# Patient Record
Sex: FEMALE | Race: WHITE | Employment: OTHER | URBAN - METROPOLITAN AREA
[De-identification: names, ages, dates, MRNs, and addresses within clinical notes are randomized per-mention and may not be internally consistent; named-entity substitution may affect disease eponyms.]

---

## 2017-01-06 ENCOUNTER — APPOINTMENT (OUTPATIENT)
Dept: LAB | Facility: HOSPITAL | Age: 62
End: 2017-01-06
Attending: INTERNAL MEDICINE
Payer: MEDICARE

## 2017-01-06 ENCOUNTER — HOSPITAL ENCOUNTER (OUTPATIENT)
Dept: GENERAL RADIOLOGY | Facility: HOSPITAL | Age: 62
Discharge: HOME | End: 2017-01-06
Attending: INTERNAL MEDICINE
Payer: MEDICARE

## 2017-01-06 ENCOUNTER — TELEPHONE (OUTPATIENT)
Dept: RHEUMATOLOGY | Facility: CLINIC | Age: 62
End: 2017-01-06

## 2017-01-06 ENCOUNTER — OFFICE VISIT (OUTPATIENT)
Dept: RHEUMATOLOGY | Facility: CLINIC | Age: 62
End: 2017-01-06

## 2017-01-06 VITALS
HEART RATE: 83 BPM | WEIGHT: 130 LBS | BODY MASS INDEX: 24.55 KG/M2 | SYSTOLIC BLOOD PRESSURE: 125 MMHG | DIASTOLIC BLOOD PRESSURE: 81 MMHG | HEIGHT: 61 IN

## 2017-01-06 DIAGNOSIS — I73.00 RAYNAUD'S DISEASE WITHOUT GANGRENE: ICD-10-CM

## 2017-01-06 DIAGNOSIS — M25.511 CHRONIC RIGHT SHOULDER PAIN: ICD-10-CM

## 2017-01-06 DIAGNOSIS — G89.29 CHRONIC RIGHT SHOULDER PAIN: ICD-10-CM

## 2017-01-06 DIAGNOSIS — M15.9 GENERALIZED OA: ICD-10-CM

## 2017-01-06 DIAGNOSIS — M25.50 POLYARTHRALGIA: ICD-10-CM

## 2017-01-06 DIAGNOSIS — M15.9 GENERALIZED OA: Primary | ICD-10-CM

## 2017-01-06 LAB
ALBUMIN SERPL BCP-MCNC: 4.3 G/DL (ref 3.5–4.8)
ALBUMIN/GLOB SERPL: 1.6 {RATIO} (ref 1–2)
ALP SERPL-CCNC: 67 U/L (ref 32–100)
ALT SERPL-CCNC: 13 U/L (ref 14–54)
ANION GAP SERPL CALC-SCNC: 10 MMOL/L (ref 0–18)
AST SERPL-CCNC: 15 U/L (ref 15–41)
BILIRUB SERPL-MCNC: 0.6 MG/DL (ref 0.3–1.2)
BUN SERPL-MCNC: 5 MG/DL (ref 8–20)
BUN/CREAT SERPL: 11.4 (ref 10–20)
CALCIUM SERPL-MCNC: 9.3 MG/DL (ref 8.5–10.5)
CHLORIDE SERPL-SCNC: 96 MMOL/L (ref 95–110)
CO2 SERPL-SCNC: 29 MMOL/L (ref 22–32)
CREAT SERPL-MCNC: 0.44 MG/DL (ref 0.5–1.5)
CRP SERPL-MCNC: 0.6 MG/DL (ref 0–0.9)
ERYTHROCYTE [DISTWIDTH] IN BLOOD BY AUTOMATED COUNT: 13.1 % (ref 11–15)
ERYTHROCYTE [SEDIMENTATION RATE] IN BLOOD: 12 MM/HR (ref 0–30)
GLOBULIN PLAS-MCNC: 2.7 G/DL (ref 2.5–3.7)
GLUCOSE SERPL-MCNC: 89 MG/DL (ref 70–99)
HCT VFR BLD AUTO: 39.3 % (ref 35–48)
HGB BLD-MCNC: 13.2 G/DL (ref 12–16)
MCH RBC QN AUTO: 30.7 PG (ref 27–32)
MCHC RBC AUTO-ENTMCNC: 33.6 G/DL (ref 32–37)
MCV RBC AUTO: 91.3 FL (ref 80–100)
OSMOLALITY UR CALC.SUM OF ELEC: 277 MOSM/KG (ref 275–295)
PLATELET # BLD AUTO: 233 K/UL (ref 140–400)
PMV BLD AUTO: 7.5 FL (ref 7.4–10.3)
POTASSIUM SERPL-SCNC: 4.2 MMOL/L (ref 3.3–5.1)
PROT SERPL-MCNC: 7 G/DL (ref 5.9–8.4)
RBC # BLD AUTO: 4.31 M/UL (ref 3.7–5.4)
RHEUMATOID FACT SER QL: <5 IU/ML
SODIUM SERPL-SCNC: 135 MMOL/L (ref 136–144)
URATE SERPL-MCNC: 3 MG/DL (ref 2.1–7.4)
WBC # BLD AUTO: 5 K/UL (ref 4–11)

## 2017-01-06 PROCEDURE — 84550 ASSAY OF BLOOD/URIC ACID: CPT

## 2017-01-06 PROCEDURE — G0463 HOSPITAL OUTPT CLINIC VISIT: HCPCS | Performed by: INTERNAL MEDICINE

## 2017-01-06 PROCEDURE — 85652 RBC SED RATE AUTOMATED: CPT

## 2017-01-06 PROCEDURE — 99214 OFFICE O/P EST MOD 30 MIN: CPT | Performed by: INTERNAL MEDICINE

## 2017-01-06 PROCEDURE — 85027 COMPLETE CBC AUTOMATED: CPT

## 2017-01-06 PROCEDURE — 86140 C-REACTIVE PROTEIN: CPT

## 2017-01-06 PROCEDURE — 36415 COLL VENOUS BLD VENIPUNCTURE: CPT

## 2017-01-06 PROCEDURE — 86200 CCP ANTIBODY: CPT

## 2017-01-06 PROCEDURE — 73030 X-RAY EXAM OF SHOULDER: CPT

## 2017-01-06 PROCEDURE — 80053 COMPREHEN METABOLIC PANEL: CPT

## 2017-01-06 PROCEDURE — 86431 RHEUMATOID FACTOR QUANT: CPT

## 2017-01-06 PROCEDURE — 20610 DRAIN/INJ JOINT/BURSA W/O US: CPT | Performed by: INTERNAL MEDICINE

## 2017-01-06 RX ORDER — TRIAMCINOLONE ACETONIDE 40 MG/ML
40 INJECTION, SUSPENSION INTRA-ARTICULAR; INTRAMUSCULAR ONCE
Status: COMPLETED | OUTPATIENT
Start: 2017-01-06 | End: 2017-01-06

## 2017-01-06 RX ORDER — ACETAMINOPHEN AND CODEINE PHOSPHATE 300; 30 MG/1; MG/1
1 TABLET ORAL EVERY 12 HOURS PRN
Qty: 60 TABLET | Refills: 3 | Status: SHIPPED | OUTPATIENT
Start: 2017-01-06 | End: 2017-03-10

## 2017-01-06 NOTE — PROCEDURES
With paitent's consent, I injected pt's right shoulder with 1ml lidocaine 1 % and 1 ml kenalog 40. It was done under sterile technique using iodine and alcohol swabs and ethyl chloride was used as an anaesthetic spray. Pt.  tolerated it well.

## 2017-01-06 NOTE — PROGRESS NOTES
Daniele Gruber is a 59year old female. HPI:   Patient presents with:  Osteoarthritis: right shoulder pain  Joint Pain: wrist and left ankle pain,finger pain      I saw Farhan Rothman on 4/6/2015. I had last seen her on 12/17/2015.  I had last seen her on   1/ feels ibuporfen makes her jumpy and irritable. No gastirits. She doesn't feel it helps her pain. Her hips are starting to hurt and her right collar bone was hurting and wrists were hurting in the past 2 weeks. No swleling in any other joints.  Not much sw albuterol sulfate (2.5 MG/3ML) 0.083% Inhalation Nebu Soln Take 3 mL (2.5 mg total) by nebulization every 4 (four) hours as needed for Wheezing. Disp: 60 ampule Rfl: 1   QUEtiapine Fumarate 50 MG Oral Tab Take 50 mg by mouth nightly.    Disp:  Rfl: 2   PA VOLUME      80.0 - 100.0 FL      MEAN CORPUSCULAR HEMOGLOBIN      27.0 - 32.0 PG      MEAN CORPUSCULAR HEMOGLOBIN CO      32.0 - 37.0 G/DL      RED CELL DISTRIBUTION WIDTH      11.0 - 15.0 %      PLATELET COUNT (L)      140 - 400 K/UL      MEAN PLATELET VO Bilateral merchant views were obtained today and reveals adequate placement of total knee arthroplasty on the left and narrowing of the patellofemoral compartment on the right. 10/22/2015  Ct chest   1. Coronary artery calcifications.   2. Pulmonary ate [place] to see if pseudogout   - check b/l knee xrays and then mri of knees -   - norco 5/325mg given #45 prn for pain  - prednisone 10mg taper given in the past and that's helped her. - but not this time   - She will return to clinic in 6 months.    - Work

## 2017-01-06 NOTE — PATIENT INSTRUCTIONS
1. Cont. Tylenol #3 every 12 hours - 3 refills given this week   2. Rest right shoudler x 3 days   3. Return to clinic in  6 months. 4. Check righ thsoudler xray   5. Check labs  6.  Physical therpay - right hsoudler x 2

## 2017-01-10 ENCOUNTER — TELEPHONE (OUTPATIENT)
Dept: RHEUMATOLOGY | Facility: CLINIC | Age: 62
End: 2017-01-10

## 2017-01-11 LAB — CCP IGG SERPL-ACNC: 0.7 U/ML (ref 0–6.9)

## 2017-01-11 NOTE — TELEPHONE ENCOUNTER
Not able to reach pt - mobile not able to leave message   - ok to mail pt.  Xray - of shoulder and mail normal lab letter -

## 2017-01-16 RX ORDER — AMLODIPINE BESYLATE 5 MG/1
TABLET ORAL
Qty: 30 TABLET | Refills: 6 | Status: SHIPPED | OUTPATIENT
Start: 2017-01-16 | End: 2017-08-17

## 2017-02-06 ENCOUNTER — TELEPHONE (OUTPATIENT)
Dept: FAMILY MEDICINE CLINIC | Facility: CLINIC | Age: 62
End: 2017-02-06

## 2017-02-06 DIAGNOSIS — Z13.89 ENCOUNTER FOR SURVEILLANCE OF ABNORMAL NEVI: Primary | ICD-10-CM

## 2017-02-06 NOTE — TELEPHONE ENCOUNTER
Pt states that she saw Dr. Anabell Anglin several years ago. She showed him a brown \"spot\" on her back and he was not concerned about it at the time. Pt's daughter looked at it and it has changed in shape. Pt does not want to come in to [facility] for it.  She prefers

## 2017-02-06 NOTE — TELEPHONE ENCOUNTER
Pt is asking to have a referral to see the Derm Doc   Pt stts she has a spot on her back and it has gotten larger now   Pt stts that spot is starting to itch   Good call back [phone]

## 2017-02-07 ENCOUNTER — OFFICE VISIT (OUTPATIENT)
Dept: PHYSICAL THERAPY | Age: 62
End: 2017-02-07
Attending: INTERNAL MEDICINE
Payer: MEDICARE

## 2017-02-07 DIAGNOSIS — M25.511 CHRONIC RIGHT SHOULDER PAIN: Primary | ICD-10-CM

## 2017-02-07 DIAGNOSIS — G89.29 CHRONIC RIGHT SHOULDER PAIN: Primary | ICD-10-CM

## 2017-02-07 DIAGNOSIS — M15.9 GENERALIZED OA: ICD-10-CM

## 2017-02-07 PROCEDURE — 97110 THERAPEUTIC EXERCISES: CPT

## 2017-02-07 PROCEDURE — 97162 PT EVAL MOD COMPLEX 30 MIN: CPT

## 2017-02-07 NOTE — PROGRESS NOTES
PHYSICAL THERAPY EVALUATION:   Referring Physician: Dr. Erinn Carpenter  Date of Onset 1/6/2017    Date of Service: 2/7/2017   Diagnosis: Chronic right shoulder pain (M25.511,G89.29)  Generalized OA (M15.9)    PATIENT SUMMARY     Olimpia Jacome is a 59year old disease    Patient/Family Goal: for the pain to go away           ASSESSMENT:   Eric presented to therapy with diagnosis of Chronic right shoulder pain (M25.511,G89.29) Generalized OA (M15.9) .  Eric presented with following co-morbidities OA, B TKR, dyspnea, --- ---   MIdtraps -- -- NT this day NT   Rhombois -- -- NT NT   Lower taps -- -- NT NT     Special tests:highly irritable no special test done this day  Today’s Treatment :  1. PROM x10 all planes: better  2.  Supine shoulder flexion as tolerated assisted any questions, please contact me at Dept: [phone]    Sincerely,  Electronically signed by therapist: Krissy Mauricio, PT    [de-identified] certification required: Yes  I certify the need for these services furnished under this plan of treatment and while un

## 2017-02-07 NOTE — TELEPHONE ENCOUNTER
Pt called on the status of this referral. Dr. Johana Perera did authorize a referral to derm.  Pt was transferred to Derm to schedule

## 2017-02-13 ENCOUNTER — APPOINTMENT (OUTPATIENT)
Dept: PHYSICAL THERAPY | Age: 62
End: 2017-02-13
Attending: INTERNAL MEDICINE
Payer: MEDICARE

## 2017-02-14 ENCOUNTER — APPOINTMENT (OUTPATIENT)
Dept: PHYSICAL THERAPY | Age: 62
End: 2017-02-14
Attending: INTERNAL MEDICINE
Payer: MEDICARE

## 2017-02-18 RX ORDER — PANTOPRAZOLE SODIUM 40 MG/1
TABLET, DELAYED RELEASE ORAL
Qty: 30 TABLET | Refills: 5 | Status: SHIPPED | OUTPATIENT
Start: 2017-02-18 | End: 2017-08-17

## 2017-02-21 ENCOUNTER — TELEPHONE (OUTPATIENT)
Dept: PHYSICAL THERAPY | Age: 62
End: 2017-02-21

## 2017-02-21 ENCOUNTER — APPOINTMENT (OUTPATIENT)
Dept: PHYSICAL THERAPY | Age: 62
End: 2017-02-21
Attending: INTERNAL MEDICINE
Payer: MEDICARE

## 2017-03-01 ENCOUNTER — HOSPITAL ENCOUNTER (OUTPATIENT)
Dept: MAMMOGRAPHY | Facility: HOSPITAL | Age: 62
Discharge: HOME | End: 2017-03-01
Attending: FAMILY MEDICINE
Payer: MEDICARE

## 2017-03-01 DIAGNOSIS — Z13.9 ENCOUNTER FOR SCREENING: ICD-10-CM

## 2017-03-01 PROCEDURE — 77067 SCR MAMMO BI INCL CAD: CPT

## 2017-03-07 ENCOUNTER — OFFICE VISIT (OUTPATIENT)
Dept: DERMATOLOGY CLINIC | Facility: CLINIC | Age: 62
End: 2017-03-07

## 2017-03-07 DIAGNOSIS — L82.1 SEBORRHEIC KERATOSES: Primary | ICD-10-CM

## 2017-03-07 PROCEDURE — 99212 OFFICE O/P EST SF 10 MIN: CPT | Performed by: DERMATOLOGY

## 2017-03-07 PROCEDURE — G0463 HOSPITAL OUTPT CLINIC VISIT: HCPCS | Performed by: DERMATOLOGY

## 2017-03-07 RX ORDER — DULOXETIN HYDROCHLORIDE 30 MG/1
CAPSULE, DELAYED RELEASE ORAL ONCE AS NEEDED
Refills: 2 | COMMUNITY
Start: 2017-02-27 | End: 2017-07-03

## 2017-03-07 NOTE — PROGRESS NOTES
HPI:     Chief Complaint     Lesion        HPI     Lesion    Additional comments: LOV 9/2015 with SD. Patient with lesion to back x few years. Area is growing. Per patient, \"Dr. Sophie Fountain looked at the area and said it was fine. \" Denies bleeding.  Personal • Ventral hernia      repair   • Appendicitis      Appendectomy   • Cataract      OD, Phaco w/ PC IOL 1999   • Mature cataract 2009     OS   • Capsular opacification 2009     Opacified Capsule OD   • Left inguinal hernia      repair w/ mesh 2009   • Left No     Social History Narrative     Family History   Problem Relation Age of Onset   • Dementia Father      Alzheimer's   • Lipids Father      hyperlipidemia   • Hypertension Father    • Musculo-skelatal Disorder Father      B/L knees replaced   • Cancer B

## 2017-03-10 ENCOUNTER — TELEPHONE (OUTPATIENT)
Dept: RHEUMATOLOGY | Facility: CLINIC | Age: 62
End: 2017-03-10

## 2017-03-10 RX ORDER — ACETAMINOPHEN AND CODEINE PHOSPHATE 300; 30 MG/1; MG/1
1 TABLET ORAL EVERY 8 HOURS PRN
Qty: 90 TABLET | Refills: 3 | OUTPATIENT
Start: 2017-03-10 | End: 2017-07-03

## 2017-03-10 NOTE — TELEPHONE ENCOUNTER
Pharmacy states pt is currently at the pharmacy requesting a refill for Her tylenon 3, states pt is taking it more than frequently.   Pharmacist states pt is willing to pay out of pocket for medication, but is asking if MD can approve for more than one tabl

## 2017-04-23 ENCOUNTER — HOSPITAL ENCOUNTER (OUTPATIENT)
Facility: HOSPITAL | Age: 62
Setting detail: OBSERVATION
Discharge: HOME | End: 2017-04-24
Attending: EMERGENCY MEDICINE | Admitting: INTERNAL MEDICINE
Payer: MEDICARE

## 2017-04-23 ENCOUNTER — APPOINTMENT (OUTPATIENT)
Dept: GENERAL RADIOLOGY | Facility: HOSPITAL | Age: 62
End: 2017-04-23
Attending: EMERGENCY MEDICINE
Payer: MEDICARE

## 2017-04-23 DIAGNOSIS — R07.9 ACUTE CHEST PAIN: Primary | ICD-10-CM

## 2017-04-23 DIAGNOSIS — R00.2 PALPITATIONS: ICD-10-CM

## 2017-04-23 PROBLEM — R73.9 HYPERGLYCEMIA: Status: ACTIVE | Noted: 2017-04-23

## 2017-04-23 PROBLEM — E87.1 HYPONATREMIA: Status: ACTIVE | Noted: 2017-04-23

## 2017-04-23 PROCEDURE — 36415 COLL VENOUS BLD VENIPUNCTURE: CPT

## 2017-04-23 PROCEDURE — 71010 XR CHEST AP PORTABLE  (CPT=71010): CPT

## 2017-04-23 PROCEDURE — 84484 ASSAY OF TROPONIN QUANT: CPT | Performed by: INTERNAL MEDICINE

## 2017-04-23 PROCEDURE — 84484 ASSAY OF TROPONIN QUANT: CPT | Performed by: EMERGENCY MEDICINE

## 2017-04-23 PROCEDURE — 85025 COMPLETE CBC W/AUTO DIFF WBC: CPT | Performed by: EMERGENCY MEDICINE

## 2017-04-23 PROCEDURE — 99285 EMERGENCY DEPT VISIT HI MDM: CPT

## 2017-04-23 PROCEDURE — 96372 THER/PROPH/DIAG INJ SC/IM: CPT

## 2017-04-23 PROCEDURE — 93005 ELECTROCARDIOGRAM TRACING: CPT

## 2017-04-23 PROCEDURE — 80048 BASIC METABOLIC PNL TOTAL CA: CPT | Performed by: EMERGENCY MEDICINE

## 2017-04-23 PROCEDURE — 93010 ELECTROCARDIOGRAM REPORT: CPT | Performed by: EMERGENCY MEDICINE

## 2017-04-23 RX ORDER — DULOXETIN HYDROCHLORIDE 30 MG/1
60 CAPSULE, DELAYED RELEASE ORAL DAILY
Status: DISCONTINUED | OUTPATIENT
Start: 2017-04-23 | End: 2017-04-24

## 2017-04-23 RX ORDER — PANTOPRAZOLE SODIUM 40 MG/1
40 TABLET, DELAYED RELEASE ORAL
Status: DISCONTINUED | OUTPATIENT
Start: 2017-04-23 | End: 2017-04-24

## 2017-04-23 RX ORDER — ASPIRIN 81 MG/1
324 TABLET, CHEWABLE ORAL ONCE
Status: COMPLETED | OUTPATIENT
Start: 2017-04-23 | End: 2017-04-23

## 2017-04-23 RX ORDER — CLONAZEPAM 1 MG/1
2 TABLET ORAL NIGHTLY
Status: DISCONTINUED | OUTPATIENT
Start: 2017-04-23 | End: 2017-04-24

## 2017-04-23 RX ORDER — ONDANSETRON 2 MG/ML
4 INJECTION INTRAMUSCULAR; INTRAVENOUS ONCE
Status: DISCONTINUED | OUTPATIENT
Start: 2017-04-23 | End: 2017-04-23

## 2017-04-23 RX ORDER — QUETIAPINE 25 MG/1
50 TABLET, FILM COATED ORAL NIGHTLY
Status: DISCONTINUED | OUTPATIENT
Start: 2017-04-23 | End: 2017-04-24

## 2017-04-23 RX ORDER — AMLODIPINE BESYLATE 5 MG/1
5 TABLET ORAL
Status: DISCONTINUED | OUTPATIENT
Start: 2017-04-23 | End: 2017-04-24

## 2017-04-23 RX ORDER — ARIPIPRAZOLE 5 MG/1
7.5 TABLET ORAL NIGHTLY
Status: DISCONTINUED | OUTPATIENT
Start: 2017-04-23 | End: 2017-04-24

## 2017-04-23 RX ORDER — ALBUTEROL SULFATE 2.5 MG/3ML
2.5 SOLUTION RESPIRATORY (INHALATION) EVERY 4 HOURS PRN
Status: DISCONTINUED | OUTPATIENT
Start: 2017-04-23 | End: 2017-04-24

## 2017-04-23 RX ORDER — ENOXAPARIN SODIUM 100 MG/ML
40 INJECTION SUBCUTANEOUS DAILY
Status: DISCONTINUED | OUTPATIENT
Start: 2017-04-23 | End: 2017-04-24

## 2017-04-23 RX ORDER — ACETAMINOPHEN AND CODEINE PHOSPHATE 300; 30 MG/1; MG/1
1 TABLET ORAL EVERY 8 HOURS PRN
Status: DISCONTINUED | OUTPATIENT
Start: 2017-04-23 | End: 2017-04-24

## 2017-04-23 NOTE — ED PROVIDER NOTES
Patient Seen in: [facility] AND [facility] Emergency Department    History   Patient presents with:  Palpitations    Stated Complaint: palpitations    HPI    Pt presents to ED after taking extra dose of anxiety medicine at home.  She induced vomiting after that a Comment excisional (fibrocystic changes)    COLONOSCOPY  7/2009    Comment incomplete (chronic constipation)    CATARACT      JESSICA NEEDLE LOCALIZATION W/ SPECIMEN 1 SITE LEFT         Medications :   Acetaminophen-Codeine 300-30 MG Oral Tab,  Take 1 tablet b Packs/Day: 0.00  Years: 10        Types: Cigarettes      Quit date: 01/01/2008    Comment: 1 unit per day    Alcohol Use: No                Review of Systems    Positive for stated complaint: palpitations  Other systems are as noted in HPI.   Con following orders were created for panel order CBC WITH DIFFERENTIAL WITH PLATELET.   Procedure                               Abnormality         Status                     ---------                               -----------         ------

## 2017-04-23 NOTE — CONSULTS
[facility]    PATIENT'S NAME: Fawn Whiting   ATTENDING PHYSICIAN: Rissa Alonzo MD   CONSULTING PHYSICIAN: Babak Griffin Mt, DO   PATIENT ACCOUNT#:   [de-identified]    LOCATION:  [address] [place] [address] #:   [id number]       DATE OF BIRTH:  [birth year] but she was in her 74s when this happened. PHYSICAL EXAMINATION:    GENERAL:  No acute distress. VITAL SIGNS:  Blood pressure 134/59, maximum 146/70. The patient is afebrile, pulse 85, respirations 18. HEENT:  No scleral icterus appreciated.     NEC her telemetry is normal, and we will continue to monitor for any arrhythmias. She should have an outpatient lipid profile to assess her long-term risk for coronary disease.   She has stated she does not have any coronary calcifications to suggest any early

## 2017-04-23 NOTE — ED NOTES
Received pt via wheelchair. Pt is A&OX4. Pt reports taking, \"Clonazepam 2mg; Quetiapine Fumarate 50mg;aripiprazole 5mg\" tonight. Pt reports taking an extra pill of each b/c \"couldn't remember if she took it or not\".  Pt states, \"Once I realized I alrea

## 2017-04-23 NOTE — PLAN OF CARE
Maintains optimal cardiac output and hemodynamic stability Progressing      Absence of cardiac arrhythmias or at baseline Progressing      Minimal or absence of nausea and vomiting Progressing      Maintains adequate nutritional intake (undernourished) Pro

## 2017-04-23 NOTE — H&P
[facility]    PATIENT'S NAME: Marcia Lynn   ATTENDING PHYSICIAN: Kiarra Wayne MD   PATIENT ACCOUNT#:   [id number]    LOCATION:  [address] [place] [address] #:   [id number]       YOB: 1955  ADMISSION DATE:       04/23/2017 rhythm. ASSESSMENT AND PLAN:    1. Chest pain with palpitations. Need to rule out CAD. We will get cardiology evaluation. EKG showed normal sinus rhythm. Could be related to anxiety. 2.   Hypertension which is controlled. 3.   Bipolar disorder.

## 2017-04-24 ENCOUNTER — APPOINTMENT (OUTPATIENT)
Dept: NUCLEAR MEDICINE | Facility: HOSPITAL | Age: 62
End: 2017-04-24
Attending: INTERNAL MEDICINE
Payer: MEDICARE

## 2017-04-24 ENCOUNTER — APPOINTMENT (OUTPATIENT)
Dept: CV DIAGNOSTICS | Facility: HOSPITAL | Age: 62
End: 2017-04-24
Attending: INTERNAL MEDICINE
Payer: MEDICARE

## 2017-04-24 VITALS
BODY MASS INDEX: 23.17 KG/M2 | RESPIRATION RATE: 20 BRPM | WEIGHT: 125.88 LBS | HEIGHT: 62 IN | TEMPERATURE: 97 F | OXYGEN SATURATION: 97 % | DIASTOLIC BLOOD PRESSURE: 72 MMHG | SYSTOLIC BLOOD PRESSURE: 129 MMHG | HEART RATE: 96 BPM

## 2017-04-24 PROCEDURE — 78452 HT MUSCLE IMAGE SPECT MULT: CPT | Performed by: NUCLEAR MEDICINE

## 2017-04-24 PROCEDURE — 93306 TTE W/DOPPLER COMPLETE: CPT | Performed by: INTERNAL MEDICINE

## 2017-04-24 PROCEDURE — 93017 CV STRESS TEST TRACING ONLY: CPT | Performed by: NUCLEAR MEDICINE

## 2017-04-24 PROCEDURE — 96372 THER/PROPH/DIAG INJ SC/IM: CPT

## 2017-04-24 PROCEDURE — 93016 CV STRESS TEST SUPVJ ONLY: CPT | Performed by: INTERNAL MEDICINE

## 2017-04-24 PROCEDURE — 93018 CV STRESS TEST I&R ONLY: CPT | Performed by: INTERNAL MEDICINE

## 2017-04-24 PROCEDURE — 93306 TTE W/DOPPLER COMPLETE: CPT

## 2017-04-24 RX ORDER — 0.9 % SODIUM CHLORIDE 0.9 %
VIAL (ML) INJECTION
Status: COMPLETED
Start: 2017-04-24 | End: 2017-04-24

## 2017-04-24 NOTE — PROGRESS NOTES
[facility]    Progress Note    Ashleigh Swan Patient Status:  Observation    1955 MRN [id number]   [address] [place] [address] [address] 3W/SW Attending [address], [address] [place] [address] Day # 1 PCP Ashly Solomon MD       Subjective:   Candido Bergeron 04/23/2017   CREATSERUM 0.57 04/23/2017   BUN 9 04/23/2017   * 04/23/2017   K 3.9 04/23/2017   CL 97 04/23/2017   CO2 28 04/23/2017   * 04/23/2017   CA 9.7 04/23/2017   ALB 4.3 01/06/2017   ALKPHO 67 01/06/2017   BILT 0.6 01/06/2017   TP 7.0 0

## 2017-04-24 NOTE — PLAN OF CARE
CARDIOVASCULAR - ADULT    • Maintains optimal cardiac output and hemodynamic stability Adequate for Discharge    • Absence of cardiac arrhythmias or at baseline Adequate for Discharge        GASTROINTESTINAL - ADULT    • Minimal or absence of nausea and vo

## 2017-05-03 RX ORDER — IBUPROFEN 600 MG/1
TABLET ORAL
Qty: 60 TABLET | Refills: 2 | Status: SHIPPED | OUTPATIENT
Start: 2017-05-03 | End: 2017-07-19

## 2017-05-10 ENCOUNTER — TELEPHONE (OUTPATIENT)
Dept: PULMONOLOGY | Facility: CLINIC | Age: 62
End: 2017-05-10

## 2017-05-10 RX ORDER — AMOXICILLIN AND CLAVULANATE POTASSIUM 500; 125 MG/1; MG/1
1 TABLET, FILM COATED ORAL 2 TIMES DAILY
Qty: 14 TABLET | Refills: 0 | Status: SHIPPED | OUTPATIENT
Start: 2017-05-10 | End: 2017-06-01

## 2017-05-10 NOTE — TELEPHONE ENCOUNTER
Eric c/o dry cough, feeling like she is choking, chest & throat burning, joint pain, slight headache, intermittent dyspnea when she is aggravated, & feeling warm onset Tuesday night. She stts she was sent home from work today.  Pt denies wheezing or any othe

## 2017-05-10 NOTE — TELEPHONE ENCOUNTER
Pt states she has a cough, and feels throat burning when coughs, no phlegm or discharge, joint pain, headache, requesting if possible to send Rx since per pt due to her insurance problem she cannot come in, Please Advise.

## 2017-05-10 NOTE — TELEPHONE ENCOUNTER
V/o received from Dr. Prakash Delatorre for Augmentin 500-125 mg oral tab take 1 tab po two times daily, Q #14, R #0. Rx sent to pharmacy. Notified pt of Dr. Jodi Bui orders. She verbalized understanding.

## 2017-05-12 ENCOUNTER — OFFICE VISIT (OUTPATIENT)
Dept: FAMILY MEDICINE CLINIC | Facility: CLINIC | Age: 62
End: 2017-05-12

## 2017-05-12 ENCOUNTER — TELEPHONE (OUTPATIENT)
Dept: PULMONOLOGY | Facility: CLINIC | Age: 62
End: 2017-05-12

## 2017-05-12 VITALS
WEIGHT: 129 LBS | TEMPERATURE: 98 F | HEART RATE: 74 BPM | SYSTOLIC BLOOD PRESSURE: 107 MMHG | HEIGHT: 62 IN | RESPIRATION RATE: 22 BRPM | DIASTOLIC BLOOD PRESSURE: 67 MMHG | BODY MASS INDEX: 23.74 KG/M2

## 2017-05-12 DIAGNOSIS — J06.9 ACUTE UPPER RESPIRATORY INFECTION: Primary | ICD-10-CM

## 2017-05-12 PROCEDURE — G0463 HOSPITAL OUTPT CLINIC VISIT: HCPCS | Performed by: PHYSICIAN ASSISTANT

## 2017-05-12 PROCEDURE — 99213 OFFICE O/P EST LOW 20 MIN: CPT | Performed by: PHYSICIAN ASSISTANT

## 2017-05-12 RX ORDER — AZITHROMYCIN 250 MG/1
TABLET, FILM COATED ORAL
Qty: 6 TABLET | Refills: 0 | Status: SHIPPED | OUTPATIENT
Start: 2017-05-12 | End: 2017-06-01

## 2017-05-12 RX ORDER — METHYLPREDNISOLONE 4 MG/1
TABLET ORAL
Qty: 1 KIT | Refills: 0 | Status: SHIPPED | OUTPATIENT
Start: 2017-05-12 | End: 2017-06-01

## 2017-05-12 NOTE — PROGRESS NOTES
HPI:    Patient ID: Kimmie Thomas is a 57year old female. HPI Comments: Patient presents for cough, sore throat, headache, and fever for past two days. She has history of COPD and has been feeling shortness of breath since symptom onset.   She has been tablet Rfl: 6   albuterol sulfate (2.5 MG/3ML) 0.083% Inhalation Nebu Soln Take 3 mL (2.5 mg total) by nebulization every 4 (four) hours as needed for Wheezing. Disp: 60 ampule Rfl: 1   QUEtiapine Fumarate 50 MG Oral Tab Take 50 mg by mouth nightly.    Disp symptomatic treatment as needed. Patient instructed to follow up if symptoms persist or worsen. Advised patient to go to ER with any severe symptoms. No orders of the defined types were placed in this encounter.        Meds This Visit:  Terri Larsen

## 2017-05-12 NOTE — TELEPHONE ENCOUNTER
Pt has Dry Cough & sore throat - requesting for cough syrup. Pls call - aware PJC is not in office. Thank you.

## 2017-05-12 NOTE — TELEPHONE ENCOUNTER
Spoke with pt she stts she is currently on Amoxicillin for cough and sore throat but is requesting rx for cough syrup. Informed pt unfortunately no MDs in clinic today to physically sign rx for cough syrup she will have to call PCP.  Pt reports she does not

## 2017-06-01 ENCOUNTER — HOSPITAL ENCOUNTER (OUTPATIENT)
Age: 62
Discharge: HOME | End: 2017-06-01
Payer: MEDICARE

## 2017-06-01 VITALS
TEMPERATURE: 99 F | DIASTOLIC BLOOD PRESSURE: 73 MMHG | WEIGHT: 190 LBS | OXYGEN SATURATION: 97 % | HEART RATE: 72 BPM | SYSTOLIC BLOOD PRESSURE: 154 MMHG | RESPIRATION RATE: 18 BRPM | HEIGHT: 67 IN | BODY MASS INDEX: 29.82 KG/M2

## 2017-06-01 DIAGNOSIS — J02.0 STREP PHARYNGITIS: Primary | ICD-10-CM

## 2017-06-01 PROCEDURE — 87430 STREP A AG IA: CPT

## 2017-06-01 PROCEDURE — 99214 OFFICE O/P EST MOD 30 MIN: CPT

## 2017-06-01 PROCEDURE — 94640 AIRWAY INHALATION TREATMENT: CPT

## 2017-06-01 RX ORDER — AMOXICILLIN 875 MG/1
875 TABLET, COATED ORAL 2 TIMES DAILY
Qty: 20 TABLET | Refills: 0 | Status: SHIPPED | OUTPATIENT
Start: 2017-06-01 | End: 2017-06-11

## 2017-06-01 RX ORDER — PREDNISONE 20 MG/1
40 TABLET ORAL DAILY
Qty: 8 TABLET | Refills: 0 | Status: SHIPPED | OUTPATIENT
Start: 2017-06-02 | End: 2017-06-06

## 2017-06-01 RX ORDER — PREDNISONE 20 MG/1
60 TABLET ORAL ONCE
Status: COMPLETED | OUTPATIENT
Start: 2017-06-01 | End: 2017-06-01

## 2017-06-01 RX ORDER — ALBUTEROL SULFATE 2.5 MG/3ML
2.5 SOLUTION RESPIRATORY (INHALATION) ONCE
Status: COMPLETED | OUTPATIENT
Start: 2017-06-01 | End: 2017-06-01

## 2017-06-01 NOTE — ED INITIAL ASSESSMENT (HPI)
Reports sore throat and cough starting last night. History of COPD. Also c/o \"chest burning. \"  Chinyere Reeks medication for cough without adequate relief in symptoms.

## 2017-06-01 NOTE — ED PROVIDER NOTES
Patient Seen in: [facility]    History   Patient presents with:  Sore Throat  Cough/URI    Stated Complaint: SORE THROAT/COUGH    HPI    Patient is a 25-year-old with a history of COPD who presents for evaluation of sore hernia re-repair (L inguinal pain)    OTHER SURGICAL HISTORY      Comment Arthrocentesis of the right knee joint    OTHER SURGICAL HISTORY      Comment Arthrocentesis of the left knee joint    BREAST BIOPSY  9/2011    Comment excisional (fibrocystic change Arthritis   • Glaucoma Neg      multiple   • Macular degeneration Neg      multiple   • Colon Cancer Maternal Grandmother    • Asthma Daughter    • Breast Cancer Sister 36   • Ovarian Cancer Sister    • Other[other] [OTHER] Sister      flu H1N1   • Breast normal. No respiratory distress. She has wheezes in the right middle field and the right lower field. She has no rhonchi. She has no rales. She exhibits no tenderness.    Mild expiratory wheezing, no retractions, no rhonchi or rales, no respiratory distress

## 2017-06-07 NOTE — DISCHARGE SUMMARY
[facility]    PATIENT'S NAME: Blade Alberts   ATTENDING PHYSICIAN: Wyatt Rincon MD   PATIENT ACCOUNT#:   [id number]    LOCATION:  [address] [place] [address] #:   [id number]       YOB: 1955  ADMISSION DATE:       04/23/2017

## 2017-07-03 ENCOUNTER — OFFICE VISIT (OUTPATIENT)
Dept: RHEUMATOLOGY | Facility: CLINIC | Age: 62
End: 2017-07-03

## 2017-07-03 VITALS
BODY MASS INDEX: 24.11 KG/M2 | HEIGHT: 62 IN | HEART RATE: 81 BPM | TEMPERATURE: 97 F | SYSTOLIC BLOOD PRESSURE: 122 MMHG | WEIGHT: 131 LBS | DIASTOLIC BLOOD PRESSURE: 77 MMHG

## 2017-07-03 DIAGNOSIS — I73.00 RAYNAUD'S DISEASE WITHOUT GANGRENE: ICD-10-CM

## 2017-07-03 DIAGNOSIS — M15.9 GENERALIZED OA: Primary | ICD-10-CM

## 2017-07-03 DIAGNOSIS — M79.10 MYALGIA: ICD-10-CM

## 2017-07-03 PROCEDURE — 99214 OFFICE O/P EST MOD 30 MIN: CPT | Performed by: INTERNAL MEDICINE

## 2017-07-03 PROCEDURE — G0463 HOSPITAL OUTPT CLINIC VISIT: HCPCS | Performed by: INTERNAL MEDICINE

## 2017-07-03 RX ORDER — PREDNISONE 10 MG/1
TABLET ORAL
Qty: 21 TABLET | Refills: 0 | Status: SHIPPED | OUTPATIENT
Start: 2017-07-03 | End: 2017-07-19

## 2017-07-03 RX ORDER — GABAPENTIN 300 MG/1
300 CAPSULE ORAL NIGHTLY
Qty: 30 CAPSULE | Refills: 1 | Status: SHIPPED | OUTPATIENT
Start: 2017-07-03 | End: 2017-07-19

## 2017-07-03 RX ORDER — ACETAMINOPHEN AND CODEINE PHOSPHATE 300; 30 MG/1; MG/1
1 TABLET ORAL EVERY 8 HOURS PRN
Qty: 90 TABLET | Refills: 1 | Status: SHIPPED | OUTPATIENT
Start: 2017-07-03 | End: 2017-07-19

## 2017-07-03 NOTE — PATIENT INSTRUCTIONS
1. Cont. Tylenol #3 every 8 hours - 1 refills given this week   2. Try prednisone 10mg - Take 6 tabsx 1 day, take 5 tabsx 1 day, take 4 tabsx 1 day,take 3 tabsx 1 day, take 2 tabsx 1 day, take 1 tabsx 1 day, then off  3.  After that Try gabapentin 300mg mat What may interact with this medicine?   Do not take this medicine with any of the following medications:  · other gabapentin products  This medicine may also interact with the following medications:  · alcohol  · antacids  · antihistamines for allergy, coug Visit your doctor or health care professional for regular checks on your progress. You may want to keep a record at home of how you feel your condition is responding to treatment.  You may want to share this information with your doctor or health care profe NOTE:This sheet is a summary. It may not cover all possible information. If you have questions about this medicine, talk to your doctor, pharmacist, or health care provider.  Copyright© 2016 [employer]

## 2017-07-03 NOTE — PROGRESS NOTES
Kayla Walls is a 57year old female. HPI:   Patient presents with:  Osteoarthritis: bilateral knee pain      I saw Amos Brown on 7/3/2017 . Rupert argueta here also on  4/6/2015. I had last seen her on 12/17/2015.  I had last seen her on   1/22/2015 and before got her left knee repalced in 6.8/2015. Wanda. #3 did help in the past.   She feels ibuprofen makes ehr tired. 7/3/2017  She has pain in her knees with sitting or standing. She feels her knees hurt more than before the surgery.    Her knees really hu ONE TABLET BY MOUTH EVERY DAY Disp: 30 tablet Rfl: 6   albuterol sulfate (2.5 MG/3ML) 0.083% Inhalation Nebu Soln Take 3 mL (2.5 mg total) by nebulization every 4 (four) hours as needed for Wheezing.  Disp: 60 ampule Rfl: 1   QUEtiapine Fumarate 50 MG Oral 15 - 41 U/L  15   ALKALINE PHOSPHATASE      32 - 100 U/L  67   Total Bilirubin      0.3 - 1.2 mg/dL  0.6   TOTAL PROTEIN      5.9 - 8.4 g/dL  7.0   Albumin      3.5 - 4.8 g/dL  4.3   GLOBULIN, TOTAL      2.5 - 3.7 g/dL  2.7   A/G RATIO      1.0 - 2.0  1.6 adequate placement of a total knee arthroplasty on the left. . A lateral view of the left knee reveals no fractures or dislocations.  Bilateral merchant views were obtained today and reveals adequate placement of total knee arthroplasty on the left and narro arthroplast 6/1/2015 and right knee 6/2016 -   - prednisone 10mg taper given in the past and that's helped her. - but not this time   - Workup in the past was negative for autoimmune disease and other causes for her Raynaud's and joint pain.  She can take

## 2017-07-19 ENCOUNTER — OFFICE VISIT (OUTPATIENT)
Dept: OBGYN CLINIC | Facility: CLINIC | Age: 62
End: 2017-07-19

## 2017-07-19 VITALS
DIASTOLIC BLOOD PRESSURE: 78 MMHG | SYSTOLIC BLOOD PRESSURE: 138 MMHG | BODY MASS INDEX: 24.73 KG/M2 | WEIGHT: 131 LBS | HEART RATE: 77 BPM | HEIGHT: 61 IN

## 2017-07-19 DIAGNOSIS — Z01.419 ENCOUNTER FOR GYNECOLOGICAL EXAMINATION: Primary | ICD-10-CM

## 2017-07-19 DIAGNOSIS — Z12.31 ENCOUNTER FOR SCREENING MAMMOGRAM FOR BREAST CANCER: ICD-10-CM

## 2017-07-19 PROCEDURE — 99396 PREV VISIT EST AGE 40-64: CPT | Performed by: OBSTETRICS & GYNECOLOGY

## 2017-07-19 PROCEDURE — G0101 CA SCREEN;PELVIC/BREAST EXAM: HCPCS | Performed by: OBSTETRICS & GYNECOLOGY

## 2017-07-20 NOTE — PROGRESS NOTES
Jony Baker is a 57year old female  No LMP recorded. Patient is postmenopausal. here for annual exam.       Last seen 11/19/15. Last pap in 2015 normal with neg HPV. No further bleeding since e-bx in .     In last month, having left pelv Alzheimer's   • Lipids Father      hyperlipidemia   • Hypertension Father    • Musculo-skelatal Disorder Father      B/L knees replaced   • Cancer Brother      liver   • Diabetes Mother    • Heart Disease Mother      CAD   • Stroke Mother      CVA   • Hear discharge. Neurological:  denies headaches, extremity weakness or numbness. Psychiatric: denies depression or anxiety. Endocrine:   denies excessive thirst or urination. Heme/Lymph:  easy bruising or bleeding.     PHYSICAL EXAM:   /78   Pulse 77

## 2017-07-27 ENCOUNTER — OFFICE VISIT (OUTPATIENT)
Dept: INTERNAL MEDICINE CLINIC | Facility: CLINIC | Age: 62
End: 2017-07-27

## 2017-07-27 VITALS
DIASTOLIC BLOOD PRESSURE: 80 MMHG | HEIGHT: 62 IN | BODY MASS INDEX: 23.55 KG/M2 | HEART RATE: 90 BPM | TEMPERATURE: 98 F | SYSTOLIC BLOOD PRESSURE: 119 MMHG | WEIGHT: 128 LBS

## 2017-07-27 DIAGNOSIS — I73.00 RAYNAUD'S DISEASE WITHOUT GANGRENE: ICD-10-CM

## 2017-07-27 DIAGNOSIS — R73.9 HYPERGLYCEMIA: ICD-10-CM

## 2017-07-27 DIAGNOSIS — M17.12 PRIMARY OSTEOARTHRITIS OF LEFT KNEE: ICD-10-CM

## 2017-07-27 DIAGNOSIS — T81.82XA EMPHYSEMA (SUBCUTANEOUS) (SURGICAL) RESULTING FROM A PROCEDURE, INITIAL ENCOUNTER: Primary | ICD-10-CM

## 2017-07-27 DIAGNOSIS — J40 BRONCHITIS: ICD-10-CM

## 2017-07-27 PROCEDURE — G0463 HOSPITAL OUTPT CLINIC VISIT: HCPCS | Performed by: INTERNAL MEDICINE

## 2017-07-27 PROCEDURE — 99214 OFFICE O/P EST MOD 30 MIN: CPT | Performed by: INTERNAL MEDICINE

## 2017-07-27 RX ORDER — AZITHROMYCIN 250 MG/1
TABLET, FILM COATED ORAL
Qty: 6 TABLET | Refills: 0 | Status: SHIPPED | OUTPATIENT
Start: 2017-07-27 | End: 2017-08-21

## 2017-08-03 PROBLEM — R07.9 ACUTE CHEST PAIN: Status: RESOLVED | Noted: 2017-04-23 | Resolved: 2017-08-03

## 2017-08-03 PROBLEM — E87.1 HYPONATREMIA: Status: RESOLVED | Noted: 2017-04-23 | Resolved: 2017-08-03

## 2017-08-03 PROBLEM — R00.2 PALPITATIONS: Status: RESOLVED | Noted: 2017-04-23 | Resolved: 2017-08-03

## 2017-08-03 NOTE — PROGRESS NOTES
HPI:    Patient ID: Rajiv Jasso is a 57year old female.     HPI    Establish care    cough, scratchy throat  , bilateral ear itching    /80 (BP Location: Right arm, Patient Position: Sitting, Cuff Size: adult)   Pulse 90   Temp 98.3 °F (36.8 °C) (Or Disp: 30 tablet Rfl: 6   albuterol sulfate (2.5 MG/3ML) 0.083% Inhalation Nebu Soln Take 3 mL (2.5 mg total) by nebulization every 4 (four) hours as needed for Wheezing.  Disp: 60 ampule Rfl: 1   QUEtiapine Fumarate 50 MG Oral Tab Take 50 mg by mouth nightl • Heart Attack Mother      quadruple bypass surgery/MI   • Arthritis Mother      possible Rheumatoid Arthritis   • Colon Cancer Maternal Grandmother    • Asthma Daughter    • Breast Cancer Sister 36   • Ovarian Cancer Sister    • Other Tawanda Leon Sister (J40) Bronchitis  Plan: PFT MISCELLANEOUS      Acute RX zithrmax  otc deocngestant and proper use f inhler    (R73.9) Hyperglycemia  Plan: low carb  Diet avoid concnerted sweees    (M17.12) Primary osteoarthritis of left knee  Plan: chronic Monitor

## 2017-08-18 NOTE — TELEPHONE ENCOUNTER
Last seen 12/12/16 with RTC in 11/2017  Last refilled 1/16/17 and 2/18/17  Sent to [address] [place] [address] for sign off

## 2017-08-19 RX ORDER — AMLODIPINE BESYLATE 5 MG/1
TABLET ORAL
Qty: 30 TABLET | Refills: 3 | Status: SHIPPED | OUTPATIENT
Start: 2017-08-19 | End: 2017-12-07

## 2017-08-19 RX ORDER — PANTOPRAZOLE SODIUM 40 MG/1
TABLET, DELAYED RELEASE ORAL
Qty: 30 TABLET | Refills: 3 | Status: SHIPPED | OUTPATIENT
Start: 2017-08-19 | End: 2017-12-07

## 2017-08-21 ENCOUNTER — TELEPHONE (OUTPATIENT)
Dept: RHEUMATOLOGY | Facility: CLINIC | Age: 62
End: 2017-08-21

## 2017-08-21 ENCOUNTER — HOSPITAL ENCOUNTER (OUTPATIENT)
Dept: GENERAL RADIOLOGY | Facility: HOSPITAL | Age: 62
Discharge: HOME | End: 2017-08-21
Attending: INTERNAL MEDICINE
Payer: MEDICARE

## 2017-08-21 ENCOUNTER — OFFICE VISIT (OUTPATIENT)
Dept: RHEUMATOLOGY | Facility: CLINIC | Age: 62
End: 2017-08-21

## 2017-08-21 VITALS
SYSTOLIC BLOOD PRESSURE: 112 MMHG | WEIGHT: 133.81 LBS | DIASTOLIC BLOOD PRESSURE: 62 MMHG | HEART RATE: 84 BPM | TEMPERATURE: 98 F | BODY MASS INDEX: 25.27 KG/M2 | HEIGHT: 61 IN | OXYGEN SATURATION: 98 %

## 2017-08-21 DIAGNOSIS — M79.10 MYALGIA: ICD-10-CM

## 2017-08-21 DIAGNOSIS — M54.50 LOW BACK PAIN WITHOUT SCIATICA, UNSPECIFIED BACK PAIN LATERALITY, UNSPECIFIED CHRONICITY: ICD-10-CM

## 2017-08-21 DIAGNOSIS — I73.00 RAYNAUD'S DISEASE WITHOUT GANGRENE: ICD-10-CM

## 2017-08-21 DIAGNOSIS — M15.9 GENERALIZED OA: Primary | ICD-10-CM

## 2017-08-21 PROCEDURE — 99212 OFFICE O/P EST SF 10 MIN: CPT | Performed by: INTERNAL MEDICINE

## 2017-08-21 PROCEDURE — 99214 OFFICE O/P EST MOD 30 MIN: CPT | Performed by: INTERNAL MEDICINE

## 2017-08-21 PROCEDURE — 72202 X-RAY EXAM SI JOINTS 3/> VWS: CPT | Performed by: INTERNAL MEDICINE

## 2017-08-21 RX ORDER — AMITRIPTYLINE HYDROCHLORIDE 10 MG/1
10 TABLET, FILM COATED ORAL NIGHTLY
Qty: 30 TABLET | Refills: 1 | Status: SHIPPED | OUTPATIENT
Start: 2017-08-21 | End: 2017-09-27

## 2017-08-21 RX ORDER — ACETAMINOPHEN AND CODEINE PHOSPHATE 300; 30 MG/1; MG/1
1 TABLET ORAL EVERY 8 HOURS PRN
Qty: 90 TABLET | Refills: 3 | Status: SHIPPED | OUTPATIENT
Start: 2017-08-21 | End: 2017-11-30

## 2017-08-21 RX ORDER — PREDNISONE 10 MG/1
TABLET ORAL
Qty: 21 TABLET | Refills: 0 | Status: SHIPPED | OUTPATIENT
Start: 2017-08-21 | End: 2017-08-21

## 2017-08-21 RX ORDER — IBUPROFEN 600 MG/1
TABLET ORAL
Qty: 60 TABLET | Refills: 2 | Status: SHIPPED | OUTPATIENT
Start: 2017-08-21 | End: 2017-09-30

## 2017-08-21 NOTE — PATIENT INSTRUCTIONS
1. Cont. Tylenol #3 every 8 hours - 3 refills given this week   2. Get si joint xray   3. Try amitryptline 10mg at night   4. Return to clinic in  1  months. Amitriptyline tablets  What is this medicine?   AMITRIPTYLINE (a kathryn TRIP ti chaim) is used to tr diarrhea  · nausea, vomiting  · weight gain or loss  What may interact with this medicine?   Do not take this medicine with any of the following medications:  · arsenic trioxide  · certain medicines used to regulate abnormal heartbeat or to treat other hear for while using this medicine? Tell your doctor if your symptoms do not get better or if they get worse. Visit your doctor or health care professional for regular checks on your progress.  Because it may take several weeks to see the full effects of this m you cannot avoid being in the sun, wear protective clothing and use sunscreen. Do not use sun lamps or tanning beds/booths. Date Last Reviewed:   NOTE:This sheet is a summary. It may not cover all possible information.  If you have questions about this med

## 2017-08-21 NOTE — TELEPHONE ENCOUNTER
LOV: 7-3-17  Last Refilled:#60, 2rfs 7/19/17    Future Appointments  Date Time Provider Cirilo Springer   8/21/2017 2:30 PM Coral Kenny MD [facility]   8/23/2017 10:15  [facility] PFT ROOM [address] [place] [address] [address]       Please advise.

## 2017-08-21 NOTE — PROGRESS NOTES
Kimmie Thomas is a 57year old female. HPI:   No chief complaint on file. I saw Susan Newmanas on 8/21/2017. I had last seen her on 7/3/2017 . Rupert argueta here also on  4/6/2015. I had last seen her on 12/17/2015.  I had last seen her on   1/22/2015 and ramya got her left knee repalced in 6.8/2015. Wanda. #3 did help in the past.   She feels ibuprofen makes ehr tired. 7/3/2017  She has pain in her knees with sitting or standing. She feels her knees hurt more than before the surgery.    Her knees really hu Disp: 30 tablet Rfl: 3   azithromycin (ZITHROMAX Z-GIRISH) 250 MG Oral Tab Take two tablets by mouth today, then one tablet daily.  Disp: 6 tablet Rfl: 0   albuterol sulfate (2.5 MG/3ML) 0.083% Inhalation Nebu Soln Take 3 mL (2.5 mg total) by nebulization ever CALCIUM      8.5 - 10.5 mg/dL 9.7 9.3   ALT (SGPT)      14 - 54 U/L  13 (L)   AST (SGOT)      15 - 41 U/L  15   ALKALINE PHOSPHATASE      32 - 100 U/L  67   Total Bilirubin      0.3 - 1.2 mg/dL  0.6   TOTAL PROTEIN      5.9 - 8.4 g/dL  7.0   Albumin PROTEIN      0.0 - 0.9 mg/dL 0.6 0.7     12/18/2015 - b/l hand xray   Osteoarthritis of both hands are roughly symmetric involvement of the first  carpometacarpal joints bilaterally and moderate to severe degenerative  changes of the PIP/PIP joints of the with this   She is not feeling better -   - renew vipul.l #3 #90 with 3 refills   - right shoudler pain - s/o rigt shoudler injection on 1/2017   - take pred 10mg - Take 6 tabsx 1 day, take 5 tabsx 1 day, take 4 tabsx 1 day,take 3 tabsx 1 day, take 2 tabs

## 2017-08-28 NOTE — TELEPHONE ENCOUNTER
LOV: 8-21-17  Last Refilled:#30, 1rf 7/3/17    Future Appointments  Date Time Provider Cirilo Springer   8/30/2017 1:00 PM [address] [place] [address] PFT ROOM [address] [place] [address] [address]       Please advise.

## 2017-08-30 ENCOUNTER — HOSPITAL ENCOUNTER (OUTPATIENT)
Dept: RESPIRATORY THERAPY | Facility: HOSPITAL | Age: 62
End: 2017-08-30
Attending: INTERNAL MEDICINE
Payer: MEDICARE

## 2017-08-30 RX ORDER — GABAPENTIN 300 MG/1
CAPSULE ORAL
Qty: 30 CAPSULE | Refills: 1 | Status: SHIPPED | OUTPATIENT
Start: 2017-08-30 | End: 2017-09-27

## 2017-09-01 ENCOUNTER — HOSPITAL ENCOUNTER (OUTPATIENT)
Dept: RESPIRATORY THERAPY | Facility: HOSPITAL | Age: 62
Discharge: HOME | End: 2017-09-01
Attending: INTERNAL MEDICINE
Payer: MEDICARE

## 2017-09-01 DIAGNOSIS — T81.82XA EMPHYSEMA (SUBCUTANEOUS) (SURGICAL) RESULTING FROM A PROCEDURE, INITIAL ENCOUNTER: ICD-10-CM

## 2017-09-01 DIAGNOSIS — J40 BRONCHITIS: ICD-10-CM

## 2017-09-01 PROCEDURE — 94726 PLETHYSMOGRAPHY LUNG VOLUMES: CPT | Performed by: INTERNAL MEDICINE

## 2017-09-01 PROCEDURE — 94060 EVALUATION OF WHEEZING: CPT | Performed by: INTERNAL MEDICINE

## 2017-09-01 PROCEDURE — 94729 DIFFUSING CAPACITY: CPT | Performed by: INTERNAL MEDICINE

## 2017-09-05 ENCOUNTER — TELEPHONE (OUTPATIENT)
Dept: RHEUMATOLOGY | Facility: CLINIC | Age: 62
End: 2017-09-05

## 2017-09-05 NOTE — TELEPHONE ENCOUNTER
PHARMACY SAID THEY HAVE BEEN CALLING FOR DRUG INTERACTION BETWEEN THE  AMITRIPTYLINE AND THE DULOXETINE SINCE 8/21

## 2017-09-12 NOTE — ADDENDUM NOTE
Encounter addended by: Ata Layton MD on: 9/12/2017 12:03 AM<BR>    Actions taken: Sign clinical note, Charge Capture section accepted

## 2017-09-12 NOTE — ADDENDUM NOTE
Encounter addended by: Jose Canela on: 9/12/2017  8:06 AM<BR>    Actions taken: Results reviewed in IB, Letter status changed

## 2017-09-12 NOTE — PROCEDURES
[facility]    Patient's Name Kamlesh Pickens MRN [id number]    1955 Pulmonologist Jordana Jimenez MD   [facility] RESPIRATORY THERAPY PCP Parth Sotomayor MD     IMPRESSION:    The PFTs are Abnormal.    There is a very mild

## 2017-09-21 ENCOUNTER — HOSPITAL ENCOUNTER (OUTPATIENT)
Age: 62
Discharge: HOME | End: 2017-09-21
Payer: MEDICARE

## 2017-09-21 VITALS
TEMPERATURE: 98 F | WEIGHT: 124 LBS | BODY MASS INDEX: 23.41 KG/M2 | RESPIRATION RATE: 20 BRPM | DIASTOLIC BLOOD PRESSURE: 67 MMHG | OXYGEN SATURATION: 97 % | HEIGHT: 61 IN | SYSTOLIC BLOOD PRESSURE: 124 MMHG | HEART RATE: 88 BPM

## 2017-09-21 DIAGNOSIS — J02.0 STREP THROAT: Primary | ICD-10-CM

## 2017-09-21 LAB — S PYO AG THROAT QL: POSITIVE

## 2017-09-21 PROCEDURE — 99213 OFFICE O/P EST LOW 20 MIN: CPT

## 2017-09-21 PROCEDURE — 87430 STREP A AG IA: CPT

## 2017-09-21 RX ORDER — AMOXICILLIN 500 MG/1
500 TABLET, FILM COATED ORAL 2 TIMES DAILY
Qty: 20 TABLET | Refills: 0 | Status: SHIPPED | OUTPATIENT
Start: 2017-09-21 | End: 2017-10-01

## 2017-09-21 NOTE — ED PROVIDER NOTES
Patient presents with:  Sore Throat      HPI:     Maye Francisco is a 57year old female who presents for evaluation of a chief complaint of sore throat and cough. Patient reports symptoms started 3 days ago.   Last night patient began to have worsening symp with cough and sore throat. On examination patient is nontoxic in appearance, easy respirations and swallowing secretions without any difficulty. Rapid strep reviewed and is positive.   Will treat patient with amoxicillin twice daily ×10 days and have her

## 2017-09-21 NOTE — ED INITIAL ASSESSMENT (HPI)
PATIENT ARRIVED AMBULATORY TO ROOM C/O A SORE THROAT THAT STARTED 2 DAYS AGO. +CONGESTED COUGH. NO FEVERS. NO N/V/D. NO NASAL CONGESTION.

## 2017-09-27 ENCOUNTER — OFFICE VISIT (OUTPATIENT)
Dept: RHEUMATOLOGY | Facility: CLINIC | Age: 62
End: 2017-09-27

## 2017-09-27 VITALS
TEMPERATURE: 99 F | BODY MASS INDEX: 23.22 KG/M2 | HEIGHT: 61 IN | HEART RATE: 79 BPM | WEIGHT: 123 LBS | SYSTOLIC BLOOD PRESSURE: 138 MMHG | DIASTOLIC BLOOD PRESSURE: 89 MMHG

## 2017-09-27 DIAGNOSIS — M15.9 GENERALIZED OA: Primary | ICD-10-CM

## 2017-09-27 DIAGNOSIS — I73.00 RAYNAUD'S DISEASE WITHOUT GANGRENE: ICD-10-CM

## 2017-09-27 DIAGNOSIS — M54.50 LOW BACK PAIN WITHOUT SCIATICA, UNSPECIFIED BACK PAIN LATERALITY, UNSPECIFIED CHRONICITY: ICD-10-CM

## 2017-09-27 DIAGNOSIS — M25.50 HYPERMOBILITY ARTHRALGIA: ICD-10-CM

## 2017-09-27 PROCEDURE — 99214 OFFICE O/P EST MOD 30 MIN: CPT | Performed by: INTERNAL MEDICINE

## 2017-09-27 PROCEDURE — G0463 HOSPITAL OUTPT CLINIC VISIT: HCPCS | Performed by: INTERNAL MEDICINE

## 2017-09-27 RX ORDER — AMITRIPTYLINE HYDROCHLORIDE 10 MG/1
20 TABLET, FILM COATED ORAL NIGHTLY
Qty: 60 TABLET | Refills: 3 | Status: SHIPPED | OUTPATIENT
Start: 2017-09-27 | End: 2020-07-08

## 2017-09-27 NOTE — PATIENT INSTRUCTIONS
1. Cont. Tylenol #3 every 8 hours - prn -   2. Can try  amitryptline -  Increase 20mg at night - if too many side effects - go back to 10mg at night   3. Return to clinic in  3-4 months.

## 2017-09-27 NOTE — PROGRESS NOTES
Rajiv Jasso is a 57year old female. HPI:   Patient presents with:  Osteoarthritis: knee and back pain  Raynaud's Syndrome      I saw Tu Baumann on 9/27/2017. I had last seen her on 8/21/2017. I had last seen her on 7/3/2017 .  Rupert ewas here also on  4/6 started working   She has hand pain and wrist pain and elft ankel pain as well. She also got her left knee repalced in 6.8/2015. Wanda. #3 did help in the past.   She feels ibuprofen makes ehr tired.      7/3/2017  She has pain in her knees with sittin 1/14/2015   • Rib fracture 2015    left side   • Seborrheic keratoses 3/7/2017   • Sx.6/1/15, CPT. 48480, L Total Knee, w/, Global Exp.8/30/15 6/8/2015   • Sx.6/8/16, CPT. 59662, R Total Knee Replacement, w/, Global Exp.9/6/16 1/14/2016 no murmurs  RS: CTAB, no crackles, no rhonchi  ABD: Soft Non tender,   Joint exam:  Right shoulder not tender   tender right knee warm - mild swelling - - small lateral loculation  Hyperpigmentation over right knee - but skin is lax - not tight   Tender  , VOLUME      7.4 - 10.3 fL 7.1 (L) 7.5   Neutrophils %      % 48    Lymphocytes %      % 42    Monocytes %      % 8    Eosinophils %      % 1    Basophils %      % 1    Neutrophils Absolute      1.8 - 7.7 K/UL 2.2    Lymphocytes Absolute      1.0 - 4.0 K/UL mm  diameter intra-articular loose body in the left glenohumeral joint. 4/24/2015 - mri left knee  1. No acute fractures or dislocations. 2. Tricompartmental degenerative changes of the left knee. 3. Degenerative changes of the left hip.     4/17/2015 this last time with her knees. - Workup in the past was negative for autoimmune disease and other causes for her Raynaud's and joint pain. She can take tylenol #3 prn for the pain as well. 2. Bipolar  - on cymbalta and kaitlin, dr. Trista Bright  f/u  3.  Ra

## 2017-10-02 RX ORDER — IBUPROFEN 600 MG/1
TABLET ORAL
Qty: 60 TABLET | Refills: 3 | Status: SHIPPED | OUTPATIENT
Start: 2017-10-02 | End: 2018-01-29

## 2017-10-02 NOTE — TELEPHONE ENCOUNTER
LOV: 9/27/17  No future appointments.   Labs:   Component      Latest Ref Rng & Units 1/6/2017   Glucose      70 - 99 mg/dL 89   Sodium      136 - 144 mmol/L 135 (L)   Potassium      3.3 - 5.1 mmol/L 4.2   Chloride      95 - 110 mmol/L 96   Carbon Dioxide, Total      22 - 32 mmol/L 29   BUN      8 - 20 mg/dL 5 (L)   CREATININE      0.50 - 1.50 mg/dL 0.44 (L)   CALCIUM      8.5 - 10.5 mg/dL 9.3   ALT (SGPT)      14 - 54 U/L 13 (L)   AST (SGOT)      15 - 41 U/L 15   ALKALINE PHOSPHATASE      32 - 100 U/L 67   Total Bilirubin      0.3 - 1.2 mg/dL 0.6   TOTAL PROTEIN      5.9 - 8.4 g/dL 7.0   Albumin      3.5 - 4.8 g/dL 4.3   GLOBULIN, TOTAL      2.5 - 3.7 g/dL 2.7   A/G RATIO      1.0 - 2.0 1.6   ANION GAP      0 - 18 10   BUN/CREA RATIO      10.0 - 20.0 11.4   CALCULATED OSMOLALITY      275 - 295 mOsm/kg 277   GFR, Non-      >=60 >60   GFR, -American      >=60 >60

## 2017-10-04 ENCOUNTER — HOSPITAL ENCOUNTER (OUTPATIENT)
Dept: CT IMAGING | Facility: HOSPITAL | Age: 62
Discharge: HOME | End: 2017-10-04
Attending: INTERNAL MEDICINE | Admitting: INTERNAL MEDICINE
Payer: MEDICARE

## 2017-10-04 ENCOUNTER — OFFICE VISIT (OUTPATIENT)
Dept: INTERNAL MEDICINE CLINIC | Facility: CLINIC | Age: 62
End: 2017-10-04

## 2017-10-04 VITALS
SYSTOLIC BLOOD PRESSURE: 128 MMHG | HEIGHT: 61 IN | TEMPERATURE: 98 F | DIASTOLIC BLOOD PRESSURE: 75 MMHG | WEIGHT: 128 LBS | HEART RATE: 72 BPM | BODY MASS INDEX: 24.17 KG/M2

## 2017-10-04 DIAGNOSIS — R10.32 LLQ PAIN: ICD-10-CM

## 2017-10-04 DIAGNOSIS — K40.91 UNILATERAL RECURRENT INGUINAL HERNIA WITHOUT OBSTRUCTION OR GANGRENE: ICD-10-CM

## 2017-10-04 DIAGNOSIS — K40.91 UNILATERAL RECURRENT INGUINAL HERNIA WITHOUT OBSTRUCTION OR GANGRENE: Primary | ICD-10-CM

## 2017-10-04 PROCEDURE — 82565 ASSAY OF CREATININE: CPT

## 2017-10-04 PROCEDURE — G0463 HOSPITAL OUTPT CLINIC VISIT: HCPCS | Performed by: INTERNAL MEDICINE

## 2017-10-04 PROCEDURE — 74177 CT ABD & PELVIS W/CONTRAST: CPT | Performed by: INTERNAL MEDICINE

## 2017-10-04 PROCEDURE — G0008 ADMIN INFLUENZA VIRUS VAC: HCPCS | Performed by: INTERNAL MEDICINE

## 2017-10-04 PROCEDURE — 90732 PPSV23 VACC 2 YRS+ SUBQ/IM: CPT | Performed by: INTERNAL MEDICINE

## 2017-10-04 PROCEDURE — G0009 ADMIN PNEUMOCOCCAL VACCINE: HCPCS | Performed by: INTERNAL MEDICINE

## 2017-10-04 PROCEDURE — 99214 OFFICE O/P EST MOD 30 MIN: CPT | Performed by: INTERNAL MEDICINE

## 2017-10-04 PROCEDURE — 90686 IIV4 VACC NO PRSV 0.5 ML IM: CPT | Performed by: INTERNAL MEDICINE

## 2017-10-04 NOTE — PROGRESS NOTES
HPI:    Patient ID: Elias Alvarez is a 57year old female.     HPI    LLQ pain  Hernia repair 2009 and 2011  recurrentl inguinal hernia  Having the same symptom  Last colonoscopy 2015  Negative for diverticuli    Tender  Left ingunal area  S/p repair area  R needed for Wheezing. Disp: 60 ampule Rfl: 1   QUEtiapine Fumarate 50 MG Oral Tab Take 50 mg by mouth nightly. Disp:  Rfl: 2   ClonazePAM (KLONOPIN) 2 MG Oral Tab Take 2 mg by mouth nightly.    Disp:  Rfl: 2   ARIpiprazole (ABILIFY) 5 MG Oral Tab Take 7.5 liver   • Diabetes Mother    • Heart Disease Mother      CAD   • Stroke Mother      CVA   • Heart Attack Mother      quadruple bypass surgery/MI   • Arthritis Mother      possible Rheumatoid Arthritis   • Colon Cancer Maternal Grandmother    • Asthma Davalente Nursing note and vitals reviewed.            ASSESSMENT/PLAN:   (K40.91) Unilateral recurrent inguinal hernia without obstruction or gangrene  (primary encounter diagnosis)  Plan: SURGERY - INTERNAL, CANCELED: CT         ABDOMEN(CONTRAST ONLY) (CPT=74160)

## 2017-10-05 ENCOUNTER — OFFICE VISIT (OUTPATIENT)
Dept: SURGERY | Facility: CLINIC | Age: 62
End: 2017-10-05

## 2017-10-05 ENCOUNTER — APPOINTMENT (OUTPATIENT)
Dept: LAB | Facility: HOSPITAL | Age: 62
End: 2017-10-05
Attending: INTERNAL MEDICINE
Payer: MEDICARE

## 2017-10-05 VITALS — SYSTOLIC BLOOD PRESSURE: 102 MMHG | DIASTOLIC BLOOD PRESSURE: 80 MMHG | HEART RATE: 72 BPM

## 2017-10-05 DIAGNOSIS — R10.32 LEFT GROIN PAIN: Primary | ICD-10-CM

## 2017-10-05 DIAGNOSIS — R10.32 LLQ PAIN: ICD-10-CM

## 2017-10-05 PROCEDURE — 36415 COLL VENOUS BLD VENIPUNCTURE: CPT

## 2017-10-05 PROCEDURE — 99203 OFFICE O/P NEW LOW 30 MIN: CPT | Performed by: SURGERY

## 2017-10-05 PROCEDURE — 85027 COMPLETE CBC AUTOMATED: CPT

## 2017-10-05 PROCEDURE — 81015 MICROSCOPIC EXAM OF URINE: CPT

## 2017-10-05 PROCEDURE — G0463 HOSPITAL OUTPT CLINIC VISIT: HCPCS | Performed by: SURGERY

## 2017-10-05 PROCEDURE — 80053 COMPREHEN METABOLIC PANEL: CPT

## 2017-10-06 ENCOUNTER — TELEPHONE (OUTPATIENT)
Dept: OTHER | Age: 62
End: 2017-10-06

## 2017-10-06 NOTE — TELEPHONE ENCOUNTER
Dr Tammy Shankar, please advise. Pt says she still has abdominal pain, not better. She says Dr Rickie Mckee did not think it was a hernia, he only recommended that she try to rest. Pt says she did complete all the tests requested by [facility]. Advised of all results.   She

## 2017-10-06 NOTE — PROGRESS NOTES
History and Physical      Eric Polanco is a 57year old female. HPI   Patient presents with:  Hernia: Hernia repair in 2009 and 2011, patient states she is having same symptoms.   LLQ pain, radiating from groin down left leg, c/o fatigue, appetite poor with food Disp: 60 tablet Rfl: 3   Amitriptyline HCl 10 MG Oral Tab Take 2 tablets (20 mg total) by mouth nightly. Disp: 60 tablet Rfl: 3   Acetaminophen-Codeine 300-30 MG Oral Tab Take 1 tablet by mouth every 8 (eight) hours as needed for Pain.  Disp: 90 t Father      B/L knees replaced   • Cancer Brother      liver   • Diabetes Mother    • Heart Disease Mother      CAD   • Stroke Mother      CVA   • Heart Attack Mother      quadruple bypass surgery/MI   • Arthritis Mother      possible Rheumatoid Arthritis and hip arthritis and spine DJD. Consider tylenol or nsaid for pain, activity as carlos.          10/6/2017  Ravi Hemphill MD

## 2017-10-06 NOTE — TELEPHONE ENCOUNTER
I spoke wit patient   Advised laxative  Discussed  CT and labs in detail  She was agreeable with plan

## 2017-10-19 ENCOUNTER — HOSPITAL ENCOUNTER (OUTPATIENT)
Age: 62
Discharge: HOME | End: 2017-10-19
Payer: MEDICARE

## 2017-10-19 ENCOUNTER — APPOINTMENT (OUTPATIENT)
Dept: GENERAL RADIOLOGY | Age: 62
End: 2017-10-19
Attending: NURSE PRACTITIONER
Payer: MEDICARE

## 2017-10-19 VITALS
TEMPERATURE: 98 F | OXYGEN SATURATION: 98 % | HEIGHT: 62 IN | BODY MASS INDEX: 23 KG/M2 | SYSTOLIC BLOOD PRESSURE: 137 MMHG | HEART RATE: 81 BPM | WEIGHT: 125 LBS | DIASTOLIC BLOOD PRESSURE: 79 MMHG | RESPIRATION RATE: 18 BRPM

## 2017-10-19 DIAGNOSIS — M75.121 COMPLETE TEAR OF RIGHT ROTATOR CUFF: ICD-10-CM

## 2017-10-19 DIAGNOSIS — M25.511 ACUTE PAIN OF RIGHT SHOULDER: Primary | ICD-10-CM

## 2017-10-19 PROCEDURE — 73030 X-RAY EXAM OF SHOULDER: CPT | Performed by: NURSE PRACTITIONER

## 2017-10-19 PROCEDURE — 99213 OFFICE O/P EST LOW 20 MIN: CPT

## 2017-10-19 RX ORDER — ACETAMINOPHEN 500 MG
1000 TABLET ORAL ONCE
Status: COMPLETED | OUTPATIENT
Start: 2017-10-19 | End: 2017-10-19

## 2017-10-19 NOTE — ED PROVIDER NOTES
Patient presents with:  Musculoskeletal Problem      HPI:     Curry Montesinos is a 57year old female who presents today with a chief complaint of pain in the right shoulder after the patient leaned down on the floor to clean under a bathroom cabinet.   She st Comment: 1 unit per day    Alcohol use No    Drug use: No    Sexual activity: No     Other Topics Concern    Caffeine Concern Yes    Comment: tea, soda, 44 oz daily    Reaction to local anesthetic No     Social History Narrative   None on file          ROS rotator cuff tear. 2. Moderate osteoarthritis right glenohumeral joint. 3. No acute finding or significant change.          The patient is aware of the XR results, will apply a sling and recommend ice, pain meds she has at home, and close follow-up with ort

## 2017-10-19 NOTE — ED INITIAL ASSESSMENT (HPI)
Patient complains of right arm pain s/p getting down on her knees to clean under the sink. Pt denies fall or trauma. No obvious deformity. Patient reports pain is worse with movement.   Pt reports pain radiates to right upper back with right arm abductio

## 2017-10-20 ENCOUNTER — TELEPHONE (OUTPATIENT)
Dept: FAMILY MEDICINE CLINIC | Facility: CLINIC | Age: 62
End: 2017-10-20

## 2017-10-20 DIAGNOSIS — S43.429D SPRAIN OF ROTATOR CUFF CAPSULE, UNSPECIFIED LATERALITY, SUBSEQUENT ENCOUNTER: Primary | ICD-10-CM

## 2017-10-20 NOTE — TELEPHONE ENCOUNTER
Patient Name:   Sandeep Ross ([id number])   Sex: female  : 1955        Order Date:  10/20/2017  Authorizing Provider:   Lisa Pittman  Procedure:  ORTHOPEDIC - INTERNAL [[id number]]             EPIC Order #:   [id number]  Comments:    Associated

## 2017-10-20 NOTE — TELEPHONE ENCOUNTER
Pt is calling state that she was in the immediate care yesterday for a rotator cuff injury  Pt want to know if Dr HOGAN can recommend a ORTH that can repair rotator cuff injury  Pt is requesting a call back

## 2017-10-23 ENCOUNTER — TELEPHONE (OUTPATIENT)
Dept: RHEUMATOLOGY | Facility: CLINIC | Age: 62
End: 2017-10-23

## 2017-10-23 NOTE — TELEPHONE ENCOUNTER
Pt calling and stts she tore her rotator cuff and needs an ortho dr. Pt would like some recommendations on who she should see. Please advise.

## 2017-10-23 NOTE — TELEPHONE ENCOUNTER
Pt is calling for status of her message and  stated that she does \"not want to see Dr. Jonnathan Presley". Please, call pt at [phone].

## 2017-10-23 NOTE — TELEPHONE ENCOUNTER
FYI.  Do you have another recommendation for this pt as Dr. Jada Marin is not covered by her insurance.

## 2017-10-24 NOTE — TELEPHONE ENCOUNTER
Talke to pt. - referred her to   - ,.  [facility] [address]  Phone: [phone]  Fax: [phone]

## 2017-10-24 NOTE — TELEPHONE ENCOUNTER
Dr Erin Anderson is in network but not at the location you gave her. He is in network at the [facility] location [phone].

## 2017-10-31 ENCOUNTER — TELEPHONE (OUTPATIENT)
Dept: PULMONOLOGY | Facility: CLINIC | Age: 62
End: 2017-10-31

## 2017-11-27 NOTE — TELEPHONE ENCOUNTER
Pt notified to call [facility] [phone] to make sure PA has octavio completed and then to call central scheduling [phone] to schedule chest CT. Pt states her insurance has changed and would like to get established with Dr. Kimber Garcia as PCP again.   Pt s

## 2017-11-30 ENCOUNTER — OFFICE VISIT (OUTPATIENT)
Dept: PULMONOLOGY | Facility: CLINIC | Age: 62
End: 2017-11-30

## 2017-11-30 VITALS
WEIGHT: 125.5 LBS | HEART RATE: 73 BPM | HEIGHT: 60.5 IN | SYSTOLIC BLOOD PRESSURE: 129 MMHG | TEMPERATURE: 98 F | DIASTOLIC BLOOD PRESSURE: 79 MMHG | BODY MASS INDEX: 24 KG/M2

## 2017-11-30 DIAGNOSIS — T81.82XA EMPHYSEMA (SUBCUTANEOUS) (SURGICAL) RESULTING FROM A PROCEDURE, INITIAL ENCOUNTER: Primary | ICD-10-CM

## 2017-11-30 PROCEDURE — 99213 OFFICE O/P EST LOW 20 MIN: CPT | Performed by: INTERNAL MEDICINE

## 2017-11-30 PROCEDURE — G0463 HOSPITAL OUTPT CLINIC VISIT: HCPCS | Performed by: INTERNAL MEDICINE

## 2017-11-30 NOTE — PROGRESS NOTES
The patient is 13-year-old female who I know well from prior evaluation. Her insurance has changed a couple times recently and I lost her is a patient and she is now back. She quit smoking 15 years ago.   She was previously advised to have a low-dose CT s complete examination. Patient to return to see me at the 6-12 month interval or sooner if needed.

## 2017-12-07 RX ORDER — AMLODIPINE BESYLATE 5 MG/1
TABLET ORAL
Qty: 30 TABLET | Refills: 11 | Status: SHIPPED | OUTPATIENT
Start: 2017-12-07 | End: 2018-12-19

## 2017-12-07 RX ORDER — PANTOPRAZOLE SODIUM 40 MG/1
TABLET, DELAYED RELEASE ORAL
Qty: 30 TABLET | Refills: 11 | Status: SHIPPED | OUTPATIENT
Start: 2017-12-07 | End: 2019-03-16

## 2017-12-21 ENCOUNTER — TELEPHONE (OUTPATIENT)
Dept: PULMONOLOGY | Facility: CLINIC | Age: 62
End: 2017-12-21

## 2017-12-21 DIAGNOSIS — R19.7 DIARRHEA, UNSPECIFIED TYPE: Primary | ICD-10-CM

## 2017-12-21 NOTE — TELEPHONE ENCOUNTER
Informed pt of Dr. Kathy Blandon orders. [place] pt [place] 1[place]16/[place] 4[place]45 [place] [facility] [place] pain [place]. She was given appt time, location, & parking. Phone # for Ramiro Later given. Instructed pt to go to ER if symptoms worsen. Pt verbalized understanding.

## 2017-12-21 NOTE — TELEPHONE ENCOUNTER
Pt requesting to speak to RN, pt indicates Dr Luz Elena Wade is now back to being her PCP. Pt complaining of dirr and lt lower pain, near hernia. Pls call pt ED:[phone] or [phone].   *pt last seen 11/30/17

## 2017-12-21 NOTE — TELEPHONE ENCOUNTER
Telephone rx w/ read back rcvd from Dr. Rk Shaikh to refer pt to [facility].

## 2018-01-09 ENCOUNTER — OFFICE VISIT (OUTPATIENT)
Dept: GASTROENTEROLOGY | Facility: CLINIC | Age: 63
End: 2018-01-09

## 2018-01-09 ENCOUNTER — TELEPHONE (OUTPATIENT)
Dept: GASTROENTEROLOGY | Facility: CLINIC | Age: 63
End: 2018-01-09

## 2018-01-09 VITALS
DIASTOLIC BLOOD PRESSURE: 79 MMHG | HEART RATE: 76 BPM | HEIGHT: 61 IN | WEIGHT: 124.63 LBS | SYSTOLIC BLOOD PRESSURE: 137 MMHG | BODY MASS INDEX: 23.53 KG/M2

## 2018-01-09 DIAGNOSIS — R19.4 CHANGE IN BOWEL HABITS: Primary | ICD-10-CM

## 2018-01-09 PROCEDURE — 99213 OFFICE O/P EST LOW 20 MIN: CPT | Performed by: INTERNAL MEDICINE

## 2018-01-09 PROCEDURE — G0463 HOSPITAL OUTPT CLINIC VISIT: HCPCS | Performed by: INTERNAL MEDICINE

## 2018-01-09 RX ORDER — POLYETHYLENE GLYCOL 3350, SODIUM CHLORIDE, SODIUM BICARBONATE, POTASSIUM CHLORIDE 420; 11.2; 5.72; 1.48 G/4L; G/4L; G/4L; G/4L
POWDER, FOR SOLUTION ORAL
Qty: 1 BOTTLE | Refills: 0 | Status: ON HOLD | OUTPATIENT
Start: 2018-01-09 | End: 2018-11-08

## 2018-01-09 NOTE — TELEPHONE ENCOUNTER
Scheduled for:  Colonoscopy 67759  Provider Name: Dr. Noel Shafer  Date:  3/27/18  Location:  [facility]  Sedation:  MAC  Time:  1:00 pm, arrival 12:00 pm  Prep: Suprep  Meds/Allergies Reconciled?:  Physician reviewed  Diagnosis with codes: Change in bowel habits R19.4

## 2018-01-09 NOTE — H&P
[address] [facility] [address] Gastroenterology                                                                                                  Clinic History and Physical     Re APPENDECTOMY      Comment: appendicitis  09/07/2011: BREAST BIOPSY Left      Comment: fibrocystic changes  No date: CATARACT  7/2009: COLONOSCOPY      Comment: incomplete (chronic constipation)  12/21/2011: HERNIA SURGERY Left      Comment: neuroma excisio TABLET BY MOUTH EVERY MORNING BEFORE BREAKFAST Disp: 30 tablet Rfl: 11   AMLODIPINE BESYLATE 5 MG Oral Tab TAKE ONE TABLET BY MOUTH ONE TIME DAILY  Disp: 30 tablet Rfl: 11   IBUPROFEN 600 MG Oral Tab TAKE ONE TABLET BY MOUTH EVERY SIX HOURS as needed with lysis of adhesions) here regarding changes in bowel habits    Discussed that with her history of constipation, her description of incomplete evacuation and small pellety stools as well as her CT from October showing a large significant stool burden, her sy

## 2018-01-09 NOTE — PATIENT INSTRUCTIONS
1. Schedule colonoscopy with Monitored anesthesia care (MAC) with Dr. Desire Varghese    2.  bowel prep from pharmacy (split suprep )    PLEASE NOTE I'M GIVING YOU 2 BOWEL PREPARATION.  SUPREP IF FOR THE COLONOSCOPY AND TRILYTE IS FOR THE WASHOUT (INSTRUCT this.    7. Schedule an appointment with the pelvic floor physical therapist

## 2018-01-16 ENCOUNTER — OFFICE VISIT (OUTPATIENT)
Dept: PULMONOLOGY | Facility: CLINIC | Age: 63
End: 2018-01-16

## 2018-01-16 VITALS
BODY MASS INDEX: 24.94 KG/M2 | WEIGHT: 127 LBS | SYSTOLIC BLOOD PRESSURE: 125 MMHG | DIASTOLIC BLOOD PRESSURE: 75 MMHG | HEART RATE: 75 BPM | RESPIRATION RATE: 18 BRPM | OXYGEN SATURATION: 99 % | HEIGHT: 60 IN

## 2018-01-16 DIAGNOSIS — M54.32 SCIATICA OF LEFT SIDE: Primary | ICD-10-CM

## 2018-01-16 PROCEDURE — 99213 OFFICE O/P EST LOW 20 MIN: CPT | Performed by: INTERNAL MEDICINE

## 2018-01-16 PROCEDURE — G0463 HOSPITAL OUTPT CLINIC VISIT: HCPCS | Performed by: INTERNAL MEDICINE

## 2018-01-16 NOTE — PROGRESS NOTES
The patient is 26-year-old female who I know well from prior evaluation and comes in now for follow-up.   She has discomfort that radiates from her left lower back around her hip to the left medial groin and occasionally radiates down the leg and will resul

## 2018-01-18 ENCOUNTER — TELEPHONE (OUTPATIENT)
Dept: GASTROENTEROLOGY | Facility: CLINIC | Age: 63
End: 2018-01-18

## 2018-01-18 NOTE — TELEPHONE ENCOUNTER
Pt contacted and she states that she did complete the bowel cleanse yesterday as instructed by Dr. Caroline Ibrahim and also started the [facility] today.      She states she was concerned b/c she has had bowel preps in the past and for this one, she had liquid stool from t

## 2018-01-18 NOTE — TELEPHONE ENCOUNTER
Pt states that she did washout as instructed yesterday and stool was liquid from start to finish. Pt is concerned that this is not normal.  She started taking Miralax today. Please call.

## 2018-01-19 NOTE — TELEPHONE ENCOUNTER
No new recommendations. We are planning on endoscopic evaluation and she should plan to see pelvic floor Pt.  Agree with triage/recommendations    Thomas Garcia MD  [facility] - Gastroenterology  1/18/2018  6:32 PM

## 2018-01-20 NOTE — TELEPHONE ENCOUNTER
Pt contacted and reviewed Dr. Eligio Mckeon message below, she verbalized understanding and did not have further questions at this time.     Future Appointments  Date Time Provider Cirilo Springer   2/13/2018 11:00 AM Amita Herrera, PT [address] NEURO PT EM [address]   2/19/201

## 2018-01-29 RX ORDER — ACETAMINOPHEN AND CODEINE PHOSPHATE 300; 30 MG/1; MG/1
TABLET ORAL
Qty: 90 TABLET | Refills: 3 | Status: SHIPPED
Start: 2018-01-29 | End: 2018-05-16

## 2018-01-29 RX ORDER — IBUPROFEN 600 MG/1
TABLET ORAL
Qty: 60 TABLET | Refills: 3 | Status: SHIPPED | OUTPATIENT
Start: 2018-01-29 | End: 2019-03-16

## 2018-01-29 NOTE — TELEPHONE ENCOUNTER
LOV: 9/27/17  Last Refilled:#90, 3rfs 8/21/17    Future Appointments  Date Time Provider Cirilo Springer   2/13/2018 11:00 AM Tash Owusu, PT [facility] NEURO PT EM [facility]   2/19/2018 12:30 PM Tash Owusu, PT [facility] NEURO PT EM [facility]   2/26/2018 12:30 PM Marie Barraza

## 2018-01-29 NOTE — TELEPHONE ENCOUNTER
LOV:9-27  Last Filled:10-2, #60 with 3 refills  Labs:10-5, ALT 14 and AST 19  Future Appointments  Date Time Provider Cirilo Springer   2/13/2018 11:00 AM Mckenna Bui, PT [facility] NEURO PT EM [facility]   2/19/2018 12:30 PM Mckenna Bui, PT [facility] NEURO PT EM [facility]

## 2018-01-30 ENCOUNTER — APPOINTMENT (OUTPATIENT)
Dept: GENERAL RADIOLOGY | Facility: HOSPITAL | Age: 63
End: 2018-01-30
Payer: MEDICARE

## 2018-01-30 ENCOUNTER — TELEPHONE (OUTPATIENT)
Dept: GASTROENTEROLOGY | Facility: CLINIC | Age: 63
End: 2018-01-30

## 2018-01-30 ENCOUNTER — HOSPITAL ENCOUNTER (EMERGENCY)
Facility: HOSPITAL | Age: 63
Discharge: HOME | End: 2018-01-30
Payer: MEDICARE

## 2018-01-30 VITALS
WEIGHT: 122 LBS | TEMPERATURE: 98 F | DIASTOLIC BLOOD PRESSURE: 85 MMHG | HEART RATE: 71 BPM | OXYGEN SATURATION: 98 % | RESPIRATION RATE: 18 BRPM | HEIGHT: 60 IN | BODY MASS INDEX: 23.95 KG/M2 | SYSTOLIC BLOOD PRESSURE: 147 MMHG

## 2018-01-30 DIAGNOSIS — K59.00 CONSTIPATION, UNSPECIFIED CONSTIPATION TYPE: Primary | ICD-10-CM

## 2018-01-30 LAB
ANION GAP SERPL CALC-SCNC: 12 MMOL/L (ref 0–18)
BASOPHILS # BLD: 0.1 K/UL (ref 0–0.2)
BASOPHILS NFR BLD: 1 %
BUN SERPL-MCNC: 7 MG/DL (ref 8–20)
BUN/CREAT SERPL: 14.9 (ref 10–20)
CALCIUM SERPL-MCNC: 9.4 MG/DL (ref 8.5–10.5)
CHLORIDE SERPL-SCNC: 95 MMOL/L (ref 95–110)
CO2 SERPL-SCNC: 24 MMOL/L (ref 22–32)
CREAT SERPL-MCNC: 0.47 MG/DL (ref 0.5–1.5)
EOSINOPHIL # BLD: 0.1 K/UL (ref 0–0.7)
EOSINOPHIL NFR BLD: 1 %
ERYTHROCYTE [DISTWIDTH] IN BLOOD BY AUTOMATED COUNT: 12.9 % (ref 11–15)
GLUCOSE SERPL-MCNC: 92 MG/DL (ref 70–99)
HCT VFR BLD AUTO: 35.6 % (ref 35–48)
HGB BLD-MCNC: 12.1 G/DL (ref 12–16)
LYMPHOCYTES # BLD: 1.6 K/UL (ref 1–4)
LYMPHOCYTES NFR BLD: 32 %
MCH RBC QN AUTO: 30.4 PG (ref 27–32)
MCHC RBC AUTO-ENTMCNC: 33.9 G/DL (ref 32–37)
MCV RBC AUTO: 89.7 FL (ref 80–100)
MONOCYTES # BLD: 0.4 K/UL (ref 0–1)
MONOCYTES NFR BLD: 7 %
NEUTROPHILS # BLD AUTO: 3 K/UL (ref 1.8–7.7)
NEUTROPHILS NFR BLD: 59 %
OSMOLALITY UR CALC.SUM OF ELEC: 270 MOSM/KG (ref 275–295)
PLATELET # BLD AUTO: 232 K/UL (ref 140–400)
PMV BLD AUTO: 7 FL (ref 7.4–10.3)
POTASSIUM SERPL-SCNC: 3.6 MMOL/L (ref 3.3–5.1)
RBC # BLD AUTO: 3.97 M/UL (ref 3.7–5.4)
SODIUM SERPL-SCNC: 131 MMOL/L (ref 136–144)
WBC # BLD AUTO: 5.2 K/UL (ref 4–11)

## 2018-01-30 PROCEDURE — 36415 COLL VENOUS BLD VENIPUNCTURE: CPT

## 2018-01-30 PROCEDURE — 74019 RADEX ABDOMEN 2 VIEWS: CPT

## 2018-01-30 PROCEDURE — 80048 BASIC METABOLIC PNL TOTAL CA: CPT

## 2018-01-30 PROCEDURE — 85025 COMPLETE CBC W/AUTO DIFF WBC: CPT

## 2018-01-30 PROCEDURE — 99284 EMERGENCY DEPT VISIT MOD MDM: CPT

## 2018-01-30 NOTE — ED INITIAL ASSESSMENT (HPI)
Pt had hx of constipation, was given colon prep to \"wash her out\".   Pt states she has not had bowel movement since 1/9, pt can pass gas with otc meds    Pt called her GI doc office and the nurse advised her come to ed bc she may need emergency surgery

## 2018-01-30 NOTE — TELEPHONE ENCOUNTER
Pt. States that she continues to be constipated since her last visit, stomach is swollen and having pain, and the Miralax did not help. Please call to discuss.

## 2018-01-30 NOTE — TELEPHONE ENCOUNTER
Routed to  Dr. Hien العلي office on-call as Dr. Kathy Souza is currently out of office. Pt contacted and she states that she had severe LLQ abdominal pain rated 10/10 yesterday. States today it is \"off and on with shooting pain and it goes from 10/10 to 0/10. \" State

## 2018-01-31 NOTE — ED NOTES
Patient accompanied by  for c/o constipation since 01/12 when she had a \"washout\" by GI. Patient admits to taking gas pill with some relief of gas no stool, and taking miralax with no relief.   Patient has hypoactive bowel sounds, denies any vomiti

## 2018-01-31 NOTE — TELEPHONE ENCOUNTER
Patient seen by Dr. Alisha Krishna on 1/9/18 office visit. Pt in ED now. I agree with triage advise given.  Will route to Dr. Mary Kelsey, on call

## 2018-01-31 NOTE — ED PROVIDER NOTES
Patient Seen in: [facility] Emergency Department     History   Patient presents with:  Constipation (gastrointestinal)    Stated Complaint: PCP sent for colon surgery     HPI    57year old female with a history of constipation, complains that she i Vitamins-Minerals (CENTRUM ADULTS OR),  Take by mouth. PEG 3350-KCl-Na Bicarb-NaCl (TRILYTE) 420 g Oral Recon Soln,  As directed.    Na Sulfate-K Sulfate-Mg Sulf (SUPREP BOWEL PREP KIT) 17.5-3.13-1.6 GM/180ML Oral Solution,  Take as directed   PANTOPRAZOL Comment: 1 unit per day  Alcohol use: No                Review of Systems    Positive for stated complaint: PCP sent for colon surgery   Other systems are as noted in HPI. Constitutional and vital signs reviewed.       All other systems reviewed and nega activity. Skin: Skin is intact. No petechiae noted. She is not diaphoretic. No cyanosis. No pallor. Psychiatric: She has a normal mood and affect. Her behavior is normal.   Nursing note and vitals reviewed.               Emergency Differential Diagnosis SOFT TISSUES: Normal. No masses or organomegaly. CALCIFICATIONS: None significant. BONES: S. shaped scoliosis dorsal lumbar spine and multilevel spondylosis. Bilateral hip arthritis.          Dictated by (CST): Cely Trujillo MD on 1/30/201 Saturation: 98% on room air, normal    Consults: No orders of the defined types were placed in this encounter. MD Discussions or Sign-Outs: None    Re-Evaluation   9p:   patient's symptoms remained unchanged -has no pain still.    overall clinical pres follow up information were provided prior to discharge from the ED if sent home.  We also recommended that the patient schedule follow up care with a primary care provider as soon as possible to obtain basic health screening including reassessment of blood

## 2018-02-02 ENCOUNTER — TELEPHONE (OUTPATIENT)
Dept: INTERNAL MEDICINE CLINIC | Facility: CLINIC | Age: 63
End: 2018-02-02

## 2018-02-02 ENCOUNTER — TELEPHONE (OUTPATIENT)
Dept: PULMONOLOGY | Facility: CLINIC | Age: 63
End: 2018-02-02

## 2018-02-02 DIAGNOSIS — I10 HYPERTENSION, UNSPECIFIED TYPE: Primary | ICD-10-CM

## 2018-02-02 NOTE — TELEPHONE ENCOUNTER
Pt has upcoming appt with cardiologist Dr Mark Anthony Benoit on  2/7- asking for referral for 6 visits to be faxed to [phone] or [phone]

## 2018-02-02 NOTE — TELEPHONE ENCOUNTER
Pt called and requested a referral to see Dr Criss Hastings. Pended referral.  Please review diagnois and sign off if you approve. Thank you!  Awais Section [phone] [facility]

## 2018-02-02 NOTE — TELEPHONE ENCOUNTER
Spoke to [place] in managed care, pts insurance is [employer] [place], Dr. Candace Dia is pts PCP,  Hettinger states she will process referral through Dr. Candace Dia. [facility] office notified of this.   Alpa pimentel [facility] phone # [phone] to ve

## 2018-02-08 ENCOUNTER — TELEPHONE (OUTPATIENT)
Dept: PULMONOLOGY | Facility: CLINIC | Age: 63
End: 2018-02-08

## 2018-02-08 NOTE — TELEPHONE ENCOUNTER
Dana Arrieta from [facility] is calling to have order for   MRI LEFT HIP to be faxed to [phone] any questions please call raudel at [phone].  Thank you

## 2018-02-12 ENCOUNTER — TELEPHONE (OUTPATIENT)
Dept: GASTROENTEROLOGY | Facility: CLINIC | Age: 63
End: 2018-02-12

## 2018-02-12 NOTE — TELEPHONE ENCOUNTER
Pt states miralax has not been helping her to have a bowel movement. Pt would like to know if GS has any other suggestions regarding her symptoms.  Please call thank you [phone]

## 2018-02-13 ENCOUNTER — OFFICE VISIT (OUTPATIENT)
Dept: PHYSICAL THERAPY | Facility: HOSPITAL | Age: 63
End: 2018-02-13
Attending: INTERNAL MEDICINE
Payer: MEDICARE

## 2018-02-13 ENCOUNTER — TELEPHONE (OUTPATIENT)
Dept: GASTROENTEROLOGY | Facility: CLINIC | Age: 63
End: 2018-02-13

## 2018-02-13 DIAGNOSIS — R19.4 CHANGE IN BOWEL HABITS: ICD-10-CM

## 2018-02-13 PROCEDURE — 97162 PT EVAL MOD COMPLEX 30 MIN: CPT

## 2018-02-13 PROCEDURE — 97535 SELF CARE MNGMENT TRAINING: CPT

## 2018-02-13 PROCEDURE — 97140 MANUAL THERAPY 1/> REGIONS: CPT

## 2018-02-13 NOTE — TELEPHONE ENCOUNTER
See other TE from 2/13/18 as I attempted to reach pt and unable to leave VM and she did not answer regarding both this message and the PT therapy message.    -Awaiting pt call back.

## 2018-02-13 NOTE — TELEPHONE ENCOUNTER
Referral order faxed to [phone], also LM for PT ([phone]) to see if they are requesting any additional information for this patient to have therapy.     -Awaiting call back. Attempted to reach pt as well- no answer, unable to leave a VM.

## 2018-02-13 NOTE — TELEPHONE ENCOUNTER
[2/13/2018 11:44 AM] Tavares Maurer @[facility]:   Courtney Grey- this is Erinn Baxter from  regarding Eric Aviles  I faxed over the referral as requested, where there something else that was needed for us to send?   [2/13/2018 11:46 AM] Garrett Romero:   Sandhya Espana, I did

## 2018-02-19 ENCOUNTER — OFFICE VISIT (OUTPATIENT)
Dept: PHYSICAL THERAPY | Facility: HOSPITAL | Age: 63
End: 2018-02-19
Attending: INTERNAL MEDICINE
Payer: MEDICARE

## 2018-02-19 DIAGNOSIS — R19.4 CHANGE IN BOWEL HABITS: ICD-10-CM

## 2018-02-19 PROCEDURE — 97112 NEUROMUSCULAR REEDUCATION: CPT

## 2018-02-19 PROCEDURE — 97140 MANUAL THERAPY 1/> REGIONS: CPT

## 2018-02-19 NOTE — PROGRESS NOTES
Dx:  R19.4 (ICD-10-CM) - 787.99 (ICD-9-CM) - Change in bowel habits         Authorized # of Visits/Insurance:  Gerber GOLDEN (8; 2/13-4/15)  Script Dates: 2/13/18-4/10/18          Next MD visit: none scheduled  Referring MD: Miya Chauhan  Fall Risk:  ashly during and upon discharge to aide with symptom management and return to previous level of function.    2. Pt to report improved BM frequency to 2-3x/week with a bristol stool scale of 3 or 4 to reduce straining by 50%.    3. Pt to demonstrate improved PF st

## 2018-02-23 ENCOUNTER — TELEPHONE (OUTPATIENT)
Dept: PULMONOLOGY | Facility: CLINIC | Age: 63
End: 2018-02-23

## 2018-02-23 NOTE — TELEPHONE ENCOUNTER
Pt is requesting MRI spine results. Pt informed results not available and Dr. Rupinder Byrne will be back in the office on Monday to review. Pt verbalized understanding.

## 2018-02-26 ENCOUNTER — APPOINTMENT (OUTPATIENT)
Dept: PHYSICAL THERAPY | Facility: HOSPITAL | Age: 63
End: 2018-02-26
Attending: INTERNAL MEDICINE
Payer: MEDICARE

## 2018-02-26 ENCOUNTER — TELEPHONE (OUTPATIENT)
Dept: PHYSICAL THERAPY | Facility: HOSPITAL | Age: 63
End: 2018-02-26

## 2018-02-27 NOTE — TELEPHONE ENCOUNTER
The MRI of the spine demonstrates foraminal stenosis at the lumbar spine bilaterally. I spoke with the patient. She was referred to physiatry.

## 2018-02-28 ENCOUNTER — LAB ENCOUNTER (OUTPATIENT)
Dept: LAB | Age: 63
End: 2018-02-28
Attending: INTERNAL MEDICINE
Payer: MEDICARE

## 2018-02-28 DIAGNOSIS — R07.9 CHEST PAIN: Primary | ICD-10-CM

## 2018-02-28 LAB
ANION GAP SERPL CALC-SCNC: 8 MMOL/L (ref 0–18)
APTT PPP: 29.9 SECONDS (ref 23.2–35.3)
BASOPHILS # BLD: 0 K/UL (ref 0–0.2)
BASOPHILS NFR BLD: 1 %
BUN SERPL-MCNC: 4 MG/DL (ref 8–20)
BUN/CREAT SERPL: 9.3 (ref 10–20)
CALCIUM SERPL-MCNC: 9.3 MG/DL (ref 8.5–10.5)
CHLORIDE SERPL-SCNC: 98 MMOL/L (ref 95–110)
CO2 SERPL-SCNC: 28 MMOL/L (ref 22–32)
CREAT SERPL-MCNC: 0.43 MG/DL (ref 0.5–1.5)
EOSINOPHIL # BLD: 0.1 K/UL (ref 0–0.7)
EOSINOPHIL NFR BLD: 1 %
ERYTHROCYTE [DISTWIDTH] IN BLOOD BY AUTOMATED COUNT: 13.2 % (ref 11–15)
GLUCOSE SERPL-MCNC: 68 MG/DL (ref 70–99)
HCT VFR BLD AUTO: 37 % (ref 35–48)
HGB BLD-MCNC: 12.3 G/DL (ref 12–16)
INR BLD: 1 (ref 0.9–1.2)
LYMPHOCYTES # BLD: 1.5 K/UL (ref 1–4)
LYMPHOCYTES NFR BLD: 33 %
MCH RBC QN AUTO: 30.2 PG (ref 27–32)
MCHC RBC AUTO-ENTMCNC: 33.3 G/DL (ref 32–37)
MCV RBC AUTO: 90.7 FL (ref 80–100)
MONOCYTES # BLD: 0.3 K/UL (ref 0–1)
MONOCYTES NFR BLD: 7 %
NEUTROPHILS # BLD AUTO: 2.6 K/UL (ref 1.8–7.7)
NEUTROPHILS NFR BLD: 58 %
OSMOLALITY UR CALC.SUM OF ELEC: 273 MOSM/KG (ref 275–295)
PLATELET # BLD AUTO: 238 K/UL (ref 140–400)
PMV BLD AUTO: 7.1 FL (ref 7.4–10.3)
POTASSIUM SERPL-SCNC: 4.1 MMOL/L (ref 3.3–5.1)
PROTHROMBIN TIME: 13 SECONDS (ref 11.8–14.5)
RBC # BLD AUTO: 4.08 M/UL (ref 3.7–5.4)
SODIUM SERPL-SCNC: 134 MMOL/L (ref 136–144)
WBC # BLD AUTO: 4.5 K/UL (ref 4–11)

## 2018-02-28 PROCEDURE — 85730 THROMBOPLASTIN TIME PARTIAL: CPT

## 2018-02-28 PROCEDURE — 80048 BASIC METABOLIC PNL TOTAL CA: CPT

## 2018-02-28 PROCEDURE — 36415 COLL VENOUS BLD VENIPUNCTURE: CPT

## 2018-02-28 PROCEDURE — 85025 COMPLETE CBC W/AUTO DIFF WBC: CPT

## 2018-02-28 PROCEDURE — 85610 PROTHROMBIN TIME: CPT

## 2018-03-01 ENCOUNTER — TELEPHONE (OUTPATIENT)
Dept: PULMONOLOGY | Facility: CLINIC | Age: 63
End: 2018-03-01

## 2018-03-01 ENCOUNTER — TELEPHONE (OUTPATIENT)
Dept: INTERNAL MEDICINE CLINIC | Facility: CLINIC | Age: 63
End: 2018-03-01

## 2018-03-01 DIAGNOSIS — M48.061 LUMBAR FORAMINAL STENOSIS: Primary | ICD-10-CM

## 2018-03-01 NOTE — TELEPHONE ENCOUNTER
Pt informed referral request will need to go through her PCP Dr. Georgiana Gomez. Pt verbalized understanding.

## 2018-03-01 NOTE — TELEPHONE ENCOUNTER
Patient would like a referral to Dr Trisha Avila for injections. Please sign referral order if you agree. Thank you.

## 2018-03-01 NOTE — TELEPHONE ENCOUNTER
Pt indicates she needs referral for Dr. Graeme Mendoza for injections in back. Pls contact pt when referral is in at:[phone],thanks.

## 2018-03-05 ENCOUNTER — HOSPITAL ENCOUNTER (OUTPATIENT)
Dept: INTERVENTIONAL RADIOLOGY/VASCULAR | Facility: HOSPITAL | Age: 63
Discharge: HOME | End: 2018-03-05
Attending: INTERNAL MEDICINE | Admitting: INTERNAL MEDICINE
Payer: MEDICARE

## 2018-03-05 ENCOUNTER — APPOINTMENT (OUTPATIENT)
Dept: PHYSICAL THERAPY | Facility: HOSPITAL | Age: 63
End: 2018-03-05
Attending: INTERNAL MEDICINE
Payer: MEDICARE

## 2018-03-05 VITALS
BODY MASS INDEX: 24.15 KG/M2 | SYSTOLIC BLOOD PRESSURE: 142 MMHG | OXYGEN SATURATION: 99 % | WEIGHT: 123 LBS | DIASTOLIC BLOOD PRESSURE: 82 MMHG | HEIGHT: 60 IN | RESPIRATION RATE: 20 BRPM | HEART RATE: 71 BPM

## 2018-03-05 DIAGNOSIS — R07.9 CHEST PAIN: ICD-10-CM

## 2018-03-05 PROCEDURE — 99152 MOD SED SAME PHYS/QHP 5/>YRS: CPT

## 2018-03-05 PROCEDURE — 93458 L HRT ARTERY/VENTRICLE ANGIO: CPT

## 2018-03-05 PROCEDURE — 99153 MOD SED SAME PHYS/QHP EA: CPT

## 2018-03-05 PROCEDURE — B2151ZZ FLUOROSCOPY OF LEFT HEART USING LOW OSMOLAR CONTRAST: ICD-10-PCS | Performed by: INTERNAL MEDICINE

## 2018-03-05 PROCEDURE — 4A023N7 MEASUREMENT OF CARDIAC SAMPLING AND PRESSURE, LEFT HEART, PERCUTANEOUS APPROACH: ICD-10-PCS | Performed by: INTERNAL MEDICINE

## 2018-03-05 PROCEDURE — B2111ZZ FLUOROSCOPY OF MULTIPLE CORONARY ARTERIES USING LOW OSMOLAR CONTRAST: ICD-10-PCS | Performed by: INTERNAL MEDICINE

## 2018-03-05 RX ORDER — NITROGLYCERIN 20 MG/100ML
INJECTION INTRAVENOUS
Status: COMPLETED
Start: 2018-03-05 | End: 2018-03-05

## 2018-03-05 RX ORDER — LIDOCAINE HYDROCHLORIDE 20 MG/ML
INJECTION, SOLUTION EPIDURAL; INFILTRATION; INTRACAUDAL; PERINEURAL
Status: COMPLETED
Start: 2018-03-05 | End: 2018-03-05

## 2018-03-05 RX ORDER — ASPIRIN 81 MG/1
TABLET, CHEWABLE ORAL
Status: DISCONTINUED
Start: 2018-03-05 | End: 2018-03-05

## 2018-03-05 RX ORDER — MIDAZOLAM HYDROCHLORIDE 1 MG/ML
INJECTION INTRAMUSCULAR; INTRAVENOUS
Status: COMPLETED
Start: 2018-03-05 | End: 2018-03-05

## 2018-03-05 RX ORDER — HEPARIN SODIUM 1000 [USP'U]/ML
INJECTION, SOLUTION INTRAVENOUS; SUBCUTANEOUS
Status: COMPLETED
Start: 2018-03-05 | End: 2018-03-05

## 2018-03-05 RX ORDER — ASPIRIN 81 MG/1
324 TABLET, CHEWABLE ORAL ONCE
Status: COMPLETED | OUTPATIENT
Start: 2018-03-05 | End: 2018-03-05

## 2018-03-05 RX ORDER — SODIUM CHLORIDE 9 MG/ML
INJECTION, SOLUTION INTRAVENOUS CONTINUOUS
Status: DISCONTINUED | OUTPATIENT
Start: 2018-03-05 | End: 2018-03-05

## 2018-03-05 RX ORDER — SODIUM CHLORIDE 9 MG/ML
INJECTION, SOLUTION INTRAVENOUS
Status: DISCONTINUED
Start: 2018-03-05 | End: 2018-03-05

## 2018-03-05 RX ORDER — VERAPAMIL HYDROCHLORIDE 2.5 MG/ML
INJECTION, SOLUTION INTRAVENOUS
Status: COMPLETED
Start: 2018-03-05 | End: 2018-03-05

## 2018-03-05 RX ADMIN — SODIUM CHLORIDE: 9 INJECTION, SOLUTION INTRAVENOUS at 07:59:00

## 2018-03-05 RX ADMIN — SODIUM CHLORIDE: 9 INJECTION, SOLUTION INTRAVENOUS at 09:45:00

## 2018-03-05 RX ADMIN — ASPIRIN 324 MG: 81 TABLET, CHEWABLE ORAL at 07:58:00

## 2018-03-05 NOTE — PROCEDURES
[facility]    PATIENT'S NAME: Alaina Minor   ATTENDING PHYSICIAN: Babak Christiansen DO   OPERATING PHYSICIAN: Babak Escamilla DO   PATIENT ACCOUNT#:   [de-identified]    LOCATION:  [place] [address]  MEDICAL RECORD #:   [id number]       DATE OF BI the right coronary artery giving rise to a large sized left ventricular branch that supplies the same area as the posterior descending artery.   The left anterior descending artery starts off as a moderate sized vessel, in the midportion becomes a very smal

## 2018-03-05 NOTE — PLAN OF CARE
Pt ambulated/voided/took liquids and food and tolerated well Site soft dry and intact after ambulation

## 2018-03-13 ENCOUNTER — APPOINTMENT (OUTPATIENT)
Dept: PHYSICAL THERAPY | Facility: HOSPITAL | Age: 63
End: 2018-03-13
Attending: INTERNAL MEDICINE
Payer: MEDICARE

## 2018-03-19 ENCOUNTER — APPOINTMENT (OUTPATIENT)
Dept: PHYSICAL THERAPY | Facility: HOSPITAL | Age: 63
End: 2018-03-19
Attending: INTERNAL MEDICINE
Payer: MEDICARE

## 2018-03-20 ENCOUNTER — TELEPHONE (OUTPATIENT)
Dept: GASTROENTEROLOGY | Facility: CLINIC | Age: 63
End: 2018-03-20

## 2018-03-20 NOTE — TELEPHONE ENCOUNTER
Pt transferred from phone room with questions re time/location of 3/27 procedure. Reviewed all with pt , who confirms she has instructions, just hadn't checked them yet. No further questions.

## 2018-03-22 ENCOUNTER — TELEPHONE (OUTPATIENT)
Dept: PULMONOLOGY | Facility: CLINIC | Age: 63
End: 2018-03-22

## 2018-03-22 NOTE — TELEPHONE ENCOUNTER
Desiree/Dr. Walker Harrell Office calling for mutual pt indicates pt was referred and is in the office now, requesting list of meds FAX:[phone], Pls fax asap, any questions pls call Desiree at:[phone],thanks.

## 2018-03-22 NOTE — DISCHARGE SUMMARY
Pt called and requested remaining apts to be canceled and would like to be discharged from PT at this time per cardiologist orders.   Therapist: Israel Hill PT, DPT

## 2018-03-26 ENCOUNTER — APPOINTMENT (OUTPATIENT)
Dept: PHYSICAL THERAPY | Facility: HOSPITAL | Age: 63
End: 2018-03-26
Attending: INTERNAL MEDICINE
Payer: MEDICARE

## 2018-03-26 ENCOUNTER — TELEPHONE (OUTPATIENT)
Dept: GASTROENTEROLOGY | Facility: CLINIC | Age: 63
End: 2018-03-26

## 2018-03-26 NOTE — TELEPHONE ENCOUNTER
Pt transferred from phone room. She is not going to get home from work until 6:30pm to start suprep. I told her to proceed as planned, do second half in the morning as instructed.  If she notices she is not cleaning out well, or is not running clear, she is

## 2018-03-27 ENCOUNTER — TELEPHONE (OUTPATIENT)
Dept: GASTROENTEROLOGY | Facility: CLINIC | Age: 63
End: 2018-03-27

## 2018-03-27 DIAGNOSIS — R19.4 CHANGE IN BOWEL HABITS: Primary | ICD-10-CM

## 2018-03-27 NOTE — TELEPHONE ENCOUNTER
Thank you for letting me know. Yes. Let's plan on a 2 day bowel prep with trilyte/colyte/golytely. Please re-schedule the procedure. Bowel prep should be 2L of which ever of the above is approved in the morning 24 hours prior to the procedure.  The

## 2018-03-27 NOTE — TELEPHONE ENCOUNTER
Pt returned call, baljeet trejo at:[phone],thanks. If calling tomorrow baljeet call after noon.

## 2018-03-27 NOTE — TELEPHONE ENCOUNTER
Dr Max Raymond, please not pt transferred from phone room. She is scheduled today for colonoscopy at 1pm. Was still passing many \"chunks\" of stool. She also had problems with getting well prepped last time.  Should we reschedule with Colyte instead of supre

## 2018-03-27 NOTE — TELEPHONE ENCOUNTER
Scheduled for:  Colonoscopy [id number]  Provider Name: Dr. Chris Kenny  Date:  5/29/18  Location:  [facility]  Sedation:  MAC  Time:  1330 (pt is aware to arrive at 1230)   Prep:  Colyte, mailed 3/27/18   Meds/Allergies Reconciled?:  Physician reviewed   Diagnosis with

## 2018-03-28 NOTE — TELEPHONE ENCOUNTER
Great. Thanks    Libia Jimenez MD  [facility] - Gastroenterology  3/28/2018  8:27 AM

## 2018-05-16 RX ORDER — ACETAMINOPHEN AND CODEINE PHOSPHATE 300; 30 MG/1; MG/1
TABLET ORAL
Qty: 90 TABLET | Refills: 3 | Status: SHIPPED
Start: 2018-05-16 | End: 2018-07-17

## 2018-05-16 NOTE — TELEPHONE ENCOUNTER
LOV: 9-27  Last Filled:1-29, #90 with 3 refills  Labs:   Future Appointments  Date Time Provider Cirilo Springer   5/29/2018 1:30 PM ZOIE, PROCEDURE ECWMOGIPROC None       Please Advise

## 2018-05-24 ENCOUNTER — HOSPITAL ENCOUNTER (OUTPATIENT)
Age: 63
Discharge: HOME | End: 2018-05-24
Attending: FAMILY MEDICINE
Payer: MEDICARE

## 2018-05-24 VITALS
SYSTOLIC BLOOD PRESSURE: 130 MMHG | OXYGEN SATURATION: 99 % | HEIGHT: 61 IN | BODY MASS INDEX: 23.22 KG/M2 | RESPIRATION RATE: 18 BRPM | HEART RATE: 79 BPM | WEIGHT: 123 LBS | TEMPERATURE: 98 F | DIASTOLIC BLOOD PRESSURE: 64 MMHG

## 2018-05-24 DIAGNOSIS — J04.0 ACUTE LARYNGITIS: Primary | ICD-10-CM

## 2018-05-24 PROCEDURE — 99214 OFFICE O/P EST MOD 30 MIN: CPT

## 2018-05-24 PROCEDURE — 87430 STREP A AG IA: CPT

## 2018-05-24 PROCEDURE — 99213 OFFICE O/P EST LOW 20 MIN: CPT

## 2018-05-24 RX ORDER — ALBUTEROL SULFATE 90 UG/1
2 AEROSOL, METERED RESPIRATORY (INHALATION) EVERY 4 HOURS PRN
Qty: 1 INHALER | Refills: 0 | Status: SHIPPED | OUTPATIENT
Start: 2018-05-24 | End: 2019-10-09

## 2018-05-24 RX ORDER — BENZONATATE 100 MG/1
100 CAPSULE ORAL 3 TIMES DAILY PRN
Qty: 15 CAPSULE | Refills: 0 | Status: SHIPPED | OUTPATIENT
Start: 2018-05-24 | End: 2018-06-23

## 2018-05-24 NOTE — ED PROVIDER NOTES
Patient Seen in: [facility] [place]    History   CC:  Patient presents with:  Cough/URI    Stated Complaint: Sore Throat/Body Ache    ------------------------------  Per Rn: (paraphrase)    Laryngitis and cough x I day  --------- tobacco: Never Used                      Comment: 1 unit per day  Alcohol use: No                Review of Systems  ROS:  Positive for stated complaint: Sore Throat/Body Ache  Other systems are as noted in HPI. Constitutional and vital signs reviewed. understanding  F/u w/PCP advised          Disposition and Plan     Clinical Impression:  Acute laryngitis  (primary encounter diagnosis)    Disposition:  Discharge  5/24/2018  4:37 pm    Follow-up:  Rena Rolon MD  [address]

## 2018-05-24 NOTE — ED INITIAL ASSESSMENT (HPI)
PATIENT ARRIVED AMBULATORY TO ROOM C/O A NON PRODUCTIVE COUGH THAT STARTED YESTERDAY. +LARYNGITIS. NO FEVERS. NO N/V/D. +NASAL CONGESTION. EASY NON LABORED RESPIRATIONS.

## 2018-06-13 ENCOUNTER — OFFICE VISIT (OUTPATIENT)
Dept: OBGYN CLINIC | Facility: CLINIC | Age: 63
End: 2018-06-13

## 2018-06-13 VITALS
DIASTOLIC BLOOD PRESSURE: 82 MMHG | BODY MASS INDEX: 24 KG/M2 | SYSTOLIC BLOOD PRESSURE: 148 MMHG | WEIGHT: 128 LBS | HEART RATE: 71 BPM

## 2018-06-13 DIAGNOSIS — R10.2 VAGINAL PAIN: Primary | ICD-10-CM

## 2018-06-13 PROCEDURE — 99213 OFFICE O/P EST LOW 20 MIN: CPT | Performed by: OBSTETRICS & GYNECOLOGY

## 2018-06-14 NOTE — PROGRESS NOTES
Kimmie Thomas is a 70year old female  No LMP recorded. Patient is postmenopausal. Patient presents with:  Vaginal Problem: Pt states she has been having vaginal pain for a few months. Last seen 17.   For last 2 months, having left vulvar/martínez Types: Cigarettes     Quit date: 1/1/2008  Smokeless tobacco: Never Used                      Comment: 1 unit per day  Alcohol use:  No                MEDICATIONS:    Current Outpatient Prescriptions:  Albuterol Sulfate  (90 Base) MCG/ACT Inhalation (three) times daily as needed for cough.  Disp: 15 capsule Rfl: 0   Na Sulfate-K Sulfate-Mg Sulf (SUPREP BOWEL PREP KIT) 17.5-3.13-1.6 GM/180ML Oral Solution Take as directed Disp: 1 Bottle Rfl: 0       ALLERGIES:    Sulfanilamide           RASH      PHYSIC

## 2018-06-27 ENCOUNTER — HOSPITAL ENCOUNTER (EMERGENCY)
Facility: HOSPITAL | Age: 63
Discharge: HOME | End: 2018-06-27
Attending: PHYSICIAN ASSISTANT
Payer: MEDICARE

## 2018-06-27 ENCOUNTER — APPOINTMENT (OUTPATIENT)
Dept: CT IMAGING | Facility: HOSPITAL | Age: 63
End: 2018-06-27
Attending: PHYSICIAN ASSISTANT
Payer: MEDICARE

## 2018-06-27 ENCOUNTER — APPOINTMENT (OUTPATIENT)
Dept: GENERAL RADIOLOGY | Facility: HOSPITAL | Age: 63
End: 2018-06-27
Attending: PHYSICIAN ASSISTANT
Payer: MEDICARE

## 2018-06-27 VITALS
HEART RATE: 73 BPM | BODY MASS INDEX: 20.96 KG/M2 | TEMPERATURE: 98 F | HEIGHT: 61 IN | RESPIRATION RATE: 16 BRPM | SYSTOLIC BLOOD PRESSURE: 155 MMHG | WEIGHT: 111 LBS | OXYGEN SATURATION: 97 % | DIASTOLIC BLOOD PRESSURE: 97 MMHG

## 2018-06-27 DIAGNOSIS — S46.911A STRAIN OF RIGHT SHOULDER, INITIAL ENCOUNTER: ICD-10-CM
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
## 2018-06-27 DIAGNOSIS — S80.02XA CONTUSION OF LEFT KNEE, INITIAL ENCOUNTER: ICD-10-CM

## 2018-06-27 DIAGNOSIS — S16.1XXA STRAIN OF NECK MUSCLE, INITIAL ENCOUNTER: ICD-10-CM

## 2018-06-27 DIAGNOSIS — S09.90XA CLOSED HEAD INJURY WITHOUT LOSS OF CONSCIOUSNESS, INITIAL ENCOUNTER: ICD-10-CM

## 2018-06-27 DIAGNOSIS — R03.0 ELEVATED BLOOD PRESSURE READING: ICD-10-CM

## 2018-06-27 DIAGNOSIS — S70.01XA CONTUSION OF RIGHT HIP, INITIAL ENCOUNTER: ICD-10-CM

## 2018-06-27 DIAGNOSIS — S80.11XA CONTUSION OF RIGHT LOWER LEG, INITIAL ENCOUNTER: ICD-10-CM

## 2018-06-27 DIAGNOSIS — S86.911A KNEE STRAIN, RIGHT, INITIAL ENCOUNTER: Primary | ICD-10-CM

## 2018-06-27 PROCEDURE — 73590 X-RAY EXAM OF LOWER LEG: CPT | Performed by: PHYSICIAN ASSISTANT

## 2018-06-27 PROCEDURE — 73502 X-RAY EXAM HIP UNI 2-3 VIEWS: CPT | Performed by: PHYSICIAN ASSISTANT

## 2018-06-27 PROCEDURE — 73030 X-RAY EXAM OF SHOULDER: CPT | Performed by: PHYSICIAN ASSISTANT

## 2018-06-27 PROCEDURE — 70450 CT HEAD/BRAIN W/O DYE: CPT | Performed by: PHYSICIAN ASSISTANT

## 2018-06-27 PROCEDURE — 72125 CT NECK SPINE W/O DYE: CPT | Performed by: PHYSICIAN ASSISTANT

## 2018-06-27 PROCEDURE — 99284 EMERGENCY DEPT VISIT MOD MDM: CPT

## 2018-06-27 PROCEDURE — 73560 X-RAY EXAM OF KNEE 1 OR 2: CPT | Performed by: PHYSICIAN ASSISTANT

## 2018-06-27 RX ORDER — HYDROCODONE BITARTRATE AND ACETAMINOPHEN 5; 325 MG/1; MG/1
1 TABLET ORAL EVERY 6 HOURS PRN
Qty: 12 TABLET | Refills: 0 | Status: SHIPPED | OUTPATIENT
Start: 2018-06-27 | End: 2018-07-04

## 2018-06-27 RX ORDER — HYDROCODONE BITARTRATE AND ACETAMINOPHEN 5; 325 MG/1; MG/1
1 TABLET ORAL ONCE
Status: COMPLETED | OUTPATIENT
Start: 2018-06-27 | End: 2018-06-27

## 2018-06-28 NOTE — ED INITIAL ASSESSMENT (HPI)
Pt was grocery shopping and slipped on some water. Pt states that the right leg slid forward and the left leg went backwards, so she was in a split. Pt c/o pain to bl knees, and states that she has hx of bl knee replacements.  Put is unsure if she hit her h

## 2018-06-28 NOTE — ED NOTES
Pt provided with discharge instructions. Verbalized understanding for plan of care at home and follow up. All questions/concerns addressed prior to discharge.    Pt ambulated out of er with steady gait

## 2018-06-28 NOTE — ED PROVIDER NOTES
Patient Seen in: [facility] AND [facility] Emergency Department    History   Patient presents with:  Fall (musculoskeletal, neurologic)    Stated Complaint:     HPI    42-year-old female presents with chief complaint of right knee pain.   Onset 20 minutes prior t HISTORY      Comment: Arthrocentesis of the left knee joint  2015 and 2016: TOTAL KNEE REPLACEMENT Bilateral  No date: TUBAL LIGATION    Medications :   HYDROcodone-acetaminophen 5-325 MG Oral Tab,  Take 1 tablet by mouth every 6 (six) hours as needed for CAD   • Stroke Mother      CVA   • Heart Attack Mother      quadruple bypass surgery/MI   • Arthritis Mother      possible Rheumatoid Arthritis   • Colon Cancer Maternal Grandmother    • Asthma Daughter    • Breast Cancer Sister 36   • Ovarian Cancer Sist abrasions. No penetrating injury. Chest: There is no tenderness to the chest wall. Respiratory: Respiratory effort was normal. There is no rales, wheezes, or rhonchi. There is no stridor.  Air entry is equal.  Cardiovascular: Regular rate and rhythm, no m shoulder. Full range of motion of right shoulder with reported pain. Distal pulses intact. Capillary refill normal.  Motor intact distally. Sensory intact distally. No ecchymosis. No erythema. No open wounds. No compartment syndrome. No edema. follow-up with their PCP to have their blood pressure re-checked within 24-48 hours was provided. Patient case discussed with and patient seen by Dr. Mat Gomez. Patient care transferred to Dr. Rony Arriola at [phone]. X-rays pending.     Disposition and Plan     C

## 2018-07-12 PROBLEM — Z96.653 HX OF TOTAL KNEE ARTHROPLASTY, BILATERAL: Status: ACTIVE | Noted: 2018-07-12

## 2018-07-12 PROBLEM — S80.01XA CONTUSION OF RIGHT KNEE: Status: ACTIVE | Noted: 2018-07-12

## 2018-07-12 PROBLEM — S80.02XA CONTUSION OF LEFT KNEE: Status: ACTIVE | Noted: 2018-07-12

## 2018-07-12 PROBLEM — M12.811 ROTATOR CUFF ARTHROPATHY, RIGHT: Status: ACTIVE | Noted: 2018-07-12

## 2018-07-30 PROBLEM — M25.561 ACUTE PAIN OF BOTH KNEES: Status: ACTIVE | Noted: 2018-07-30

## 2018-07-30 PROBLEM — M25.562 ACUTE PAIN OF BOTH KNEES: Status: ACTIVE | Noted: 2018-07-30

## 2018-08-21 ENCOUNTER — LAB ENCOUNTER (OUTPATIENT)
Dept: LAB | Facility: HOSPITAL | Age: 63
End: 2018-08-21
Attending: PHYSICAL MEDICINE & REHABILITATION
Payer: MEDICARE

## 2018-08-21 DIAGNOSIS — Z51.81 ENCOUNTER FOR THERAPEUTIC DRUG MONITORING: Primary | ICD-10-CM

## 2018-08-21 LAB
APTT PPP: 29.8 SECONDS (ref 23.2–35.3)
INR BLD: 1 (ref 0.9–1.2)
PROTHROMBIN TIME: 12.9 SECONDS (ref 11.8–14.5)

## 2018-08-21 PROCEDURE — 85610 PROTHROMBIN TIME: CPT

## 2018-08-21 PROCEDURE — 36415 COLL VENOUS BLD VENIPUNCTURE: CPT

## 2018-08-21 PROCEDURE — 85730 THROMBOPLASTIN TIME PARTIAL: CPT

## 2018-08-24 ENCOUNTER — HOSPITAL ENCOUNTER (OUTPATIENT)
Dept: GENERAL RADIOLOGY | Facility: HOSPITAL | Age: 63
Discharge: HOME | End: 2018-08-24
Attending: PHYSICAL MEDICINE & REHABILITATION
Payer: MEDICARE

## 2018-08-24 DIAGNOSIS — M47.817 SPONDYLOSIS OF LUMBOSACRAL REGION WITHOUT MYELOPATHY OR RADICULOPATHY: ICD-10-CM

## 2018-08-24 PROCEDURE — 77002 NEEDLE LOCALIZATION BY XRAY: CPT | Performed by: PHYSICAL MEDICINE & REHABILITATION

## 2018-08-24 PROCEDURE — 64493 INJ PARAVERT F JNT L/S 1 LEV: CPT

## 2018-08-24 PROCEDURE — 64495 INJ PARAVERT F JNT L/S 3 LEV: CPT

## 2018-08-24 PROCEDURE — 64494 INJ PARAVERT F JNT L/S 2 LEV: CPT

## 2018-08-24 RX ORDER — TRIAMCINOLONE ACETONIDE 40 MG/ML
INJECTION, SUSPENSION INTRA-ARTICULAR; INTRAMUSCULAR
Status: DISPENSED
Start: 2018-08-24 | End: 2018-08-25

## 2018-08-24 RX ORDER — LIDOCAINE HYDROCHLORIDE 10 MG/ML
INJECTION, SOLUTION EPIDURAL; INFILTRATION; INTRACAUDAL; PERINEURAL
Status: DISPENSED
Start: 2018-08-24 | End: 2018-08-25

## 2018-08-24 NOTE — H&P
Patient admitted for outpatient procedure for chronic left low back pain. Has a history of facet arthritis, RA and OA. On no blood thinners and not diabetic. Here for facet injections.    Examination   Heart: RRR  Lungs: CTA  Abd: soft NT  MSK: painful lumb

## 2018-08-25 NOTE — OPERATIVE REPORT
[facility]    PATIENT'S NAME: Fawn Whiting   ATTENDING PHYSICIAN: Pastor Georgia MD   OPERATING PHYSICIAN: Pastor Georgia MD   PATIENT ACCOUNT#:   [de-identified]    LOCATION:  [facility]  MEDICAL RECORD #:   [id number]       DATE OF BIRTH:  [birth year]

## 2018-09-04 RX ORDER — ACETAMINOPHEN AND CODEINE PHOSPHATE 300; 30 MG/1; MG/1
TABLET ORAL
Qty: 90 TABLET | Refills: 0 | OUTPATIENT
Start: 2018-09-04 | End: 2018-10-08

## 2018-09-04 NOTE — TELEPHONE ENCOUNTER
LOV:9-27  Last Filled:5-16, #90 with 3 refills  Labs:   Future Appointments  Date Time Provider Cirilo Racheal   9/6/2018 1:30 PM Corin Hook MD [facility]h [facility]       Please Advise

## 2018-09-07 NOTE — TELEPHONE ENCOUNTER
Tylenol #3 phoned into [facility] and spoke to [place]. Phoned patient and informed her to follow up with Dr. Alek [place].  Patient stated she would call back to schedule.

## 2018-09-13 PROBLEM — R10.32 CHRONIC PAIN OF LEFT INGUINAL REGION: Status: ACTIVE | Noted: 2018-09-13

## 2018-09-13 PROBLEM — G89.29 CHRONIC PAIN OF LEFT INGUINAL REGION: Status: ACTIVE | Noted: 2018-09-13

## 2018-09-27 ENCOUNTER — NURSE TRIAGE (OUTPATIENT)
Dept: OTHER | Age: 63
End: 2018-09-27

## 2018-09-27 ENCOUNTER — APPOINTMENT (OUTPATIENT)
Dept: CT IMAGING | Facility: HOSPITAL | Age: 63
End: 2018-09-27
Attending: EMERGENCY MEDICINE
Payer: MEDICARE

## 2018-09-27 ENCOUNTER — HOSPITAL ENCOUNTER (EMERGENCY)
Facility: HOSPITAL | Age: 63
Discharge: HOME | End: 2018-09-27
Attending: EMERGENCY MEDICINE
Payer: MEDICARE

## 2018-09-27 VITALS
TEMPERATURE: 98 F | DIASTOLIC BLOOD PRESSURE: 80 MMHG | RESPIRATION RATE: 18 BRPM | SYSTOLIC BLOOD PRESSURE: 144 MMHG | WEIGHT: 121 LBS | OXYGEN SATURATION: 98 % | HEIGHT: 61 IN | BODY MASS INDEX: 22.84 KG/M2 | HEART RATE: 80 BPM

## 2018-09-27 DIAGNOSIS — R51.9 HEADACHE IN BACK OF HEAD: Primary | ICD-10-CM

## 2018-09-27 PROCEDURE — 99284 EMERGENCY DEPT VISIT MOD MDM: CPT

## 2018-09-27 PROCEDURE — 70450 CT HEAD/BRAIN W/O DYE: CPT | Performed by: EMERGENCY MEDICINE

## 2018-09-27 PROCEDURE — 96374 THER/PROPH/DIAG INJ IV PUSH: CPT

## 2018-09-27 PROCEDURE — 96361 HYDRATE IV INFUSION ADD-ON: CPT

## 2018-09-27 PROCEDURE — 96375 TX/PRO/DX INJ NEW DRUG ADDON: CPT

## 2018-09-27 RX ORDER — DIPHENHYDRAMINE HYDROCHLORIDE 50 MG/ML
25 INJECTION INTRAMUSCULAR; INTRAVENOUS ONCE
Status: COMPLETED | OUTPATIENT
Start: 2018-09-27 | End: 2018-09-27

## 2018-09-27 RX ORDER — METOCLOPRAMIDE HYDROCHLORIDE 5 MG/ML
10 INJECTION INTRAMUSCULAR; INTRAVENOUS ONCE
Status: COMPLETED | OUTPATIENT
Start: 2018-09-27 | End: 2018-09-27

## 2018-09-27 RX ORDER — TRAMADOL HYDROCHLORIDE 50 MG/1
TABLET ORAL EVERY 4 HOURS PRN
Qty: 10 TABLET | Refills: 0 | Status: SHIPPED | OUTPATIENT
Start: 2018-09-27 | End: 2018-10-04

## 2018-09-27 RX ORDER — KETOROLAC TROMETHAMINE 15 MG/ML
15 INJECTION, SOLUTION INTRAMUSCULAR; INTRAVENOUS ONCE
Status: COMPLETED | OUTPATIENT
Start: 2018-09-27 | End: 2018-09-27

## 2018-09-27 RX ORDER — DEXAMETHASONE SODIUM PHOSPHATE 4 MG/ML
10 VIAL (ML) INJECTION ONCE
Status: COMPLETED | OUTPATIENT
Start: 2018-09-27 | End: 2018-09-27

## 2018-09-27 RX ORDER — HYDROCODONE BITARTRATE AND ACETAMINOPHEN 5; 325 MG/1; MG/1
1 TABLET ORAL ONCE
Status: DISCONTINUED | OUTPATIENT
Start: 2018-09-27 | End: 2018-09-27

## 2018-09-27 RX ORDER — HYDROCODONE BITARTRATE AND ACETAMINOPHEN 5; 325 MG/1; MG/1
2 TABLET ORAL ONCE
Status: COMPLETED | OUTPATIENT
Start: 2018-09-27 | End: 2018-09-27

## 2018-09-27 NOTE — TELEPHONE ENCOUNTER
Action Requested: Summary for Provider     []  Critical Lab, Recommendations Needed  [] Need Additional Advice  []   FYI    []   Need Orders  [] Need Medications Sent to Pharmacy  []  Other     SUMMARY: advised pt to go to ER as she is experiencing the wor

## 2018-09-28 NOTE — ED PROVIDER NOTES
Patient Seen in: [facility] AND [facility] Emergency Department    History   Patient presents with:  Headache (neurologic)    Stated Complaint:     HPI    70year old Female complains of headache   Location:  sharp pain, unilateral in the left occipital area. No date: KNEE SURGERY;  Right      Comment:  osteoarthritis  No date: OTHER SURGICAL HISTORY      Comment:  Laparotomy (adhesions)  No date: OTHER SURGICAL HISTORY      Comment:  Arthrocentesis of the right knee joint  No date: OTHER SURGICAL HISTORY      C • Dementia Father         Alzheimer's   • Lipids Father         hyperlipidemia   • Hypertension Father    • Musculo-skelatal Disorder Father         B/L knees replaced   • Cancer Brother         liver   • Diabetes Mother    • Heart Disease Mother         C Nose: Nose normal.   Mouth/Throat: Mucous membranes are normal. Mucous membranes are not pale and not cyanotic. Eyes: Conjunctivae and lids are normal. Right conjunctiva is not injected. Left conjunctiva is not injected. No scleral icterus.  Pupils are eq lesion. SINUSES: Limited views demonstrate no significant mucosal thickening or     fluid. ORBITS: Limited views are unremarkable.            OTHER: Negative.           =====    CONCLUSION:          No acute intracranial abnormality by Radhika Justice Complicating Factors: The patient already has has Osteoarthritis; Raynaud's disease; Sx.6/8/16, CPT. 38097, R Total Knee Replacement, w/, Global Exp.9/6/16; Seborrheic keratoses;  Hyperglycemia; Emphysema (subcutaneous) (surgical) resulting from a Plan: General supportive care recommendations given to patient. Will give to [place], if pain less than 5 out of 10, will discharge patient home to follow-up with primary care for neuro referral, possible occipital nerve injections.   Further outpatient eval

## 2018-09-28 NOTE — TELEPHONE ENCOUNTER
CSS--please call patient and schedule ER f/u visit with MMP     Disposition and Plan      Clinical Impression:  Headache in back of head  (primary encounter diagnosis)     Disposition:  Discharge     Follow-up:  Manfred Briones MD  [address] [facility]

## 2018-10-05 NOTE — TELEPHONE ENCOUNTER
Future Appointments       Provider Department Appt Notes    In 4 days Arlina Baumgarten, MD [address] [place], [address] [facility] Jaime Redd ER F/U

## 2018-10-08 ENCOUNTER — TELEPHONE (OUTPATIENT)
Dept: RHEUMATOLOGY | Facility: CLINIC | Age: 63
End: 2018-10-08

## 2018-10-08 ENCOUNTER — OFFICE VISIT (OUTPATIENT)
Dept: RHEUMATOLOGY | Facility: CLINIC | Age: 63
End: 2018-10-08
Payer: MEDICARE

## 2018-10-08 ENCOUNTER — HOSPITAL ENCOUNTER (OUTPATIENT)
Dept: GENERAL RADIOLOGY | Facility: HOSPITAL | Age: 63
Discharge: HOME | End: 2018-10-08
Attending: INTERNAL MEDICINE
Payer: MEDICARE

## 2018-10-08 VITALS
BODY MASS INDEX: 23.79 KG/M2 | HEIGHT: 61 IN | HEART RATE: 80 BPM | DIASTOLIC BLOOD PRESSURE: 82 MMHG | WEIGHT: 126 LBS | SYSTOLIC BLOOD PRESSURE: 136 MMHG

## 2018-10-08 DIAGNOSIS — M15.9 GENERALIZED OA: ICD-10-CM

## 2018-10-08 DIAGNOSIS — G89.29 CHRONIC LEFT SHOULDER PAIN: ICD-10-CM

## 2018-10-08 DIAGNOSIS — M25.512 CHRONIC LEFT SHOULDER PAIN: ICD-10-CM

## 2018-10-08 DIAGNOSIS — M25.50 HYPERMOBILITY ARTHRALGIA: ICD-10-CM

## 2018-10-08 DIAGNOSIS — M15.9 GENERALIZED OA: Primary | ICD-10-CM

## 2018-10-08 PROCEDURE — 73502 X-RAY EXAM HIP UNI 2-3 VIEWS: CPT | Performed by: INTERNAL MEDICINE

## 2018-10-08 PROCEDURE — 99214 OFFICE O/P EST MOD 30 MIN: CPT | Performed by: INTERNAL MEDICINE

## 2018-10-08 RX ORDER — ACETAMINOPHEN AND CODEINE PHOSPHATE 300; 30 MG/1; MG/1
TABLET ORAL
Qty: 90 TABLET | Refills: 5 | Status: SHIPPED | OUTPATIENT
Start: 2018-10-08 | End: 2019-03-08

## 2018-10-08 NOTE — PATIENT INSTRUCTIONS
1. Cont. Tylenol #3 every 8 hours -#90  5refills  2. Check mri left shoulder   3. Return to clinic in  3-4 months.    4. Check left hip xray

## 2018-10-08 NOTE — PROGRESS NOTES
Fannie Kamara is a 70year old female. HPI:   Patient presents with:  Osteoarthritis  Raynaud's Syndrome  Hip Pain: left  Shoulder Pain: left  Back Pain: lower      I saw Eric Aviles on 10/8/2018. I had last seen her on 9/27/2017.  I had last seen her on 8 she has right shoudler pain now -for the 5-6- months after she started working   She has hand pain and wrist pain and elft ankel pain as well. She also got her left knee repalced in 6.8/2015. Wanda.  #3 did help in the past.   She feels ibuprofen makes saw Dr. Conner Curry gave injections in both shoulders. It helped her right shoulder, but not her left shoulder. She fell in June 2018 -   She felt that her left shoulder pain is gradual. The pain is really bad in the morning.    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 Disp: 60 ampule Rfl: 1   QUEtiapine Fumarate 50 MG Oral Tab Take 50 mg by mouth nightly.    Disp:  Rfl: 2   ClonazePAM (KLONOPIN) 2 MG Oral Tab 3.3 - 5.1 mmol/L 3.9 4.2   Chloride      95 - 110 mmol/L 97 96   Carbon Dioxide, Total      22 - 32 mmol/L 28 29   BUN      8 - 20 mg/dL 9 5 (L)   CREATININE      0.50 - 1.50 mg/dL 0.57 0.44 (L)   CALCIUM      8.5 - 10.5 mg/dL 9.7 9.3   ALT (SGPT)      14 26   NEHAL SCREEN      Negative  Negative   C-Citrullinated Peptide IgG AB      0.0 - 6.9 U/mL 0.7 2.5   COMPLEMENT C3      88 - 201 mg/dL  111   RHEUMATOID FACTOR      <11 IU/mL <5 8   C-REACTIVE PROTEIN      0.0 - 0.9 mg/dL 0.6 0.7     12/18/2015 - b/l puentes left greater than right hip osteoarthritis. 4. No suspicious bone lesion or fracture. 5. Atherosclerotic vascular calcification. 9/6/2018 - left shoulder xray   3 view x-rays of left shoulder were obtained and reviewed today.  There is no evidence of f Increase amittriyline to 20mg at night to see if that helps. - not taking this   7. Left shoulder pain - sh sonia wary of surgery - check mri left shoulder - she didn't have transportation - yet - so she has to start it -   8.  Left hip pain - check xraay

## 2018-10-09 NOTE — TELEPHONE ENCOUNTER
PA started with [employer] [phone] Fax [phone] tracking #[id number]. Additional clinical information well need to be faxed in. [employer] to fax form to [phone].

## 2018-10-15 NOTE — TELEPHONE ENCOUNTER
Contacted pt to discuss xray report from 10/8/2018:    \"CONCLUSION: Mild left hip osteoarthritis without acute fracture or dislocation. Overall appearance is unchanged since 6/27/18. \"     Pt made aware that report has been mailed to address on file on 10

## 2018-10-23 ENCOUNTER — TELEPHONE (OUTPATIENT)
Dept: GASTROENTEROLOGY | Facility: CLINIC | Age: 63
End: 2018-10-23

## 2018-10-23 DIAGNOSIS — R19.4 CHANGE IN BOWEL HABITS: Primary | ICD-10-CM

## 2018-10-23 NOTE — TELEPHONE ENCOUNTER
Dr. Yulia Cash-    Please advise if pt OK to reschedule using orders from TE 3/27/18 (2 day bowel prep) for CLN w/MAC, DX change in bowel habits or would you prefer to see again for OV?

## 2018-10-23 NOTE — TELEPHONE ENCOUNTER
Yes ok to schedule using bowel prep instructions from telephone encounter from 3/27/18.      Thanks    Cee Wolff MD  [facility], [facility] - Gastroenterology  10/23/2018  4:50 PM

## 2018-10-23 NOTE — TELEPHONE ENCOUNTER
Pt states that she had colonoscopy scheduled in May 2018 but had to cancel because she lost her insurance. Pt has [address] now and would like to schedule. Please call.

## 2018-10-29 ENCOUNTER — TELEPHONE (OUTPATIENT)
Dept: RHEUMATOLOGY | Facility: CLINIC | Age: 63
End: 2018-10-29

## 2018-10-29 NOTE — TELEPHONE ENCOUNTER
Pt calling to check status on scheduling CLN. Pt states it has been over a week and she has not received a call.  Please call thank you

## 2018-10-29 NOTE — TELEPHONE ENCOUNTER
PA approved until 11/11/18 for MRI CPT 11003. Per insurance to completed at [place] #[phone] fax [phone]. Pt will call insurance for a closer location. She will call office back with information.

## 2018-10-29 NOTE — TELEPHONE ENCOUNTER
Pt states that she was scheduled to have an MRI today at 2:15 and was told by Central Scheduling that Dr. Keyur Ritchie office cancelled the appt for her. Pt would like to speak with the RN as to why the appt was cancelled.

## 2018-10-30 NOTE — TELEPHONE ENCOUNTER
Prep signed off per Dr. Khalida Anderson written order below. No further action required at this time.

## 2018-10-30 NOTE — TELEPHONE ENCOUNTER
Scheduled for:  Colonoscopy 82820  Provider Name: Dr. Savannah Jimenez  Date:  11/12/18  Location:  [facility] [place]  Sedation:  MAC  Time:  1589 (pt is aware to arrive at 0815)   Prep:  Colyte, mailed 10/31/18   Meds/Allergies Reconciled?:  Physician reviewed   Diagnosis wit

## 2018-10-30 NOTE — TELEPHONE ENCOUNTER
Spoke with pt. Insurance would not approve MRI at [address] [place] [address]. She would have to call insurance to change the facility of the MRI. Pt stated she will call and copy of order mailed to her. Pt verbalized understanding.

## 2018-10-31 ENCOUNTER — TELEPHONE (OUTPATIENT)
Dept: GASTROENTEROLOGY | Facility: CLINIC | Age: 63
End: 2018-10-31

## 2018-10-31 DIAGNOSIS — R19.4 CHANGE IN BOWEL HABITS: Primary | ICD-10-CM

## 2018-11-01 NOTE — TELEPHONE ENCOUNTER
Rescheduled this patient per Dr. Dina Fields since he was on call and over booked on 11/12/18    Rescheduled for:  Colonoscopy 76220  Provider Name: Dr. Dina Fields  Date:    From-11/12/18  To-11/8/18  Location:  [facility]  Sedation:  MAC  Time:    [id number]    To-0

## 2018-11-06 ENCOUNTER — TELEPHONE (OUTPATIENT)
Dept: GASTROENTEROLOGY | Facility: CLINIC | Age: 63
End: 2018-11-06

## 2018-11-06 NOTE — TELEPHONE ENCOUNTER
Sent call to RN - Pt has Sore throat & Coughing - will this effect 11/8/2018 CLN? Pls call. Thank you.

## 2018-11-06 NOTE — TELEPHONE ENCOUNTER
Dr. 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 Denies fever, chills, SOB, chest/nasal congestion, difficul

## 2018-11-07 RX ORDER — ISOSORBIDE MONONITRATE 30 MG/1
15 TABLET, EXTENDED RELEASE ORAL DAILY
COMMUNITY
End: 2019-03-16

## 2018-11-08 ENCOUNTER — HOSPITAL ENCOUNTER (OUTPATIENT)
Facility: HOSPITAL | Age: 63
Setting detail: HOSPITAL OUTPATIENT SURGERY
Discharge: HOME | End: 2018-11-08
Attending: INTERNAL MEDICINE | Admitting: INTERNAL MEDICINE
Payer: MEDICARE

## 2018-11-08 ENCOUNTER — ANESTHESIA EVENT (OUTPATIENT)
Dept: ENDOSCOPY | Facility: HOSPITAL | Age: 63
End: 2018-11-08
Payer: MEDICARE

## 2018-11-08 ENCOUNTER — ANESTHESIA (OUTPATIENT)
Dept: ENDOSCOPY | Facility: HOSPITAL | Age: 63
End: 2018-11-08
Payer: MEDICARE

## 2018-11-08 DIAGNOSIS — R19.4 CHANGE IN BOWEL HABITS: ICD-10-CM

## 2018-11-08 PROCEDURE — 0DBM8ZX EXCISION OF DESCENDING COLON, VIA NATURAL OR ARTIFICIAL OPENING ENDOSCOPIC, DIAGNOSTIC: ICD-10-PCS | Performed by: INTERNAL MEDICINE

## 2018-11-08 PROCEDURE — 0DBE8ZX EXCISION OF LARGE INTESTINE, VIA NATURAL OR ARTIFICIAL OPENING ENDOSCOPIC, DIAGNOSTIC: ICD-10-PCS | Performed by: INTERNAL MEDICINE

## 2018-11-08 PROCEDURE — 45380 COLONOSCOPY AND BIOPSY: CPT | Performed by: INTERNAL MEDICINE

## 2018-11-08 RX ORDER — SODIUM CHLORIDE, SODIUM LACTATE, POTASSIUM CHLORIDE, CALCIUM CHLORIDE 600; 310; 30; 20 MG/100ML; MG/100ML; MG/100ML; MG/100ML
INJECTION, SOLUTION INTRAVENOUS CONTINUOUS
Status: DISCONTINUED | OUTPATIENT
Start: 2018-11-08 | End: 2018-11-08

## 2018-11-08 RX ORDER — NALOXONE HYDROCHLORIDE 0.4 MG/ML
80 INJECTION, SOLUTION INTRAMUSCULAR; INTRAVENOUS; SUBCUTANEOUS AS NEEDED
Status: DISCONTINUED | OUTPATIENT
Start: 2018-11-08 | End: 2018-11-08

## 2018-11-08 NOTE — H&P
History & Physical Examination    Patient Name: Kimmie Thomas  MRN: [id number]  [facility]: [id number]  YOB: 1955    Diagnosis: colorectal cancer screening, rectal bleeding, LLQ pain      Medications Prior to Admission:  Isosorbide Mononitrate ER 27 M Oral Solution Take as directed Disp: 1 Bottle Rfl: 0 Not Taking   PANTOPRAZOLE SODIUM 40 MG Oral Tab EC TAKE ONE TABLET BY MOUTH EVERY MORNING BEFORE BREAKFAST Disp: 30 tablet Rfl: 11 Taking   Amitriptyline HCl 10 MG Oral Tab Take 2 tablets (20 mg total) b Mother    • Heart Disease Mother         CAD   • Stroke Mother         CVA   • Heart Attack Mother         quadruple bypass surgery/MI   • Arthritis Mother         possible Rheumatoid Arthritis   • Colon Cancer Maternal Grandmother    • Asthma Daughter

## 2018-11-08 NOTE — OPERATIVE REPORT
Colonoscopy Report    Eric Aviles     1955 Age 70year old   PCP Srikanth Willams MD Endoscopist Neal Arana MD     Date of procedure: 18    Procedure: Colonoscopy w/ biopsy    Pre-operative diagnosis: colorectal cancer screening, LLQ p tolerated the procedure well. There were no immediate postoperative complications. The patient’s vital signs were monitored throughout the procedure and remained stable.     Estimated blood loss: insignificant    Specimens collected:  Colon polyp, colon bio

## 2018-11-08 NOTE — ANESTHESIA PREPROCEDURE EVALUATION
Anesthesia PreOp Note    HPI:     Juan Mcdermott is a 70year old female who presents for preoperative consultation requested by: Justino Elliott MD    Date of Surgery: 11/8/2018    Procedure(s):  COLONOSCOPY  Indication: Change in bowel habits    Releva APPENDECTOMY     • BREAST BIOPSY Left 09/07/2011    fibrocystic changes   • CATARACT     • COLONOSCOPY  7/2009    incomplete (chronic constipation)   • HERNIA SURGERY Left 12/21/2011    neuroma excision, partial mesh removal, primary re-repair   • INGUINAL Taking   aspirin 81 MG Oral Tab Take 2 tablets (162 mg total) by mouth daily.  Disp: 30 tablet Rfl: 0 Taking   IBUPROFEN 600 MG Oral Tab TAKE ONE TABLET BY MOUTH EVERY SIX HOURS AS NEEDED WITH FOOD Disp: 60 tablet Rfl: 3 PRN   PEG 3350-KCl-Na Bicarb-NaCl (T level: Not on file    Social Needs      Financial resource strain: Not on file      Food insecurity - worry: Not on file      Food insecurity - inability: Not on file      Transportation needs - medical: Not on file      Transportation needs - non-medical: Anesthesia ROS/Med Hx and Physical Exam     Patient summary reviewed and Nursing notes reviewed    Airway   Mallampati: I  TM distance: >3 FB  Neck ROM: full  Dental    (+) upper dentures and lower dentures    Pulmonary - normal exam   (+) COPD mild

## 2018-11-09 ENCOUNTER — TELEPHONE (OUTPATIENT)
Dept: GASTROENTEROLOGY | Facility: CLINIC | Age: 63
End: 2018-11-09

## 2018-11-09 VITALS
OXYGEN SATURATION: 99 % | WEIGHT: 125 LBS | HEIGHT: 61 IN | BODY MASS INDEX: 23.6 KG/M2 | SYSTOLIC BLOOD PRESSURE: 137 MMHG | HEART RATE: 76 BPM | RESPIRATION RATE: 16 BRPM | DIASTOLIC BLOOD PRESSURE: 85 MMHG

## 2018-11-09 NOTE — TELEPHONE ENCOUNTER
Entered into Epic:Recall colon in 1 year by Dr. Yulia Cash d/t poor prep. Last Colon done 11/8/18, next due 11/8/19. Snapshot updated.     Pt contacted and reviewed Dr. Yulia Cash message below, she verbalized understanding and accepted the following appt, directions provi

## 2018-11-09 NOTE — TELEPHONE ENCOUNTER
GI Staff:    Please let the patient know her biopsies and polyp were unremarkable. 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 It is not harmful. Dr. Reza Haque also saw this findings 9 years ago.     As I discussed with he

## 2018-11-13 ENCOUNTER — HOSPITAL ENCOUNTER (OUTPATIENT)
Dept: MRI IMAGING | Age: 63
Discharge: HOME | End: 2018-11-13
Attending: INTERNAL MEDICINE
Payer: MEDICARE

## 2018-11-13 ENCOUNTER — TELEPHONE (OUTPATIENT)
Dept: RHEUMATOLOGY | Facility: CLINIC | Age: 63
End: 2018-11-13

## 2018-11-13 DIAGNOSIS — M25.512 CHRONIC LEFT SHOULDER PAIN: ICD-10-CM

## 2018-11-13 DIAGNOSIS — G89.29 CHRONIC LEFT SHOULDER PAIN: ICD-10-CM

## 2018-11-13 DIAGNOSIS — M15.9 GENERALIZED OA: ICD-10-CM

## 2018-11-13 PROCEDURE — 73221 MRI JOINT UPR EXTREM W/O DYE: CPT | Performed by: INTERNAL MEDICINE

## 2018-11-14 NOTE — TELEPHONE ENCOUNTER
Call pt.  With mri shoulder results - she will need f.u eval from  Oncology for bone lesions - will d/w

## 2018-11-15 ENCOUNTER — TELEPHONE (OUTPATIENT)
Dept: INTERNAL MEDICINE CLINIC | Facility: CLINIC | Age: 63
End: 2018-11-15

## 2018-11-15 NOTE — TELEPHONE ENCOUNTER
Talked to pt. About her test results - she is aware of the unusual bone lesions that need to be worked up to rule out cancer -   Told her to make a ppointment with dr. Shelley Lewis for this coming week. Dr. Judi James is aware. willsed her this message as FYI.    Can you mail the patient a copy of her MRI results

## 2018-11-15 NOTE — TELEPHONE ENCOUNTER
I called pt  MRI discussed  Next step See Dr Romayne Sartorius his opinion  Pt has an appt with me on 11/29  Will dsicuss plan of mgt at that time  Patient voiced understanding  and agrees with plan

## 2018-11-19 ENCOUNTER — TELEPHONE (OUTPATIENT)
Dept: SURGERY | Facility: CLINIC | Age: 63
End: 2018-11-19

## 2018-11-20 NOTE — TELEPHONE ENCOUNTER
MD Kiera Dupont, ONELIA Cc: Christine Guerra RN; Mine Bolton LPN   Caller: Unspecified (Yesterday,  4:08 PM)             I reviewed MRI report. Ward Sofia would follow the recs of Dr. Rohit Han and Dr. Dwain Mccormick. Peng Wilkinson will probably need more testing

## 2018-11-29 ENCOUNTER — OFFICE VISIT (OUTPATIENT)
Dept: INTERNAL MEDICINE CLINIC | Facility: CLINIC | Age: 63
End: 2018-11-29
Payer: MEDICARE

## 2018-11-29 VITALS
TEMPERATURE: 98 F | BODY MASS INDEX: 24.17 KG/M2 | HEIGHT: 61 IN | RESPIRATION RATE: 17 BRPM | HEART RATE: 90 BPM | WEIGHT: 128 LBS | SYSTOLIC BLOOD PRESSURE: 154 MMHG | DIASTOLIC BLOOD PRESSURE: 77 MMHG

## 2018-11-29 DIAGNOSIS — M89.9 BONE LESION: Primary | ICD-10-CM

## 2018-11-29 PROCEDURE — G0009 ADMIN PNEUMOCOCCAL VACCINE: HCPCS | Performed by: INTERNAL MEDICINE

## 2018-11-29 PROCEDURE — 99213 OFFICE O/P EST LOW 20 MIN: CPT | Performed by: INTERNAL MEDICINE

## 2018-11-29 PROCEDURE — 90653 IIV ADJUVANT VACCINE IM: CPT | Performed by: INTERNAL MEDICINE

## 2018-11-29 PROCEDURE — G0008 ADMIN INFLUENZA VIRUS VAC: HCPCS | Performed by: INTERNAL MEDICINE

## 2018-11-29 PROCEDURE — 90670 PCV13 VACCINE IM: CPT | Performed by: INTERNAL MEDICINE

## 2018-11-29 PROCEDURE — G0463 HOSPITAL OUTPT CLINIC VISIT: HCPCS | Performed by: INTERNAL MEDICINE

## 2018-11-29 NOTE — PROGRESS NOTES
HPI:    Patient ID: Luis Alfredo Brizuela is a 70year old female.     HPI      Here to discuss MRI report    /77 (BP Location: Right arm, Patient Position: Sitting, Cuff Size: adult)   Pulse 90   Temp 97.9 °F (36.6 °C) (Oral)   Resp 17   Ht 5' 1\" (1.549 m) Oral Tab Take 2 mg by mouth nightly. Disp:  Rfl: 2   ARIpiprazole (ABILIFY) 5 MG Oral Tab Take 7.5 mg by mouth nightly. Disp:  Rfl:    DULoxetine HCl (CYMBALTA) 60 MG Oral Cap DR Particles Take 60 mg by mouth daily. Take 60mg daily.   Disp:  Rfl:      A CVA   • Heart Attack Mother         quadruple bypass surgery/MI   • Arthritis Mother         possible Rheumatoid Arthritis   • Colon Cancer Maternal Grandmother    • Asthma Daughter    • Breast Cancer Sister 36   • Ovarian Cancer Sister    • Other ( a.c. joint degenerative change. GLENOHUMERAL JOINT, LABRUM, BICEPS TENDON:                                         Advanced degenerative changes of the left glenohumeral joint with moderate medial and inferior osteophytes.  Moderate shoul 11/19/2018. Outside left shoulder MRI dated  11/13/2018. FINDINGS:    The humerus is intact, without evidence of acute fracture. No periosteal edema seen along the  humeral shaft.   There is a well-circumscribed subcortical osseous lesion with sclerotic ri the anterior mid shaft of the left humerus. This lesion has a nonaggressive appearance. A similar but smaller subcortical lesion with a  nonaggressive appearance lies in the humeral shaft more proximally.  The exact etiology of these  lesions is uncertain,

## 2018-12-07 ENCOUNTER — OFFICE VISIT (OUTPATIENT)
Dept: HEMATOLOGY/ONCOLOGY | Facility: HOSPITAL | Age: 63
End: 2018-12-07
Attending: INTERNAL MEDICINE
Payer: MEDICARE

## 2018-12-07 VITALS
TEMPERATURE: 98 F | HEIGHT: 61 IN | HEART RATE: 90 BPM | RESPIRATION RATE: 16 BRPM | WEIGHT: 122 LBS | SYSTOLIC BLOOD PRESSURE: 150 MMHG | BODY MASS INDEX: 23.03 KG/M2 | DIASTOLIC BLOOD PRESSURE: 78 MMHG

## 2018-12-07 DIAGNOSIS — M89.9 BONE LESION: Primary | ICD-10-CM

## 2018-12-07 PROCEDURE — 99203 OFFICE O/P NEW LOW 30 MIN: CPT | Performed by: INTERNAL MEDICINE

## 2018-12-07 NOTE — PROGRESS NOTES
RONNY Walls is a 70year old here today for evaluation of Bone lesion  (primary encounter diagnosis)    The patient was initially seen by her rheumatologist as the patient has diffuse DJD with bilateral knee replacements.   The patient had pain in Subjective      CC: Vascular Dementia    History of Present Illness   Stephen Zarate returns to clinic today with a history of suspected Vascular Dementia . He has noted symptoms since at least early 2016 after a stroke. He has noted some memory impairment. His family has noted more prominent changes in personality and judgment, along with increased irritability and declining hygiene.     Prior evaluation in [place] reportedly showed evidence of multiple strokes (left occipital and right parietal). Carotid Doppler studies were reportedly normal. He was placed on anticoagulation for new onset atrial fibrillation. An EEG was interpreted as consistent with encephalopathy. Neuropsychological testing was consistent with a moderate dementia. Screening bloodwork in 7/16 was notable for mildly elevated AST and ALT, but otherwise unremarkable.     He was placed on Keppra in [place] for a suspicion of occipital seizures. He had described positive visual phenomena, which resolved quickly with the institution of Keppra. However, these symptoms varied, and were not clearly stereotyped. He stopped this medication in 11/16 and has had no recurrent symptoms suggestive of seizures.    Since his last visit on 7/17/17, he continues to live independently in [place] (he previously moved himself out of [facility]) and feels that he is doing well. He reports that his memory medications have been helpful, and that he is now having vivid memories from his childhood. He reports feeling very well and denies any complaints. However, his family notes that his blood sugars have ranged over 500. There are concerns about his ability to manage his medications and finances. He has not been paying bills and had failed to pay taxes by report for over 5 years.       The following portions of the patient's history were reviewed and updated as appropriate: allergies, current medications, past family history, past medical history, past social  "history, past surgical history and problem list.    Review of Systems   Constitutional: Negative.    Respiratory: Negative.    Cardiovascular: Negative.    Gastrointestinal: Negative.    Genitourinary: Negative.    Musculoskeletal: Negative.    Psychiatric/Behavioral: Negative.        Objective     /80  Ht 69\" (175.3 cm)  Wt 154 lb (69.9 kg)  BMI 22.74 kg/m2    General appearance today is normal.       Physical Exam   Constitutional: He is oriented to person, place, and time.   Neurological: He is oriented to person, place, and time. He has normal strength.   Psychiatric: His speech is normal.        Neurologic Exam     Mental Status   Oriented to person, place, and time.   Registration: recalls 3 of 3 objects. Recall at 5 minutes: recalls 2 of 3 objects. Follows 3 step commands.   Attention: normal.   Speech: speech is normal   Level of consciousness: alert  Able to name object. Able to read. Able to repeat. Able to write. Normal comprehension.     Cranial Nerves   Cranial nerves II through XII intact.     Motor Exam   Muscle bulk: normal  Overall muscle tone: normal    Strength   Strength 5/5 throughout.         Results  MMSE=29  On additional testing, 0/5 free recall; moderately impaired on similarities/differences; very concrete on interpreting proverbs; trouble with working through theoretical scenarios;       Assessment/Plan   Stephen was seen today for memory loss.    Diagnoses and all orders for this visit:    Vascular dementia with behavior disturbance        Discussion/Summary   Stephen Zarate returns to clinic today with a history of suspected vascular dementia (VaD). Though he continues to score well on the MMSE, additional testing would seem to correlate with his history which is very concerning for a significant dysexecutive function. Additionally, it is noted that his MRI shows prominent cortical atrophy and severe white matter changes extending frontally, especially on the right. I am also " did not show any features consistent with malignancy and more similar to the 5 mm lesion described above.   It was felt in the impression from the radiologist that these lesions were nonaggressive in appearance and they likely were consistent with benign fi Medications:  Isosorbide Mononitrate ER 30 MG Oral Tablet 24 Hr Take 15 mg by mouth daily.  Disp:  Rfl:    Acetaminophen-Codeine #3 300-30 MG Oral Tab TAKE ONE TABLET BY MOUTH EVERY EIGHT HOURS AS NEEDED FOR PAIN Disp: 90 tablet Rfl: 5   aspirin 81 MG Oral unconvinced that he is effectively managing medications or finances despite his claims otherwise. For now, I still feel we need to address his competence and will consider other options regarding neuropsychological testing.  He will then follow up in 3 months , or sooner if needed.       I spent 25 minutes of face-to-face time with the patient. Approximately 15 minutes were spent in counseling, including review of his current status, symptoms, management and treatment options.    1/14/2016     Past Surgical History:   Procedure Laterality Date   • APPENDECTOMY      appendicitis   • APPENDECTOMY     • BREAST BIOPSY Left 09/07/2011    fibrocystic changes   • CATARACT     • COLONOSCOPY  7/2009    incomplete (chronic constipation)   • Concern: Not Asked        Stress Concern: Not Asked        Weight Concern: Not Asked        Special Diet: Not Asked        Back Care: Not Asked        Exercise: Not Asked        Bike Helmet: Not Asked        Seat Belt: Not Asked        Self-Exams: Not Aske Abdominal: Soft. Bowel sounds are normal. She exhibits no distension. There is no tenderness. Musculoskeletal: She exhibits no edema. Lymphadenopathy:     She has no cervical adenopathy.    Neurological: She is alert and oriented to person, place, and will be discussing the case with Dr. Marlene Smith and with Dr. Megan Hightower. This note was created using a voice-recognition transcribing system. Incorrect words or phrases may have been missed during proofreading. Please interpret accordingly.           No o Sodium      136 - 144 mmol/L 132 (L)   Potassium      3.3 - 5.1 mmol/L 4.4   Chloride      95 - 110 mmol/L 97   Carbon Dioxide, Total      22 - 32 mmol/L 28   BUN      8 - 20 mg/dL 4 (L)   CREATININE      0.50 - 1.50 mg/dL 0.53   CALCIUM      8.5 - 10.5 signal characteristics arises from the  medial proximal humeral shaft.  There is no marrow edema, endosteal scalloping, cortical disruption,  enhancing extraosseous soft tissue mass, periosteal infiltration or other aggressive features  associated with eith of Exam: 11/19/18  MRN: [id number]   Exam Location: [address] [place] [address] Ordered by: Alana Sanders   Procedure: XR HUMERUS (MIN 2 VIEWS), LEFT (CPT=73060)          Interpretation   2 views of the left humerus were obtaine joint degenerative change. GLENOHUMERAL JOINT, LABRUM, BICEPS TENDON:                                         Advanced degenerative changes of the left glenohumeral joint with moderate medial and inferior osteophytes.  Moderate shoulder j FINDINGS:          CSF SPACES:   Ventricles, cisterns, and sulci are appropriate for age. No hydrocephalus, subarachnoid hemorrhage, or mass. No midline shift. CEREBRUM:     No edema, hemorrhage, mass, acute infarction, or significant atrophy. infraspinatus and teres minor muscle bellies. The proximal intra-articular segment of the long head biceps tendon is not visualized, raising  suspicion for a full-thickness rupture of the proximal biceps tendon.  The distal long head biceps  tendon is seen BONES: No evidence of recent fracture or dislocation. There is demineralization of the bony structures. There is spurring about the acetabulum and femoral head/neck as well as the greater trochanter. Early buttressing about the inferior femoral neck.    [place] Hint Automated exposure control for dose reduction was used. Adjustment of the mA and/or kV was done based on the patient's size. Use of iterative   reconstruction technique for dose reduction was used.    FINDINGS:   LIVER: Mildly prominent right hepatic lobe p Benign vertebral body hemangioma L4. Lucent lesion at the left acetabulum which appears slightly more defined when compared to prior study and may reflect   degenerative geode. There are degenerative changes of both hips, stable.  No significant bony lesion the lateral humeral head. The torn tendon fibers are markedly  retracted by up to 5.5 cm, to the level of the superior glenoid rim.  The AP extent of the tear  measures at least 5.5 cm, involving the entire width of the supraspinatus tendon as well as the with resulting uncovering and elevation of the humeral head and effacement of  the acromiohumeral interval.  Suspect a full-thickness rupture of the proximal intra-articular segment of the long head biceps  tendon.   Tear of the posterior-inferior glenoid l pulmonary embolus to the segmental pulmonary artery level. There is a left-sided three-vessel aortic arch without aneurysm or aortic injury. CHEST WALL: No axillary or clavicular lymphadenopathy.    LIMITED ABDOMEN: Limited images of the upper abdomen are

## 2018-12-20 RX ORDER — AMLODIPINE BESYLATE 5 MG/1
TABLET ORAL
Qty: 30 TABLET | Refills: 10 | Status: SHIPPED | OUTPATIENT
Start: 2018-12-20 | End: 2019-10-21

## 2019-02-21 PROBLEM — M75.102 LEFT ROTATOR CUFF TEAR ARTHROPATHY: Status: ACTIVE | Noted: 2019-02-21

## 2019-02-21 PROBLEM — M12.812 LEFT ROTATOR CUFF TEAR ARTHROPATHY: Status: ACTIVE | Noted: 2019-02-21

## 2019-03-08 NOTE — TELEPHONE ENCOUNTER
Requested medication: ACETAMINOPHEN-CODEINE #3 300-30 MG Oral Tab  Last refilled: 10/8/18 #90 tab with 5 refills  LOV: 10/8/18  Future Appointments   Date Time Provider Cirilo Springer   3/19/2019  2:50 PM Tatianna Lubin MD [address] [facility]   3/22

## 2019-03-11 RX ORDER — ACETAMINOPHEN AND CODEINE PHOSPHATE 300; 30 MG/1; MG/1
TABLET ORAL
Qty: 90 TABLET | Refills: 4 | Status: ON HOLD
Start: 2019-03-11 | End: 2019-03-25

## 2019-03-13 NOTE — TELEPHONE ENCOUNTER
Tyl #3 faxed to [address] [place] [address] per Dr. Kaylynn Collier on 3-11. Confirmation received.

## 2019-03-18 ENCOUNTER — APPOINTMENT (OUTPATIENT)
Dept: LAB | Age: 64
End: 2019-03-18
Attending: ORTHOPAEDIC SURGERY
Payer: MEDICARE

## 2019-03-18 DIAGNOSIS — M12.819 ROTATOR CUFF ARTHROPATHY: ICD-10-CM

## 2019-03-18 PROCEDURE — 87641 MR-STAPH DNA AMP PROBE: CPT

## 2019-03-19 LAB — MRSA DNA SPEC QL NAA+PROBE: NEGATIVE

## 2019-03-22 ENCOUNTER — ANESTHESIA (OUTPATIENT)
Dept: SURGERY | Facility: HOSPITAL | Age: 64
DRG: 483 | End: 2019-03-22
Payer: MEDICARE

## 2019-03-22 ENCOUNTER — HOSPITAL ENCOUNTER (INPATIENT)
Facility: HOSPITAL | Age: 64
LOS: 3 days | Discharge: HOME HEALTH | DRG: 483 | End: 2019-03-25
Attending: ORTHOPAEDIC SURGERY | Admitting: ORTHOPAEDIC SURGERY
Payer: MEDICARE

## 2019-03-22 ENCOUNTER — APPOINTMENT (OUTPATIENT)
Dept: GENERAL RADIOLOGY | Facility: HOSPITAL | Age: 64
DRG: 483 | End: 2019-03-22
Attending: ORTHOPAEDIC SURGERY
Payer: MEDICARE

## 2019-03-22 ENCOUNTER — ANESTHESIA EVENT (OUTPATIENT)
Dept: SURGERY | Facility: HOSPITAL | Age: 64
DRG: 483 | End: 2019-03-22
Payer: MEDICARE

## 2019-03-22 DIAGNOSIS — M75.102 ROTATOR CUFF SYNDROME OF LEFT SHOULDER: ICD-10-CM

## 2019-03-22 DIAGNOSIS — M12.819 ROTATOR CUFF ARTHROPATHY: Primary | ICD-10-CM

## 2019-03-22 PROBLEM — Z47.1 AFTERCARE FOLLOWING LEFT SHOULDER JOINT REPLACEMENT SURGERY: Status: ACTIVE | Noted: 2019-03-22

## 2019-03-22 PROBLEM — Z96.612 AFTERCARE FOLLOWING LEFT SHOULDER JOINT REPLACEMENT SURGERY: Status: ACTIVE | Noted: 2019-03-22

## 2019-03-22 PROBLEM — I10 ESSENTIAL HYPERTENSION: Chronic | Status: ACTIVE | Noted: 2019-03-22

## 2019-03-22 PROCEDURE — 73030 X-RAY EXAM OF SHOULDER: CPT | Performed by: ORTHOPAEDIC SURGERY

## 2019-03-22 PROCEDURE — 3E0T3BZ INTRODUCTION OF ANESTHETIC AGENT INTO PERIPHERAL NERVES AND PLEXI, PERCUTANEOUS APPROACH: ICD-10-PCS | Performed by: ANESTHESIOLOGY

## 2019-03-22 PROCEDURE — 0RRK00Z REPLACEMENT OF LEFT SHOULDER JOINT WITH REVERSE BALL AND SOCKET SYNTHETIC SUBSTITUTE, OPEN APPROACH: ICD-10-PCS | Performed by: ORTHOPAEDIC SURGERY

## 2019-03-22 PROCEDURE — 99232 SBSQ HOSP IP/OBS MODERATE 35: CPT | Performed by: HOSPITALIST

## 2019-03-22 DEVICE — IMPLANTABLE DEVICE
Type: IMPLANTABLE DEVICE | Site: SHOULDER | Status: FUNCTIONAL
Brand: FLEX SHOULDER SYSTEM

## 2019-03-22 RX ORDER — SODIUM CHLORIDE 0.9 % (FLUSH) 0.9 %
10 SYRINGE (ML) INJECTION AS NEEDED
Status: DISCONTINUED | OUTPATIENT
Start: 2019-03-22 | End: 2019-03-25

## 2019-03-22 RX ORDER — MORPHINE SULFATE 4 MG/ML
4 INJECTION, SOLUTION INTRAMUSCULAR; INTRAVENOUS EVERY 10 MIN PRN
Status: DISCONTINUED | OUTPATIENT
Start: 2019-03-22 | End: 2019-03-22 | Stop reason: HOSPADM

## 2019-03-22 RX ORDER — DEXAMETHASONE SODIUM PHOSPHATE 10 MG/ML
INJECTION, SOLUTION INTRAMUSCULAR; INTRAVENOUS AS NEEDED
Status: DISCONTINUED | OUTPATIENT
Start: 2019-03-22 | End: 2019-03-22 | Stop reason: SURG

## 2019-03-22 RX ORDER — ENOXAPARIN SODIUM 100 MG/ML
40 INJECTION SUBCUTANEOUS DAILY
Status: DISCONTINUED | OUTPATIENT
Start: 2019-03-22 | End: 2019-03-25

## 2019-03-22 RX ORDER — HYDROCODONE BITARTRATE AND ACETAMINOPHEN 5; 325 MG/1; MG/1
2 TABLET ORAL AS NEEDED
Status: DISCONTINUED | OUTPATIENT
Start: 2019-03-22 | End: 2019-03-22 | Stop reason: HOSPADM

## 2019-03-22 RX ORDER — HALOPERIDOL 5 MG/ML
0.25 INJECTION INTRAMUSCULAR ONCE AS NEEDED
Status: DISCONTINUED | OUTPATIENT
Start: 2019-03-22 | End: 2019-03-22 | Stop reason: HOSPADM

## 2019-03-22 RX ORDER — HYDROCODONE BITARTRATE AND ACETAMINOPHEN 5; 325 MG/1; MG/1
1 TABLET ORAL AS NEEDED
Status: DISCONTINUED | OUTPATIENT
Start: 2019-03-22 | End: 2019-03-22 | Stop reason: HOSPADM

## 2019-03-22 RX ORDER — OXYCODONE HCL 10 MG/1
10 TABLET, FILM COATED, EXTENDED RELEASE ORAL EVERY 12 HOURS
Status: DISCONTINUED | OUTPATIENT
Start: 2019-03-22 | End: 2019-03-24

## 2019-03-22 RX ORDER — MORPHINE SULFATE 4 MG/ML
4 INJECTION, SOLUTION INTRAMUSCULAR; INTRAVENOUS EVERY 2 HOUR PRN
Status: ACTIVE | OUTPATIENT
Start: 2019-03-22 | End: 2019-03-23

## 2019-03-22 RX ORDER — NEOSTIGMINE METHYLSULFATE 0.5 MG/ML
INJECTION INTRAVENOUS AS NEEDED
Status: DISCONTINUED | OUTPATIENT
Start: 2019-03-22 | End: 2019-03-22 | Stop reason: SURG

## 2019-03-22 RX ORDER — MORPHINE SULFATE 15 MG/1
15 TABLET ORAL EVERY 4 HOURS PRN
Status: ACTIVE | OUTPATIENT
Start: 2019-03-22 | End: 2019-03-23

## 2019-03-22 RX ORDER — FAMOTIDINE 20 MG/1
20 TABLET ORAL ONCE
Status: DISCONTINUED | OUTPATIENT
Start: 2019-03-22 | End: 2019-03-22 | Stop reason: HOSPADM

## 2019-03-22 RX ORDER — ONDANSETRON 2 MG/ML
4 INJECTION INTRAMUSCULAR; INTRAVENOUS EVERY 4 HOURS PRN
Status: DISPENSED | OUTPATIENT
Start: 2019-03-22 | End: 2019-03-24

## 2019-03-22 RX ORDER — MAGNESIUM HYDROXIDE 1200 MG/15ML
LIQUID ORAL CONTINUOUS PRN
Status: COMPLETED | OUTPATIENT
Start: 2019-03-22 | End: 2019-03-22

## 2019-03-22 RX ORDER — MORPHINE SULFATE 4 MG/ML
6 INJECTION, SOLUTION INTRAMUSCULAR; INTRAVENOUS EVERY 2 HOUR PRN
Status: ACTIVE | OUTPATIENT
Start: 2019-03-22 | End: 2019-03-23

## 2019-03-22 RX ORDER — SODIUM PHOSPHATE, DIBASIC AND SODIUM PHOSPHATE, MONOBASIC 7; 19 G/133ML; G/133ML
1 ENEMA RECTAL ONCE AS NEEDED
Status: DISCONTINUED | OUTPATIENT
Start: 2019-03-22 | End: 2019-03-25

## 2019-03-22 RX ORDER — MORPHINE SULFATE 4 MG/ML
2 INJECTION, SOLUTION INTRAMUSCULAR; INTRAVENOUS EVERY 10 MIN PRN
Status: DISCONTINUED | OUTPATIENT
Start: 2019-03-22 | End: 2019-03-22 | Stop reason: HOSPADM

## 2019-03-22 RX ORDER — ARIPIPRAZOLE 5 MG/1
7.5 TABLET ORAL NIGHTLY
Status: DISCONTINUED | OUTPATIENT
Start: 2019-03-22 | End: 2019-03-22

## 2019-03-22 RX ORDER — ALBUTEROL SULFATE 2.5 MG/3ML
2.5 SOLUTION RESPIRATORY (INHALATION) EVERY 4 HOURS PRN
Status: DISCONTINUED | OUTPATIENT
Start: 2019-03-22 | End: 2019-03-25

## 2019-03-22 RX ORDER — ONDANSETRON 2 MG/ML
4 INJECTION INTRAMUSCULAR; INTRAVENOUS ONCE AS NEEDED
Status: DISCONTINUED | OUTPATIENT
Start: 2019-03-22 | End: 2019-03-22 | Stop reason: HOSPADM

## 2019-03-22 RX ORDER — CEFAZOLIN SODIUM/WATER 2 G/20 ML
SYRINGE (ML) INTRAVENOUS AS NEEDED
Status: DISCONTINUED | OUTPATIENT
Start: 2019-03-22 | End: 2019-03-22 | Stop reason: SURG

## 2019-03-22 RX ORDER — MIDAZOLAM HYDROCHLORIDE 1 MG/ML
INJECTION INTRAMUSCULAR; INTRAVENOUS AS NEEDED
Status: DISCONTINUED | OUTPATIENT
Start: 2019-03-22 | End: 2019-03-22 | Stop reason: SURG

## 2019-03-22 RX ORDER — ROCURONIUM BROMIDE 10 MG/ML
INJECTION, SOLUTION INTRAVENOUS AS NEEDED
Status: DISCONTINUED | OUTPATIENT
Start: 2019-03-22 | End: 2019-03-22 | Stop reason: SURG

## 2019-03-22 RX ORDER — SODIUM CHLORIDE, SODIUM LACTATE, POTASSIUM CHLORIDE, CALCIUM CHLORIDE 600; 310; 30; 20 MG/100ML; MG/100ML; MG/100ML; MG/100ML
INJECTION, SOLUTION INTRAVENOUS CONTINUOUS
Status: DISCONTINUED | OUTPATIENT
Start: 2019-03-22 | End: 2019-03-25

## 2019-03-22 RX ORDER — ACETAMINOPHEN 500 MG
1000 TABLET ORAL EVERY 8 HOURS
Status: COMPLETED | OUTPATIENT
Start: 2019-03-22 | End: 2019-03-23

## 2019-03-22 RX ORDER — DEXAMETHASONE SODIUM PHOSPHATE 4 MG/ML
VIAL (ML) INJECTION AS NEEDED
Status: DISCONTINUED | OUTPATIENT
Start: 2019-03-22 | End: 2019-03-22 | Stop reason: SURG

## 2019-03-22 RX ORDER — METOCLOPRAMIDE HYDROCHLORIDE 5 MG/ML
10 INJECTION INTRAMUSCULAR; INTRAVENOUS EVERY 6 HOURS PRN
Status: ACTIVE | OUTPATIENT
Start: 2019-03-22 | End: 2019-03-24

## 2019-03-22 RX ORDER — NALOXONE HYDROCHLORIDE 0.4 MG/ML
80 INJECTION, SOLUTION INTRAMUSCULAR; INTRAVENOUS; SUBCUTANEOUS AS NEEDED
Status: DISCONTINUED | OUTPATIENT
Start: 2019-03-22 | End: 2019-03-22 | Stop reason: HOSPADM

## 2019-03-22 RX ORDER — DULOXETIN HYDROCHLORIDE 30 MG/1
60 CAPSULE, DELAYED RELEASE ORAL NIGHTLY
Status: DISCONTINUED | OUTPATIENT
Start: 2019-03-22 | End: 2019-03-25

## 2019-03-22 RX ORDER — METOCLOPRAMIDE 10 MG/1
10 TABLET ORAL ONCE
Status: DISCONTINUED | OUTPATIENT
Start: 2019-03-22 | End: 2019-03-22 | Stop reason: HOSPADM

## 2019-03-22 RX ORDER — DOCUSATE SODIUM 100 MG/1
100 CAPSULE, LIQUID FILLED ORAL 2 TIMES DAILY
Status: DISCONTINUED | OUTPATIENT
Start: 2019-03-22 | End: 2019-03-25

## 2019-03-22 RX ORDER — MORPHINE SULFATE 10 MG/ML
6 INJECTION, SOLUTION INTRAMUSCULAR; INTRAVENOUS EVERY 10 MIN PRN
Status: DISCONTINUED | OUTPATIENT
Start: 2019-03-22 | End: 2019-03-22 | Stop reason: HOSPADM

## 2019-03-22 RX ORDER — MORPHINE SULFATE 2 MG/ML
2 INJECTION, SOLUTION INTRAMUSCULAR; INTRAVENOUS EVERY 2 HOUR PRN
Status: DISPENSED | OUTPATIENT
Start: 2019-03-22 | End: 2019-03-23

## 2019-03-22 RX ORDER — AMITRIPTYLINE HYDROCHLORIDE 10 MG/1
20 TABLET, FILM COATED ORAL NIGHTLY
Status: DISCONTINUED | OUTPATIENT
Start: 2019-03-22 | End: 2019-03-22

## 2019-03-22 RX ORDER — AMLODIPINE BESYLATE 5 MG/1
5 TABLET ORAL
Status: DISCONTINUED | OUTPATIENT
Start: 2019-03-23 | End: 2019-03-22

## 2019-03-22 RX ORDER — POLYETHYLENE GLYCOL 3350 17 G/17G
17 POWDER, FOR SOLUTION ORAL DAILY PRN
Status: DISCONTINUED | OUTPATIENT
Start: 2019-03-22 | End: 2019-03-25

## 2019-03-22 RX ORDER — ACETAMINOPHEN 500 MG
1000 TABLET ORAL ONCE
Status: COMPLETED | OUTPATIENT
Start: 2019-03-22 | End: 2019-03-22

## 2019-03-22 RX ORDER — SENNOSIDES 8.6 MG
17.2 TABLET ORAL NIGHTLY
Status: DISCONTINUED | OUTPATIENT
Start: 2019-03-22 | End: 2019-03-25

## 2019-03-22 RX ORDER — ONDANSETRON 2 MG/ML
INJECTION INTRAMUSCULAR; INTRAVENOUS AS NEEDED
Status: DISCONTINUED | OUTPATIENT
Start: 2019-03-22 | End: 2019-03-22 | Stop reason: SURG

## 2019-03-22 RX ORDER — CEFAZOLIN SODIUM/WATER 2 G/20 ML
2 SYRINGE (ML) INTRAVENOUS EVERY 8 HOURS
Status: DISCONTINUED | OUTPATIENT
Start: 2019-03-22 | End: 2019-03-22

## 2019-03-22 RX ORDER — PHENYLEPHRINE HCL 10 MG/ML
VIAL (ML) INJECTION AS NEEDED
Status: DISCONTINUED | OUTPATIENT
Start: 2019-03-22 | End: 2019-03-22 | Stop reason: SURG

## 2019-03-22 RX ORDER — QUETIAPINE 25 MG/1
50 TABLET, FILM COATED ORAL NIGHTLY
Status: DISCONTINUED | OUTPATIENT
Start: 2019-03-22 | End: 2019-03-22

## 2019-03-22 RX ORDER — DIPHENHYDRAMINE HYDROCHLORIDE 50 MG/ML
25 INJECTION INTRAMUSCULAR; INTRAVENOUS ONCE AS NEEDED
Status: ACTIVE | OUTPATIENT
Start: 2019-03-22 | End: 2019-03-22

## 2019-03-22 RX ORDER — HYDROCODONE BITARTRATE AND ACETAMINOPHEN 10; 325 MG/1; MG/1
2 TABLET ORAL EVERY 6 HOURS PRN
Status: DISCONTINUED | OUTPATIENT
Start: 2019-03-22 | End: 2019-03-25

## 2019-03-22 RX ORDER — LIDOCAINE HYDROCHLORIDE 10 MG/ML
INJECTION, SOLUTION EPIDURAL; INFILTRATION; INTRACAUDAL; PERINEURAL AS NEEDED
Status: DISCONTINUED | OUTPATIENT
Start: 2019-03-22 | End: 2019-03-22 | Stop reason: SURG

## 2019-03-22 RX ORDER — GLYCOPYRROLATE 0.2 MG/ML
INJECTION INTRAMUSCULAR; INTRAVENOUS AS NEEDED
Status: DISCONTINUED | OUTPATIENT
Start: 2019-03-22 | End: 2019-03-22 | Stop reason: SURG

## 2019-03-22 RX ORDER — CLONAZEPAM 1 MG/1
2 TABLET ORAL NIGHTLY
Status: DISCONTINUED | OUTPATIENT
Start: 2019-03-22 | End: 2019-03-22

## 2019-03-22 RX ORDER — HYDROCODONE BITARTRATE AND ACETAMINOPHEN 10; 325 MG/1; MG/1
1 TABLET ORAL EVERY 6 HOURS PRN
Status: DISCONTINUED | OUTPATIENT
Start: 2019-03-22 | End: 2019-03-25

## 2019-03-22 RX ORDER — HYDROCODONE BITARTRATE AND ACETAMINOPHEN 10; 325 MG/1; MG/1
1-2 TABLET ORAL EVERY 6 HOURS PRN
Qty: 30 TABLET | Refills: 0 | Status: SHIPPED | OUTPATIENT
Start: 2019-03-22 | End: 2019-04-03

## 2019-03-22 RX ORDER — ROPIVACAINE HYDROCHLORIDE 5 MG/ML
INJECTION, SOLUTION EPIDURAL; INFILTRATION; PERINEURAL AS NEEDED
Status: DISCONTINUED | OUTPATIENT
Start: 2019-03-22 | End: 2019-03-22 | Stop reason: SURG

## 2019-03-22 RX ORDER — BISACODYL 10 MG
10 SUPPOSITORY, RECTAL RECTAL
Status: DISCONTINUED | OUTPATIENT
Start: 2019-03-22 | End: 2019-03-25

## 2019-03-22 RX ORDER — CEFAZOLIN SODIUM/WATER 2 G/20 ML
2 SYRINGE (ML) INTRAVENOUS EVERY 8 HOURS
Status: COMPLETED | OUTPATIENT
Start: 2019-03-22 | End: 2019-03-22

## 2019-03-22 RX ORDER — CEFAZOLIN SODIUM/WATER 2 G/20 ML
2 SYRINGE (ML) INTRAVENOUS ONCE
Status: DISCONTINUED | OUTPATIENT
Start: 2019-03-22 | End: 2019-03-22 | Stop reason: HOSPADM

## 2019-03-22 RX ORDER — SODIUM CHLORIDE, SODIUM LACTATE, POTASSIUM CHLORIDE, CALCIUM CHLORIDE 600; 310; 30; 20 MG/100ML; MG/100ML; MG/100ML; MG/100ML
INJECTION, SOLUTION INTRAVENOUS CONTINUOUS
Status: DISCONTINUED | OUTPATIENT
Start: 2019-03-22 | End: 2019-03-22 | Stop reason: HOSPADM

## 2019-03-22 RX ORDER — OXYCODONE HYDROCHLORIDE 5 MG/1
10 TABLET ORAL EVERY 4 HOURS PRN
Status: ACTIVE | OUTPATIENT
Start: 2019-03-22 | End: 2019-03-23

## 2019-03-22 RX ORDER — OXYCODONE HYDROCHLORIDE 5 MG/1
5 TABLET ORAL EVERY 4 HOURS PRN
Status: ACTIVE | OUTPATIENT
Start: 2019-03-22 | End: 2019-03-23

## 2019-03-22 RX ADMIN — ROCURONIUM BROMIDE 10 MG: 10 INJECTION, SOLUTION INTRAVENOUS at 08:56:00

## 2019-03-22 RX ADMIN — PHENYLEPHRINE HCL 100 MCG: 10 MG/ML VIAL (ML) INJECTION at 08:22:00

## 2019-03-22 RX ADMIN — DEXAMETHASONE SODIUM PHOSPHATE 4 MG: 10 INJECTION, SOLUTION INTRAMUSCULAR; INTRAVENOUS at 06:59:00

## 2019-03-22 RX ADMIN — LIDOCAINE HYDROCHLORIDE 2 ML: 10 INJECTION, SOLUTION EPIDURAL; INFILTRATION; INTRACAUDAL; PERINEURAL at 06:53:00

## 2019-03-22 RX ADMIN — PHENYLEPHRINE HCL 100 MCG: 10 MG/ML VIAL (ML) INJECTION at 09:05:00

## 2019-03-22 RX ADMIN — ONDANSETRON 4 MG: 2 INJECTION INTRAMUSCULAR; INTRAVENOUS at 09:22:00

## 2019-03-22 RX ADMIN — ROPIVACAINE HYDROCHLORIDE 30 ML: 5 INJECTION, SOLUTION EPIDURAL; INFILTRATION; PERINEURAL at 06:59:00

## 2019-03-22 RX ADMIN — SODIUM CHLORIDE, SODIUM LACTATE, POTASSIUM CHLORIDE, CALCIUM CHLORIDE: 600; 310; 30; 20 INJECTION, SOLUTION INTRAVENOUS at 07:44:00

## 2019-03-22 RX ADMIN — PHENYLEPHRINE HCL 100 MCG: 10 MG/ML VIAL (ML) INJECTION at 08:15:00

## 2019-03-22 RX ADMIN — ROCURONIUM BROMIDE 40 MG: 10 INJECTION, SOLUTION INTRAVENOUS at 07:49:00

## 2019-03-22 RX ADMIN — PHENYLEPHRINE HCL 100 MCG: 10 MG/ML VIAL (ML) INJECTION at 09:13:00

## 2019-03-22 RX ADMIN — PHENYLEPHRINE HCL 100 MCG: 10 MG/ML VIAL (ML) INJECTION at 08:50:00

## 2019-03-22 RX ADMIN — LIDOCAINE HYDROCHLORIDE 5 ML: 10 INJECTION, SOLUTION EPIDURAL; INFILTRATION; INTRACAUDAL; PERINEURAL at 07:49:00

## 2019-03-22 RX ADMIN — GLYCOPYRROLATE 0.4 MG: 0.2 INJECTION INTRAMUSCULAR; INTRAVENOUS at 09:26:00

## 2019-03-22 RX ADMIN — MIDAZOLAM HYDROCHLORIDE 2 MG: 1 INJECTION INTRAMUSCULAR; INTRAVENOUS at 06:53:00

## 2019-03-22 RX ADMIN — DEXAMETHASONE SODIUM PHOSPHATE 4 MG: 4 MG/ML VIAL (ML) INJECTION at 08:14:00

## 2019-03-22 RX ADMIN — SODIUM CHLORIDE, SODIUM LACTATE, POTASSIUM CHLORIDE, CALCIUM CHLORIDE: 600; 310; 30; 20 INJECTION, SOLUTION INTRAVENOUS at 09:27:00

## 2019-03-22 RX ADMIN — PHENYLEPHRINE HCL 100 MCG: 10 MG/ML VIAL (ML) INJECTION at 08:42:00

## 2019-03-22 RX ADMIN — CEFAZOLIN SODIUM/WATER 2 G: 2 G/20 ML SYRINGE (ML) INTRAVENOUS at 07:54:00

## 2019-03-22 RX ADMIN — NEOSTIGMINE METHYLSULFATE 3 MG: 0.5 INJECTION INTRAVENOUS at 09:26:00

## 2019-03-22 NOTE — OPERATIVE REPORT
[facility]    PATIENT'S NAME: Yesi Estevez   ATTENDING PHYSICIAN: Avinash Cartwright MD   OPERATING PHYSICIAN: Josue Choi MD   PATIENT ACCOUNT#:   [de-identified]    LOCATION:  [address] [place] [address] #:   [id number]       DATE OF BIRTH: seen and evaluated preoperatively. Her left shoulder was identified as the correct operative site. My initials were placed. She was transferred to the operating room and transferred to the operating table in a supine position.   General anesthesia was in aspect of the inferior aspect of the glenoid. It was reamed and drilled to accommodate 25 mm Perform Tornier base plate, which was compressed to the glenoid with a central compression screw.   Additional locking screws were placed in the periphery, 3 addit admitted for postoperative medical management, postoperative pain control, postoperative DVT prophylaxis, and postoperative antibiotic therapy. All of her questions were answered. She was in agreement with the treatment plan.         Dictated By Jacquelin Laura

## 2019-03-22 NOTE — PROGRESS NOTES
[facility] HOSP - [facility]    Progress Note    Mar Bachelor Patient Status:  Hospital Outpatient Surgery    1955 MRN [id number]   Location [address] [facility] Attending Waldo Estrada MD   Hosp Day # 0 PCP Jada Sotomayor, PT/OT. Essential hypertension  CONT HOME MEDS, MONITOR.              Results:     Lab Results   Component Value Date    WBC 4.5 02/28/2018    HGB 12.3 02/28/2018    HCT 37.0 02/28/2018     02/28/2018    CREATSERUM 0.43 (L) 02/28/2018    BUN 4 (

## 2019-03-22 NOTE — ANESTHESIA POSTPROCEDURE EVALUATION
Patient: Eric Aviles    Procedure Summary     Date:  03/22/19 Room / Location:  [address] [place] [address] MAIN OR 04 / [address] [place] [address] MAIN OR    Anesthesia Start:  5479 Anesthesia Stop:  9686    Procedure:  SHOULDER TOTAL (Left Shoulder) Diagnosis:       Rotator cuff syndrome of left bella

## 2019-03-22 NOTE — H&P
Eric JAVIER Aviles  32/1955  70year old   female  Yuli Casey MD     HPI:   Patient presents with:  Shoulder Pain: Left shoulder RP from Dr. Kadie De La Torre  The patient is right-handed.   Date of injury/ onset of symptoms: 2 years without injury   The patie PANTOPRAZOLE SODIUM 40 MG Oral Tab EC TAKE ONE TABLET BY MOUTH EVERY MORNING BEFORE BREAKFAST Disp: 30 tablet Rfl: 11       HISTORY:        Past Medical History:   Diagnosis Date   • Bipolar disorder ([facility] [address]     • Depression     • Essential hypertension   Arthritis   • Colon Cancer Maternal Grandmother     • Asthma Daughter     • Breast Cancer Sister 36   • Ovarian Cancer Sister     • Other (Other) Sister           flu H1N1   • Breast Cancer Paternal Aunt     • Glaucoma Neg           multiple   • Macular de positive Neer sign, Barber sign, and abduction sign. The patient has no pain with cross body adduction localized to the acromioclavicular joint.   The patient has a negative Speed's test and negative Crook test.  The patient has no evidence of generaliz

## 2019-03-22 NOTE — ANESTHESIA PREPROCEDURE EVALUATION
Anesthesia PreOp Note    HPI:     Tawnya Durand is a 70year old female who presents for preoperative consultation requested by: Sariah Pierson MD    Date of Surgery: 3/22/2019    Procedure(s):  SHOULDER TOTAL  Indication: Rotator cuff syndrome of left s left side   • Sciatica    • Seborrheic keratoses 3/7/2017   • Sx.6/1/15, CPT. 44563, L Total Knee, w/, Global Exp.8/30/15 6/8/2015   • Sx.6/8/16, CPT. 23569, R Total Knee Replacement, w/, Global Exp.9/6/16 1/14/2016   • Visual impairme DULoxetine HCl (CYMBALTA) 60 MG Oral Cap DR Particles Take 60 mg by mouth nightly. Take 60mg daily.   Disp:  Rfl:  3/21/2019 at 2300   albuterol sulfate (2.5 MG/3ML) 0.083% Inhalation Nebu Soln Take 3 mL (2.5 mg total) by nebulization every 4 (four) hours Paternal Aunt    • Glaucoma Neg         multiple   • Macular degeneration Neg         multiple     Social History    Socioeconomic History      Marital status:       Spouse name: Not on file      Number of children: Not on file      Years of educat Weight Concern: Not Asked        Special Diet: Not Asked        Back Care: Not Asked        Exercise: Not Asked        Bike Helmet: Not Asked        Seat Belt: Not Asked        Self-Exams: Not Asked        Grew up on a farm: Not Asked        History of tan including possible dental damage if relevant, major complications, and any alternative forms of anesthetic management. All of the patient's questions were answered to the best of my ability. The patient desires the anesthetic management as planned.   I ha

## 2019-03-22 NOTE — ANESTHESIA PROCEDURE NOTES
Airway  Date/Time: 3/22/2019 7:52 AM  Urgency: elective    Airway not difficult    General Information and Staff    Patient location during procedure: OR  Anesthesiologist: Endy Calvert MD  Resident/CRNA: Shawna Gupta CRNA  Performed: [facility]

## 2019-03-22 NOTE — BRIEF OP NOTE
Pre-Operative Diagnosis: Rotator cuff arthropathy left shoulder     Post-Operative Diagnosis: same3     Procedure Performed:   Procedure(s):  LEFT REVERSE TOTAL SHOULDER ARTHROPLASTY WITH BICEPS TENODESIS    Surgeon(s) and Role:     Alice De Dios MD

## 2019-03-22 NOTE — ANESTHESIA PROCEDURE NOTES
Peripheral Block    Anesthesiologist:  Darline Arthur MD  Performed by:   Anesthesiologist  Patient Location:  PACU  Start Time:  3/22/2019 6:53 AM  End Time:  3/22/2019 7:03 AM  Site Identification: ultrasound guided, real time ultrasound guided, ner

## 2019-03-23 ENCOUNTER — APPOINTMENT (OUTPATIENT)
Dept: CV DIAGNOSTICS | Facility: HOSPITAL | Age: 64
DRG: 483 | End: 2019-03-23
Attending: HOSPITALIST
Payer: MEDICARE

## 2019-03-23 LAB
ANION GAP SERPL CALC-SCNC: 4 MMOL/L (ref 0–18)
ANION GAP SERPL CALC-SCNC: 4 MMOL/L (ref 0–18)
ANTIBODY SCREEN: NEGATIVE
BASOPHILS # BLD AUTO: 0 X10(3) UL (ref 0–0.2)
BASOPHILS NFR BLD AUTO: 0 %
BUN BLD-MCNC: 8 MG/DL (ref 7–18)
BUN BLD-MCNC: 8 MG/DL (ref 7–18)
BUN/CREAT SERPL: 14.3 (ref 10–20)
BUN/CREAT SERPL: 16.7 (ref 10–20)
CALCIUM BLD-MCNC: 8 MG/DL (ref 8.5–10.1)
CALCIUM BLD-MCNC: 8.1 MG/DL (ref 8.5–10.1)
CHLORIDE SERPL-SCNC: 102 MMOL/L (ref 98–107)
CHLORIDE SERPL-SCNC: 105 MMOL/L (ref 98–107)
CO2 SERPL-SCNC: 27 MMOL/L (ref 21–32)
CO2 SERPL-SCNC: 27 MMOL/L (ref 21–32)
CREAT BLD-MCNC: 0.48 MG/DL (ref 0.55–1.02)
CREAT BLD-MCNC: 0.56 MG/DL (ref 0.55–1.02)
DEPRECATED HBV CORE AB SER IA-ACNC: 148.7 NG/ML (ref 18–340)
DEPRECATED RDW RBC AUTO: 42.4 FL (ref 35.1–46.3)
DEPRECATED RDW RBC AUTO: 43.5 FL (ref 35.1–46.3)
EOSINOPHIL # BLD AUTO: 0 X10(3) UL (ref 0–0.7)
EOSINOPHIL NFR BLD AUTO: 0 %
ERYTHROCYTE [DISTWIDTH] IN BLOOD BY AUTOMATED COUNT: 12.7 % (ref 11–15)
ERYTHROCYTE [DISTWIDTH] IN BLOOD BY AUTOMATED COUNT: 12.8 % (ref 11–15)
GLUCOSE BLD-MCNC: 112 MG/DL (ref 70–99)
GLUCOSE BLD-MCNC: 132 MG/DL (ref 70–99)
HCT VFR BLD AUTO: 24.2 % (ref 35–48)
HCT VFR BLD AUTO: 25.2 % (ref 35–48)
HGB BLD-MCNC: 6.9 G/DL (ref 12–16)
HGB BLD-MCNC: 7.2 G/DL (ref 12–16)
HGB BLD-MCNC: 7.9 G/DL (ref 12–16)
HGB BLD-MCNC: 8 G/DL (ref 12–16)
IMM GRANULOCYTES # BLD AUTO: 0.02 X10(3) UL (ref 0–1)
IMM GRANULOCYTES NFR BLD: 0.3 %
IRON SATURATION: 24 % (ref 15–50)
IRON SERPL-MCNC: 59 UG/DL (ref 50–170)
LACTATE SERPL-SCNC: 1.7 MMOL/L (ref 0.4–2)
LYMPHOCYTES # BLD AUTO: 1.04 X10(3) UL (ref 1–4)
LYMPHOCYTES NFR BLD AUTO: 15.5 %
MCH RBC QN AUTO: 29.7 PG (ref 26–34)
MCH RBC QN AUTO: 29.9 PG (ref 26–34)
MCHC RBC AUTO-ENTMCNC: 31.7 G/DL (ref 31–37)
MCHC RBC AUTO-ENTMCNC: 32.6 G/DL (ref 31–37)
MCV RBC AUTO: 91.7 FL (ref 80–100)
MCV RBC AUTO: 93.7 FL (ref 80–100)
MONOCYTES # BLD AUTO: 0.52 X10(3) UL (ref 0.1–1)
MONOCYTES NFR BLD AUTO: 7.7 %
NEUTROPHILS # BLD AUTO: 5.15 X10 (3) UL (ref 1.5–7.7)
NEUTROPHILS # BLD AUTO: 5.15 X10(3) UL (ref 1.5–7.7)
NEUTROPHILS NFR BLD AUTO: 76.5 %
OSMOLALITY SERPL CALC.SUM OF ELEC: 276 MOSM/KG (ref 275–295)
OSMOLALITY SERPL CALC.SUM OF ELEC: 281 MOSM/KG (ref 275–295)
PLATELET # BLD AUTO: 197 10(3)UL (ref 150–450)
PLATELET # BLD AUTO: 203 10(3)UL (ref 150–450)
POTASSIUM SERPL-SCNC: 4.3 MMOL/L (ref 3.5–5.1)
POTASSIUM SERPL-SCNC: 4.5 MMOL/L (ref 3.5–5.1)
RBC # BLD AUTO: 2.64 X10(6)UL (ref 3.8–5.3)
RBC # BLD AUTO: 2.69 X10(6)UL (ref 3.8–5.3)
RH BLOOD TYPE: POSITIVE
SODIUM SERPL-SCNC: 133 MMOL/L (ref 136–145)
SODIUM SERPL-SCNC: 136 MMOL/L (ref 136–145)
TOTAL IRON BINDING CAPACITY: 249 UG/DL (ref 240–450)
TRANSFERRIN SERPL-MCNC: 167 MG/DL (ref 200–360)
TROPONIN I SERPL-MCNC: <0.045 NG/ML (ref ?–0.04)
WBC # BLD AUTO: 6.6 X10(3) UL (ref 4–11)
WBC # BLD AUTO: 6.7 X10(3) UL (ref 4–11)

## 2019-03-23 PROCEDURE — 93306 TTE W/DOPPLER COMPLETE: CPT | Performed by: HOSPITALIST

## 2019-03-23 PROCEDURE — 30233N1 TRANSFUSION OF NONAUTOLOGOUS RED BLOOD CELLS INTO PERIPHERAL VEIN, PERCUTANEOUS APPROACH: ICD-10-PCS | Performed by: HOSPITALIST

## 2019-03-23 PROCEDURE — 99233 SBSQ HOSP IP/OBS HIGH 50: CPT | Performed by: HOSPITALIST

## 2019-03-23 RX ORDER — SODIUM CHLORIDE 9 MG/ML
INJECTION, SOLUTION INTRAVENOUS
Status: DISPENSED
Start: 2019-03-23 | End: 2019-03-24

## 2019-03-23 RX ORDER — FUROSEMIDE 10 MG/ML
20 INJECTION INTRAMUSCULAR; INTRAVENOUS ONCE
Status: COMPLETED | OUTPATIENT
Start: 2019-03-23 | End: 2019-03-24

## 2019-03-23 RX ORDER — SODIUM CHLORIDE 0.9 % (FLUSH) 0.9 %
10 SYRINGE (ML) INJECTION AS NEEDED
Status: DISCONTINUED | OUTPATIENT
Start: 2019-03-23 | End: 2019-03-25

## 2019-03-23 RX ORDER — SODIUM CHLORIDE 9 MG/ML
INJECTION, SOLUTION INTRAVENOUS ONCE
Status: COMPLETED | OUTPATIENT
Start: 2019-03-23 | End: 2019-03-24

## 2019-03-23 RX ORDER — METHOCARBAMOL 750 MG/1
750 TABLET, FILM COATED ORAL 3 TIMES DAILY
Status: DISCONTINUED | OUTPATIENT
Start: 2019-03-23 | End: 2019-03-25

## 2019-03-23 NOTE — PHYSICAL THERAPY NOTE
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   • Pulmonary emphysema (HCC)    • Rib fracture 2015    left side   • Sciatica    • Seborrheic AND STRENGTH ASSESSMENT  Upper extremity ROM and strength are within functional limits  Except L UE    Lower extremity ROM is within functional limits     Lower extremity strength is within functional limits     BALANCE  Static Sitting: Good  Dynamic Sitti devices at assistance level:  Mod I   Goal #3   Current Status    Goal #4 Patient will negotiate 3 stairs/one curb w/ assistive device and supervision   Goal #4   Current Status    Goal #5 Patient to demonstrate independence with home activity/exercise inst

## 2019-03-23 NOTE — ANESTHESIA POST-OP FOLLOW-UP NOTE
[facility] HOSP - [facility]   Acute Pain Rounds Note  3/23/2019    Patient name: Kamlesh Pickens 70year old female  : 1955  MRN: [id number]    Diagnosis: (M12.819) Rotator cuff arthropathy  (primary encounter diagnosis)  Plan: MRSA SCREEN BY PCR  (M75.

## 2019-03-23 NOTE — OCCUPATIONAL THERAPY NOTE
OCCUPATIONAL THERAPY EVALUATION - INPATIENT      Room Number: 403/403-A  Evaluation Date: 3/23/2019  Type of Evaluation: Initial  Presenting Problem: (L reverse TSA with biceps tenodesis )    Physician Order: IP Consult to Occupational Therapy  Reason for hour supervision. DISCHARGE RECOMMENDATIONS  OT Discharge Recommendations: 24 hour care/supervision;Home(HHOT )       PLAN  OT Treatment Plan: ADL training;UE strengthening/ROM; Patient/Family education;Patient/Family training;Equipment eval/education; Co SITUATION  Type of Home: House  Home Layout: One level  Lives With: Daughter    Toilet and Equipment: Standard height toilet  Shower/Tub and Equipment: Tub-shower combo;Walk-in shower          Hand Dominance: Right  Drives: Yes       Stairs in Home: 3STE; CK    FUNCTIONAL TRANSFER ASSESSMENT        Toilet Transfer: CGA no AD   Shower Transfer: n/t   Chair Transfer: n/t; declined, requested to return to bed   Car Transfer: n/t     Bedroom Mobility: CGA no AD       FUNCTIONAL ADL ASSESSMENT  Grooming: min.  A

## 2019-03-23 NOTE — CM/SW NOTE
SW received order for s/p total joint and fresh post op. Per chart review and discussion w/ RN/Desiree, MD does not typically send pt's home w/ HHC. Pt is from home w/ significant other, self care. PT/OT to evaluate, MAURICE will follow for recommendations.

## 2019-03-23 NOTE — PROGRESS NOTES
[place] [facility] - [facility]    Progress Note    Kayla Muñoz Patient Status:  Inpatient    1955 MRN [id number]   Location [facility] 4W/SW/SE Attending Cirilo Manning Day # 1 PCP Kedar Crawford MD        Subjective:     Co 03/23/2019    HCT 25.2 (L) 03/23/2019    .0 03/23/2019    CREATSERUM 0.56 03/23/2019    BUN 8 03/23/2019     03/23/2019    K 4.5 03/23/2019     03/23/2019    CO2 27.0 03/23/2019     (H) 03/23/2019    CA 8.1 (L) 03/23/2019    ALB 4

## 2019-03-23 NOTE — PROGRESS NOTES
[place] [facility] - [facility]    Progress Note    Elvi Aviles Patient Status:  Inpatient    1955 MRN [id number]   Location [facility] 4W/SW/SE Attending Marcial Lujan MD   Hosp Day # 1 PCP Srikanth Willams MD     SUBJECTIVE:  Interval History

## 2019-03-23 NOTE — PLAN OF CARE
DISCHARGE PLANNING    • Discharge to home or other facility with appropriate resources Progressing        Impaired Functional Mobility    • Achieve highest/safest level of mobility/gait Progressing        MUSCULOSKELETAL - ADULT    • Return mobility to saf Meryl Menjivar back at [phone] to let her know the actual results are in the computer. Orders to hold transfusion were obtained. This case was discussed thoroughly with Dr. Meryl Menjivar.

## 2019-03-23 NOTE — PLAN OF CARE
DISCHARGE PLANNING    • Discharge to home or other facility with appropriate resources Progressing    Patient plans to discharge home once cleared. PT/OT today.      Impaired Functional Mobility    • Achieve highest/safest level of mobility/gait Progressing

## 2019-03-24 LAB
ANION GAP SERPL CALC-SCNC: 4 MMOL/L (ref 0–18)
BASOPHILS # BLD AUTO: 0.03 X10(3) UL (ref 0–0.2)
BASOPHILS NFR BLD AUTO: 0.4 %
BUN BLD-MCNC: 4 MG/DL (ref 7–18)
BUN/CREAT SERPL: 12.1 (ref 10–20)
CALCIUM BLD-MCNC: 8.3 MG/DL (ref 8.5–10.1)
CHLORIDE SERPL-SCNC: 102 MMOL/L (ref 98–107)
CO2 SERPL-SCNC: 29 MMOL/L (ref 21–32)
CREAT BLD-MCNC: 0.33 MG/DL (ref 0.55–1.02)
DEPRECATED RDW RBC AUTO: 45.1 FL (ref 35.1–46.3)
EOSINOPHIL # BLD AUTO: 0.03 X10(3) UL (ref 0–0.7)
EOSINOPHIL NFR BLD AUTO: 0.4 %
ERYTHROCYTE [DISTWIDTH] IN BLOOD BY AUTOMATED COUNT: 13.5 % (ref 11–15)
GLUCOSE BLD-MCNC: 108 MG/DL (ref 70–99)
HAV IGM SER QL: 1.8 MG/DL (ref 1.6–2.6)
HCT VFR BLD AUTO: 25.7 % (ref 35–48)
HEMOCCULT STL QL: NEGATIVE
HGB BLD-MCNC: 8.5 G/DL (ref 12–16)
IMM GRANULOCYTES # BLD AUTO: 0.02 X10(3) UL (ref 0–1)
IMM GRANULOCYTES NFR BLD: 0.3 %
LYMPHOCYTES # BLD AUTO: 1.45 X10(3) UL (ref 1–4)
LYMPHOCYTES NFR BLD AUTO: 20.4 %
MCH RBC QN AUTO: 30 PG (ref 26–34)
MCHC RBC AUTO-ENTMCNC: 33.1 G/DL (ref 31–37)
MCV RBC AUTO: 90.8 FL (ref 80–100)
MONOCYTES # BLD AUTO: 0.63 X10(3) UL (ref 0.1–1)
MONOCYTES NFR BLD AUTO: 8.9 %
NEUTROPHILS # BLD AUTO: 4.94 X10 (3) UL (ref 1.5–7.7)
NEUTROPHILS # BLD AUTO: 4.94 X10(3) UL (ref 1.5–7.7)
NEUTROPHILS NFR BLD AUTO: 69.6 %
OSMOLALITY SERPL CALC.SUM OF ELEC: 277 MOSM/KG (ref 275–295)
PHOSPHATE SERPL-MCNC: 2.5 MG/DL (ref 2.5–4.9)
PLATELET # BLD AUTO: 168 10(3)UL (ref 150–450)
POTASSIUM SERPL-SCNC: 3.6 MMOL/L (ref 3.5–5.1)
RBC # BLD AUTO: 2.83 X10(6)UL (ref 3.8–5.3)
SODIUM SERPL-SCNC: 135 MMOL/L (ref 136–145)
WBC # BLD AUTO: 7.1 X10(3) UL (ref 4–11)

## 2019-03-24 PROCEDURE — 99233 SBSQ HOSP IP/OBS HIGH 50: CPT | Performed by: HOSPITALIST

## 2019-03-24 RX ORDER — POTASSIUM CHLORIDE 20 MEQ/1
40 TABLET, EXTENDED RELEASE ORAL EVERY 4 HOURS
Status: COMPLETED | OUTPATIENT
Start: 2019-03-24 | End: 2019-03-24

## 2019-03-24 RX ORDER — MAGNESIUM OXIDE 400 MG (241.3 MG MAGNESIUM) TABLET
400 TABLET ONCE
Status: COMPLETED | OUTPATIENT
Start: 2019-03-24 | End: 2019-03-24

## 2019-03-24 NOTE — PROGRESS NOTES
[facility]    Progress Note    Kylahjeanie Hubbardn Patient Status:  Inpatient    1955 MRN [id number]   Location [facility] 4W/SW/SE Attending Cirilo Manningissa Day # 2 PCP Miguelina Rios MD     SUBJECTIVE:  Interval H

## 2019-03-24 NOTE — PHYSICAL THERAPY NOTE
PHYSICAL THERAPY TREATMENT NOTE - INPATIENT     Room Number: 403/403-A       Presenting Problem: L total shoulder replacement    Problem List  Principal Problem:    Left rotator cuff tear arthropathy  Active Problems:    Essential hypertension    Rotator c Static Sitting: Good  Dynamic Sitting: Fair +           Static Standing: Fair  Dynamic Standing: Fair    ACTIVITY TOLERANCE                         O2 WALK  SPO2 on Room Air at Rest: 93  SPO2 Ambulation on Room Air: 93            AM-PAC '6-C device and supervision   Goal #4   Current Status Pt negotiated 5 stairs with 1 HR CGA   Goal #5 Patient to demonstrate independence with home activity/exercise instructions provided to patient in preparation for discharge.    Goal #5   Current Status IN [place]

## 2019-03-24 NOTE — OCCUPATIONAL THERAPY NOTE
OCCUPATIONAL THERAPY TREATMENT NOTE - INPATIENT    Room Number: 403/403-A         Presenting Problem: L reverse TSA with biceps tenodesis     Problem List  Principal Problem:    Left rotator cuff tear arthropathy  Active Problems:    Essential hypertension TOLERANCE         good                ACTIVITIES OF DAILY LIVING ASSESSMENT  AM-PAC ‘6-Clicks’ Inpatient Daily Activity Short Form  How much help from another person does the patient currently need…  -   Putting on and taking off regular lower body clothin

## 2019-03-24 NOTE — PROGRESS NOTES
[place] [facility] - [facility]    Progress Note    Elias Alvarez Patient Status:  Inpatient    1955 MRN [id number]   Location [facility] 4W/SW/SE Attending Cirilo Manning Day # 2 PCP Sam Vieira MD        Subjective:     Co WBC 7.1 03/24/2019    HGB 8.5 (L) 03/24/2019    HCT 25.7 (L) 03/24/2019    .0 03/24/2019    CREATSERUM 0.33 (L) 03/24/2019    BUN 4 (L) 03/24/2019     (L) 03/24/2019    K 3.6 03/24/2019     03/24/2019    CO2 29.0 03/24/2019     (H

## 2019-03-24 NOTE — PLAN OF CARE
DISCHARGE PLANNING    • Discharge to home or other facility with appropriate resources Progressing      Patient's D/C plan is for home when stable    HEMATOLOGIC - ADULT    • Maintains hematologic stability Progressing    • Free from bleeding injury Progre

## 2019-03-25 ENCOUNTER — APPOINTMENT (OUTPATIENT)
Dept: GENERAL RADIOLOGY | Facility: HOSPITAL | Age: 64
DRG: 483 | End: 2019-03-25
Attending: HOSPITALIST
Payer: MEDICARE

## 2019-03-25 VITALS
OXYGEN SATURATION: 94 % | TEMPERATURE: 98 F | BODY MASS INDEX: 25.43 KG/M2 | HEIGHT: 61 IN | RESPIRATION RATE: 16 BRPM | DIASTOLIC BLOOD PRESSURE: 79 MMHG | WEIGHT: 134.69 LBS | SYSTOLIC BLOOD PRESSURE: 111 MMHG | HEART RATE: 109 BPM

## 2019-03-25 LAB
ANION GAP SERPL CALC-SCNC: 7 MMOL/L (ref 0–18)
BLOOD TYPE BARCODE: 8400
BUN BLD-MCNC: 4 MG/DL (ref 7–18)
BUN/CREAT SERPL: 17.4 (ref 10–20)
CALCIUM BLD-MCNC: 8.6 MG/DL (ref 8.5–10.1)
CHLORIDE SERPL-SCNC: 102 MMOL/L (ref 98–107)
CO2 SERPL-SCNC: 27 MMOL/L (ref 21–32)
CREAT BLD-MCNC: 0.23 MG/DL (ref 0.55–1.02)
DEPRECATED RDW RBC AUTO: 45.4 FL (ref 35.1–46.3)
ERYTHROCYTE [DISTWIDTH] IN BLOOD BY AUTOMATED COUNT: 13.4 % (ref 11–15)
GLUCOSE BLD-MCNC: 99 MG/DL (ref 70–99)
HAV IGM SER QL: 2 MG/DL (ref 1.6–2.6)
HCT VFR BLD AUTO: 24.8 % (ref 35–48)
HGB BLD-MCNC: 8.1 G/DL (ref 12–16)
MCH RBC QN AUTO: 29.8 PG (ref 26–34)
MCHC RBC AUTO-ENTMCNC: 32.7 G/DL (ref 31–37)
MCV RBC AUTO: 91.2 FL (ref 80–100)
OSMOLALITY SERPL CALC.SUM OF ELEC: 279 MOSM/KG (ref 275–295)
PLATELET # BLD AUTO: 181 10(3)UL (ref 150–450)
POTASSIUM SERPL-SCNC: 4.1 MMOL/L (ref 3.5–5.1)
RBC # BLD AUTO: 2.72 X10(6)UL (ref 3.8–5.3)
SODIUM SERPL-SCNC: 136 MMOL/L (ref 136–145)
WBC # BLD AUTO: 7.5 X10(3) UL (ref 4–11)

## 2019-03-25 PROCEDURE — 99239 HOSP IP/OBS DSCHRG MGMT >30: CPT | Performed by: HOSPITALIST

## 2019-03-25 PROCEDURE — 71046 X-RAY EXAM CHEST 2 VIEWS: CPT | Performed by: HOSPITALIST

## 2019-03-25 RX ORDER — PSEUDOEPHEDRINE HCL 30 MG
100 TABLET ORAL 2 TIMES DAILY PRN
Qty: 20 CAPSULE | Refills: 0 | Status: SHIPPED | OUTPATIENT
Start: 2019-03-25 | End: 2019-07-18 | Stop reason: ALTCHOICE

## 2019-03-25 RX ORDER — METHOCARBAMOL 750 MG/1
750 TABLET, FILM COATED ORAL 3 TIMES DAILY
Qty: 20 TABLET | Refills: 0 | Status: SHIPPED | OUTPATIENT
Start: 2019-03-25 | End: 2019-03-27

## 2019-03-25 RX ORDER — ACETAMINOPHEN 500 MG
500 TABLET ORAL EVERY 6 HOURS PRN
Status: DISCONTINUED | OUTPATIENT
Start: 2019-03-25 | End: 2019-03-25

## 2019-03-25 NOTE — DISCHARGE SUMMARY
> 30 min spent on dc  Dc [facility] FOR [facility] Discharge Diagnoses: shoulder arthroplasty    Lace+ Score: 27  59-90 High Risk  29-58 Medium Risk  0-28   Low Risk.     TCM Follow-Up Recommendation:  LACE < 29: Low Risk of readmission after discharge f

## 2019-03-25 NOTE — PHYSICAL THERAPY NOTE
PHYSICAL THERAPY TREATMENT NOTE - INPATIENT     Room Number: 403/403-A       Presenting Problem: L total shoulder replacement    Problem List  Principal Problem:    Left rotator cuff tear arthropathy  Active Problems:    Essential hypertension    Rotator c Static Sitting: Good  Dynamic Sitting: Fair +           Static Standing: Fair  Dynamic Standing: Fair    ACTIVITY TOLERANCE                         O2 WALK                  AM-PAC '6-Clicks' INPATIENT SHORT FORM - BASIC MOBI Goal #4 Patient will negotiate 3 stairs/one curb w/ assistive device and supervision   Goal #4   Current Status NT pt declined to review   Goal #5 Patient to demonstrate independence with home activity/exercise instructions provided to patient in prepara

## 2019-03-25 NOTE — PLAN OF CARE
DISCHARGE PLANNING    • Discharge to home or other facility with appropriate resources Progressing      Plan for home when stable    HEMATOLOGIC - ADULT    • Maintains hematologic stability Progressing    • Free from bleeding injury Progressing        Impa

## 2019-03-26 NOTE — PAYOR COMM NOTE
--------------  ADMISSION REVIEW     Maxime Jacques MA O  Subscriber #:  [id number]  Authorization Number: [id number]    Admit date: 3/22/19  Admit time: 1154       Admitting Physician: Yanet Lam MD  Attending Physician:  No att. providers found  Pr albuterol sulfate (2.5 MG/3ML) 0.083% Inhalation Nebu Soln Take 3 mL (2.5 mg total) by nebulization every 4 (four) hours as needed for Wheezing.  Disp: 60 ampule Rfl: 1   AMLODIPINE BESYLATE 5 MG Oral Tab TAKE ONE TABLET BY MOUTH ONE TIME DAILY  Disp: 30 ta The patient is awake and oriented x 3 and overall well appearing. The patient has normal mood. The patient is non-tender and atraumatic with the exception of their left upper extremity. The patient's skin is intact and compartments are soft.   The patien The risks, indications, benefits, and procedures of both operative and non-operative treatment were discussed with the patient.     The patient desired surgery. Surgery recommended was left reverse total shoulder arthroplasty and biceps tenodesis.   Risks INDICATIONS:  The patient is a 20-year-old female. Based on history, physical examination, and imaging studies, was diagnosed as having a complete chronic rotator cuff tear with associated rotator cuff arthropathy.   The risks, indications, and benefits of OPERATIVE TECHNIQUE:  On 03/22/2019, the patient was seen and evaluated preoperatively. Her left shoulder was identified as the correct operative site. My initials were placed.   She was transferred to the operating room and transferred to the operating t placed with 10 degrees of inferior tilt at the center aspect of the inferior aspect of the glenoid. It was reamed and drilled to accommodate 25 mm Perform Tornier base plate, which was compressed to the glenoid with a central compression screw.   Additiona stable condition.       DISPOSITION:  She was admitted for postoperative medical management, postoperative pain control, postoperative DVT prophylaxis, and postoperative antibiotic therapy. All of her questions were answered.   She was in agreement with th What is the anticipated duration of therapy? <48 hours    1210-D/C'd         ceFAZolin sodium (ANCEF/KEFZOL) 2 GM/20ML premix IV syringe 2 g   Dose: 2 g  Freq:  Once Route: IV  Start: 03/22/19 0600 End: 03/22/19 0744    Order specific questions:   What infe Start: 03/22/19 0600 End: 03/22/19 0744    (0600)-Not Given     0744-MAR Hold     0744-MAR Unhold     0744-D/C'd         oxyCODONE HCl ER (OXYCONTIN) 12 hr tab 10 mg   Dose: 10 mg  Freq: Every 12 hours Route: OR  Start: 03/22/19 1215 End: 03/24/19 1546 Start: 03/25/19 0203 End: 03/25/19 1847       0236-Given     1847-D/C'd          1847-D/C'd      bacitracin 500 UNIT/GM ointment   Freq: As needed  Start: 03/22/19 0917 End: 03/22/19 8808    0917-Given     0952-D/C'd             1847-D/C'd      HYDROcodone

## 2019-03-26 NOTE — PAYOR COMM NOTE
--------------  DISCHARGE REVIEW    Francois Real [place] [facility]  Subscriber #:  [id number]  Authorization Number: [id number]    Admit date: 3/22/19  Admit time:  1154  Discharge Date: 3/25/2019  4:47 PM     Admitting Physician: Gui Ley MD  Attending Physi

## 2019-03-27 RX ORDER — METHOCARBAMOL 750 MG/1
750 TABLET, FILM COATED ORAL 3 TIMES DAILY
Qty: 20 TABLET | Refills: 0 | Status: SHIPPED | OUTPATIENT
Start: 2019-03-27 | End: 2019-07-18 | Stop reason: ALTCHOICE

## 2019-03-27 NOTE — DISCHARGE SUMMARY
[facility]    PATIENT'S NAME: Nikos Trimble   ATTENDING PHYSICIAN: Theresa Manning MD   PATIENT ACCOUNT#:   [id number]    LOCATION:  [address] [place] [address] RECORD #:   [id number]       YOB: 1955  ADMISSION DATE:       03/22/2 anemia. The patient's iron levels are acceptable. We gave her Venofer and transfused with 1 unit of blood. 3.   Hypertension. CONDITION ON DISCHARGE:  Stable. CODE STATUS:  Full code. DIET:  Low salt.     ACTIVITIES:  PT, OT; otherwise, as direc

## 2019-03-27 NOTE — TELEPHONE ENCOUNTER
Dr. Jacquelin Laureano, please advise on refill request. Pt scheduled f/u appointment with you. Rx pended. [facility] discharge notes 3/22/19  FOLLOWUP:  Dr. Veena Roque in 5 days. Dr. Ariela Castro, as she designates her for all wound care and activity orders. Hemoglobin followup when she sees Dr. Veena Roque. Future Appointments   Date Time Provider [facility]   3/29/2019  1:20 PM [facility]   4/3/2019  1:20 PM Khalida Samuels MD [facility]   4/8/2019  2:30 PM Madeleine Ulloa MD [facility]     Attempted to contact Pt, no answer.

## 2019-05-03 PROBLEM — Z96.612 S/P REVERSE TOTAL SHOULDER ARTHROPLASTY, LEFT: Status: ACTIVE | Noted: 2019-05-03

## 2019-06-17 ENCOUNTER — TELEPHONE (OUTPATIENT)
Dept: INTERNAL MEDICINE CLINIC | Facility: CLINIC | Age: 64
End: 2019-06-17

## 2019-06-17 NOTE — TELEPHONE ENCOUNTER
Pt called in requested for message to be sent to PCP    Pt said she had a colonoscopy and she need to know if she should go back to take another because doctor told her she was half empty  And he saw one polyp and it was negative  And he advised to come ba

## 2019-06-18 NOTE — TELEPHONE ENCOUNTER
Spoke with pt and MD message below given. Pt verb understanding. Dr Purvi Yip office number given to pt.

## 2019-06-26 PROBLEM — M12.811 RIGHT ROTATOR CUFF TEAR ARTHROPATHY: Status: ACTIVE | Noted: 2019-06-26

## 2019-06-26 PROBLEM — M75.101 RIGHT ROTATOR CUFF TEAR ARTHROPATHY: Status: ACTIVE | Noted: 2019-06-26

## 2019-06-26 RX ORDER — IBUPROFEN 600 MG/1
TABLET ORAL
Qty: 60 TABLET | Refills: 1 | Status: SHIPPED | OUTPATIENT
Start: 2019-06-26 | End: 2020-05-08

## 2019-06-26 NOTE — TELEPHONE ENCOUNTER
LOV: 10/8/2018 medication is not on active list: The source prescription was discontinued on 10/2/2017 by Heladio Valente MD. Per script request  Future Appointments   Date Time Provider Cirilo Racheal   6/26/2019  1:30 PM Talia Parmar MD [facility]

## 2019-07-18 ENCOUNTER — NURSE TRIAGE (OUTPATIENT)
Dept: OTHER | Age: 64
End: 2019-07-18

## 2019-07-18 ENCOUNTER — HOSPITAL ENCOUNTER (OUTPATIENT)
Dept: GENERAL RADIOLOGY | Age: 64
Discharge: HOME | End: 2019-07-18
Attending: INTERNAL MEDICINE
Payer: MEDICARE

## 2019-07-18 ENCOUNTER — OFFICE VISIT (OUTPATIENT)
Dept: INTERNAL MEDICINE CLINIC | Facility: CLINIC | Age: 64
End: 2019-07-18
Payer: MEDICARE

## 2019-07-18 VITALS
WEIGHT: 142 LBS | DIASTOLIC BLOOD PRESSURE: 82 MMHG | TEMPERATURE: 98 F | SYSTOLIC BLOOD PRESSURE: 147 MMHG | HEIGHT: 61 IN | BODY MASS INDEX: 26.81 KG/M2 | HEART RATE: 73 BPM

## 2019-07-18 DIAGNOSIS — R10.32 LEFT INGUINAL PAIN: Primary | ICD-10-CM

## 2019-07-18 DIAGNOSIS — R10.32 LEFT INGUINAL PAIN: ICD-10-CM

## 2019-07-18 LAB
APPEARANCE: CLEAR
MULTISTIX LOT#: ABNORMAL NUMERIC
PH, URINE: 5 (ref 4.5–8)
SPECIFIC GRAVITY: 1 (ref 1–1.03)
UROBILINOGEN,SEMI-QN: 0.2 MG/DL (ref 0–1.9)

## 2019-07-18 PROCEDURE — 74018 RADEX ABDOMEN 1 VIEW: CPT | Performed by: INTERNAL MEDICINE

## 2019-07-18 PROCEDURE — 99214 OFFICE O/P EST MOD 30 MIN: CPT | Performed by: INTERNAL MEDICINE

## 2019-07-18 PROCEDURE — 81002 URINALYSIS NONAUTO W/O SCOPE: CPT | Performed by: INTERNAL MEDICINE

## 2019-07-18 RX ORDER — ACETAMINOPHEN AND CODEINE PHOSPHATE 300; 30 MG/1; MG/1
TABLET ORAL
Refills: 4 | COMMUNITY
Start: 2019-07-08 | End: 2019-09-11

## 2019-07-18 NOTE — TELEPHONE ENCOUNTER
Action Requested: Summary for Provider     []  Critical Lab, Recommendations Needed  [] Need Additional Advice  []   FYI    []   Need Orders  [] Need Medications Sent to Pharmacy  []  Other     SUMMARY: Spoke with the patient who reports she had two hernia

## 2019-07-18 NOTE — PROGRESS NOTES
Patient ID: Amber Arango is a 75year old female. Patient presents with:  Groin Pain: Left sided groin pain, now pain is radiating to left side of vagina. Has seen Gyne already, saw PCP and was instructed to go to ER.  Per patient was told by Surgeon not constipation. Genitourinary: Negative. Musculoskeletal: Negative. Skin: Negative. Neurological: Negative. Psychiatric/Behavioral: Negative.       MEDICAL HISTORY:     Past Medical History:   Diagnosis Date   • Bipolar disorder ([facility]    • Miguelina Simon MOUTH ONE TIME DAILY  (Patient taking differently: TAKE ONE TABLET BY MOUTH ONE TIME NIGHT), Disp: 30 tablet, Rfl: 10  •  Amitriptyline HCl 10 MG Oral Tab, Take 2 tablets (20 mg total) by mouth nightly., Disp: 60 tablet, Rfl: 3  •  albuterol sulfate (2.5 M phone: Not on file        Gets together: Not on file        Attends Ten[employer] service: Not on file        Active member of club or organization: Not on file        Attends meetings of clubs or organizations: Not on file        Relationship status: Not on f alert.   Psychiatric: Her mood appears anxious. Results for Lazaro Rangel (MRN [id number]) as of 7/18/2019 15:58   Ref.  Range 7/18/2019 15:43   Color Urine Latest Ref Range: Yellow  Light Yellow   Spec Gravity Latest Ref Range: 1.005 - 1.030  1.000 (A)

## 2019-07-18 NOTE — PATIENT INSTRUCTIONS
1.  Get x-ray and ultrasound completed. 2.  Take over-the-counter enema if you do not believe MiraLAX is effective. 3.  Increase fiber in your diet. 4.  Drink plenty of fluids every day.     Constipation (Adult)  Constipation means that you have bowel All treatment should be done after talking with your healthcare provider. This is especially true if you have another medical problems, are taking prescription medicines, or are an older adult. Treatment most often involves lifestyle changes.  You may also Call your healthcare provider right away if any of these occur:  · Fever of 100.4°F (38°C) or higher, or as directed by your healthcare provider  · Failure to resume normal bowel movements  · Pain in your abdomen or back gets worse  · Nausea or vomiting  ·

## 2019-07-23 ENCOUNTER — HOSPITAL ENCOUNTER (OUTPATIENT)
Dept: ULTRASOUND IMAGING | Facility: HOSPITAL | Age: 64
Discharge: HOME | End: 2019-07-23
Attending: INTERNAL MEDICINE
Payer: MEDICARE

## 2019-07-23 ENCOUNTER — TELEPHONE (OUTPATIENT)
Dept: INTERNAL MEDICINE CLINIC | Facility: CLINIC | Age: 64
End: 2019-07-23

## 2019-07-23 DIAGNOSIS — R10.32 LEFT INGUINAL PAIN: ICD-10-CM

## 2019-07-23 PROCEDURE — 76882 US LMTD JT/FCL EVL NVASC XTR: CPT | Performed by: INTERNAL MEDICINE

## 2019-07-23 NOTE — TELEPHONE ENCOUNTER
Spoke with patient (identified name and ), results reviewed and agrees with plan. Written by Bertrand Bui MD on 2019  4:59 PM   Eric,     Your ultrasound is attached.  You have no hernia.      Marco Helms MD   [phone]     Written b

## 2019-07-25 ENCOUNTER — HOSPITAL ENCOUNTER (EMERGENCY)
Facility: HOSPITAL | Age: 64
Discharge: HOME | End: 2019-07-25
Attending: EMERGENCY MEDICINE
Payer: MEDICARE

## 2019-07-25 VITALS
BODY MASS INDEX: 26.62 KG/M2 | HEART RATE: 73 BPM | DIASTOLIC BLOOD PRESSURE: 68 MMHG | WEIGHT: 141 LBS | TEMPERATURE: 98 F | RESPIRATION RATE: 18 BRPM | HEIGHT: 61 IN | SYSTOLIC BLOOD PRESSURE: 136 MMHG | OXYGEN SATURATION: 98 %

## 2019-07-25 DIAGNOSIS — L30.9 DERMATITIS: Primary | ICD-10-CM

## 2019-07-25 DIAGNOSIS — R42 DIZZINESS: ICD-10-CM

## 2019-07-25 LAB
ALBUMIN SERPL-MCNC: 4.1 G/DL (ref 3.4–5)
ALP LIVER SERPL-CCNC: 99 U/L (ref 55–142)
ALT SERPL-CCNC: 18 U/L (ref 13–56)
ANION GAP SERPL CALC-SCNC: 7 MMOL/L (ref 0–18)
AST SERPL-CCNC: 11 U/L (ref 15–37)
BASOPHILS # BLD AUTO: 0.05 X10(3) UL (ref 0–0.2)
BASOPHILS NFR BLD AUTO: 0.8 %
BILIRUB DIRECT SERPL-MCNC: <0.1 MG/DL (ref 0–0.2)
BILIRUB SERPL-MCNC: 0.3 MG/DL (ref 0.1–2)
BILIRUB UR QL: NEGATIVE
BUN BLD-MCNC: 10 MG/DL (ref 7–18)
BUN/CREAT SERPL: 17.9 (ref 10–20)
CALCIUM BLD-MCNC: 9.1 MG/DL (ref 8.5–10.1)
CHLORIDE SERPL-SCNC: 93 MMOL/L (ref 98–112)
CLARITY UR: CLEAR
CO2 SERPL-SCNC: 30 MMOL/L (ref 21–32)
COLOR UR: COLORLESS
CREAT BLD-MCNC: 0.56 MG/DL (ref 0.55–1.02)
DEPRECATED RDW RBC AUTO: 44.1 FL (ref 35.1–46.3)
EOSINOPHIL # BLD AUTO: 0.14 X10(3) UL (ref 0–0.7)
EOSINOPHIL NFR BLD AUTO: 2.2 %
ERYTHROCYTE [DISTWIDTH] IN BLOOD BY AUTOMATED COUNT: 14 % (ref 11–15)
GLUCOSE BLD-MCNC: 89 MG/DL (ref 70–99)
GLUCOSE UR-MCNC: NEGATIVE MG/DL
HCT VFR BLD AUTO: 36.9 % (ref 35–48)
HGB BLD-MCNC: 12.4 G/DL (ref 12–16)
HGB UR QL STRIP.AUTO: NEGATIVE
IMM GRANULOCYTES # BLD AUTO: 0.01 X10(3) UL (ref 0–1)
IMM GRANULOCYTES NFR BLD: 0.2 %
KETONES UR-MCNC: NEGATIVE MG/DL
LEUKOCYTE ESTERASE UR QL STRIP.AUTO: NEGATIVE
LYMPHOCYTES # BLD AUTO: 2.71 X10(3) UL (ref 1–4)
LYMPHOCYTES NFR BLD AUTO: 42.1 %
M PROTEIN MFR SERPL ELPH: 7.3 G/DL (ref 6.4–8.2)
MCH RBC QN AUTO: 29 PG (ref 26–34)
MCHC RBC AUTO-ENTMCNC: 33.6 G/DL (ref 31–37)
MCV RBC AUTO: 86.4 FL (ref 80–100)
MONOCYTES # BLD AUTO: 0.48 X10(3) UL (ref 0.1–1)
MONOCYTES NFR BLD AUTO: 7.5 %
NEUTROPHILS # BLD AUTO: 3.05 X10 (3) UL (ref 1.5–7.7)
NEUTROPHILS # BLD AUTO: 3.05 X10(3) UL (ref 1.5–7.7)
NEUTROPHILS NFR BLD AUTO: 47.2 %
NITRITE UR QL STRIP.AUTO: NEGATIVE
OSMOLALITY SERPL CALC.SUM OF ELEC: 269 MOSM/KG (ref 275–295)
PH UR: 7 [PH] (ref 5–8)
PLATELET # BLD AUTO: 245 10(3)UL (ref 150–450)
POTASSIUM SERPL-SCNC: 4.1 MMOL/L (ref 3.5–5.1)
PROT UR-MCNC: NEGATIVE MG/DL
RBC # BLD AUTO: 4.27 X10(6)UL (ref 3.8–5.3)
SODIUM SERPL-SCNC: 130 MMOL/L (ref 136–145)
SP GR UR STRIP: 1 (ref 1–1.03)
UROBILINOGEN UR STRIP-ACNC: <2
VIT C UR-MCNC: NEGATIVE MG/DL
WBC # BLD AUTO: 6.4 X10(3) UL (ref 4–11)

## 2019-07-25 PROCEDURE — 80076 HEPATIC FUNCTION PANEL: CPT | Performed by: EMERGENCY MEDICINE

## 2019-07-25 PROCEDURE — 85025 COMPLETE CBC W/AUTO DIFF WBC: CPT | Performed by: EMERGENCY MEDICINE

## 2019-07-25 PROCEDURE — 36415 COLL VENOUS BLD VENIPUNCTURE: CPT

## 2019-07-25 PROCEDURE — 93010 ELECTROCARDIOGRAM REPORT: CPT | Performed by: EMERGENCY MEDICINE

## 2019-07-25 PROCEDURE — 93005 ELECTROCARDIOGRAM TRACING: CPT

## 2019-07-25 PROCEDURE — 80048 BASIC METABOLIC PNL TOTAL CA: CPT | Performed by: EMERGENCY MEDICINE

## 2019-07-25 PROCEDURE — 99283 EMERGENCY DEPT VISIT LOW MDM: CPT

## 2019-07-25 PROCEDURE — 81001 URINALYSIS AUTO W/SCOPE: CPT | Performed by: EMERGENCY MEDICINE

## 2019-07-25 RX ORDER — HYDROXYZINE HYDROCHLORIDE 25 MG/1
TABLET, FILM COATED ORAL EVERY 6 HOURS PRN
Qty: 30 TABLET | Refills: 0 | Status: SHIPPED | OUTPATIENT
Start: 2019-07-25 | End: 2019-07-28

## 2019-07-25 RX ORDER — PREDNISONE 20 MG/1
60 TABLET ORAL DAILY
Qty: 15 TABLET | Refills: 0 | Status: SHIPPED | OUTPATIENT
Start: 2019-07-25 | End: 2019-07-30

## 2019-07-25 RX ORDER — HYDROXYZINE HYDROCHLORIDE 25 MG/1
25 TABLET, FILM COATED ORAL ONCE
Status: COMPLETED | OUTPATIENT
Start: 2019-07-25 | End: 2019-07-25

## 2019-07-26 NOTE — ED PROVIDER NOTES
Patient Seen in: [facility] AND [facility] Emergency Department    History   Patient presents with:  Rash Skin Problem (integumentary)    Stated Complaint: rash    HPI    17-year-old female states recently started having some itching in her left lower abdomen an Arthrocentesis of the left knee joint   • SHOULDER TOTAL Left 3/22/2019    Performed by Job Ross MD at [address] [facility] [address]   • TOTAL KNEE REPLACEMENT Bilateral 2015 and 2016   • TUBAL LIGATION             Social History    Tobacco Use      Smoking sta There is no other appreciated rash per  Psychiatric: Normal mood and affect. Behavior is normal.   Nursing note and vitals reviewed. Differential diagnosis includes nonspecific rash and dermatitis, pruritus.       ED Course     Labs Reviewed   BASIC MET [address]  [phone]    Schedule an appointment as soon as possible for a visit in 1 day      We recommend that you schedule follow up care with a primary care provider within the next three months to obtain basic health screening including reassessment of

## 2019-07-26 NOTE — ED INITIAL ASSESSMENT (HPI)
Pt presents for eval of painful rash to L abdomen, L hip and groin since 7/18. Pt has been taking Benadryl and calamine lotion with no relief . Pt also reports dizziness x 3 days.

## 2019-08-08 ENCOUNTER — OFFICE VISIT (OUTPATIENT)
Dept: GASTROENTEROLOGY | Facility: CLINIC | Age: 64
End: 2019-08-08
Payer: MEDICARE

## 2019-08-08 ENCOUNTER — OFFICE VISIT (OUTPATIENT)
Dept: INTERNAL MEDICINE CLINIC | Facility: CLINIC | Age: 64
End: 2019-08-08
Payer: MEDICARE

## 2019-08-08 VITALS
DIASTOLIC BLOOD PRESSURE: 79 MMHG | HEIGHT: 61 IN | BODY MASS INDEX: 26.62 KG/M2 | SYSTOLIC BLOOD PRESSURE: 125 MMHG | WEIGHT: 141 LBS | HEART RATE: 84 BPM

## 2019-08-08 VITALS
WEIGHT: 144 LBS | SYSTOLIC BLOOD PRESSURE: 132 MMHG | DIASTOLIC BLOOD PRESSURE: 77 MMHG | HEART RATE: 77 BPM | TEMPERATURE: 98 F | HEIGHT: 61 IN | BODY MASS INDEX: 27.19 KG/M2

## 2019-08-08 DIAGNOSIS — K59.00 CONSTIPATION, UNSPECIFIED CONSTIPATION TYPE: Primary | ICD-10-CM

## 2019-08-08 DIAGNOSIS — R21 RASH: Primary | ICD-10-CM

## 2019-08-08 PROCEDURE — 99213 OFFICE O/P EST LOW 20 MIN: CPT | Performed by: INTERNAL MEDICINE

## 2019-08-08 RX ORDER — HYDROXYZINE HYDROCHLORIDE 25 MG/1
25 TABLET, FILM COATED ORAL 3 TIMES DAILY PRN
Qty: 30 TABLET | Refills: 0 | Status: SHIPPED | OUTPATIENT
Start: 2019-08-08 | End: 2019-12-20

## 2019-08-08 RX ORDER — HYDROXYZINE HYDROCHLORIDE 25 MG/1
25 TABLET, FILM COATED ORAL 3 TIMES DAILY PRN
COMMUNITY
End: 2019-08-08

## 2019-08-08 RX ORDER — PREDNISONE 20 MG/1
40 TABLET ORAL DAILY
Qty: 10 TABLET | Refills: 0 | Status: SHIPPED | OUTPATIENT
Start: 2019-08-08 | End: 2019-08-13

## 2019-08-08 NOTE — PROGRESS NOTES
Patient ID: Ariella Quintero is a 75year old female. Patient presents with:  Rash: rash and Itchiness on body was treated for this before, new episode developed yesterday. HISTORY OF PRESENT ILLNESS:   HPI.    There was initial improvement in the rash • COLONOSCOPY  7/2009    incomplete (chronic constipation)   • COLONOSCOPY N/A 11/8/2018    Performed by Jazmin Miranda MD at [facility] ENDOSCOPY   • HERNIA SURGERY Left 12/21/2011    neuroma excision, partial mesh removal, primary re-repair   • INGUINAL HE •  ClonazePAM (KLONOPIN) 2 MG Oral Tab, Take 2 mg by mouth nightly.  , Disp: , Rfl: 2  •  ARIpiprazole (ABILIFY) 5 MG Oral Tab, Take 7.5 mg by mouth nightly.  , Disp: , Rfl:   •  DULoxetine HCl (CYMBALTA) 60 MG Oral Cap DR Particles, Take 60 mg by mouth ni Concerns:         Service: Not Asked        Blood Transfusions: Not Asked        Caffeine Concern: Yes          tea, soda, 44 oz daily        Occupational Exposure: Not Asked        Hobby Hazards: Not Asked        Sleep Concern: Not Asked

## 2019-08-09 ENCOUNTER — OFFICE VISIT (OUTPATIENT)
Dept: DERMATOLOGY CLINIC | Facility: CLINIC | Age: 64
End: 2019-08-09
Payer: MEDICARE

## 2019-08-09 DIAGNOSIS — L30.9 DERMATITIS: Primary | ICD-10-CM

## 2019-08-09 PROCEDURE — 99213 OFFICE O/P EST LOW 20 MIN: CPT | Performed by: DERMATOLOGY

## 2019-08-09 RX ORDER — CLOBETASOL PROPIONATE 0.5 MG/G
1 CREAM TOPICAL 2 TIMES DAILY
Qty: 60 G | Refills: 3 | Status: SHIPPED | OUTPATIENT
Start: 2019-08-09 | End: 2019-12-20

## 2019-08-09 RX ORDER — TERBINAFINE HYDROCHLORIDE 250 MG/1
250 TABLET ORAL DAILY
Qty: 30 TABLET | Refills: 1 | Status: SHIPPED | OUTPATIENT
Start: 2019-08-09 | End: 2020-06-26

## 2019-08-09 RX ORDER — PREDNISONE 10 MG/1
TABLET ORAL
Qty: 18 TABLET | Refills: 0 | Status: SHIPPED | OUTPATIENT
Start: 2019-08-09 | End: 2019-09-11 | Stop reason: ALTCHOICE

## 2019-08-09 RX ORDER — CETIRIZINE HYDROCHLORIDE 10 MG/1
10 TABLET ORAL DAILY
Qty: 60 TABLET | Refills: 2 | Status: SHIPPED | OUTPATIENT
Start: 2019-08-09 | End: 2021-04-27 | Stop reason: ALTCHOICE

## 2019-08-09 NOTE — PROGRESS NOTES
HPI:    Patient ID: Patience Jacome is a 75year old female. HPI  The patient presents today for evaluation of chronic constipation. She has been seen by Dr. Tai Massey previously and most recently by Dr. Dawn Velasquez.     The patient h disorder on multiple medications. She also has arthritis under the care of rheumatology felt to represent seronegative rheumatoid arthritis or undifferentiated connective tissue disease.   This is being managed with acetaminophen with codeine No. 3 (1-2 ta Normocephalic and atraumatic. Mouth/Throat: No oropharyngeal exudate. Eyes: Conjunctivae are normal. No scleral icterus. Neck: Neck supple. No thyromegaly present. Cardiovascular: Normal rate, regular rhythm and normal heart sounds.    Pulmonary/Christy 2.2      Basophils %      % 0.8      Immature Granulocyte %      % 0.2      MEAN PLATELET VOLUME      6.8 - 10.3 fL       Glucose      70 - 99 mg/dL 89      Sodium      136 - 145 mmol/L 130 (L)      Potassium      3.5 - 5.1 mmol/L 4.1      Chloride      98 Eosinophils %      % 1    Basophils %      % 1    Immature Granulocyte %      %     MEAN PLATELET VOLUME      6.8 - 10.3 fL 7.1 (L)    Glucose      70 - 99 mg/dL     Sodium      136 - 145 mmol/L     Potassium      3.5 - 5.1 mmol/L     Chloride      98 - changes are seen in the spine and both hips. OTHER:             Vascular calcifications are noted.              =====  CONCLUSION:   1. Mild constipation. 2. 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 3. Demineralization. 4. Scoliosis.   5.

## 2019-08-12 ENCOUNTER — TELEPHONE (OUTPATIENT)
Dept: DERMATOLOGY CLINIC | Facility: CLINIC | Age: 64
End: 2019-08-12

## 2019-08-12 NOTE — TELEPHONE ENCOUNTER
LOV 8/9/19, pt wanted to ask which of her Rxs was an antifungal. Informed - terbinafine, pt voiced understanding. Pt wants to let you know that she is doing \"so much better\" and appreciates your help.

## 2019-08-12 NOTE — TELEPHONE ENCOUNTER
Pt calling with update on how she is doing. Was given 3 rx's. One was for the toe. Which medication is used for the toe.  Please call

## 2019-08-19 ENCOUNTER — TELEPHONE (OUTPATIENT)
Dept: DERMATOLOGY CLINIC | Facility: CLINIC | Age: 64
End: 2019-08-19

## 2019-08-19 NOTE — PROGRESS NOTES
Elias Daughters is a 75year old female. Patient presents with:  Derm Problem: LOV 3/7/17. pt presenting today with rash to bilateral sides of trunk and knees for 3 weeks.  pt states rash comes and go. pt was prescribed prednisone , just started taking ye Inhalation Nebu Soln Take 3 mL (2.5 mg total) by nebulization every 4 (four) hours as needed for Wheezing.  Disp: 60 ampule Rfl: 1      Past Medical History:   Diagnosis Date   • Bipolar disorder [facility])    • Cataract    • Depression    • Essential hypertensi Take 50 mg by mouth nightly. Disp:  Rfl: 2   ClonazePAM (KLONOPIN) 2 MG Oral Tab Take 2 mg by mouth nightly. Disp:  Rfl: 2   ARIpiprazole (ABILIFY) 5 MG Oral Tab Take 7.5 mg by mouth nightly.    Disp:  Rfl:    DULoxetine HCl (CYMBALTA) 60 MG Oral Cap  osteoarthritis   • OTHER Left 03/22/2019    LEFT REVERSE TOTAL SHOULDER ARTHROPLASTY WITH BICEPS TENODESIS   • OTHER SURGICAL HISTORY      Laparotomy (adhesions)   • OTHER SURGICAL HISTORY      Arthrocentesis of the right knee joint   • OTHER SURGICAL HIST Physically abused: Not on file        Forced sexual activity: Not on file    Other Topics      Concerns:         Service: Not Asked        Blood Transfusions: Not Asked        Caffeine Concern: Yes          tea, soda, 44 oz daily        Occupationa right great toenail distal medial abnormal changes. Not related to rash. Has been on prednisone. Not clear if the hydroxyzine was helping on 60 mg of prednisone now 40 had shoulder replacement in May concerns is related to that prednisone has helped.   Lacy Greer testing. Patient will let us know how they are doing over the next several weeks. Await clinical response to above therapy.        Lamisil as prescribed for the onychomycosislamisil daily x 1 month then 1 week each month x 4 mos  RTC as noted    The carmen

## 2019-08-19 NOTE — TELEPHONE ENCOUNTER
Pt states once she stopped using medication the rash came back.  Please call- also may need refill on medications

## 2019-08-19 NOTE — TELEPHONE ENCOUNTER
LOV 8/9/19.  Pt seen for rash (dermatitis?) pt continues on clobetasol cream BID, terbinafine 250mg QD, triamcinolone cream BID, cetirizine 10mg QD, prednisone (she has 1 tab left) pt states \"she is all clear\" however she did try to stop the oral medicati

## 2019-08-20 NOTE — TELEPHONE ENCOUNTER
lamisil -terbinafine-daily x 1 month then 1 week each month x 4 mos--for toe nail fungus--not really this itchy rash. Finish prednisone.   Possible rash may come back, but at this point she should be able to manage with the creams and I would continue th

## 2019-08-26 ENCOUNTER — NURSE TRIAGE (OUTPATIENT)
Dept: OTHER | Age: 64
End: 2019-08-26

## 2019-08-26 NOTE — TELEPHONE ENCOUNTER
Patient stated that she is still having left lower abdominal pain. Taking the miralax daily. Also introduced prunes into her diet. Had a small bowel movement yesterday. Patient wanted to know what else to do for the abdominal pain? Please advise.        Wr

## 2019-09-03 ENCOUNTER — TELEPHONE (OUTPATIENT)
Dept: GASTROENTEROLOGY | Facility: CLINIC | Age: 64
End: 2019-09-03

## 2019-09-03 NOTE — TELEPHONE ENCOUNTER
----- Message from Troy Oliveira RN sent at 2018  3:48 PM CST -----  Regardin yr CLN recall  Entered into Epic:Recall colon in 1 year by Dr. Flor Morelos. Last Colon done 18, next due 19. Snapshot updated.

## 2019-09-11 ENCOUNTER — OFFICE VISIT (OUTPATIENT)
Dept: INTERNAL MEDICINE CLINIC | Facility: CLINIC | Age: 64
End: 2019-09-11
Payer: MEDICARE

## 2019-09-11 ENCOUNTER — MED REC SCAN ONLY (OUTPATIENT)
Dept: INTERNAL MEDICINE CLINIC | Facility: CLINIC | Age: 64
End: 2019-09-11

## 2019-09-11 VITALS
HEIGHT: 61 IN | TEMPERATURE: 98 F | WEIGHT: 142 LBS | SYSTOLIC BLOOD PRESSURE: 146 MMHG | DIASTOLIC BLOOD PRESSURE: 82 MMHG | BODY MASS INDEX: 26.81 KG/M2 | HEART RATE: 72 BPM

## 2019-09-11 DIAGNOSIS — L98.9 BUMPS ON SKIN: Primary | ICD-10-CM

## 2019-09-11 DIAGNOSIS — Z23 NEED FOR VACCINATION: ICD-10-CM

## 2019-09-11 PROCEDURE — 99213 OFFICE O/P EST LOW 20 MIN: CPT | Performed by: INTERNAL MEDICINE

## 2019-09-11 PROCEDURE — 90662 IIV NO PRSV INCREASED AG IM: CPT | Performed by: INTERNAL MEDICINE

## 2019-09-11 PROCEDURE — G0008 ADMIN INFLUENZA VIRUS VAC: HCPCS | Performed by: INTERNAL MEDICINE

## 2019-09-11 RX ORDER — ACETAMINOPHEN AND CODEINE PHOSPHATE 300; 30 MG/1; MG/1
TABLET ORAL
Qty: 90 TABLET | Refills: 4 | OUTPATIENT
Start: 2019-09-11 | End: 2020-02-04

## 2019-09-11 NOTE — PROGRESS NOTES
Patient ID: Frannie Mahmood is a 75year old female. Patient presents with:  Lump: right hand, ring finger. Noticed 1.5 weeks ago       HISTORY OF PRESENT ILLNESS:   HPI  Patient presents for above.   Had noticed a bump on her fourth finger on the right hand Laparotomy (adhesions)   • OTHER SURGICAL HISTORY      Arthrocentesis of the right knee joint   • OTHER SURGICAL HISTORY      Arthrocentesis of the left knee joint   • SHOULDER TOTAL Left 3/22/2019    Performed by Sariah Pierson MD at [address] [place] [address] [place]   • T •  DULoxetine HCl (CYMBALTA) 60 MG Oral Cap DR Particles, Take 60 mg by mouth nightly. Take 60mg daily.  , Disp: , Rfl:     Allergies:  Sulfa Antibiotics       RASH  Sulfanilamide           RASH    Social History    Socioeconomic History      Marital status Occupational Exposure: Not Asked        Hobby Hazards: Not Asked        Sleep Concern: Not Asked        Stress Concern: Not Asked        Weight Concern: Not Asked        Special Diet: Not Asked        Back Care: Not Asked        Exercise: Not Asked

## 2019-09-11 NOTE — TELEPHONE ENCOUNTER
Last filled: 3/11/19 #90 tab with 4 refills  LOV: 10/8/18  Future Appointments   Date Time Provider Cirilo Springer   10/2/2019  1:30 PM Talia Parmar MD [facility]O [facility]   10/4/2019  4:00 PM Eda Walker MD [facility]     Labs: 1/6/17

## 2019-09-12 RX ORDER — ACETAMINOPHEN AND CODEINE PHOSPHATE 300; 30 MG/1; MG/1
TABLET ORAL
Qty: 90 TABLET | Refills: 3 | OUTPATIENT
Start: 2019-09-12

## 2019-09-12 NOTE — TELEPHONE ENCOUNTER
Called pharmacy and spoke to teresa regarding refill. Attempted to call pt. STEFANI. Will call again this afternoon to let her know refill was sent through.

## 2019-09-13 NOTE — TELEPHONE ENCOUNTER
Called pt. Verbalized understanding on refill. Advised we have 2 rheumatologists she can see in case she needs to come in.

## 2019-09-28 ENCOUNTER — TELEPHONE (OUTPATIENT)
Dept: INTERNAL MEDICINE CLINIC | Facility: CLINIC | Age: 64
End: 2019-09-28

## 2019-09-28 ENCOUNTER — NURSE TRIAGE (OUTPATIENT)
Dept: INTERNAL MEDICINE CLINIC | Facility: CLINIC | Age: 64
End: 2019-09-28

## 2019-09-28 RX ORDER — BENZONATATE 100 MG/1
100 CAPSULE ORAL 3 TIMES DAILY PRN
Qty: 30 CAPSULE | Refills: 1 | Status: SHIPPED | OUTPATIENT
Start: 2019-09-28 | End: 2019-10-11

## 2019-09-28 NOTE — TELEPHONE ENCOUNTER
Verified pt name and . Reviewed doctor's recommendations as noted below. Pt agreed with plan of care. Also reviewed recommendations to go to IC/ER if symptoms worsen.

## 2019-09-28 NOTE — TELEPHONE ENCOUNTER
Patient has a history of COPD , she has had antibiotics and steroids in the past, she is already on Tylenol with codeine. I will send Tessalon perles in for her , if she needs anything else or worsens she will need to be seen .   There is no history of he

## 2019-09-28 NOTE — TELEPHONE ENCOUNTER
Action Requested: Summary for Provider     []  Critical Lab, Recommendations Needed  [x] Need Additional Advice  []   FYI    []   Need Orders  [x] Need Medications Sent to Pharmacy  []  Other     SUMMARY: Patient requesting cough medication be sent to phar

## 2019-09-28 NOTE — TELEPHONE ENCOUNTER
Pt states she feels her body is aching. She is also experiencing coughing and chills. Pt declined appt and wants Dr. Felipe Vora to prescribe something for her. Transf to Triage.

## 2019-09-30 ENCOUNTER — APPOINTMENT (OUTPATIENT)
Dept: CT IMAGING | Facility: HOSPITAL | Age: 64
DRG: 194 | End: 2019-09-30
Attending: EMERGENCY MEDICINE
Payer: MEDICARE

## 2019-09-30 ENCOUNTER — APPOINTMENT (OUTPATIENT)
Dept: GENERAL RADIOLOGY | Facility: HOSPITAL | Age: 64
DRG: 194 | End: 2019-09-30
Attending: EMERGENCY MEDICINE
Payer: MEDICARE

## 2019-09-30 ENCOUNTER — HOSPITAL ENCOUNTER (INPATIENT)
Facility: HOSPITAL | Age: 64
LOS: 4 days | Discharge: HOME | DRG: 194 | End: 2019-10-04
Attending: EMERGENCY MEDICINE | Admitting: HOSPITALIST
Payer: MEDICARE

## 2019-09-30 ENCOUNTER — HOSPITAL ENCOUNTER (OUTPATIENT)
Age: 64
Discharge: ANOTHER HOSPITAL | DRG: 194 | End: 2019-09-30
Attending: EMERGENCY MEDICINE
Payer: MEDICARE

## 2019-09-30 VITALS
OXYGEN SATURATION: 93 % | WEIGHT: 141 LBS | BODY MASS INDEX: 25.95 KG/M2 | HEART RATE: 95 BPM | SYSTOLIC BLOOD PRESSURE: 120 MMHG | TEMPERATURE: 100 F | RESPIRATION RATE: 20 BRPM | DIASTOLIC BLOOD PRESSURE: 61 MMHG | HEIGHT: 62 IN

## 2019-09-30 DIAGNOSIS — R07.9 CHEST PAIN OF UNKNOWN ETIOLOGY: Primary | ICD-10-CM

## 2019-09-30 DIAGNOSIS — R06.00 DYSPNEA ON EXERTION: ICD-10-CM

## 2019-09-30 DIAGNOSIS — J18.9 COMMUNITY ACQUIRED PNEUMONIA OF RIGHT MIDDLE LOBE OF LUNG: Primary | ICD-10-CM

## 2019-09-30 DIAGNOSIS — R53.1 WEAKNESS GENERALIZED: ICD-10-CM

## 2019-09-30 PROCEDURE — 93010 ELECTROCARDIOGRAM REPORT: CPT | Performed by: EMERGENCY MEDICINE

## 2019-09-30 PROCEDURE — 93010 ELECTROCARDIOGRAM REPORT: CPT

## 2019-09-30 PROCEDURE — 93005 ELECTROCARDIOGRAM TRACING: CPT

## 2019-09-30 PROCEDURE — 99214 OFFICE O/P EST MOD 30 MIN: CPT

## 2019-09-30 PROCEDURE — 71045 X-RAY EXAM CHEST 1 VIEW: CPT | Performed by: EMERGENCY MEDICINE

## 2019-09-30 PROCEDURE — 99223 1ST HOSP IP/OBS HIGH 75: CPT | Performed by: HOSPITALIST

## 2019-09-30 PROCEDURE — 71260 CT THORAX DX C+: CPT | Performed by: EMERGENCY MEDICINE

## 2019-09-30 RX ORDER — METHYLPREDNISOLONE SODIUM SUCCINATE 40 MG/ML
40 INJECTION, POWDER, LYOPHILIZED, FOR SOLUTION INTRAMUSCULAR; INTRAVENOUS EVERY 8 HOURS
Status: DISCONTINUED | OUTPATIENT
Start: 2019-09-30 | End: 2019-10-01

## 2019-09-30 RX ORDER — GUAIFENESIN 100 MG/5ML
100 SOLUTION ORAL EVERY 4 HOURS PRN
Status: DISCONTINUED | OUTPATIENT
Start: 2019-09-30 | End: 2019-10-04

## 2019-09-30 RX ORDER — CETIRIZINE HYDROCHLORIDE 10 MG/1
10 TABLET ORAL DAILY
Status: DISCONTINUED | OUTPATIENT
Start: 2019-09-30 | End: 2019-10-04

## 2019-09-30 RX ORDER — SODIUM CHLORIDE 9 MG/ML
INJECTION, SOLUTION INTRAVENOUS CONTINUOUS
Status: DISCONTINUED | OUTPATIENT
Start: 2019-09-30 | End: 2019-10-04

## 2019-09-30 RX ORDER — ACETAMINOPHEN 325 MG/1
650 TABLET ORAL EVERY 6 HOURS PRN
Status: DISCONTINUED | OUTPATIENT
Start: 2019-09-30 | End: 2019-10-04

## 2019-09-30 RX ORDER — AMLODIPINE BESYLATE 5 MG/1
5 TABLET ORAL
Status: DISCONTINUED | OUTPATIENT
Start: 2019-09-30 | End: 2019-10-04

## 2019-09-30 RX ORDER — SODIUM CHLORIDE 0.9 % (FLUSH) 0.9 %
3 SYRINGE (ML) INJECTION AS NEEDED
Status: DISCONTINUED | OUTPATIENT
Start: 2019-09-30 | End: 2019-10-04

## 2019-09-30 RX ORDER — KETOROLAC TROMETHAMINE 15 MG/ML
15 INJECTION, SOLUTION INTRAMUSCULAR; INTRAVENOUS ONCE
Status: COMPLETED | OUTPATIENT
Start: 2019-09-30 | End: 2019-09-30

## 2019-09-30 RX ORDER — ACETAMINOPHEN AND CODEINE PHOSPHATE 300; 30 MG/1; MG/1
1 TABLET ORAL EVERY 4 HOURS PRN
Status: DISCONTINUED | OUTPATIENT
Start: 2019-09-30 | End: 2019-10-04

## 2019-09-30 RX ORDER — TEMAZEPAM 15 MG/1
15 CAPSULE ORAL NIGHTLY PRN
Status: DISCONTINUED | OUTPATIENT
Start: 2019-09-30 | End: 2019-10-04

## 2019-09-30 RX ORDER — IPRATROPIUM BROMIDE AND ALBUTEROL SULFATE 2.5; .5 MG/3ML; MG/3ML
3 SOLUTION RESPIRATORY (INHALATION) EVERY 6 HOURS PRN
Status: DISCONTINUED | OUTPATIENT
Start: 2019-09-30 | End: 2019-10-04

## 2019-09-30 RX ORDER — HEPARIN SODIUM 5000 [USP'U]/ML
5000 INJECTION, SOLUTION INTRAVENOUS; SUBCUTANEOUS EVERY 12 HOURS SCHEDULED
Status: DISCONTINUED | OUTPATIENT
Start: 2019-09-30 | End: 2019-10-04

## 2019-09-30 RX ORDER — AMITRIPTYLINE HYDROCHLORIDE 10 MG/1
20 TABLET, FILM COATED ORAL NIGHTLY
Status: DISCONTINUED | OUTPATIENT
Start: 2019-09-30 | End: 2019-10-04

## 2019-09-30 RX ORDER — HYDROXYZINE HYDROCHLORIDE 25 MG/1
25 TABLET, FILM COATED ORAL 3 TIMES DAILY PRN
Status: DISCONTINUED | OUTPATIENT
Start: 2019-09-30 | End: 2019-10-04

## 2019-09-30 RX ORDER — METHYLPREDNISOLONE SODIUM SUCCINATE 125 MG/2ML
125 INJECTION, POWDER, LYOPHILIZED, FOR SOLUTION INTRAMUSCULAR; INTRAVENOUS ONCE
Status: COMPLETED | OUTPATIENT
Start: 2019-09-30 | End: 2019-09-30

## 2019-09-30 RX ORDER — BENZONATATE 100 MG/1
100 CAPSULE ORAL 3 TIMES DAILY PRN
Status: DISCONTINUED | OUTPATIENT
Start: 2019-09-30 | End: 2019-10-01

## 2019-09-30 RX ORDER — DULOXETIN HYDROCHLORIDE 30 MG/1
60 CAPSULE, DELAYED RELEASE ORAL NIGHTLY
Status: DISCONTINUED | OUTPATIENT
Start: 2019-09-30 | End: 2019-10-04

## 2019-09-30 RX ORDER — QUETIAPINE 25 MG/1
50 TABLET, FILM COATED ORAL NIGHTLY
Status: DISCONTINUED | OUTPATIENT
Start: 2019-09-30 | End: 2019-10-04

## 2019-09-30 RX ORDER — ARIPIPRAZOLE 5 MG/1
7.5 TABLET ORAL NIGHTLY
Status: DISCONTINUED | OUTPATIENT
Start: 2019-09-30 | End: 2019-10-04

## 2019-09-30 RX ORDER — ONDANSETRON 2 MG/ML
4 INJECTION INTRAMUSCULAR; INTRAVENOUS EVERY 6 HOURS PRN
Status: DISCONTINUED | OUTPATIENT
Start: 2019-09-30 | End: 2019-10-04

## 2019-09-30 RX ORDER — CLONAZEPAM 1 MG/1
2 TABLET ORAL NIGHTLY
Status: DISCONTINUED | OUTPATIENT
Start: 2019-09-30 | End: 2019-10-04

## 2019-09-30 NOTE — ED NOTES
Orders for admission, patient is aware of plan and ready to go upstairs, any questions, please call ED ONELIA Eason Ear at ext: [phone]

## 2019-09-30 NOTE — ED NOTES
C/O cough difficulty breathing, chest and back pain for 4 days. Appears pale and states she's feeling weak and unable to walk a short distance in her house without dyspnea. EKG done. 911 called.

## 2019-09-30 NOTE — ED PROVIDER NOTES
Patient Seen in: [facility] AND [facility] Emergency Department      History   Patient presents with:  Dyspnea ARON SOB (respiratory)    Stated Complaint: [place], presents SOB/chest pain    HPI    76-year-old female with past medical history of hypertension COPD Left 12/21/2011    neuroma excision, partial mesh removal, primary re-repair   • INGUINAL HERNIA REPAIR Left 05/20/2009   • KNEE SURGERY Right     osteoarthritis   • OTHER Left 03/22/2019    LEFT REVERSE TOTAL SHOULDER ARTHROPLASTY WITH BICEPS TENODESIS person, place, and time. She appears well-developed and well-nourished. HENT:   Head: Normocephalic and atraumatic. Mouth/Throat: Oropharynx is clear and moist.   Eyes: Pupils are equal, round, and reactive to light.  EOM are normal.   Neck: Normal rang Notable for the following components:    WBC 12.7 (*)     HGB 11.7 (*)     HCT 33.8 (*)     Neutrophil Absolute Prelim 10.39 (*)     Neutrophil Absolute 10.39 (*)     All other components within normal limits   TROPONIN I - Normal   MAGNESIUM - Normal   LA cardiomegaly. Unremarkable pulmonary vasculature. MEDIAST/SAVANAH:   Atherosclerotic aorta with no visible aneurysm. LUNGS/PLEURA: No significant pulmonary parenchymal abnormalities. No effusion or pleural thickening.  BONES: Mild degenerative change withi advised. Scattered emphysematous changes of mild-to-moderate severity noted. Mild bibasilar scarring/atelectasis. VASCULATURE: No visible pulmonary arterial thrombus or attenuation. THORACIC AORTA: Normal size for age. No aneurysm or dissection.   PLEUR pressure.     Medications Prescribed:  Current Discharge Medication List                   Present on Admission  Date Reviewed: 9/30/2019          ICD-10-CM Noted POA    Community acquired pneumonia of right middle lobe of lung ([facility] J18.1 9/30/2019 Unknown

## 2019-09-30 NOTE — ED INITIAL ASSESSMENT (HPI)
Patient presents via EMS from [address] [facility] for SOB/ chestpain since yesterday. Cough as well this week. 4 baby aspirin prior.    Hx of copd/SOB

## 2019-09-30 NOTE — ED INITIAL ASSESSMENT (HPI)
Coughing is severe since Sunday, also c/o weakness and back pain. spoke to her provider via  Phone and was [employer] and inhaler.

## 2019-10-01 PROCEDURE — 99233 SBSQ HOSP IP/OBS HIGH 50: CPT | Performed by: HOSPITALIST

## 2019-10-01 PROCEDURE — 99222 1ST HOSP IP/OBS MODERATE 55: CPT | Performed by: INTERNAL MEDICINE

## 2019-10-01 RX ORDER — POTASSIUM CHLORIDE 20 MEQ/1
40 TABLET, EXTENDED RELEASE ORAL ONCE
Status: COMPLETED | OUTPATIENT
Start: 2019-10-01 | End: 2019-10-01

## 2019-10-01 RX ORDER — HYDROCODONE POLISTIREX AND CHLORPHENIRAMINE POLISTIREX 10; 8 MG/5ML; MG/5ML
5 SUSPENSION, EXTENDED RELEASE ORAL 2 TIMES DAILY PRN
Status: DISCONTINUED | OUTPATIENT
Start: 2019-10-01 | End: 2019-10-04

## 2019-10-01 RX ORDER — METHYLPREDNISOLONE SODIUM SUCCINATE 40 MG/ML
40 INJECTION, POWDER, LYOPHILIZED, FOR SOLUTION INTRAMUSCULAR; INTRAVENOUS EVERY 12 HOURS
Status: DISCONTINUED | OUTPATIENT
Start: 2019-10-01 | End: 2019-10-02

## 2019-10-01 RX ORDER — IPRATROPIUM BROMIDE AND ALBUTEROL SULFATE 2.5; .5 MG/3ML; MG/3ML
3 SOLUTION RESPIRATORY (INHALATION) 2 TIMES DAILY
Status: DISCONTINUED | OUTPATIENT
Start: 2019-10-01 | End: 2019-10-04

## 2019-10-01 NOTE — PAYOR COMM NOTE
--------------  ADMISSION REVIEW     Payor: Panda Meyers #:  [id number]  Authorization Number: [id number]    Admit date: 9/30/19  Admit time: 1844       Admitting Physician: Jose Antonio Chavez MD  Attending Physician:  Jose Antonio Chavez MD  Primary C Miesha Esposito MD at [facility]   • HERNIA SURGERY Left 12/21/2011    neuroma excision, partial mesh removal, primary re-repair   • INGUINAL HERNIA REPAIR Left 05/20/2009   • KNEE SURGERY Right     osteoarthritis   • OTHER Left 03/22/2019    LEFT REVERSE TOTA normal limits   PRO BETA NATRIURETIC PEPTIDE - Abnormal; Notable for the following components:    Pro-Beta Natriuretic Peptide 348 (*)     All other components within normal limits   CBC W/ DIFFERENTIAL - Abnormal; Notable for the following components: of breath, and cough. CBC showed white blood cell count of 12.7 with left shift. Chemistry was unremarkable, except for mild hypokalemia and dehydration. D-dimer was slightly elevated. Respiratory panel was negative. Lactic acid was negative.   Blood c and streptococcal antigens. Blood and sputum cultures. Use IV Solu-Medrol and nebulizer treatments, as well. DVT proximal, oxygen protocol, monitor her respiratory status closely. Further recommendations to follow.       10/01/19 0824 — — 20 113/52 94 % Action Dose Route User    9/30/2019 2057 Given 2 mg Oral Kevin Sharp RN      DULoxetine HCl (CYMBALTA) DR particles cap 60 mg     Date Action Dose Route User    9/30/2019 2057 Given 60 mg Oral Kevin Sharp RN      guaiFENesin (ROBITUSSIN) 100 MG/5ML solut new O2.     PLEASE FAX INPT DAYS AUTHORIZED ALONG WITH NRD [phone]

## 2019-10-01 NOTE — PLAN OF CARE
Problem: RESPIRATORY - ADULT  Goal: Achieves optimal ventilation and oxygenation  Description  INTERVENTIONS:  - Assess for changes in respiratory status  - Assess for changes in mentation and behavior  - Position to facilitate oxygenation and minimize r affect risk of falls.   - [place] fall precautions as indicated by assessment.  - Educate pt/family on patient safety including physical limitations  - Instruct pt to call for assistance with activity based on assessment  - Modify environment to reduce ri

## 2019-10-01 NOTE — H&P
[facility]    PATIENT'S NAME: Minda Rolon   ATTENDING PHYSICIAN: Cisco Silveira MD   PATIENT ACCOUNT#:   [id number]    LOCATION:  [place] [address]  MEDICAL RECORD #:   [id number]       YOB: 1955  ADMISSION DATE:       09/30/2019 living. REVIEW OF SYSTEMS:  The patient is a very poor historian, but she reports shortness of breath in the last few days. Cough, nonproductive of phlegm she cannot tell me if she has been having wheezing. She cannot remember any sick contacts.   Oth

## 2019-10-01 NOTE — CONSULTS
[facility]    Report of Consultation    Charis Britt Patient Status:  Inpatient    1955 MRN [id number]   Location [facility] 5SW/SE Attending Annie Garcia MD   Hosp Day # 1 PCP Sara Giron MD     Date of Admission:   changes   • CATARACT     • COLONOSCOPY  7/2009    incomplete (chronic constipation)   • COLONOSCOPY N/A 11/8/2018    Performed by Argenis Trivedi MD at [facility] ENDOSCOPY   • HERNIA SURGERY Left 12/21/2011    neuroma excision, partial mesh removal, primary Medications:  Hydrocod Polst-CPM Polst ER (TUSSIONEX) 10-8 MG/5ML liquid 5 mL 5 mL Oral BID PRN   CefTRIAXone Sodium (ROCEPHIN) 1 g in sodium chloride 0.9% 100 mL MBP/ADD-vantage 1 g Intravenous Q24H   azithromycin (ZITHROMAX) 500 mg in sodium chloride 0.9 ONE TABLET BY MOUTH ONE TIME DAILY  (Patient taking differently: TAKE ONE TABLET BY MOUTH ONE TIME NIGHT)   Amitriptyline HCl 10 MG Oral Tab Take 2 tablets (20 mg total) by mouth nightly.    albuterol sulfate (2.5 MG/3ML) 0.083% Inhalation Nebu Soln Take 3 11.8 (L) 10/01/2019    HCT 34.4 (L) 10/01/2019    .0 10/01/2019    CREATSERUM 0.44 (L) 10/01/2019    BUN 7 10/01/2019     10/01/2019    K 3.7 10/01/2019     10/01/2019    CO2 26.0 10/01/2019     (H) 10/01/2019    CA 9.1 10/01/2019

## 2019-10-01 NOTE — PLAN OF CARE
Problem: Patient Centered Care  Goal: Patient preferences are identified and integrated in the patient's plan of care  Description  Interventions:  - What would you like us to know as we care for you?  \" I live at home\"  - Provide timely, complete, and unsuccessful or patient reports new pain  - Anticipate increased pain with activity and pre-medicate as appropriate  Outcome: Progressing     Problem: RISK FOR INFECTION - ADULT  Goal: Absence of fever/infection during anticipated neutropenic period  Descr

## 2019-10-01 NOTE — PROGRESS NOTES
[facility]    Progress Note    Curry Montesinos Patient Status:  Inpatient    1955 MRN [id number]   [facility] 5SW/SE Attending Charmayne Cranker, MD   Hosp Day # 1 PCP Juan Alberto Teran MD       Subjective:   Curry Montesinos is f amLODIPine Besylate  5 mg Oral Daily   • ARIpiprazole  7.5 mg Oral Nightly   • cetirizine  10 mg Oral Daily   • clonazePAM  2 mg Oral Nightly   • DULoxetine HCl  60 mg Oral Nightly   • QUEtiapine Fumarate  50 mg Oral Nightly       Current PRN Inpatient Med subcarinal region may be reactive. Correlate clinically and follow-up CT in 3 months would be of help to further assess stability.     Dictated by (CST): Unruly Nickerson MD on 9/30/2019 at 16:01     Approved by (CST): Unruly Nickerosn MD on 9/30/2019 at 16:23

## 2019-10-01 NOTE — CM/SW NOTE
10/4 - 229pm:  Possible O2 need per RN. Pt has a qualifying dx of COPD. Ref to [place] from [address] [place] [address] to review.      HME order form has been signed by MD.     Documentation for O2 sats: (RN to add to progress note)  Patient's O2 sat on room air is ____% at rest.

## 2019-10-02 ENCOUNTER — APPOINTMENT (OUTPATIENT)
Dept: GENERAL RADIOLOGY | Facility: HOSPITAL | Age: 64
DRG: 194 | End: 2019-10-02
Attending: INTERNAL MEDICINE
Payer: MEDICARE

## 2019-10-02 PROCEDURE — 99233 SBSQ HOSP IP/OBS HIGH 50: CPT | Performed by: HOSPITALIST

## 2019-10-02 PROCEDURE — 99232 SBSQ HOSP IP/OBS MODERATE 35: CPT | Performed by: INTERNAL MEDICINE

## 2019-10-02 PROCEDURE — 71046 X-RAY EXAM CHEST 2 VIEWS: CPT | Performed by: INTERNAL MEDICINE

## 2019-10-02 NOTE — PROGRESS NOTES
[facility]    Progress Note    Kayla Walls Patient Status:  Inpatient    1955 MRN [id number]   Location [facility] 5SW/SE Attending Selin Marquis MD   [address] Day # 2 PCP Bernadette Reveles MD       Subjective:   Kayla Kavita was • Amitriptyline HCl  20 mg Oral Nightly   • amLODIPine Besylate  5 mg Oral Daily   • ARIpiprazole  7.5 mg Oral Nightly   • cetirizine  10 mg Oral Daily   • clonazePAM  2 mg Oral Nightly   • DULoxetine HCl  60 mg Oral Nightly   • QUEtiapine Fumarate  50 m Chest Ap Portable  (cpt=71045)    Result Date: 9/30/2019  CONCLUSION: No acute cardiopulmonary abnormality.    Dictated by (CST): Neelam Rees MD on 9/30/2019 at 14:28     Approved by (CST): Neelam Rees MD on 9/30/2019 at 14:29          Ct Chest Pain/pe (i

## 2019-10-02 NOTE — PLAN OF CARE
Problem: RESPIRATORY - ADULT  Goal: Achieves optimal ventilation and oxygenation  Description  INTERVENTIONS:  - Assess for changes in respiratory status  - Assess for changes in mentation and behavior  - Position to facilitate oxygenation and minimize res risk of falls.   - [place] fall precautions as indicated by assessment.  - Educate pt/family on patient safety including physical limitations  - Instruct pt to call for assistance with activity based on assessment  - Modify environment to reduce risk of i

## 2019-10-02 NOTE — PROGRESS NOTES
[facility]    Progress Note      Assessment and Plan:   1. Community-acquired pneumonia–patient is slowly improving.     Recommendations: Continue azithromycin, ceftriaxone, anticipate discharge in a day or 2.    2.  Abnormal CT scan the ch

## 2019-10-02 NOTE — PLAN OF CARE
Problem: Patient Centered Care  Goal: Patient preferences are identified and integrated in the patient's plan of care  Description  Interventions:  - What would you like us to know as we care for you?  I need my sleeping medication at night  - Provide roscoe patient reports new pain  - Anticipate increased pain with activity and pre-medicate as appropriate  10/2/2019 1500 by Shelton Hernandez RN  Outcome: Progressing   Denies pain.   Problem: RISK FOR INFECTION - ADULT  Goal: Absence of fever/infection during antici services based on physician/LIP order or complex needs related to functional status, cognitive ability or social support system  10/2/2019 1500 by Trish Alvarenga RN  Outcome: Progressing    Per pulmonology, plans to d/c in a day or two.    Problem: RESPIRATOR

## 2019-10-03 PROCEDURE — 99232 SBSQ HOSP IP/OBS MODERATE 35: CPT | Performed by: HOSPITALIST

## 2019-10-03 PROCEDURE — 99232 SBSQ HOSP IP/OBS MODERATE 35: CPT | Performed by: INTERNAL MEDICINE

## 2019-10-03 NOTE — PROGRESS NOTES
[facility] - [facility]    Progress Note      Assessment and Plan:   1. Community-acquired pneumonia–patient is slowly improving. Still with harsh cough and basilar crackle.   Would continue 1 more day of IV antibiotics and then discharged tomorrow on

## 2019-10-03 NOTE — PLAN OF CARE
Problem: Patient Centered Care  Goal: Patient preferences are identified and integrated in the patient's plan of care  Description  Interventions:  - What would you like us to know as we care for you?  I need my sleeping medication at night  - Provide roscoe ADULT  Goal: Verbalizes/displays adequate comfort level or patient's stated pain goal  Description  INTERVENTIONS:  - Encourage pt to monitor pain and request assistance  - Assess pain using appropriate pain scale  - Administer analgesics based on type and Identify discharge learning needs (meds, wound care, etc)  - Arrange for interpreters to assist at discharge as needed  - Consider post-discharge preferences of patient/family/discharge partner  - Complete POLST form as appropriate  - Assess patient's abil

## 2019-10-03 NOTE — PROGRESS NOTES
[place] [facility]    Progress Note    Frannie Mahmood Patient Status:  Inpatient    1955 MRN [id number]   Location [facility] 5SW/SE Attending Farrukh Carty MD   [facility] Day # 3 PCP Camelia Kelly MD       Subjective:   Frannie Mahmood was HCl  20 mg Oral Nightly   • amLODIPine Besylate  5 mg Oral Daily   • ARIpiprazole  7.5 mg Oral Nightly   • cetirizine  10 mg Oral Daily   • clonazePAM  2 mg Oral Nightly   • DULoxetine HCl  60 mg Oral Nightly   • QUEtiapine Fumarate  50 mg Oral Nightly (cpt=71045)    Result Date: 9/30/2019  CONCLUSION: No acute cardiopulmonary abnormality.    Dictated by (CST): Adria Fabry, MD on 9/30/2019 at 14:28     Approved by (CST): Adria Fabry, MD on 9/30/2019 at 14:29          Ct Chest Pain/pe (iv Only) (cpt=71260)

## 2019-10-04 VITALS
TEMPERATURE: 98 F | BODY MASS INDEX: 27.25 KG/M2 | OXYGEN SATURATION: 95 % | WEIGHT: 144.31 LBS | SYSTOLIC BLOOD PRESSURE: 109 MMHG | HEIGHT: 61 IN | DIASTOLIC BLOOD PRESSURE: 63 MMHG | HEART RATE: 85 BPM | RESPIRATION RATE: 18 BRPM

## 2019-10-04 PROCEDURE — 99239 HOSP IP/OBS DSCHRG MGMT >30: CPT | Performed by: HOSPITALIST

## 2019-10-04 PROCEDURE — 99232 SBSQ HOSP IP/OBS MODERATE 35: CPT | Performed by: INTERNAL MEDICINE

## 2019-10-04 RX ORDER — HYDROCODONE POLISTIREX AND CHLORPHENIRAMINE POLISTIREX 10; 8 MG/5ML; MG/5ML
5 SUSPENSION, EXTENDED RELEASE ORAL 2 TIMES DAILY PRN
Qty: 1 BOTTLE | Refills: 0 | Status: SHIPPED | OUTPATIENT
Start: 2019-10-04 | End: 2019-10-09

## 2019-10-04 RX ORDER — AMOXICILLIN AND CLAVULANATE POTASSIUM 875; 125 MG/1; MG/1
1 TABLET, FILM COATED ORAL EVERY 12 HOURS SCHEDULED
Status: DISCONTINUED | OUTPATIENT
Start: 2019-10-04 | End: 2019-10-04

## 2019-10-04 RX ORDER — AMOXICILLIN AND CLAVULANATE POTASSIUM 875; 125 MG/1; MG/1
1 TABLET, FILM COATED ORAL EVERY 12 HOURS SCHEDULED
Qty: 14 TABLET | Refills: 0 | Status: SHIPPED | OUTPATIENT
Start: 2019-10-04 | End: 2019-10-11 | Stop reason: ALTCHOICE

## 2019-10-04 NOTE — PLAN OF CARE
Pt O2 sat at rest 92% RA, . Ambulating O2 90-92% RA, . . Dipped to 89% once, At rest, RA 93% 106. Will continue to monitor.

## 2019-10-04 NOTE — DISCHARGE SUMMARY
[facility]    Discharge Summary    Elias Alvarez Patient Status:  Inpatient    1955 MRN [id number]   Location [facility] 5SW/SE Attending Carlos Obregon MD   Hosp Day # 4 PCP Stephany Vidal MD     Date of Admission: 20 NECK:  Supple. There was no JVD. CHEST:  Symmetrical movement on inspiration  LUNGS:  No audible wheezing, bibasilar crackles  CVS: S1 S2 heard, RRR   ABDOMEN: Non-distended  MUSCULOSKELETAL:  There was no deformity.   There was full range of motion in 12 (twelve) hours for 7 days. Stop taking on:  10/11/2019  Quantity:  14 tablet  Refills:  0     Hydrocod Polst-CPM Polst ER 10-8 MG/5ML Suer  Commonly known as:  TUSSIONEX      Take 5 mL by mouth 2 (two) times daily as needed (severe cough).    Stop taki DULoxetine HCl      Take 60 mg by mouth nightly. Take 60mg daily. Refills:  0     hydrocortisone 2.5 % Crea      Apply topically 2 (two) times daily.    Refills:  0     hydrOXYzine HCl 25 MG Tabs  Commonly known as:  ATARAX      Take 1 tablet (25 mg total

## 2019-10-04 NOTE — PROGRESS NOTES
[facility]    Progress Note    Amber Arango Patient Status:  Inpatient    1955 MRN [id number]   Location [facility] 5SW/SE Attending Dorita Santiago MD   Hosp Day # 4 PCP Alexa Falcon MD        Subjective:     Anahi PTT 29.8 08/21/2018    INR 1.0 08/21/2018    TSH 1.55 07/30/2015    DDIMER 1.02 (H) 09/30/2019    ESRML 12 01/06/2017    CRP 0.6 01/06/2017    BNP 31 07/12/2012    MG 2.0 09/30/2019    PHOS 2.5 03/24/2019    TROP <0.045 10/01/2019                        Ah

## 2019-10-04 NOTE — PLAN OF CARE
Problem: Patient Centered Care  Goal: Patient preferences are identified and integrated in the patient's plan of care  Description  Interventions:  - What would you like us to know as we care for you?  I need my sleeping medication at night  - Provide roscoe Adequate for Discharge     Problem: PAIN - ADULT  Goal: Verbalizes/displays adequate comfort level or patient's stated pain goal  Description  INTERVENTIONS:  - Encourage pt to monitor pain and request assistance  - Assess pain using appropriate pain scale Arrange for needed discharge resources and transportation as appropriate  - Identify discharge learning needs (meds, wound care, etc)  - Arrange for interpreters to assist at discharge as needed  - Consider post-discharge preferences of patient/family/disc

## 2019-10-04 NOTE — PLAN OF CARE
Problem: Patient Centered Care  Goal: Patient preferences are identified and integrated in the patient's plan of care  Description  Interventions:  - What would you like us to know as we care for you?  I need my sleeping medication at night  - Provide roscoe FALL  Goal: Free from fall injury  Description  INTERVENTIONS:  - Assess pt frequently for physical needs  - Identify cognitive and physical deficits and behaviors that affect risk of falls.   - [place] fall precautions as indicated by assessment.  - Educ respiratory status  - Assess for changes in mentation and behavior  - Position to facilitate oxygenation and minimize respiratory effort  - Oxygen supplementation based on oxygen saturation or ABGs  - Provide Smoking Cessation handout, if applicable  - Enc

## 2019-10-05 ENCOUNTER — TELEPHONE (OUTPATIENT)
Dept: INTERNAL MEDICINE CLINIC | Facility: CLINIC | Age: 64
End: 2019-10-05

## 2019-10-05 NOTE — TELEPHONE ENCOUNTER
Patient calling ans states she just got out of the hospital October 4, 2019 and they states the test she took last time that Dr. Opal Stroud should have did more test on her chest she not sure what they was talking about       Please call her

## 2019-10-07 ENCOUNTER — PATIENT MESSAGE (OUTPATIENT)
Dept: CASE MANAGEMENT | Age: 64
End: 2019-10-07

## 2019-10-07 ENCOUNTER — TELEPHONE (OUTPATIENT)
Dept: MEDSURG UNIT | Facility: HOSPITAL | Age: 64
End: 2019-10-07

## 2019-10-07 ENCOUNTER — PATIENT OUTREACH (OUTPATIENT)
Dept: CASE MANAGEMENT | Age: 64
End: 2019-10-07

## 2019-10-07 ENCOUNTER — TELEPHONE (OUTPATIENT)
Dept: INTERNAL MEDICINE CLINIC | Facility: CLINIC | Age: 64
End: 2019-10-07

## 2019-10-07 NOTE — TELEPHONE ENCOUNTER
Spoke to patient and she states they told her at the hospital something to do the stomach area and the groin area     She could not think of there name of the test she could not remember she just states the stomach and the going

## 2019-10-07 NOTE — PAYOR COMM NOTE
--------------  DISCHARGE REVIEW    Payor: Janetetlon Connor #:  [id number]  Authorization Number: [id number]    Admit date: 9/30/19  Admit time:  1844  Discharge Date: 10/4/2019  5:38 PM     Admitting Physician: Samia Holt MD  Attending Physicia Bone lesion     Left rotator cuff tear arthropathy     Aftercare following left shoulder joint replacement surgery     Essential hypertension     Rotator cuff arthropathy     S/P reverse total shoulder arthroplasty, left     Right rotator cuff tear arthr Community acquired pneumonia of right middle lobe of lung ([facility] [address]  COPD  Acute resp distress  RESOLVING  rec'd cont IV rocephin / azithro  initially on steroids, now stopped  Pulm was consulted  Stable for dc on augmentin  Passed home oxygen rose Steve total) by mouth nightly. Quantity:  60 tablet  Refills:  3     benzonatate 100 MG Caps  Commonly known as:  TESSALON      Take 1 capsule (100 mg total) by mouth 3 (three) times daily as needed for cough.    Quantity:  30 capsule  Refills:  1     cetirizin In 1 week    [address] [facility]  [phone]   Patricio Mai MD  In 1 month    [address] [id number] [phone]   [address]  Schedule an appointment as soon as possible for a vi

## 2019-10-07 NOTE — TELEPHONE ENCOUNTER
[facility] called stating that the Hydrocod Polst-CPM oral suspension extended release is not available. They asked if an alternative could be used. I contacted Dr. Myrna Osler regarding the matter.   He said the patient could just take any O

## 2019-10-07 NOTE — TELEPHONE ENCOUNTER
Erin Michel from [facility]  is calling to inform Dr that patient was discharge from the Hospital for having pneumonia pt was in hospital for 5days.      Please advise

## 2019-10-08 ENCOUNTER — TELEPHONE (OUTPATIENT)
Dept: PULMONOLOGY | Facility: CLINIC | Age: 64
End: 2019-10-08

## 2019-10-08 NOTE — TELEPHONE ENCOUNTER
Please call patient and schedule for TCM follow up appointment, it is unclear what the patient is asking and this can be discussed at f/u TCM appt. Thank you.

## 2019-10-08 NOTE — TELEPHONE ENCOUNTER
Patient states that she was discharged from [address] [place] [address] on 10/4/19 and was told to follow up with Dr. Panchito Moore but no appts available. She also is requesting an RX for an inhaler. She has one but it is . Please call.

## 2019-10-09 NOTE — TELEPHONE ENCOUNTER
Patient calling back, advised to take OTC Delsym for her cough and will see her later for her appointment. States that she already using the Delsym and her symptoms is better and lesser. Patient cancelled today's TCM OV schedule.   Was trying to do soft tra

## 2019-10-09 NOTE — TELEPHONE ENCOUNTER
Inhaler not on active med list only nebulizer. Pt contacted reports has not been prescribed before takes PRN would like a refill.  Script pended for MD approval.     Refill Protocol Appointment Criteria  · Appointment scheduled in the past 6 months or in th

## 2019-10-10 RX ORDER — ALBUTEROL SULFATE 90 UG/1
2 AEROSOL, METERED RESPIRATORY (INHALATION) EVERY 4 HOURS PRN
Qty: 3 INHALER | Refills: 0 | Status: SHIPPED | OUTPATIENT
Start: 2019-10-10 | End: 2019-11-09

## 2019-10-11 ENCOUNTER — TELEPHONE (OUTPATIENT)
Dept: INTERNAL MEDICINE CLINIC | Facility: CLINIC | Age: 64
End: 2019-10-11

## 2019-10-11 ENCOUNTER — OFFICE VISIT (OUTPATIENT)
Dept: INTERNAL MEDICINE CLINIC | Facility: CLINIC | Age: 64
End: 2019-10-11
Payer: MEDICARE

## 2019-10-11 ENCOUNTER — TELEPHONE (OUTPATIENT)
Dept: OTHER | Age: 64
End: 2019-10-11

## 2019-10-11 VITALS
HEART RATE: 94 BPM | HEIGHT: 61 IN | OXYGEN SATURATION: 98 % | TEMPERATURE: 98 F | WEIGHT: 144 LBS | DIASTOLIC BLOOD PRESSURE: 78 MMHG | BODY MASS INDEX: 27.19 KG/M2 | SYSTOLIC BLOOD PRESSURE: 138 MMHG

## 2019-10-11 DIAGNOSIS — J18.9 COMMUNITY ACQUIRED PNEUMONIA OF RIGHT MIDDLE LOBE OF LUNG: Primary | ICD-10-CM

## 2019-10-11 DIAGNOSIS — J43.2 CENTRILOBULAR EMPHYSEMA (HCC): ICD-10-CM

## 2019-10-11 PROCEDURE — 99495 TRANSJ CARE MGMT MOD F2F 14D: CPT | Performed by: INTERNAL MEDICINE

## 2019-10-11 NOTE — PROGRESS NOTES
Alejandro Connors [phone] for post hospital follow up, [facility] contact information provided.

## 2019-10-11 NOTE — TELEPHONE ENCOUNTER
Pt states had pneumonia was recently discharged from [facility]. Denies sob, fever, chills or chest pain. Does state only feels pain in chest when she coughs. Afraid might get pneumonia again. Offered ER f/u visit appointment scheduled today at 2:30.

## 2019-10-11 NOTE — TELEPHONE ENCOUNTER
Patient asking why her Albuterol Inhaler has not yet been filled. Informed patient there is a prior authorization on medication and can take a few days.

## 2019-10-11 NOTE — PATIENT INSTRUCTIONS
What is COPD? COPD stands for chronic obstructive pulmonary disease. It means the airways in your lungs are blocked (obstructed). This makes it hard to breathe. You may have trouble with daily activities because of shortness of breath.  Over time the s destroyed. This damage makes COPD worse. Date Last Reviewed: 8/1/2018  © 4554-7455 [employer] [address]. [address] [facility], [address] [place]. All rights reserved. This information is not intended as a substitute for professional medical care.  A activities  · Increased heart rate  · Chest pain or discomfort when breathing in or coughing  · Headache  · A lot of sweating and clammy skin  Date Last Reviewed: 4/1/2019  © 5717-6256 [employer] [address]. [address] [facility], [address] [place].  A

## 2019-10-11 NOTE — PROGRESS NOTES
HPI:    Marcial Patel is a 75year old female here today for hospital follow up.    She was discharged from Inpatient hospital, [facility]  to Home   Admission Date: 9/30/19   Discharge Date: 10/4/19  Hospital Discharge Diagnoses (since 9/11/2019) Current Meds:  Albuterol Sulfate  (90 Base) MCG/ACT Inhalation Aero Soln, Inhale 2 puffs into the lungs every 4 (four) hours as needed for Wheezing.   ACETAMINOPHEN-CODEINE #3 300-30 MG Oral Tab, TAKE ONE TABLET BY MOUTH EVERY EIGHT HOURS AS NEEDED Seborrheic keratoses (3/7/2017), Sx.6/1/15, CPT. 78054, L Total Knee, w/, Global Exp.8/30/15 (6/8/2015), Sx.6/8/16, CPT. 13046, R Total Knee Replacement, w/, Global Exp.9/6/16 (1/14/2016), and Visual impairment.     She  has a past surgica following:    Height as of this encounter: 5' 1\" (1.549 m). Weight as of this encounter: 144 lb (65.3 kg).    /78 (BP Location: Right arm, Patient Position: Sitting, Cuff Size: adult)   Pulse 94   Temp 97.8 °F (36.6 °C) (Oral)   Ht 5' 1\" (1.549 m days:   · Number of Possible Diagnoses and/or Management Options: moderate  · Amount and/or Complexity of Data to Be Reviewed: moderate  · Risk of Significant Complications, Morbidity, and/or Mortality: moderate    Overall Risk:   moderate    Patient seen

## 2019-10-11 NOTE — TELEPHONE ENCOUNTER
Received call from patient stating she had received a call from nurse triage and was unsure as to why. I referred back to previous TE documented by RN Meena De Anda listed medication question.  Patient stated she has not received her inhaler, so I reassured patient

## 2019-10-14 NOTE — TELEPHONE ENCOUNTER
Pt contacted again on 10/11/19 for TCM. Please see Pt Outreach Encounter from 10/7/19. Encounter closing.

## 2019-10-16 NOTE — TELEPHONE ENCOUNTER
Spoke with pt who states she needs a f/u jasson from hospital stay. Per pt she will not be able to come in on Tues or Thurs and she will be out of town Nov 4-11. Pt offered jasson for 11/18 at 4:30. Pt provided with [employer] ins. Pt voiced understanding.

## 2019-10-18 NOTE — PROGRESS NOTES
Several attempts made to reach the patient with no return call. Patient completed HFU on 10/11/2019. Closing encounter.

## 2019-10-21 RX ORDER — AMLODIPINE BESYLATE 5 MG/1
TABLET ORAL
Qty: 30 TABLET | Refills: 5 | Status: SHIPPED | OUTPATIENT
Start: 2019-10-21 | End: 2020-03-31

## 2019-11-15 ENCOUNTER — HOSPITAL ENCOUNTER (OUTPATIENT)
Dept: GENERAL RADIOLOGY | Age: 64
Discharge: HOME | End: 2019-11-15
Attending: INTERNAL MEDICINE
Payer: MEDICARE

## 2019-11-15 DIAGNOSIS — J43.2 CENTRILOBULAR EMPHYSEMA (HCC): ICD-10-CM

## 2019-11-15 DIAGNOSIS — J18.9 COMMUNITY ACQUIRED PNEUMONIA OF RIGHT MIDDLE LOBE OF LUNG: ICD-10-CM

## 2019-11-15 PROCEDURE — 71046 X-RAY EXAM CHEST 2 VIEWS: CPT | Performed by: INTERNAL MEDICINE

## 2019-11-16 ENCOUNTER — OFFICE VISIT (OUTPATIENT)
Dept: INTERNAL MEDICINE CLINIC | Facility: CLINIC | Age: 64
End: 2019-11-16
Payer: MEDICARE

## 2019-11-16 VITALS
WEIGHT: 143.19 LBS | DIASTOLIC BLOOD PRESSURE: 79 MMHG | BODY MASS INDEX: 27.03 KG/M2 | SYSTOLIC BLOOD PRESSURE: 124 MMHG | HEIGHT: 61 IN | HEART RATE: 87 BPM

## 2019-11-16 DIAGNOSIS — J18.9 COMMUNITY ACQUIRED PNEUMONIA OF RIGHT MIDDLE LOBE OF LUNG: Primary | ICD-10-CM

## 2019-11-16 PROCEDURE — 99213 OFFICE O/P EST LOW 20 MIN: CPT | Performed by: INTERNAL MEDICINE

## 2019-11-16 NOTE — PROGRESS NOTES
Patient ID: Olimpia Jacome is a 75year old female. Patient presents with: Follow - Up: Pt states f/u chest Xray       HISTORY OF PRESENT ILLNESS:   HPI  Patient presents for above. Here for follow-up of pneumonia.   Had repeat x-ray done yesterday which OTHER SURGICAL HISTORY      Arthrocentesis of the right knee joint   • OTHER SURGICAL HISTORY      Arthrocentesis of the left knee joint   • SHOULDER TOTAL Left 3/22/2019    Performed by Didi Anderson MD at [facility] [employer], Take 60 mg by mouth nightly. Take 60mg daily.  , Disp: , Rfl:     Allergies:  Sulfa Antibiotics       RASH  Sulfanilamide           RASH    Social History    Socioeconomic History      Marital status:       Spouse name: Not on file      N Sleep Concern: Not Asked        Stress Concern: Not Asked        Weight Concern: Not Asked        Special Diet: Not Asked        Back Care: Not Asked        Exercise: Not Asked        Bike Helmet: Not Asked        Seat Belt: Not Asked        Self-Exams: No

## 2019-11-18 ENCOUNTER — OFFICE VISIT (OUTPATIENT)
Dept: PULMONOLOGY | Facility: CLINIC | Age: 64
End: 2019-11-18
Payer: MEDICARE

## 2019-11-18 VITALS
HEART RATE: 84 BPM | BODY MASS INDEX: 27.38 KG/M2 | HEIGHT: 61 IN | WEIGHT: 145 LBS | SYSTOLIC BLOOD PRESSURE: 134 MMHG | OXYGEN SATURATION: 96 % | DIASTOLIC BLOOD PRESSURE: 83 MMHG

## 2019-11-18 DIAGNOSIS — J18.9 COMMUNITY ACQUIRED PNEUMONIA OF RIGHT MIDDLE LOBE OF LUNG: Primary | ICD-10-CM

## 2019-11-18 PROCEDURE — 1111F DSCHRG MED/CURRENT MED MERGE: CPT | Performed by: INTERNAL MEDICINE

## 2019-11-18 PROCEDURE — 99213 OFFICE O/P EST LOW 20 MIN: CPT | Performed by: INTERNAL MEDICINE

## 2019-11-18 NOTE — PROGRESS NOTES
The patient is a 29-year-old female who I know well from prior evaluation who comes in now for follow-up. In general, she is doing well. 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 She had a repeat chest x-ray which was unremarkable.     Review of Systems

## 2019-11-20 ENCOUNTER — TELEPHONE (OUTPATIENT)
Dept: INTERNAL MEDICINE CLINIC | Facility: CLINIC | Age: 64
End: 2019-11-20

## 2019-11-20 DIAGNOSIS — Z12.11 ENCOUNTER FOR SCREENING COLONOSCOPY: Primary | ICD-10-CM

## 2019-11-20 NOTE — TELEPHONE ENCOUNTER
Patient is requesting an order for a colonoscopy, however, patient states she does not want to have the colonscopy done by Dr. Sammie Eisenberg. Patient states she received a letter that she is due for her colonscopy. Please advise.

## 2019-11-21 NOTE — TELEPHONE ENCOUNTER
Patient is calling again because she needs and order for a colonoscopy. Doesn't want Dr. Gerber Lacey. Cannot remember if she has mentioned this to doctor before. Informed she might have to set up appointment.     Patient woould appreciate a phone call kati

## 2019-11-25 ENCOUNTER — TELEPHONE (OUTPATIENT)
Dept: GASTROENTEROLOGY | Facility: CLINIC | Age: 64
End: 2019-11-25

## 2019-11-25 DIAGNOSIS — Z12.11 COLON CANCER SCREENING: Primary | ICD-10-CM

## 2019-11-25 NOTE — TELEPHONE ENCOUNTER
Last Procedure, Date, MD:  BOLA 11/12/18 w/ Dr. Leonid Valverde  Last Diagnosis:  Impression:  1. Poor bowel preparation - no large masses, but polyps may have been missed  2. Tortuous colon  3. A 4 mm descending colon polyp removed  4.  Small non-bleeding hemorrhoids  R

## 2019-11-25 NOTE — TELEPHONE ENCOUNTER
Mkaenna Nickerson routed this conversation to [facility] Gi Clinical Staff   Makenna Nickerson      11/25/19 1:14 PM   Note      Pt calling to schedule CLN. Pt aware of at least 72hr call back time.  Please call [phone]

## 2019-11-26 RX ORDER — POLYETHYLENE GLYCOL 3350, SODIUM CHLORIDE, SODIUM BICARBONATE, POTASSIUM CHLORIDE 420; 11.2; 5.72; 1.48 G/4L; G/4L; G/4L; G/4L
POWDER, FOR SOLUTION ORAL
Qty: 2 BOTTLE | Refills: 0 | Status: SHIPPED | OUTPATIENT
Start: 2019-11-26 | End: 2019-12-20

## 2019-11-26 NOTE — TELEPHONE ENCOUNTER
Signed.     Thanks    Roland Wright MD  [facility], [facility] - Gastroenterology  11/26/2019  10:40 AM

## 2019-11-26 NOTE — TELEPHONE ENCOUNTER
GI Staff:    Please schedule colonoscopy with MAC at the hospital or [facility] for colorectal cancer screening    Please emphasize the extended bowel preparation recommendations with her given the poor prep last year    She should make sure she is taking the mi

## 2019-11-26 NOTE — TELEPHONE ENCOUNTER
Routed to GI schedulers, thank you. Dr. Eligio Mckeon- please review and sign pended 6L order, thank you.

## 2019-11-26 NOTE — TELEPHONE ENCOUNTER
Scheduled for:  Colonoscopy - [id number]  Provider Name:  Dr. Keyonna Jacobo  Date:  2/14/20  Location:  [facility]  Sedation:  MAC  Time:  8:45 am (pt is aware to arrive at 7:45 am)  Prep:  Colyte 6 L & Miralax daily for 6 days prior to procedure, Prep instructions were g

## 2019-12-02 ENCOUNTER — TELEPHONE (OUTPATIENT)
Dept: GASTROENTEROLOGY | Facility: CLINIC | Age: 64
End: 2019-12-02

## 2019-12-02 DIAGNOSIS — Z12.11 COLON CANCER SCREENING: Primary | ICD-10-CM

## 2019-12-02 DIAGNOSIS — K62.5 RECTAL BLEEDING: ICD-10-CM

## 2019-12-02 NOTE — TELEPHONE ENCOUNTER
Pt states that she has been having back pain and blood in stool and would like to try to move up her 2/14/20 colonoscopy. She also states she spoke to Dr. Radha Schilling (oncall doctor) last night and he advised to call to try to move up procedure. Please call.

## 2019-12-02 NOTE — TELEPHONE ENCOUNTER
Pt contacted and reviewed Dr. Sherri Valencia message below. She states she will complete the CBC tomorrow morning. She confirmed her daughter has picked up her prep instructions and bowel kits.  She understands she is to read the instructions thoroughly and that [facility] w

## 2019-12-02 NOTE — TELEPHONE ENCOUNTER
Dr. Mk Cao    Discussed w/ pt, Dr. Noel Shafer, and [facility] in endo and pt unable to go to [address] [place] [address],[address] tomorrow for an opening b/c of hx of COPD and need for extensive bowel prep. Pt states she did note BRBPR, no blood/BM today, feels tired.  Denies SOB, dizziness, LH,

## 2019-12-02 NOTE — TELEPHONE ENCOUNTER
[id number] [address], great thank you for expediting and finding this opening    I will order a CBC and ask her to get this completed at the lab prior to the colonoscopy    Thanks    Neal Arana MD  [facility], [facility] - Gastroenterology  12/2/2019  12:34 PM

## 2019-12-02 NOTE — TELEPHONE ENCOUNTER
Informed by [facility] that pt unable to go to [facility] b/c insurance is out of network and must go to [address] [place] [address]. Spoke to Dr. Tonya Nix and Ara Gonzalez RN in endoscopy and approval was received for [facility] below.  Pt contacted and accepted the following appt and aware it was d

## 2019-12-02 NOTE — TELEPHONE ENCOUNTER
[facility]- please note STAT referral for CLN at [facility] on 12/4/2019, thank you. Call transferred to RN by [place]:  Pt contacted and reviewed 6L (3 doses) of prep times for procedure on 12/4/2019.  She verbalized understanding of all and is aware that last dose is

## 2019-12-03 ENCOUNTER — LAB ENCOUNTER (OUTPATIENT)
Dept: LAB | Age: 64
End: 2019-12-03
Attending: INTERNAL MEDICINE
Payer: MEDICARE

## 2019-12-03 DIAGNOSIS — K62.5 RECTAL BLEEDING: ICD-10-CM

## 2019-12-03 PROCEDURE — 85025 COMPLETE CBC W/AUTO DIFF WBC: CPT

## 2019-12-03 PROCEDURE — 36415 COLL VENOUS BLD VENIPUNCTURE: CPT

## 2019-12-03 NOTE — TELEPHONE ENCOUNTER
Columba Blair RN             Patient Name: Guillermina Gibson    MRN: [id number]     Procedure(s):   COLONOSCOPY         Anesthesia Type: [address] [50]     Laterality:  N/A [4]     Date: 12/19/2019   Time (includes any set-up time): Evonne

## 2019-12-09 ENCOUNTER — TELEPHONE (OUTPATIENT)
Dept: OBGYN CLINIC | Facility: CLINIC | Age: 64
End: 2019-12-09

## 2019-12-09 NOTE — TELEPHONE ENCOUNTER
Pt is scheduled for her annual on 12/20, states she's getting a colonoscopy done on 12/19 and wondering if ok.  Please advise

## 2019-12-09 NOTE — TELEPHONE ENCOUNTER
Informed pt that she can keep her Annual on 12/20/19 with Julian Eubanks [id number]. Informed pt that she would not want it the same day as her colonoscopy on 12/19 d/t anesthesia.

## 2019-12-11 ENCOUNTER — TELEPHONE (OUTPATIENT)
Dept: GASTROENTEROLOGY | Facility: CLINIC | Age: 64
End: 2019-12-11

## 2019-12-11 NOTE — TELEPHONE ENCOUNTER
Spoke with pt, she stated that she lost her prep instructions. Pt does not use her MyChart, even though it shows active, so she does not want the instructions sent that way. She stated she will  her prep instructions from the [facility] on 12/12/19.

## 2019-12-19 ENCOUNTER — HOSPITAL ENCOUNTER (OUTPATIENT)
Facility: HOSPITAL | Age: 64
Setting detail: HOSPITAL OUTPATIENT SURGERY
Discharge: HOME | End: 2019-12-19
Attending: INTERNAL MEDICINE | Admitting: INTERNAL MEDICINE
Payer: MEDICARE

## 2019-12-19 ENCOUNTER — ANESTHESIA EVENT (OUTPATIENT)
Dept: ENDOSCOPY | Facility: HOSPITAL | Age: 64
End: 2019-12-19
Payer: MEDICARE

## 2019-12-19 ENCOUNTER — ANESTHESIA (OUTPATIENT)
Dept: ENDOSCOPY | Facility: HOSPITAL | Age: 64
End: 2019-12-19
Payer: MEDICARE

## 2019-12-19 DIAGNOSIS — K62.5 RECTAL BLEEDING: ICD-10-CM

## 2019-12-19 DIAGNOSIS — Z12.11 COLON CANCER SCREENING: ICD-10-CM

## 2019-12-19 PROCEDURE — 0DBH8ZX EXCISION OF CECUM, VIA NATURAL OR ARTIFICIAL OPENING ENDOSCOPIC, DIAGNOSTIC: ICD-10-PCS | Performed by: INTERNAL MEDICINE

## 2019-12-19 PROCEDURE — 45385 COLONOSCOPY W/LESION REMOVAL: CPT | Performed by: INTERNAL MEDICINE

## 2019-12-19 RX ORDER — LIDOCAINE HYDROCHLORIDE 10 MG/ML
INJECTION, SOLUTION EPIDURAL; INFILTRATION; INTRACAUDAL; PERINEURAL AS NEEDED
Status: DISCONTINUED | OUTPATIENT
Start: 2019-12-19 | End: 2019-12-19 | Stop reason: SURG

## 2019-12-19 RX ORDER — NALOXONE HYDROCHLORIDE 0.4 MG/ML
80 INJECTION, SOLUTION INTRAMUSCULAR; INTRAVENOUS; SUBCUTANEOUS AS NEEDED
Status: DISCONTINUED | OUTPATIENT
Start: 2019-12-19 | End: 2019-12-19

## 2019-12-19 RX ORDER — SODIUM CHLORIDE, SODIUM LACTATE, POTASSIUM CHLORIDE, CALCIUM CHLORIDE 600; 310; 30; 20 MG/100ML; MG/100ML; MG/100ML; MG/100ML
INJECTION, SOLUTION INTRAVENOUS CONTINUOUS
Status: DISCONTINUED | OUTPATIENT
Start: 2019-12-19 | End: 2019-12-19

## 2019-12-19 RX ADMIN — SODIUM CHLORIDE, SODIUM LACTATE, POTASSIUM CHLORIDE, CALCIUM CHLORIDE: 600; 310; 30; 20 INJECTION, SOLUTION INTRAVENOUS at 13:15:00

## 2019-12-19 RX ADMIN — LIDOCAINE HYDROCHLORIDE 40 MG: 10 INJECTION, SOLUTION EPIDURAL; INFILTRATION; INTRACAUDAL; PERINEURAL at 12:40:00

## 2019-12-19 NOTE — ANESTHESIA POSTPROCEDURE EVALUATION
Patient: Eric Aviles    Procedure Summary     Date:  12/19/19 Room / Location:  [facility] ENDOSCOPY 04 / [facility] ENDOSCOPY    Anesthesia Start:  6144 Anesthesia Stop:  1972    Procedure:  COLONOSCOPY (N/A ) Diagnosis:       Colon cancer screening      Rectal bleedi

## 2019-12-19 NOTE — H&P
History & Physical Examination    Patient Name: Kayla Muñoz  MRN: [id number]  CSN: [id number]  YOB: 1955    Diagnosis: colorectal cancer screening    PEG 3350-KCl-Na Bicarb-NaCl (TRILYTE) 420 g Oral Recon Soln, Take prep as directed by gastro 12/18/2019 at 0700  ARIpiprazole (ABILIFY) 5 MG Oral Tab, Take 7.5 mg by mouth nightly.  , Disp: , Rfl: , 12/18/2019 at 0700  DULoxetine HCl (CYMBALTA) 60 MG Oral Cap DR Particles, Take 60 mg by mouth nightly. Take 60mg daily.  , Disp: , Rfl: , 12/18/2019 a LIGATION       Family History   Problem Relation Age of Onset   • Dementia Father         Alzheimer's   • Lipids Father         hyperlipidemia   • Hypertension Father    • Musculo-skelatal Disorder Father         B/L knees replaced   • Cancer Brother

## 2019-12-19 NOTE — ANESTHESIA PREPROCEDURE EVALUATION
Anesthesia PreOp Note    HPI:     Ibis Carolina is a 75year old female who presents for preoperative consultation requested by: Flaco Tran MD    Date of Surgery: 12/19/2019    Procedure(s):  COLONOSCOPY  Indication: Colon cancer screening, Rectal keratoses         Date Noted: 03/07/2017      Sx.6/8/16, CPT. 29102, R Total Knee Replacement, w/, Global Exp.9/6/16         Date Noted: 01/14/2016      Raynaud's disease         Date Noted: 08/29/2014      Osteoarthritis         Date Noted: 08/28 substitute with Trilyte/generic equivalent if needed. , Disp: 2 Bottle, Rfl: 0, 12/19/2019  AMLODIPINE BESYLATE 5 MG Oral Tab, TAKE 1 TABLET BY MOUTH 1  TIME DAILY, Disp: 30 tablet, Rfl: 5, 12/18/2019 at 0700  ACETAMINOPHEN-CODEINE #3 300-30 MG Oral Tab, TA infusion, , Intravenous, Continuous, Justino Elliott MD, Last Rate: 20 mL/hr at 12/19/19 1223    No current [facility]-ordered outpatient medications on file.         Sulfa Antibiotics       RASH  Sulfanilamide           RASH    Family History   Problem Relat session: Not on file      Stress: Not on file    Relationships      Social connections:        Talks on phone: Not on file        Gets together: Not on file        Attends Gnosticist service: Not on file        Active member of club or organization: Not on 1.549 m (5' 1\") and weight is 61.7 kg (136 lb). Her blood pressure is 115/67 and her pulse is 81. Her respiration is 16 and oxygen saturation is 95%. 12/19/19  1202   BP: 115/67   Pulse: 81   Resp: 16   SpO2: 95%   Weight: 61.7 kg (136 lb)   Height: 1.

## 2019-12-19 NOTE — OPERATIVE REPORT
Colonoscopy Report    Eric Aviles     1955 Age 75year old   PCP Panchito Morrell MD Endoscopist Garfield Blevins MD     Date of procedure: 19    Procedure: Colonoscopy w/ snare polypectomy    Pre-operative diagnosis: colorectal cancer screening patient’s vital signs were monitored throughout the procedure and remained stable. Estimated blood loss: insignificant    Specimens collected:  Colon polyp    Complications: none     Colonoscopy findings:     There was a 5 mm sessile polyp in the cecum r

## 2019-12-20 ENCOUNTER — OFFICE VISIT (OUTPATIENT)
Dept: OBGYN CLINIC | Facility: CLINIC | Age: 64
End: 2019-12-20
Payer: MEDICARE

## 2019-12-20 ENCOUNTER — TELEPHONE (OUTPATIENT)
Dept: GASTROENTEROLOGY | Facility: CLINIC | Age: 64
End: 2019-12-20

## 2019-12-20 VITALS
TEMPERATURE: 98 F | HEART RATE: 77 BPM | SYSTOLIC BLOOD PRESSURE: 148 MMHG | OXYGEN SATURATION: 99 % | DIASTOLIC BLOOD PRESSURE: 83 MMHG | RESPIRATION RATE: 17 BRPM | HEIGHT: 61 IN | BODY MASS INDEX: 25.68 KG/M2 | WEIGHT: 136 LBS

## 2019-12-20 VITALS
WEIGHT: 139.81 LBS | SYSTOLIC BLOOD PRESSURE: 117 MMHG | HEART RATE: 86 BPM | HEIGHT: 61 IN | BODY MASS INDEX: 26.4 KG/M2 | DIASTOLIC BLOOD PRESSURE: 81 MMHG

## 2019-12-20 DIAGNOSIS — Z12.31 ENCOUNTER FOR SCREENING MAMMOGRAM FOR BREAST CANCER: ICD-10-CM

## 2019-12-20 DIAGNOSIS — Z12.4 SCREENING FOR MALIGNANT NEOPLASM OF CERVIX: ICD-10-CM

## 2019-12-20 DIAGNOSIS — Z01.419 ENCOUNTER FOR GYNECOLOGICAL EXAMINATION: Primary | ICD-10-CM

## 2019-12-20 PROCEDURE — 99397 PER PM REEVAL EST PAT 65+ YR: CPT | Performed by: OBSTETRICS & GYNECOLOGY

## 2019-12-20 NOTE — TELEPHONE ENCOUNTER
I mailed out colonoscopy results letter to pt  Updated health maintenance  Entered into 10 yr CLN recall  Recall colon in 10 years per.  Colon done 12/19/19    GI staff: please place recall in for colonoscopy in 10 years

## 2019-12-22 NOTE — PROGRESS NOTES
Tawnya Durand is a 75year old female  No LMP recorded. Patient is postmenopausal. here for annual exam.       Last seen 18. Last pap 2015 normal with neg HPV.     Has been having left groin pain for a few years, feels like it happened after h SHOULDER ARTHROPLASTY WITH BICEPS TENODESIS   • OTHER SURGICAL HISTORY      Laparotomy (adhesions)   • OTHER SURGICAL HISTORY      Arthrocentesis of the right knee joint   • OTHER SURGICAL HISTORY      Arthrocentesis of the left knee joint   • OTHER SURGIC Tab Take 1 tablet (250 mg total) by mouth daily. 30 tablet 1   • IBUPROFEN 600 MG Oral Tab TAKE ONE TABLET BY MOUTH EVERY SIX HOURS as needed with food 60 tablet 1   • Amitriptyline HCl 10 MG Oral Tab Take 2 tablets (20 mg total) by mouth nightly.  60 table retraction or skin changes  Abdomen:  soft, nontender, nondistended, no masses  Psychiatric:  Oriented to time, place, person and situation.  Appropriate mood and affect    Pelvic Exam:  External Genitalia: normal appearance, hair distribution, and no lesio

## 2020-01-01 ENCOUNTER — EXTERNAL RECORD (OUTPATIENT)
Dept: OTHER | Age: 65
End: 2020-01-01

## 2020-01-02 ENCOUNTER — TELEPHONE (OUTPATIENT)
Dept: PULMONOLOGY | Facility: CLINIC | Age: 65
End: 2020-01-02

## 2020-01-02 DIAGNOSIS — R59.0 HILAR ADENOPATHY: Primary | ICD-10-CM

## 2020-01-02 DIAGNOSIS — J90 PLEURAL EFFUSION, LEFT: ICD-10-CM

## 2020-01-02 NOTE — TELEPHONE ENCOUNTER
Pt c/o mid back pain that radiates to lower back which Tylenol w/ codeine helps, took med this morn, & currently rates pain 2/10 and has sore throat.  Pt stts yest had somewhat productive cough, green & beige sputum, & was not feeling well, but these sx hav

## 2020-01-02 NOTE — TELEPHONE ENCOUNTER
Pt notified of Dr. Rodney Stephen orders. Instructed her to call [facility]. Their phone # was given. She voiced understanding.

## 2020-01-06 NOTE — TELEPHONE ENCOUNTER
LOV:1-6  Last Filled:12-19, #60 with 0 refill  Labs:1-6, ALT 13 and AST 15  Future Appointments  Date Time Provider Cirilo Springer   7/3/2017 10:20 AM Eleanor Ibrahim MD 2014 [facility]       Please Advise IV intact

## 2020-01-09 ENCOUNTER — HOSPITAL ENCOUNTER (OUTPATIENT)
Dept: CT IMAGING | Age: 65
Discharge: HOME | End: 2020-01-09
Attending: INTERNAL MEDICINE
Payer: MEDICARE

## 2020-01-09 DIAGNOSIS — R59.0 HILAR ADENOPATHY: ICD-10-CM

## 2020-01-09 DIAGNOSIS — J90 PLEURAL EFFUSION, LEFT: ICD-10-CM

## 2020-01-09 LAB — CREAT BLD-MCNC: 0.6 MG/DL (ref 0.55–1.02)

## 2020-01-09 PROCEDURE — 71260 CT THORAX DX C+: CPT | Performed by: INTERNAL MEDICINE

## 2020-01-09 PROCEDURE — 82565 ASSAY OF CREATININE: CPT

## 2020-01-14 ENCOUNTER — HOSPITAL ENCOUNTER (OUTPATIENT)
Dept: MAMMOGRAPHY | Age: 65
Discharge: HOME | End: 2020-01-14
Attending: OBSTETRICS & GYNECOLOGY
Payer: MEDICARE

## 2020-01-14 DIAGNOSIS — Z12.31 ENCOUNTER FOR SCREENING MAMMOGRAM FOR BREAST CANCER: ICD-10-CM

## 2020-01-14 PROCEDURE — 77067 SCR MAMMO BI INCL CAD: CPT | Performed by: OBSTETRICS & GYNECOLOGY

## 2020-01-14 PROCEDURE — 77063 BREAST TOMOSYNTHESIS BI: CPT | Performed by: OBSTETRICS & GYNECOLOGY

## 2020-01-16 ENCOUNTER — TELEPHONE (OUTPATIENT)
Dept: OBGYN CLINIC | Facility: CLINIC | Age: 65
End: 2020-01-16

## 2020-01-16 ENCOUNTER — TELEPHONE (OUTPATIENT)
Dept: PULMONOLOGY | Facility: CLINIC | Age: 65
End: 2020-01-16

## 2020-01-16 DIAGNOSIS — R91.1 LUNG NODULE: Primary | ICD-10-CM

## 2020-01-16 NOTE — TELEPHONE ENCOUNTER
Pt calling for results for mammogram, 1/14/2020. Informed pt of the results below. Sent to [facility] to sign off. CONCLUSION:  No mammographic evidence for malignancy.   As long as the patient's clinical breast exam remains unchanged, annual screening

## 2020-01-29 ENCOUNTER — HOSPITAL ENCOUNTER (OUTPATIENT)
Age: 65
Discharge: HOME | End: 2020-01-29
Attending: EMERGENCY MEDICINE
Payer: MEDICARE

## 2020-01-29 VITALS
OXYGEN SATURATION: 100 % | HEART RATE: 78 BPM | RESPIRATION RATE: 16 BRPM | TEMPERATURE: 97 F | SYSTOLIC BLOOD PRESSURE: 137 MMHG | DIASTOLIC BLOOD PRESSURE: 63 MMHG

## 2020-01-29 DIAGNOSIS — S61.211A LACERATION OF LEFT INDEX FINGER WITHOUT FOREIGN BODY WITHOUT DAMAGE TO NAIL, INITIAL ENCOUNTER: Primary | ICD-10-CM

## 2020-01-29 PROCEDURE — 90471 IMMUNIZATION ADMIN: CPT

## 2020-01-29 PROCEDURE — 99213 OFFICE O/P EST LOW 20 MIN: CPT

## 2020-01-29 PROCEDURE — 12001 RPR S/N/AX/GEN/TRNK 2.5CM/<: CPT

## 2020-01-29 NOTE — ED PROVIDER NOTES
Patient Seen in: [address]      History   Patient presents with:  Laceration Abrasion    Stated Complaint: Finger injury     HPI    This patient stated she cut her left index finger with a pair of scissors today.   The pa Josue Evans MD at [facility] OR   • TOTAL KNEE REPLACEMENT Bilateral 2015 and 2016   • TUBAL LIGATION                  Family history reviewed with patient/caregiver and is not pertinent to presenting problem.     Social History    Tobacco Use      [place] [id number]  [phone]  In 8 days  For suture removal        Medications Prescribed:  Current Discharge Medication List

## 2020-02-04 RX ORDER — ACETAMINOPHEN AND CODEINE PHOSPHATE 300; 30 MG/1; MG/1
TABLET ORAL
Qty: 90 TABLET | Refills: 0 | Status: SHIPPED | OUTPATIENT
Start: 2020-02-04 | End: 2020-07-21

## 2020-02-04 NOTE — TELEPHONE ENCOUNTER
Last filled: 9/11/19 #90 tab with 4 refills   LOV: 10/8/18   Future Appointments   Date Time Provider Cirilo Springer   2/11/2020  9:00 AM CF XR RM2 FLUORO [facility] DIAG RAD EM [facility]   2/11/2020 10:00 AM CF CT RM1 CF CT SCAN EM [facility]   2/28/2020  9:40 AM Kalyani Faulkner MD 2014 [facility]   5/18/2020  1:00 PM Malissa Roland MD [facility]     Labs:  1/6/2017    Summary:  1. Cont. Tylenol #3 every 8 hours -#90  5refills  2. Check mri left shoulder   3. Return to clinic in  6 months. 4. Check left hip xray   Keyana Escobar MD  10/8/2018   3:13 PM  - [place]ed [place]  through Epic     Please advise.

## 2020-02-28 ENCOUNTER — HOSPITAL ENCOUNTER (OUTPATIENT)
Dept: GENERAL RADIOLOGY | Facility: HOSPITAL | Age: 65
Discharge: HOME | End: 2020-02-28
Attending: INTERNAL MEDICINE
Payer: MEDICARE

## 2020-02-28 ENCOUNTER — APPOINTMENT (OUTPATIENT)
Dept: LAB | Facility: HOSPITAL | Age: 65
End: 2020-02-28
Attending: INTERNAL MEDICINE
Payer: MEDICARE

## 2020-02-28 ENCOUNTER — OFFICE VISIT (OUTPATIENT)
Dept: RHEUMATOLOGY | Facility: CLINIC | Age: 65
End: 2020-02-28
Payer: MEDICARE

## 2020-02-28 VITALS
WEIGHT: 148 LBS | HEIGHT: 61 IN | DIASTOLIC BLOOD PRESSURE: 69 MMHG | RESPIRATION RATE: 16 BRPM | HEART RATE: 76 BPM | SYSTOLIC BLOOD PRESSURE: 102 MMHG | BODY MASS INDEX: 27.94 KG/M2

## 2020-02-28 DIAGNOSIS — M13.0 POLYARTHRITIS: ICD-10-CM

## 2020-02-28 DIAGNOSIS — M15.9 GENERALIZED OSTEOARTHRITIS: ICD-10-CM

## 2020-02-28 DIAGNOSIS — M15.9 GENERALIZED OA: ICD-10-CM

## 2020-02-28 DIAGNOSIS — M15.9 GENERALIZED OA: Primary | ICD-10-CM

## 2020-02-28 DIAGNOSIS — M25.50 HYPERMOBILITY ARTHRALGIA: ICD-10-CM

## 2020-02-28 LAB
CRP SERPL-MCNC: <0.29 MG/DL (ref ?–0.3)
ERYTHROCYTE [SEDIMENTATION RATE] IN BLOOD: 8 MM/HR (ref 0–30)

## 2020-02-28 PROCEDURE — 86140 C-REACTIVE PROTEIN: CPT

## 2020-02-28 PROCEDURE — 73130 X-RAY EXAM OF HAND: CPT | Performed by: INTERNAL MEDICINE

## 2020-02-28 PROCEDURE — 99214 OFFICE O/P EST MOD 30 MIN: CPT | Performed by: INTERNAL MEDICINE

## 2020-02-28 PROCEDURE — 36415 COLL VENOUS BLD VENIPUNCTURE: CPT

## 2020-02-28 PROCEDURE — 85652 RBC SED RATE AUTOMATED: CPT

## 2020-02-28 RX ORDER — ACETAMINOPHEN AND CODEINE PHOSPHATE 300; 30 MG/1; MG/1
1 TABLET ORAL EVERY 8 HOURS PRN
Qty: 90 TABLET | Refills: 0 | Status: SHIPPED | OUTPATIENT
Start: 2020-02-28 | End: 2020-03-26

## 2020-02-28 RX ORDER — PREDNISONE 10 MG/1
TABLET ORAL
Qty: 21 TABLET | Refills: 0 | Status: SHIPPED | OUTPATIENT
Start: 2020-02-28 | End: 2020-04-01 | Stop reason: ALTCHOICE

## 2020-02-28 NOTE — PROGRESS NOTES
Kathy Cheatham is a 75year old female. HPI:   Patient presents with: Follow - Up: Generalized OA  Hand Pain: Righ hand more than Left  Raynaud's Syndrome      I saw Eric Aviles on 2/28/2020.   She is a pleasant 75year old who has been having a history of 7/3/2017  She has pain in her knees with sitting or standing. She feels her knees hurt more than before the surgery. Her knees really hurt. By the end of the night her knees can't beart it. She had to quit her job.    Her right shoudler is much better left hip is throbbing and her left arm is stiff. She got a right lower back injection and it helped only a little bit. The pain is higher than the injection spot. She was given kenalog 20mg injection by PA on 9/6/2018. It did not help her.    She fell i MOUTH 1  TIME DAILY 30 tablet 5   • IBUPROFEN 600 MG Oral Tab TAKE ONE TABLET BY MOUTH EVERY SIX HOURS as needed with food 60 tablet 1   • Amitriptyline HCl 10 MG Oral Tab Take 2 tablets (20 mg total) by mouth nightly.  60 tablet 3   • albuterol sulfate (2. heberdon's nodes.    No edema    Component      Latest Ref Rng & Units 4/23/2017 1/6/2017          12:35 AM    Glucose      70 - 99 mg/dL 103 (H) 89   Sodium      136 - 144 mmol/L 133 (L) 135 (L)   Potassium      3.3 - 5.1 mmol/L 3.9 4.2   Chloride      95 U/mL  0.7   TROPONIN      <=0.03 ng/mL 0.00    POCT RAPID STREP      Negative       Component      Latest Ref Rng & Units 1/6/2017 4/10/2012   SED RATE      0 - 30 mm/Hr 12 13   COMPLEMENT C4      16 - 38 mg/dL  26   NEHAL SCREEN      Negative  Negative   C- correction. 4. The anterior cruciate ligament is diminutive and may be partially  torn. 8/21/2017 - si joint xray   1. Intact sacroiliac joints. 2. Disc and facet degeneration and lower lumbar spine.   3. Moderate left greater than right hip osteoarth increase to 20mg at night if she needs to   5. oa in hands -  diffuse OA  6. Low back pain - d/w her to do core strengthening exercises - gave her exericses for low back pain   Increase amittriyline to 20mg at night to see if that helps.   - not taking this

## 2020-02-28 NOTE — PATIENT INSTRUCTIONS
1. Check labs and xrays of hands today   2. Prednisone 10mg - Take 6 tabsx 1 day, take 5 tabsx 1 day, take 4 tabsx 1 day,take 3 tabsx 1 day, take 2 tabsx 1 day, take 1 tabsx 1 day, then off  3. Return to clinic in 2 weeks - 3 weeks.

## 2020-03-05 ENCOUNTER — TELEPHONE (OUTPATIENT)
Dept: INTERNAL MEDICINE CLINIC | Facility: CLINIC | Age: 65
End: 2020-03-05

## 2020-03-05 ENCOUNTER — TELEPHONE (OUTPATIENT)
Dept: RHEUMATOLOGY | Facility: CLINIC | Age: 65
End: 2020-03-05

## 2020-03-05 NOTE — TELEPHONE ENCOUNTER
Name and  verified. Pt stated she does not use My Chart. Pt given lab and hand x-ray results. Pt reports steroids are not helping; still has swelling and pain in her hands R>L. Tylenol #3 helps.  She tried ibuprofen yesterday; it did nothing for the pain

## 2020-03-05 NOTE — TELEPHONE ENCOUNTER
Per patient she needs more referral to see Dr Elijah Hartmann, she has an appointment on 03/27/2020.

## 2020-03-06 ENCOUNTER — TELEPHONE (OUTPATIENT)
Dept: INTERNAL MEDICINE CLINIC | Facility: CLINIC | Age: 65
End: 2020-03-06

## 2020-03-06 DIAGNOSIS — I73.00 RAYNAUD'S DISEASE WITHOUT GANGRENE: ICD-10-CM

## 2020-03-06 DIAGNOSIS — M79.642 BILATERAL HAND PAIN: Primary | ICD-10-CM

## 2020-03-06 DIAGNOSIS — M79.641 BILATERAL HAND PAIN: Primary | ICD-10-CM

## 2020-03-06 NOTE — TELEPHONE ENCOUNTER
Dr. Emilee Macario, Pt left a message requesting a referral for a follow up visit with Dr. Rad Callaway. Please sign referral if you agree.  Thank you, Sharda, [facility]

## 2020-03-09 NOTE — TELEPHONE ENCOUNTER
Talked to pt. - her right hand 5th finger is better -   But straightening her fingers it's hurting a lot in her palms of her hands. She has different kinds of pains in her hands. tyelenol #3 is helping back. The steroid burst didn't help this time.

## 2020-03-13 ENCOUNTER — HOSPITAL ENCOUNTER (OUTPATIENT)
Dept: GENERAL RADIOLOGY | Facility: HOSPITAL | Age: 65
Discharge: HOME | End: 2020-03-13
Attending: ORTHOPAEDIC SURGERY
Payer: MEDICARE

## 2020-03-13 ENCOUNTER — HOSPITAL ENCOUNTER (OUTPATIENT)
Dept: CT IMAGING | Facility: HOSPITAL | Age: 65
Discharge: HOME | End: 2020-03-13
Attending: ORTHOPAEDIC SURGERY
Payer: MEDICARE

## 2020-03-13 DIAGNOSIS — M75.101 RIGHT ROTATOR CUFF TEAR ARTHROPATHY: ICD-10-CM

## 2020-03-13 DIAGNOSIS — M75.102 ROTATOR CUFF TEAR ARTHROPATHY OF BOTH SHOULDERS: ICD-10-CM

## 2020-03-13 DIAGNOSIS — M25.511 CHRONIC RIGHT SHOULDER PAIN: ICD-10-CM

## 2020-03-13 DIAGNOSIS — G89.29 CHRONIC PAIN: ICD-10-CM

## 2020-03-13 DIAGNOSIS — M12.812 ROTATOR CUFF TEAR ARTHROPATHY OF BOTH SHOULDERS: ICD-10-CM

## 2020-03-13 DIAGNOSIS — M12.811 ROTATOR CUFF TEAR ARTHROPATHY OF BOTH SHOULDERS: ICD-10-CM

## 2020-03-13 DIAGNOSIS — G89.29 CHRONIC RIGHT SHOULDER PAIN: ICD-10-CM

## 2020-03-13 DIAGNOSIS — M12.811 RIGHT ROTATOR CUFF TEAR ARTHROPATHY: ICD-10-CM

## 2020-03-13 DIAGNOSIS — M75.101 ROTATOR CUFF SYNDROME OF RIGHT SHOULDER: ICD-10-CM

## 2020-03-13 DIAGNOSIS — M75.101 ROTATOR CUFF TEAR ARTHROPATHY OF BOTH SHOULDERS: ICD-10-CM

## 2020-03-13 PROCEDURE — 77002 NEEDLE LOCALIZATION BY XRAY: CPT | Performed by: ORTHOPAEDIC SURGERY

## 2020-03-13 PROCEDURE — 73201 CT UPPER EXTREMITY W/DYE: CPT | Performed by: ORTHOPAEDIC SURGERY

## 2020-03-13 PROCEDURE — 23350 INJECTION FOR SHOULDER X-RAY: CPT | Performed by: ORTHOPAEDIC SURGERY

## 2020-03-13 RX ORDER — LIDOCAINE HYDROCHLORIDE 10 MG/ML
INJECTION, SOLUTION EPIDURAL; INFILTRATION; INTRACAUDAL; PERINEURAL
Status: COMPLETED
Start: 2020-03-13 | End: 2020-03-13

## 2020-03-13 RX ADMIN — LIDOCAINE HYDROCHLORIDE: 10 INJECTION, SOLUTION EPIDURAL; INFILTRATION; INTRACAUDAL; PERINEURAL at 10:26:00

## 2020-03-26 RX ORDER — ACETAMINOPHEN AND CODEINE PHOSPHATE 300; 30 MG/1; MG/1
TABLET ORAL
Qty: 90 TABLET | Refills: 3 | Status: SHIPPED | OUTPATIENT
Start: 2020-03-26 | End: 2020-06-26

## 2020-03-31 ENCOUNTER — TELEPHONE (OUTPATIENT)
Dept: INTERNAL MEDICINE CLINIC | Facility: CLINIC | Age: 65
End: 2020-03-31

## 2020-03-31 RX ORDER — AMLODIPINE BESYLATE 5 MG/1
TABLET ORAL
Qty: 30 TABLET | Refills: 5 | Status: SHIPPED | OUTPATIENT
Start: 2020-03-31 | End: 2020-11-20

## 2020-03-31 NOTE — TELEPHONE ENCOUNTER
Pt was called back and she stated that she is feeling well so far. She will continue to monitor her s/sx and her sugars. Pt was advised if she does not feel well and her sugar keeps dropping she needs to go to ER. Pt agreed with plan.  Pt will go to ER if s

## 2020-03-31 NOTE — TELEPHONE ENCOUNTER
[address] pt stated that she checked her blood sugar and it was 68 so she is going to have a glass of  apple juice and 1 piece of bread  and recheck in 15 min. Her blood pressure was 150/97 Hr 113 but then she rechecked it and it was 134/79 HR 95.   Pt will

## 2020-03-31 NOTE — TELEPHONE ENCOUNTER
Dr. Marlena Gilmore pt stated that she woke up today at 6 am very hungry she ate bananas  and peanut butter . Then at 8 am this morning she felt sweaty, shaky, dizzy and she felt like she was going to pass out.  Her  who is diabetic told her that it sounded li

## 2020-04-01 ENCOUNTER — OFFICE VISIT (OUTPATIENT)
Dept: INTERNAL MEDICINE CLINIC | Facility: CLINIC | Age: 65
End: 2020-04-01
Payer: MEDICARE

## 2020-04-01 VITALS
HEART RATE: 78 BPM | BODY MASS INDEX: 27.94 KG/M2 | SYSTOLIC BLOOD PRESSURE: 134 MMHG | HEIGHT: 61 IN | WEIGHT: 148 LBS | TEMPERATURE: 98 F | DIASTOLIC BLOOD PRESSURE: 86 MMHG

## 2020-04-01 DIAGNOSIS — E16.2 HYPOGLYCEMIA: Primary | ICD-10-CM

## 2020-04-01 PROCEDURE — 36416 COLLJ CAPILLARY BLOOD SPEC: CPT | Performed by: INTERNAL MEDICINE

## 2020-04-01 PROCEDURE — 99214 OFFICE O/P EST MOD 30 MIN: CPT | Performed by: INTERNAL MEDICINE

## 2020-04-01 PROCEDURE — 82962 GLUCOSE BLOOD TEST: CPT | Performed by: INTERNAL MEDICINE

## 2020-04-01 PROCEDURE — 83036 HEMOGLOBIN GLYCOSYLATED A1C: CPT | Performed by: INTERNAL MEDICINE

## 2020-04-01 NOTE — PATIENT INSTRUCTIONS
Nondiabetic Hypoglycemic Reaction  You have had an episode of low blood sugar (hypoglycemia). A single episode of hypoglycemia does not mean that this problem will recur. There are many causes for low blood sugar.  These include eating highly refined star · It is important to learn the warning signals your body gives as your blood sugar starts to drop. See the symptoms listed below. If symptoms of hypoglycemia return  · Keep a source of fast-acting sugar with you.  At the first sign of low blood sugar, eat

## 2020-04-01 NOTE — PROGRESS NOTES
Patient ID: Maey Francisco is a 75year old female. Patient presents with:  Blood Sugar: Low blood glucose, dizziness. x1 day        HISTORY OF PRESENT ILLNESS:   HPI  Patient presents for above. Here with several episodes of hypoglycemia yesterday.   She Sx.6/1/15, CPT. 15698, L Total Knee, w/, Global Exp.8/30/15 6/8/2015   • Sx.6/8/16, CPT. 54026, R Total Knee Replacement, w/, Global Exp.9/6/16 1/14/2016   • Visual impairment     WEARS READER       Past Surgical History:   Procedure Late Disp: 30 tablet, Rfl: 1  •  IBUPROFEN 600 MG Oral Tab, TAKE ONE TABLET BY MOUTH EVERY SIX HOURS as needed with food, Disp: 60 tablet, Rfl: 1  •  Amitriptyline HCl 10 MG Oral Tab, Take 2 tablets (20 mg total) by mouth nightly., Disp: 60 tablet, Rfl: 3  •  a Social connections:        Talks on phone: Not on file        Gets together: Not on file        Attends Religion service: Not on file        Active member of club or organization: Not on file        Attends meetings of clubs or organizations: Not on jd value was 5.8% done 4/1/2020. Accucheck -105      ASSESSMENT/PLAN:   1. Hypoglycemia  · Unclear etiology. · Patient does state she did not eat much the night before.   · HEMOGLOBIN A1C  · GLUCOSE BLOOD TEST  · Encouraged that she eat well-balanced meals t

## 2020-04-22 RX ORDER — ACETAMINOPHEN AND CODEINE PHOSPHATE 300; 30 MG/1; MG/1
TABLET ORAL
Qty: 90 TABLET | Refills: 3 | OUTPATIENT
Start: 2020-04-22

## 2020-04-22 NOTE — TELEPHONE ENCOUNTER
Last filled: 3/26/2020 #90 tab with 3 refills     Future Appointments   Date Time Provider Cirilo Springer   5/18/2020  1:00 PM Mau Pérez MD [facility]     Refill not appropriate

## 2020-05-08 RX ORDER — IBUPROFEN 600 MG/1
TABLET ORAL
Qty: 60 TABLET | Refills: 1 | Status: ON HOLD | OUTPATIENT
Start: 2020-05-08 | End: 2020-07-12

## 2020-05-08 NOTE — TELEPHONE ENCOUNTER
Last filled: 6/26/19 #60 tab with 1 refill  LOV: 2/28/2020  Future Appointments   Date Time Provider Cirilo Springer   5/18/2020  1:00 PM Thu Suazo MD [facility]     Labs:   Component      Latest Ref Rng & Units 2/28/2020   C-REACTIVE

## 2020-05-11 ENCOUNTER — TELEPHONE (OUTPATIENT)
Dept: PULMONOLOGY | Facility: CLINIC | Age: 65
End: 2020-05-11

## 2020-05-11 NOTE — TELEPHONE ENCOUNTER
Pt states she is worried about coming in to her office visit. She wants to know if she does the chest CT if she can just get her results over the phone. .Please advise

## 2020-05-11 NOTE — TELEPHONE ENCOUNTER
Pt aware that she had CT chest 1/9/20, she is not due for CT chest until 1/1/2021, & we are taking precautions for everyone's safety (screening, taking temperatures, wearing masks, & cleaning). Per pt she will keep appt.  She declined to change current appt

## 2020-06-10 ENCOUNTER — OFFICE VISIT (OUTPATIENT)
Dept: OBGYN CLINIC | Facility: CLINIC | Age: 65
End: 2020-06-10
Payer: MEDICARE

## 2020-06-10 ENCOUNTER — TELEPHONE (OUTPATIENT)
Dept: OBGYN CLINIC | Facility: CLINIC | Age: 65
End: 2020-06-10

## 2020-06-10 VITALS
SYSTOLIC BLOOD PRESSURE: 145 MMHG | DIASTOLIC BLOOD PRESSURE: 82 MMHG | WEIGHT: 150.63 LBS | BODY MASS INDEX: 28 KG/M2 | HEART RATE: 80 BPM

## 2020-06-10 DIAGNOSIS — N64.4 MASTODYNIA: ICD-10-CM

## 2020-06-10 DIAGNOSIS — N63.25 BREAST LUMP ON LEFT SIDE AT 3 O'CLOCK POSITION: Primary | ICD-10-CM

## 2020-06-10 PROCEDURE — 99213 OFFICE O/P EST LOW 20 MIN: CPT | Performed by: OBSTETRICS & GYNECOLOGY

## 2020-06-10 RX ORDER — DIAZEPAM 5 MG/1
TABLET ORAL
COMMUNITY
Start: 2020-04-30 | End: 2020-07-08

## 2020-06-10 NOTE — TELEPHONE ENCOUNTER
Pt reports left breast pain 7/10 for about 2-3 weeks. States she does not know why she didn't call sooner. States the left breast hurts with touch and when she wears a bra.  States the left breast is bigger than the right breast. Reports some \"irregular\"

## 2020-06-16 PROBLEM — N63.25 BREAST LUMP ON LEFT SIDE AT 3 O'CLOCK POSITION: Status: ACTIVE | Noted: 2020-06-16

## 2020-06-16 PROBLEM — N64.4 MASTODYNIA: Status: ACTIVE | Noted: 2020-06-16

## 2020-06-17 ENCOUNTER — HOSPITAL ENCOUNTER (OUTPATIENT)
Dept: MAMMOGRAPHY | Facility: HOSPITAL | Age: 65
Discharge: HOME | End: 2020-06-17
Attending: OBSTETRICS & GYNECOLOGY
Payer: MEDICARE

## 2020-06-17 ENCOUNTER — TELEPHONE (OUTPATIENT)
Dept: OBGYN CLINIC | Facility: CLINIC | Age: 65
End: 2020-06-17

## 2020-06-17 ENCOUNTER — HOSPITAL ENCOUNTER (OUTPATIENT)
Dept: ULTRASOUND IMAGING | Facility: HOSPITAL | Age: 65
Discharge: HOME | End: 2020-06-17
Attending: OBSTETRICS & GYNECOLOGY
Payer: MEDICARE

## 2020-06-17 DIAGNOSIS — N63.25 BREAST LUMP ON LEFT SIDE AT 3 O'CLOCK POSITION: ICD-10-CM

## 2020-06-17 DIAGNOSIS — N64.4 MASTODYNIA: ICD-10-CM

## 2020-06-17 PROCEDURE — 77061 BREAST TOMOSYNTHESIS UNI: CPT | Performed by: OBSTETRICS & GYNECOLOGY

## 2020-06-17 PROCEDURE — 76642 ULTRASOUND BREAST LIMITED: CPT | Performed by: OBSTETRICS & GYNECOLOGY

## 2020-06-17 PROCEDURE — 77065 DX MAMMO INCL CAD UNI: CPT | Performed by: OBSTETRICS & GYNECOLOGY

## 2020-06-17 NOTE — TELEPHONE ENCOUNTER
Pt informed EPIFANIO has not reviewed results, but they do show benign findings with recs to return in 1 year. Pt states the tech told her there was a small cyst seen. Pt informed the report does indicate that, but they feel it is benign.   Pt advised if EPIFANIO h

## 2020-06-17 NOTE — PROGRESS NOTES
HPI:    Patient ID: Jessica Hoff is a 77year old female. HPI   and . She normally sees Dr Itzel Vora with last exam in December. She is here c/o 3 weeks of left breast pain, worsening over time. It is focused on lateral aspect.   She also relates so 1   • Amitriptyline HCl 10 MG Oral Tab Take 2 tablets (20 mg total) by mouth nightly. 60 tablet 3   • albuterol sulfate (2.5 MG/3ML) 0.083% Inhalation Nebu Soln Take 3 mL (2.5 mg total) by nebulization every 4 (four) hours as needed for Wheezing.  60 ampule

## 2020-06-26 ENCOUNTER — APPOINTMENT (OUTPATIENT)
Dept: LAB | Age: 65
End: 2020-06-26
Attending: INTERNAL MEDICINE
Payer: MEDICARE

## 2020-06-26 ENCOUNTER — TELEPHONE (OUTPATIENT)
Dept: INTERNAL MEDICINE CLINIC | Facility: CLINIC | Age: 65
End: 2020-06-26

## 2020-06-26 ENCOUNTER — OFFICE VISIT (OUTPATIENT)
Dept: INTERNAL MEDICINE CLINIC | Facility: CLINIC | Age: 65
End: 2020-06-26
Payer: MEDICARE

## 2020-06-26 ENCOUNTER — NURSE TRIAGE (OUTPATIENT)
Dept: INTERNAL MEDICINE CLINIC | Facility: CLINIC | Age: 65
End: 2020-06-26

## 2020-06-26 VITALS
HEIGHT: 61 IN | BODY MASS INDEX: 27.75 KG/M2 | HEART RATE: 86 BPM | TEMPERATURE: 98 F | SYSTOLIC BLOOD PRESSURE: 137 MMHG | DIASTOLIC BLOOD PRESSURE: 84 MMHG | WEIGHT: 147 LBS

## 2020-06-26 DIAGNOSIS — F43.9 STRESS: ICD-10-CM

## 2020-06-26 DIAGNOSIS — R07.89 CHEST PRESSURE: Primary | ICD-10-CM

## 2020-06-26 DIAGNOSIS — R07.89 CHEST PRESSURE: ICD-10-CM

## 2020-06-26 PROCEDURE — 80061 LIPID PANEL: CPT

## 2020-06-26 PROCEDURE — 93010 ELECTROCARDIOGRAM REPORT: CPT | Performed by: INTERNAL MEDICINE

## 2020-06-26 PROCEDURE — 99214 OFFICE O/P EST MOD 30 MIN: CPT | Performed by: INTERNAL MEDICINE

## 2020-06-26 PROCEDURE — 93005 ELECTROCARDIOGRAM TRACING: CPT

## 2020-06-26 PROCEDURE — 36415 COLL VENOUS BLD VENIPUNCTURE: CPT

## 2020-06-26 NOTE — TELEPHONE ENCOUNTER
Dr Pizarro Ace just an fyi, you just spoke with patient today and  patient rescheduled her appointment on 08/07/2020 due to her appointment to her therapist is last week on July 2020.

## 2020-06-26 NOTE — TELEPHONE ENCOUNTER
Action Requested: Summary for Provider     []  Critical Lab, Recommendations Needed  [] Need Additional Advice  []   FYI    []   Need Orders  [] Need Medications Sent to Pharmacy  []  Other     SUMMARY:    Patient states having cramping mid chest and under

## 2020-06-26 NOTE — TELEPHONE ENCOUNTER
Patient called for test results, Lipid panel and EKG done today. Patient verbalized understanding that tests were still in process.

## 2020-06-26 NOTE — PROGRESS NOTES
Patient ID: Daniele Gruber is a 77year old female. Patient presents with:  Chest Congestion       HISTORY OF PRESENT ILLNESS:   HPI  Patient presents for above.   Here with a one-month history of substernal chest pain with radiation to the left pectoral re • COLONOSCOPY N/A 11/8/2018    Performed by Alana Thomas MD at [address] [place] [address] ENDOSCOPY   • HERNIA SURGERY Left 12/21/2011    neuroma excision, partial mesh removal, primary re-repair   • INGUINAL HERNIA REPAIR Left 05/20/2009   • KNEE SURGERY Right     Daun Needle •  DULoxetine HCl (CYMBALTA) 60 MG Oral Cap DR Particles, Take 60 mg by mouth nightly. Take 60mg daily.  , Disp: , Rfl:     Allergies:  Sulfa Antibiotics       RASH  Sulfanilamide           RASH    Social History    Socioeconomic History      Marital status Occupational Exposure: Not Asked        Hobby Hazards: Not Asked        Sleep Concern: Not Asked        Stress Concern: Not Asked        Weight Concern: Not Asked        Special Diet: Not Asked        Back Care: Not Asked        Exercise: Not Asked

## 2020-06-27 ENCOUNTER — TELEPHONE (OUTPATIENT)
Dept: INTERNAL MEDICINE CLINIC | Facility: CLINIC | Age: 65
End: 2020-06-27

## 2020-06-27 NOTE — TELEPHONE ENCOUNTER
Spoke with pt,  verified, Pt informed of EKG and lab result & MD recommendation, pt stated understanding. See below. Your results are available on MyChart. Estephanie Rued are all normal/stable.  Please call the office if you have any questions.      Re

## 2020-06-29 ENCOUNTER — TELEPHONE (OUTPATIENT)
Dept: OBGYN CLINIC | Facility: CLINIC | Age: 65
End: 2020-06-29

## 2020-06-29 NOTE — TELEPHONE ENCOUNTER
Notes recorded by Tracie Sutherland DO on 6/22/2020 at 7:49 AM CDT  Hi Jan. I assume radiology staff informed you of the benign findings. The report states ok for 12 month follow up but you'll be due for bilateral mammogram in January.  Please see Dr Gertrude Diaz ag

## 2020-07-07 ENCOUNTER — LAB ENCOUNTER (OUTPATIENT)
Dept: LAB | Facility: HOSPITAL | Age: 65
End: 2020-07-07
Attending: ORTHOPAEDIC SURGERY
Payer: MEDICARE

## 2020-07-07 DIAGNOSIS — Z01.818 PRE-OP TESTING: Primary | ICD-10-CM

## 2020-07-08 ENCOUNTER — OFFICE VISIT (OUTPATIENT)
Dept: OBGYN CLINIC | Facility: CLINIC | Age: 65
End: 2020-07-08
Payer: MEDICARE

## 2020-07-08 ENCOUNTER — TELEPHONE (OUTPATIENT)
Dept: OBGYN CLINIC | Facility: CLINIC | Age: 65
End: 2020-07-08

## 2020-07-08 ENCOUNTER — APPOINTMENT (OUTPATIENT)
Dept: LAB | Facility: HOSPITAL | Age: 65
DRG: 483 | End: 2020-07-08
Attending: ORTHOPAEDIC SURGERY
Payer: MEDICARE

## 2020-07-08 VITALS
SYSTOLIC BLOOD PRESSURE: 149 MMHG | WEIGHT: 149.38 LBS | DIASTOLIC BLOOD PRESSURE: 86 MMHG | HEART RATE: 86 BPM | BODY MASS INDEX: 28 KG/M2

## 2020-07-08 DIAGNOSIS — Z01.818 PREOPERATIVE TESTING: ICD-10-CM

## 2020-07-08 DIAGNOSIS — R31.0 GROSS HEMATURIA: Primary | ICD-10-CM

## 2020-07-08 DIAGNOSIS — R39.15 URINARY URGENCY: ICD-10-CM

## 2020-07-08 DIAGNOSIS — R31.0 GROSS HEMATURIA: ICD-10-CM

## 2020-07-08 LAB
BILIRUB UR QL: NEGATIVE
CLARITY UR: CLEAR
COLOR UR: COLORLESS
GLUCOSE UR-MCNC: NEGATIVE MG/DL
KETONES UR-MCNC: NEGATIVE MG/DL
LEUKOCYTE ESTERASE UR QL STRIP.AUTO: NEGATIVE
NITRITE UR QL STRIP.AUTO: NEGATIVE
PH UR: 7 [PH] (ref 5–8)
PROT UR-MCNC: NEGATIVE MG/DL
RBC #/AREA URNS AUTO: <1 /HPF
SARS-COV-2 RNA RESP QL NAA+PROBE: NOT DETECTED
SP GR UR STRIP: 1 (ref 1–1.03)
UROBILINOGEN UR STRIP-ACNC: <2
WBC #/AREA URNS AUTO: 0 /HPF

## 2020-07-08 PROCEDURE — 99213 OFFICE O/P EST LOW 20 MIN: CPT | Performed by: OBSTETRICS & GYNECOLOGY

## 2020-07-08 PROCEDURE — 1111F DSCHRG MED/CURRENT MED MERGE: CPT | Performed by: OBSTETRICS & GYNECOLOGY

## 2020-07-08 PROCEDURE — 87641 MR-STAPH DNA AMP PROBE: CPT

## 2020-07-08 PROCEDURE — 81001 URINALYSIS AUTO W/SCOPE: CPT

## 2020-07-08 RX ORDER — QUETIAPINE 50 MG/1
50 TABLET, FILM COATED ORAL NIGHTLY
COMMUNITY

## 2020-07-08 RX ORDER — METOCLOPRAMIDE 10 MG/1
10 TABLET ORAL ONCE
Status: DISCONTINUED | OUTPATIENT
Start: 2020-07-08 | End: 2020-07-08

## 2020-07-08 RX ORDER — NYSTATIN AND TRIAMCINOLONE ACETONIDE 100000; 1 [USP'U]/G; MG/G
1 OINTMENT TOPICAL 2 TIMES DAILY
Qty: 30 G | Refills: 0 | Status: SHIPPED | OUTPATIENT
Start: 2020-07-08 | End: 2020-12-16

## 2020-07-08 NOTE — TELEPHONE ENCOUNTER
Pt calling to report that when she went to the bathroom last night she noticed multiple bumps on the outside of rectum area. Pt stated that the bumps are tiny and not painful or itchy but pt is concerned.  Pt also stated that last week and yesterday she not

## 2020-07-09 LAB — MRSA DNA SPEC QL NAA+PROBE: NEGATIVE

## 2020-07-10 ENCOUNTER — ANESTHESIA (OUTPATIENT)
Dept: SURGERY | Facility: HOSPITAL | Age: 65
DRG: 483 | End: 2020-07-10
Payer: MEDICARE

## 2020-07-10 ENCOUNTER — HOSPITAL ENCOUNTER (INPATIENT)
Facility: HOSPITAL | Age: 65
LOS: 2 days | Discharge: HOME | DRG: 483 | End: 2020-07-12
Attending: ORTHOPAEDIC SURGERY | Admitting: ORTHOPAEDIC SURGERY
Payer: MEDICARE

## 2020-07-10 ENCOUNTER — ANESTHESIA EVENT (OUTPATIENT)
Dept: SURGERY | Facility: HOSPITAL | Age: 65
DRG: 483 | End: 2020-07-10
Payer: MEDICARE

## 2020-07-10 ENCOUNTER — APPOINTMENT (OUTPATIENT)
Dept: GENERAL RADIOLOGY | Facility: HOSPITAL | Age: 65
DRG: 483 | End: 2020-07-10
Attending: ORTHOPAEDIC SURGERY
Payer: MEDICARE

## 2020-07-10 DIAGNOSIS — G89.29 CHRONIC RIGHT SHOULDER PAIN: ICD-10-CM

## 2020-07-10 DIAGNOSIS — M25.511 CHRONIC RIGHT SHOULDER PAIN: ICD-10-CM

## 2020-07-10 DIAGNOSIS — Z01.818 PREOPERATIVE TESTING: Primary | ICD-10-CM

## 2020-07-10 DIAGNOSIS — M75.101 ROTATOR CUFF SYNDROME OF RIGHT SHOULDER: ICD-10-CM

## 2020-07-10 PROBLEM — Z47.1 AFTERCARE FOLLOWING RIGHT SHOULDER JOINT REPLACEMENT SURGERY: Status: ACTIVE | Noted: 2020-07-10

## 2020-07-10 PROBLEM — Z96.611 AFTERCARE FOLLOWING RIGHT SHOULDER JOINT REPLACEMENT SURGERY: Status: ACTIVE | Noted: 2020-07-10

## 2020-07-10 PROCEDURE — 99232 SBSQ HOSP IP/OBS MODERATE 35: CPT | Performed by: HOSPITALIST

## 2020-07-10 PROCEDURE — 3E0T3BZ INTRODUCTION OF ANESTHETIC AGENT INTO PERIPHERAL NERVES AND PLEXI, PERCUTANEOUS APPROACH: ICD-10-PCS | Performed by: ORTHOPAEDIC SURGERY

## 2020-07-10 PROCEDURE — 0RRJ00Z REPLACEMENT OF RIGHT SHOULDER JOINT WITH REVERSE BALL AND SOCKET SYNTHETIC SUBSTITUTE, OPEN APPROACH: ICD-10-PCS | Performed by: ORTHOPAEDIC SURGERY

## 2020-07-10 PROCEDURE — 76942 ECHO GUIDE FOR BIOPSY: CPT | Performed by: ANESTHESIOLOGY

## 2020-07-10 PROCEDURE — 73030 X-RAY EXAM OF SHOULDER: CPT | Performed by: ORTHOPAEDIC SURGERY

## 2020-07-10 DEVICE — IMPLANTABLE DEVICE
Type: IMPLANTABLE DEVICE | Site: SHOULDER | Status: FUNCTIONAL
Brand: FLEX SHOULDER SYSTEM

## 2020-07-10 RX ORDER — QUETIAPINE 25 MG/1
50 TABLET, FILM COATED ORAL NIGHTLY
Status: DISCONTINUED | OUTPATIENT
Start: 2020-07-10 | End: 2020-07-12

## 2020-07-10 RX ORDER — DEXAMETHASONE SODIUM PHOSPHATE 4 MG/ML
VIAL (ML) INJECTION AS NEEDED
Status: DISCONTINUED | OUTPATIENT
Start: 2020-07-10 | End: 2020-07-10 | Stop reason: SURG

## 2020-07-10 RX ORDER — HYDROCODONE BITARTRATE AND ACETAMINOPHEN 5; 325 MG/1; MG/1
2 TABLET ORAL AS NEEDED
Status: DISCONTINUED | OUTPATIENT
Start: 2020-07-10 | End: 2020-07-10 | Stop reason: HOSPADM

## 2020-07-10 RX ORDER — DIPHENHYDRAMINE HYDROCHLORIDE 50 MG/ML
25 INJECTION INTRAMUSCULAR; INTRAVENOUS ONCE AS NEEDED
Status: ACTIVE | OUTPATIENT
Start: 2020-07-10 | End: 2020-07-10

## 2020-07-10 RX ORDER — OXYCODONE HCL 10 MG/1
10 TABLET, FILM COATED, EXTENDED RELEASE ORAL EVERY 12 HOURS
Status: COMPLETED | OUTPATIENT
Start: 2020-07-10 | End: 2020-07-10

## 2020-07-10 RX ORDER — ENOXAPARIN SODIUM 100 MG/ML
40 INJECTION SUBCUTANEOUS DAILY
Status: DISCONTINUED | OUTPATIENT
Start: 2020-07-10 | End: 2020-07-12

## 2020-07-10 RX ORDER — CLONAZEPAM 1 MG/1
4 TABLET ORAL NIGHTLY
Status: DISCONTINUED | OUTPATIENT
Start: 2020-07-10 | End: 2020-07-12

## 2020-07-10 RX ORDER — CEFAZOLIN SODIUM/WATER 2 G/20 ML
2 SYRINGE (ML) INTRAVENOUS EVERY 8 HOURS
Status: COMPLETED | OUTPATIENT
Start: 2020-07-10 | End: 2020-07-11

## 2020-07-10 RX ORDER — MORPHINE SULFATE 4 MG/ML
4 INJECTION, SOLUTION INTRAMUSCULAR; INTRAVENOUS EVERY 2 HOUR PRN
Status: ACTIVE | OUTPATIENT
Start: 2020-07-10 | End: 2020-07-11

## 2020-07-10 RX ORDER — HYDROMORPHONE HYDROCHLORIDE 1 MG/ML
0.2 INJECTION, SOLUTION INTRAMUSCULAR; INTRAVENOUS; SUBCUTANEOUS EVERY 5 MIN PRN
Status: DISCONTINUED | OUTPATIENT
Start: 2020-07-10 | End: 2020-07-10 | Stop reason: HOSPADM

## 2020-07-10 RX ORDER — DULOXETIN HYDROCHLORIDE 60 MG/1
60 CAPSULE, DELAYED RELEASE ORAL NIGHTLY
Status: DISCONTINUED | OUTPATIENT
Start: 2020-07-10 | End: 2020-07-12

## 2020-07-10 RX ORDER — NALOXONE HYDROCHLORIDE 0.4 MG/ML
80 INJECTION, SOLUTION INTRAMUSCULAR; INTRAVENOUS; SUBCUTANEOUS AS NEEDED
Status: DISCONTINUED | OUTPATIENT
Start: 2020-07-10 | End: 2020-07-10 | Stop reason: HOSPADM

## 2020-07-10 RX ORDER — OXYCODONE HYDROCHLORIDE 5 MG/1
10 TABLET ORAL EVERY 4 HOURS PRN
Status: ACTIVE | OUTPATIENT
Start: 2020-07-10 | End: 2020-07-11

## 2020-07-10 RX ORDER — HYDROCODONE BITARTRATE AND ACETAMINOPHEN 10; 325 MG/1; MG/1
1 TABLET ORAL EVERY 6 HOURS PRN
Status: DISCONTINUED | OUTPATIENT
Start: 2020-07-10 | End: 2020-07-12

## 2020-07-10 RX ORDER — ARIPIPRAZOLE 5 MG/1
5 TABLET ORAL NIGHTLY
Status: DISCONTINUED | OUTPATIENT
Start: 2020-07-10 | End: 2020-07-12

## 2020-07-10 RX ORDER — DOCUSATE SODIUM 100 MG/1
100 CAPSULE, LIQUID FILLED ORAL 2 TIMES DAILY
Status: DISCONTINUED | OUTPATIENT
Start: 2020-07-10 | End: 2020-07-12

## 2020-07-10 RX ORDER — HALOPERIDOL 5 MG/ML
0.25 INJECTION INTRAMUSCULAR ONCE AS NEEDED
Status: DISCONTINUED | OUTPATIENT
Start: 2020-07-10 | End: 2020-07-10 | Stop reason: HOSPADM

## 2020-07-10 RX ORDER — OXYCODONE HYDROCHLORIDE 5 MG/1
5 TABLET ORAL EVERY 4 HOURS PRN
Status: ACTIVE | OUTPATIENT
Start: 2020-07-10 | End: 2020-07-11

## 2020-07-10 RX ORDER — ALBUTEROL SULFATE 2.5 MG/3ML
2.5 SOLUTION RESPIRATORY (INHALATION) EVERY 4 HOURS PRN
Status: DISCONTINUED | OUTPATIENT
Start: 2020-07-10 | End: 2020-07-12

## 2020-07-10 RX ORDER — HYDROCODONE BITARTRATE AND ACETAMINOPHEN 10; 325 MG/1; MG/1
2 TABLET ORAL EVERY 6 HOURS PRN
Status: DISCONTINUED | OUTPATIENT
Start: 2020-07-10 | End: 2020-07-12

## 2020-07-10 RX ORDER — ROPIVACAINE HYDROCHLORIDE 5 MG/ML
INJECTION, SOLUTION EPIDURAL; INFILTRATION; PERINEURAL AS NEEDED
Status: DISCONTINUED | OUTPATIENT
Start: 2020-07-10 | End: 2020-07-10 | Stop reason: SURG

## 2020-07-10 RX ORDER — POLYETHYLENE GLYCOL 3350 17 G/17G
17 POWDER, FOR SOLUTION ORAL DAILY PRN
Status: DISCONTINUED | OUTPATIENT
Start: 2020-07-10 | End: 2020-07-12

## 2020-07-10 RX ORDER — MIDAZOLAM HYDROCHLORIDE 1 MG/ML
INJECTION INTRAMUSCULAR; INTRAVENOUS AS NEEDED
Status: DISCONTINUED | OUTPATIENT
Start: 2020-07-10 | End: 2020-07-10 | Stop reason: SURG

## 2020-07-10 RX ORDER — SODIUM CHLORIDE, SODIUM LACTATE, POTASSIUM CHLORIDE, CALCIUM CHLORIDE 600; 310; 30; 20 MG/100ML; MG/100ML; MG/100ML; MG/100ML
INJECTION, SOLUTION INTRAVENOUS CONTINUOUS
Status: DISCONTINUED | OUTPATIENT
Start: 2020-07-10 | End: 2020-07-12

## 2020-07-10 RX ORDER — SODIUM PHOSPHATE, DIBASIC AND SODIUM PHOSPHATE, MONOBASIC 7; 19 G/133ML; G/133ML
1 ENEMA RECTAL ONCE AS NEEDED
Status: DISCONTINUED | OUTPATIENT
Start: 2020-07-10 | End: 2020-07-12

## 2020-07-10 RX ORDER — GLYCOPYRROLATE 0.2 MG/ML
INJECTION, SOLUTION INTRAMUSCULAR; INTRAVENOUS AS NEEDED
Status: DISCONTINUED | OUTPATIENT
Start: 2020-07-10 | End: 2020-07-10 | Stop reason: SURG

## 2020-07-10 RX ORDER — NEOSTIGMINE METHYLSULFATE 1 MG/ML
INJECTION INTRAVENOUS AS NEEDED
Status: DISCONTINUED | OUTPATIENT
Start: 2020-07-10 | End: 2020-07-10 | Stop reason: SURG

## 2020-07-10 RX ORDER — HYDROCODONE BITARTRATE AND ACETAMINOPHEN 5; 325 MG/1; MG/1
1 TABLET ORAL AS NEEDED
Status: DISCONTINUED | OUTPATIENT
Start: 2020-07-10 | End: 2020-07-10 | Stop reason: HOSPADM

## 2020-07-10 RX ORDER — ACETAMINOPHEN 500 MG
1000 TABLET ORAL ONCE
Status: COMPLETED | OUTPATIENT
Start: 2020-07-10 | End: 2020-07-10

## 2020-07-10 RX ORDER — MORPHINE SULFATE 2 MG/ML
1 INJECTION, SOLUTION INTRAMUSCULAR; INTRAVENOUS EVERY 2 HOUR PRN
Status: DISCONTINUED | OUTPATIENT
Start: 2020-07-10 | End: 2020-07-12

## 2020-07-10 RX ORDER — FAMOTIDINE 20 MG/1
20 TABLET ORAL ONCE
Status: DISCONTINUED | OUTPATIENT
Start: 2020-07-10 | End: 2020-07-10 | Stop reason: HOSPADM

## 2020-07-10 RX ORDER — PROCHLORPERAZINE EDISYLATE 5 MG/ML
10 INJECTION INTRAMUSCULAR; INTRAVENOUS EVERY 6 HOURS PRN
Status: ACTIVE | OUTPATIENT
Start: 2020-07-10 | End: 2020-07-12

## 2020-07-10 RX ORDER — AMLODIPINE BESYLATE 5 MG/1
5 TABLET ORAL NIGHTLY
Status: DISCONTINUED | OUTPATIENT
Start: 2020-07-10 | End: 2020-07-12

## 2020-07-10 RX ORDER — MORPHINE SULFATE 4 MG/ML
2 INJECTION, SOLUTION INTRAMUSCULAR; INTRAVENOUS EVERY 10 MIN PRN
Status: DISCONTINUED | OUTPATIENT
Start: 2020-07-10 | End: 2020-07-10 | Stop reason: HOSPADM

## 2020-07-10 RX ORDER — HYDROCODONE BITARTRATE AND ACETAMINOPHEN 10; 325 MG/1; MG/1
1 TABLET ORAL EVERY 4 HOURS PRN
Qty: 20 TABLET | Refills: 0 | Status: SHIPPED | OUTPATIENT
Start: 2020-07-10 | End: 2020-07-14

## 2020-07-10 RX ORDER — MORPHINE SULFATE 4 MG/ML
6 INJECTION, SOLUTION INTRAMUSCULAR; INTRAVENOUS EVERY 2 HOUR PRN
Status: ACTIVE | OUTPATIENT
Start: 2020-07-10 | End: 2020-07-11

## 2020-07-10 RX ORDER — ONDANSETRON 2 MG/ML
4 INJECTION INTRAMUSCULAR; INTRAVENOUS EVERY 4 HOURS PRN
Status: DISPENSED | OUTPATIENT
Start: 2020-07-10 | End: 2020-07-12

## 2020-07-10 RX ORDER — MORPHINE SULFATE 4 MG/ML
4 INJECTION, SOLUTION INTRAMUSCULAR; INTRAVENOUS EVERY 2 HOUR PRN
Status: DISCONTINUED | OUTPATIENT
Start: 2020-07-10 | End: 2020-07-12

## 2020-07-10 RX ORDER — HYDROMORPHONE HYDROCHLORIDE 1 MG/ML
0.6 INJECTION, SOLUTION INTRAMUSCULAR; INTRAVENOUS; SUBCUTANEOUS EVERY 5 MIN PRN
Status: DISCONTINUED | OUTPATIENT
Start: 2020-07-10 | End: 2020-07-10 | Stop reason: HOSPADM

## 2020-07-10 RX ORDER — MORPHINE SULFATE 15 MG/1
15 TABLET ORAL EVERY 4 HOURS PRN
Status: ACTIVE | OUTPATIENT
Start: 2020-07-10 | End: 2020-07-11

## 2020-07-10 RX ORDER — PROCHLORPERAZINE EDISYLATE 5 MG/ML
5 INJECTION INTRAMUSCULAR; INTRAVENOUS ONCE AS NEEDED
Status: DISCONTINUED | OUTPATIENT
Start: 2020-07-10 | End: 2020-07-10 | Stop reason: HOSPADM

## 2020-07-10 RX ORDER — EPHEDRINE SULFATE 50 MG/ML
INJECTION, SOLUTION INTRAVENOUS AS NEEDED
Status: DISCONTINUED | OUTPATIENT
Start: 2020-07-10 | End: 2020-07-10 | Stop reason: SURG

## 2020-07-10 RX ORDER — ONDANSETRON 2 MG/ML
4 INJECTION INTRAMUSCULAR; INTRAVENOUS ONCE AS NEEDED
Status: DISCONTINUED | OUTPATIENT
Start: 2020-07-10 | End: 2020-07-10 | Stop reason: HOSPADM

## 2020-07-10 RX ORDER — MORPHINE SULFATE 2 MG/ML
2 INJECTION, SOLUTION INTRAMUSCULAR; INTRAVENOUS EVERY 2 HOUR PRN
Status: DISPENSED | OUTPATIENT
Start: 2020-07-10 | End: 2020-07-11

## 2020-07-10 RX ORDER — CEFAZOLIN SODIUM/WATER 2 G/20 ML
2 SYRINGE (ML) INTRAVENOUS ONCE
Status: DISCONTINUED | OUTPATIENT
Start: 2020-07-10 | End: 2020-07-10 | Stop reason: HOSPADM

## 2020-07-10 RX ORDER — DEXAMETHASONE SODIUM PHOSPHATE 10 MG/ML
INJECTION, SOLUTION INTRAMUSCULAR; INTRAVENOUS AS NEEDED
Status: DISCONTINUED | OUTPATIENT
Start: 2020-07-10 | End: 2020-07-10 | Stop reason: SURG

## 2020-07-10 RX ORDER — LIDOCAINE HYDROCHLORIDE 10 MG/ML
INJECTION, SOLUTION EPIDURAL; INFILTRATION; INTRACAUDAL; PERINEURAL AS NEEDED
Status: DISCONTINUED | OUTPATIENT
Start: 2020-07-10 | End: 2020-07-10 | Stop reason: SURG

## 2020-07-10 RX ORDER — BISACODYL 10 MG
10 SUPPOSITORY, RECTAL RECTAL
Status: DISCONTINUED | OUTPATIENT
Start: 2020-07-10 | End: 2020-07-12

## 2020-07-10 RX ORDER — ROCURONIUM BROMIDE 10 MG/ML
INJECTION, SOLUTION INTRAVENOUS AS NEEDED
Status: DISCONTINUED | OUTPATIENT
Start: 2020-07-10 | End: 2020-07-10 | Stop reason: SURG

## 2020-07-10 RX ORDER — HYDROMORPHONE HYDROCHLORIDE 1 MG/ML
0.4 INJECTION, SOLUTION INTRAMUSCULAR; INTRAVENOUS; SUBCUTANEOUS EVERY 5 MIN PRN
Status: DISCONTINUED | OUTPATIENT
Start: 2020-07-10 | End: 2020-07-10 | Stop reason: HOSPADM

## 2020-07-10 RX ORDER — CEFAZOLIN SODIUM/WATER 2 G/20 ML
SYRINGE (ML) INTRAVENOUS AS NEEDED
Status: DISCONTINUED | OUTPATIENT
Start: 2020-07-10 | End: 2020-07-10 | Stop reason: SURG

## 2020-07-10 RX ORDER — SODIUM CHLORIDE 0.9 % (FLUSH) 0.9 %
10 SYRINGE (ML) INJECTION AS NEEDED
Status: DISCONTINUED | OUTPATIENT
Start: 2020-07-10 | End: 2020-07-12

## 2020-07-10 RX ORDER — METOCLOPRAMIDE HYDROCHLORIDE 5 MG/ML
10 INJECTION INTRAMUSCULAR; INTRAVENOUS EVERY 6 HOURS PRN
Status: ACTIVE | OUTPATIENT
Start: 2020-07-10 | End: 2020-07-12

## 2020-07-10 RX ORDER — MORPHINE SULFATE 2 MG/ML
2 INJECTION, SOLUTION INTRAMUSCULAR; INTRAVENOUS EVERY 2 HOUR PRN
Status: DISCONTINUED | OUTPATIENT
Start: 2020-07-10 | End: 2020-07-12

## 2020-07-10 RX ORDER — SENNOSIDES 8.6 MG
17.2 TABLET ORAL NIGHTLY
Status: DISCONTINUED | OUTPATIENT
Start: 2020-07-10 | End: 2020-07-12

## 2020-07-10 RX ORDER — SODIUM CHLORIDE, SODIUM LACTATE, POTASSIUM CHLORIDE, CALCIUM CHLORIDE 600; 310; 30; 20 MG/100ML; MG/100ML; MG/100ML; MG/100ML
INJECTION, SOLUTION INTRAVENOUS CONTINUOUS
Status: DISCONTINUED | OUTPATIENT
Start: 2020-07-10 | End: 2020-07-10 | Stop reason: HOSPADM

## 2020-07-10 RX ORDER — ACETAMINOPHEN 500 MG
1000 TABLET ORAL EVERY 8 HOURS
Status: ACTIVE | OUTPATIENT
Start: 2020-07-10 | End: 2020-07-11

## 2020-07-10 RX ORDER — MORPHINE SULFATE 10 MG/ML
6 INJECTION, SOLUTION INTRAMUSCULAR; INTRAVENOUS EVERY 10 MIN PRN
Status: DISCONTINUED | OUTPATIENT
Start: 2020-07-10 | End: 2020-07-10 | Stop reason: HOSPADM

## 2020-07-10 RX ORDER — MORPHINE SULFATE 4 MG/ML
4 INJECTION, SOLUTION INTRAMUSCULAR; INTRAVENOUS EVERY 10 MIN PRN
Status: DISCONTINUED | OUTPATIENT
Start: 2020-07-10 | End: 2020-07-10 | Stop reason: HOSPADM

## 2020-07-10 RX ADMIN — EPHEDRINE SULFATE 5 MG: 50 INJECTION, SOLUTION INTRAVENOUS at 08:44:00

## 2020-07-10 RX ADMIN — CEFAZOLIN SODIUM/WATER 2 G: 2 G/20 ML SYRINGE (ML) INTRAVENOUS at 08:00:00

## 2020-07-10 RX ADMIN — ROPIVACAINE HYDROCHLORIDE 30 ML: 5 INJECTION, SOLUTION EPIDURAL; INFILTRATION; PERINEURAL at 07:07:00

## 2020-07-10 RX ADMIN — MIDAZOLAM HYDROCHLORIDE 2 MG: 1 INJECTION INTRAMUSCULAR; INTRAVENOUS at 07:00:00

## 2020-07-10 RX ADMIN — EPHEDRINE SULFATE 10 MG: 50 INJECTION, SOLUTION INTRAVENOUS at 09:19:00

## 2020-07-10 RX ADMIN — NEOSTIGMINE METHYLSULFATE 3 MG: 1 INJECTION INTRAVENOUS at 09:40:00

## 2020-07-10 RX ADMIN — SODIUM CHLORIDE, SODIUM LACTATE, POTASSIUM CHLORIDE, CALCIUM CHLORIDE: 600; 310; 30; 20 INJECTION, SOLUTION INTRAVENOUS at 10:07:00

## 2020-07-10 RX ADMIN — ROCURONIUM BROMIDE 3 MG: 10 INJECTION, SOLUTION INTRAVENOUS at 07:52:00

## 2020-07-10 RX ADMIN — DEXAMETHASONE SODIUM PHOSPHATE 4 MG: 10 INJECTION, SOLUTION INTRAMUSCULAR; INTRAVENOUS at 07:07:00

## 2020-07-10 RX ADMIN — LIDOCAINE HYDROCHLORIDE 50 MG: 10 INJECTION, SOLUTION EPIDURAL; INFILTRATION; INTRACAUDAL; PERINEURAL at 07:52:00

## 2020-07-10 RX ADMIN — DEXAMETHASONE SODIUM PHOSPHATE 4 MG: 4 MG/ML VIAL (ML) INJECTION at 08:05:00

## 2020-07-10 RX ADMIN — EPHEDRINE SULFATE 10 MG: 50 INJECTION, SOLUTION INTRAVENOUS at 08:41:00

## 2020-07-10 RX ADMIN — GLYCOPYRROLATE 0.4 MG: 0.2 INJECTION, SOLUTION INTRAMUSCULAR; INTRAVENOUS at 09:40:00

## 2020-07-10 RX ADMIN — LIDOCAINE HYDROCHLORIDE 5 ML: 10 INJECTION, SOLUTION EPIDURAL; INFILTRATION; INTRACAUDAL; PERINEURAL at 07:04:00

## 2020-07-10 RX ADMIN — EPHEDRINE SULFATE 10 MG: 50 INJECTION, SOLUTION INTRAVENOUS at 08:21:00

## 2020-07-10 RX ADMIN — ROCURONIUM BROMIDE 20 MG: 10 INJECTION, SOLUTION INTRAVENOUS at 09:10:00

## 2020-07-10 NOTE — ANESTHESIA PROCEDURE NOTES
Airway  Urgency: Elective      General Information and Staff    Patient location during procedure: OR  Anesthesiologist: Jose Guadalupe Michel DO  Resident/CRNA: Stefani Botello CRNA  Performed: CRNA     Indications and Patient Condition  Indications for airway ma

## 2020-07-10 NOTE — ANESTHESIA POSTPROCEDURE EVALUATION
Patient: Eric Aviles    Procedure Summary     Date:  07/10/20 Room / Location:  [facility] OR  / [facility] OR    Anesthesia Start:  1054 Anesthesia Stop:  0743    Procedure:  SHOULDER TOTAL (Right Shoulder) Diagnosis:       Chronic right shoulder pain

## 2020-07-10 NOTE — PROGRESS NOTES
[facility]    Progress Note    Filemon Lynch Patient Status:  Inpatient    1955 MRN [id number]   Location [facility] 4W/SW/SE Attending Linden Kam MD   Hosp Day # 0 PCP Vipin Benoit MD        Subjective:     Constitution hypertension  CONT HOME MEDS, MONITOR.              Results:     Lab Results   Component Value Date    WBC 5.0 12/03/2019    HGB 13.1 12/03/2019    HCT 39.5 12/03/2019    .0 12/03/2019    CREATSERUM 0.56 10/02/2019    BUN 7 10/02/2019     10/02

## 2020-07-10 NOTE — ANESTHESIA PROCEDURE NOTES
Peripheral Block  Performed by: Holly Thomas DO  Authorized by: Holly Thomas DO       General Information and Staff    Start Time:   Anesthesiologist: Holly Thomas DO  Performed by:   Anesthesiologist  Patient Location:  PACU      Site Identification:

## 2020-07-10 NOTE — H&P
Eric Aviles  32/1955  77year old   female  Yulia Martinez MD     HPI:   Patient presents with: Follow - Up: Discuss Right shoulder arthroplasty        The patient complains of pain located right shoulder.   The pain is the same with motion and at [facility] Sx.6/1/15, CPT. 22187, L Total Knee, w/, Global Exp.8/30/15 6/8/2015   • Sx.6/8/16, CPT. 33938, R Total Knee Replacement, w/, Global Exp.9/6/16 1/14/2016   • Visual impairment       WEARS READER             Past Surgical History:   Proced Cancer Sister 36   • Ovarian Cancer Sister     • Other (Other) Sister           flu H1N1   • Breast Cancer Paternal Aunt     • Glaucoma Neg           multiple   • Macular degeneration Neg           multiple       Social History    Tobacco Use      Smoking and humeral head osteophytes and superior migration without glenoid wear (E0)     ASSESSMENT AND PLAN:   Chronic right shoulder pain  (primary encounter diagnosis)  Right rotator cuff tear arthropathy     The risks, indications, benefits, and procedures of

## 2020-07-10 NOTE — BRIEF OP NOTE
Pre-Operative Diagnosis: right shoulder rotator cuff arthropathy     Post-Operative Diagnosis: same     Procedure Performed:   Procedure(s):  RIGHT REVERSE TOTAL SHOULDER ARTHROPLASTY     Surgeon(s) and Role:     Jordana Rubalcava MD - Primary    Assista

## 2020-07-10 NOTE — PLAN OF CARE
Alert and oriented x 3, 2l of oxygen currently, will wean. Check void by 1900. ABD pad and tape c/d/I. Ice to the shoulder and immobilizer sling in place. Hemovac in. Will continue to monitor.    Problem: Patient Centered Care  Goal: Patient preferences are

## 2020-07-10 NOTE — OPERATIVE REPORT
[facility]    PATIENT'S NAME: Alaina Minor   ATTENDING PHYSICIAN: Aide Veliz MD   OPERATING PHYSICIAN: Deric Gregg MD   PATIENT ACCOUNT#:   [de-identified]    LOCATION:  [address] [place] [address] #:   [id number]       DATE OF BIRTH: TECHNIQUE:  On 07/10/2020, the patient was seen and evaluated preoperatively. Her right shoulder was identified as the correct operative site. My initials were placed.   She was transferred to the operating room and transferred to the operating table in a further compress the baseplate to the glenoid. Then the superior and inferior locking screws were secured. The patient's baseplate was stable. The wound was thoroughly and copiously irrigated.   A 36 mm glenosphere was press-fit into place and a central postoperative pain control, postoperative DVT prophylaxis, and postoperative antibiotic therapy. All of her questions were answered. She was in agreement with the treatment plan.      Dictated By Chris Garza MD  d: 07/10/2020 10:07:37  t: 07/10/20

## 2020-07-10 NOTE — ANESTHESIA PREPROCEDURE EVALUATION
Anesthesia PreOp Note    HPI:     Omayra Silveira is a 77year old female who presents for preoperative consultation requested by: Raj Acosta MD    Date of Surgery: 7/10/2020    Procedure(s):  SHOULDER TOTAL  Indication: Chronic right shoulder pain [M2 side         Date Noted: 01/16/2018      Emphysema (subcutaneous) (surgical) resulting from a procedure, initial encounter      Hyperglycemia         Date Noted: 04/23/2017      Seborrheic keratoses         Date Noted: 03/07/2017      Sx.6/8/16, CPT. 53276, Arthrocentesis of the left knee joint   • OTHER SURGICAL HISTORY  08/2019    rotator cuff   • SHOULDER TOTAL Left 3/22/2019    Performed by Sariah Pierson MD at [address] [place] [address]   • TOTAL KNEE REPLACEMENT Bilateral 2015 and 2016   • TUBAL LIGATION         QU syringe 2 g, 2 g, Intravenous, Once, Aurora Aguayo MD    HYDROcodone-acetaminophen  MG Oral Tab, Take 1 tablet by mouth every 4 (four) hours as needed. , Disp: 20 tablet, Rfl: 0          Sulfa Antibiotics       RASH  Sulfanilamide           RASH week: Not on file        Minutes per session: Not on file      Stress: Not on file    Relationships      Social connections:        Talks on phone: Not on file        Gets together: Not on file        Attends [employer] service: Not on file        Active me (142 lb)     Height: 1.549 m (5' 1\")           Anesthesia Evaluation      Airway   Mallampati: I  TM distance: >3 FB  Neck ROM: full  Dental    (+) upper dentures and lower dentures    Pulmonary - normal exam     ROS comment: No treatments  Cardiovascular

## 2020-07-10 NOTE — PROGRESS NOTES
7/2/2020  Alberto Landau Terran  32/1955  77year old   female      HPI:     The patient denies pain. She has numbness form her block. The patient denies redness, swelling, or drainage from their wound. The patient denies systemic symptoms.     EXAM:   /52

## 2020-07-11 LAB
ANION GAP SERPL CALC-SCNC: 7 MMOL/L (ref 0–18)
BUN BLD-MCNC: 8 MG/DL (ref 7–18)
BUN/CREAT SERPL: 14.3 (ref 10–20)
CALCIUM BLD-MCNC: 8.4 MG/DL (ref 8.5–10.1)
CHLORIDE SERPL-SCNC: 102 MMOL/L (ref 98–112)
CO2 SERPL-SCNC: 28 MMOL/L (ref 21–32)
CREAT BLD-MCNC: 0.56 MG/DL (ref 0.55–1.02)
DEPRECATED RDW RBC AUTO: 41.1 FL (ref 35.1–46.3)
ERYTHROCYTE [DISTWIDTH] IN BLOOD BY AUTOMATED COUNT: 12.4 % (ref 11–15)
GLUCOSE BLD-MCNC: 117 MG/DL (ref 70–99)
HCT VFR BLD AUTO: 26.6 % (ref 35–48)
HGB BLD-MCNC: 8.9 G/DL (ref 12–16)
MCH RBC QN AUTO: 30.5 PG (ref 26–34)
MCHC RBC AUTO-ENTMCNC: 33.5 G/DL (ref 31–37)
MCV RBC AUTO: 91.1 FL (ref 80–100)
OSMOLALITY SERPL CALC.SUM OF ELEC: 283 MOSM/KG (ref 275–295)
PLATELET # BLD AUTO: 202 10(3)UL (ref 150–450)
POTASSIUM SERPL-SCNC: 4.4 MMOL/L (ref 3.5–5.1)
RBC # BLD AUTO: 2.92 X10(6)UL (ref 3.8–5.3)
SODIUM SERPL-SCNC: 137 MMOL/L (ref 136–145)
WBC # BLD AUTO: 6.8 X10(3) UL (ref 4–11)

## 2020-07-11 PROCEDURE — 99233 SBSQ HOSP IP/OBS HIGH 50: CPT | Performed by: HOSPITALIST

## 2020-07-11 NOTE — PROGRESS NOTES
[address] [facility] Patient Status:  Inpatient    1955 MRN [id number]   Location [facility] 4W/SW/SE Attending Lucina Mchugh MD   [facility] Day # 1 PCP MD Bere Boles Argenis Conner is a 77year old female patient.     Pa Sx.6/8/16, CPT. 05104, R Total Knee Replacement, w/, Global Exp.9/6/16         Date Noted: 01/14/2016      Raynaud's disease         Date Noted: 08/29/2014      Osteoarthritis         Date Noted: 08/28/2014        Past Medical History:   Diagnosis

## 2020-07-11 NOTE — OCCUPATIONAL THERAPY NOTE
OCCUPATIONAL THERAPY EVALUATION - INPATIENT      Room Number: 423/423-A  Evaluation Date: 7/11/2020  Type of Evaluation: Initial  Presenting Problem: R reverse TSA. NWB R UE.      Physician Order: IP Consult to Occupational Therapy  Reason for Therapy: ADL/ Research supports that patients with this level of impairment may benefit from Sub-acute rehab. However, pt ganesh do better with pain management and will have family assist at home.  May be able to return home with 24 hour assist.       Gael Melendez KNEE SURGERY Right     osteoarthritis   • JESSICA LOCALIZATION WIRE 1 SITE LEFT (CPT=19281)      2011   • OTHER Left 03/22/2019    LEFT REVERSE TOTAL SHOULDER ARTHROPLASTY WITH BICEPS TENODESIS   • OTHER SURGICAL HISTORY      Laparotomy (adhesions)   • OTHER S DAILY LIVING ASSESSMENT  AM-PAC ‘6-Clicks’ Inpatient Daily Activity Short Form  How much help from another person does the patient currently need…  -   Putting on and taking off regular lower body clothing?: A Little  -   Bathing (including washing, rinsin

## 2020-07-11 NOTE — ANESTHESIA POST-OP FOLLOW-UP NOTE
Ms. Maye Francisco is POD#1 after her right total shoulder performed with interscalene nerve block. She states that her pain was well controlled overnight, now feels that her block has worn off.    Able to wiggle fingers of right hand, good hand  on rig

## 2020-07-11 NOTE — PROGRESS NOTES
[facility]    Progress Note    Ariella Quintero Patient Status:  Inpatient    1955 MRN [id number]   Location [facility] 4W/SW/SE Attending Ariel Espinoza MD   Hosp Day # 1 PCP Mil Edmonds MD       Subjective:   Ariella Quintero is st Sammie Giraldo MD on 7/10/2020 at 11:20 AM     Finalized by (CST):  Holly Calle MD on 7/10/2020 at 11:21 AM               • docusate sodium  100 mg Oral BID   • enoxaparin  40 mg Subcutaneous Daily   • Senna  17.2 mg Oral Nightly   • amLODIPine Besylate  5 mg

## 2020-07-12 VITALS
HEART RATE: 78 BPM | TEMPERATURE: 99 F | SYSTOLIC BLOOD PRESSURE: 131 MMHG | DIASTOLIC BLOOD PRESSURE: 71 MMHG | OXYGEN SATURATION: 90 % | WEIGHT: 142 LBS | HEIGHT: 61 IN | BODY MASS INDEX: 26.81 KG/M2 | RESPIRATION RATE: 16 BRPM

## 2020-07-12 LAB
DEPRECATED RDW RBC AUTO: 40.1 FL (ref 35.1–46.3)
ERYTHROCYTE [DISTWIDTH] IN BLOOD BY AUTOMATED COUNT: 12.1 % (ref 11–15)
HCT VFR BLD AUTO: 25.9 % (ref 35–48)
HGB BLD-MCNC: 8.6 G/DL (ref 12–16)
MCH RBC QN AUTO: 29.9 PG (ref 26–34)
MCHC RBC AUTO-ENTMCNC: 33.2 G/DL (ref 31–37)
MCV RBC AUTO: 89.9 FL (ref 80–100)
PLATELET # BLD AUTO: 207 10(3)UL (ref 150–450)
RBC # BLD AUTO: 2.88 X10(6)UL (ref 3.8–5.3)
WBC # BLD AUTO: 8.5 X10(3) UL (ref 4–11)

## 2020-07-12 PROCEDURE — 99239 HOSP IP/OBS DSCHRG MGMT >30: CPT | Performed by: HOSPITALIST

## 2020-07-12 NOTE — DISCHARGE SUMMARY
[facility]    Discharge Summary    Kayla Muñoz Patient Status:  Inpatient    1955 MRN [id number]   Location [facility] 4W/SW/SE Attending Jennifer Swan MD   Hosp Day # 2 PCP Tilford Boas, MD     Date of Admission: 7/10/2020 Date Procedure Surgeon Location Status    [id number] 7/10/20 SHOULDER TOTAL Ulises Dacosta  [facility]            Discharge Plan:   Discharge Condition: Stable    Current Discharge Medication List    New Orders    HYDROcodone-acetaminophen  MG · when to take this  · reasons to take this      Take 1 tablet (10 mg total) by mouth daily.    Quantity:  60 tablet  Refills:  2        CONTINUE taking these medications      Instructions Prescription details   Abilify 5 MG Tabs  Generic drug:  Clifton Paiz information:  [address] [facility] [place]  [phone]                   Follow up Labs and imaging: Other Discharge Instructions:       Lucina Baum MD      Reverse Total Shoulder Arthroplasty    ?  THE OPERATION:  Your operation was surgery after you remove the large bandage. To bathe, remove the sling and support your arm by your side. To wash under your armpit, lean over and let the arm fall away from your body. DO NOT raise your arm!   You may gently wash the incision with regula

## 2020-07-12 NOTE — PLAN OF CARE
Alert and oriented x4. Pale. 3L O2 per NC. Attempted to wean off O2, but Room air is only 90% Encouraged incentive spirometry 10x/hr while awake. CMS intact. Tolerated diet. LBM 7/9/20. Voiding freely. OOB  x 1 assist RUE immobilizer in place.   Pain manage factors as ordered  - Implement neutropenic guidelines  Outcome: Progressing     Problem: SAFETY ADULT - FALL  Goal: Free from fall injury  Description  INTERVENTIONS:  - Assess pt frequently for physical needs  - Identify cognitive and physical deficits a Smoking Cessation handout, if applicable  - Encourage broncho-pulmonary hygiene including cough, deep breathe, Incentive Spirometry  - Assess the need for suctioning and perform as needed  - Assess and instruct to report SOB or any respiratory difficulty ADULT  Goal: Return mobility to safest level of function  Description  INTERVENTIONS:  - Assess patient stability and activity tolerance for standing, transferring and ambulating w/ or w/o assistive devices  - Assist with transfers and ambulation using saf

## 2020-07-12 NOTE — PROGRESS NOTES
HPI:    Patient ID: Rajiv Jasso is a 77year old female. HPI   and . She is here today c/o some brown tinge on toilet tissue when wiping after urination. She also noticed some bumps near the anal area.  No vaginal DC, spontaneous vaginal blee nebulization every 4 (four) hours as needed for Wheezing. 60 ampule 1   • ClonazePAM (KLONOPIN) 2 MG Oral Tab Take 4 mg by mouth nightly. 2   • ARIpiprazole (ABILIFY) 5 MG Oral Tab Take 5 mg by mouth nightly.        • DULoxetine HCl (CYMBALTA) 60 MG Oral

## 2020-07-12 NOTE — PLAN OF CARE
Pt seen by Dr. Daniel Felix and cleared for DC. Vitals have been stable per MAR. Patient &  verbalize understanding of all discharge instructions, when to follow up with Dr. Yesi Alexis, and next dose of pain meds.  Patient and  verbalized understandin guidelines  Outcome: Adequate for Discharge     Problem: SAFETY ADULT - FALL  Goal: Free from fall injury  Description  INTERVENTIONS:  - Assess pt frequently for physical needs  - Identify cognitive and physical deficits and behaviors that affect risk of Cessation handout, if applicable  - Encourage broncho-pulmonary hygiene including cough, deep breathe, Incentive Spirometry  - Assess the need for suctioning and perform as needed  - Assess and instruct to report SOB or any respiratory difficulty  - Respir Problem: MUSCULOSKELETAL - ADULT  Goal: Return mobility to safest level of function  Description  INTERVENTIONS:  - Assess patient stability and activity tolerance for standing, transferring and ambulating w/ or w/o assistive devices  - Assist with trans

## 2020-07-12 NOTE — PLAN OF CARE
Nerve block wore off last night, pt woke up in te with 10/10 pain. Morphine given q2H for 9-10/10 pain. BP has been low, Dr. Kristen Nino aware, continue NS @ 125 ml/hr. per [facility]. Hemovac removed. Immobilizer in place.    Problem: Patient Centered Care  Goal: Maddie Identify cognitive and physical deficits and behaviors that affect risk of falls.   - [place] fall precautions as indicated by assessment.  - Educate pt/family on patient safety including physical limitations  - Instruct pt to call for assistance with act

## 2020-07-12 NOTE — OCCUPATIONAL THERAPY NOTE
OCCUPATIONAL THERAPY TREATMENT NOTE - INPATIENT    Room Number: 423/423-A         Presenting Problem: R reverse TSA. NWB R UE.       Problem List  Principal Problem:    Right rotator cuff tear arthropathy  Active Problems:    Essential hypertension      OCC anna agreeable for ot session    OBJECTIVE  Precautions: Limb alert - right;Bed/chair alarm    WEIGHT BEARING RESTRICTION  Weight Bearing Restriction: R upper extremity  R Upper Extremity: Non-Weight Bearing             PAIN ASSESSMENT  Rating: 10  Locatio met;Call light within reach;RN aware of session/findings; Alarm set    OT Goals:    Patient will complete LE dressing with SBA  Comment: min asisst with cross leg technique    Patient will complete toileting with SBA  Comment: mod assist    Patient will com

## 2020-07-13 NOTE — PAYOR COMM NOTE
--------------  DISCHARGE REVIEW    Payor: Flora Martinez #:  [id number]  Authorization Number: [id number]    Admit date: 7/10/20  Admit time:  6445  Discharge Date: 7/12/2020  1:28 PM     Admitting Physician: Bernadette Bright MD  Attendi guarding. Neurologic: No focal neurological deficits. Musculoskeletal: Moves all extremities.     Hospital Course:   Right rotator cuff tear arthropathy  S/P RIGHT REVERSE TOTAL SHOULDER ARTHROPLASTY POD  2, INTERSCALENE BLOCK, PAIN CONTROL WITH  NORCO- mouth every 4 (four) hours as needed.    Stop taking on:  July 20, 2020  Quantity:  20 tablet  Refills:  0        CHANGE how you take these medications      Instructions Prescription details   amLODIPine Besylate 5 MG Tabs  Commonly known as:  Tiana Fabian  What Information     Angel Sheth MD. Schedule an appointment as soon as possible for a visit in 2 weeks.     Specialties:  SURGERY, ORTHOPEDIC, HAND SURGERY  Why:  For suture removal, For x-rays  Contact information:  [address] through an incision in front of your shoulder. The stitches will absorb and will not need to be removed. You may remove the large bandage 2 days after surgery. The incision may be sore, swell, and develop bruising over the next several days.   This will

## 2020-08-04 ENCOUNTER — OFFICE VISIT (OUTPATIENT)
Dept: PHYSICAL THERAPY | Age: 65
End: 2020-08-04
Attending: ORTHOPAEDIC SURGERY
Payer: MEDICARE

## 2020-08-04 DIAGNOSIS — Z96.611 AFTERCARE FOLLOWING RIGHT SHOULDER JOINT REPLACEMENT SURGERY: ICD-10-CM

## 2020-08-04 DIAGNOSIS — Z47.1 AFTERCARE FOLLOWING RIGHT SHOULDER JOINT REPLACEMENT SURGERY: ICD-10-CM

## 2020-08-04 PROCEDURE — 97163 PT EVAL HIGH COMPLEX 45 MIN: CPT

## 2020-08-04 PROCEDURE — 97110 THERAPEUTIC EXERCISES: CPT

## 2020-08-04 NOTE — PROGRESS NOTES
POST-OP SHOULDER EVALUATION:   Referring Physician: Dr. Butch Garsia  Aftercare following right shoulder joint replacement surgery (Z11.8,H66.702)   Date of Service: 8/4/2020   Date of surgery: 7/10/20  PATIENT SUMMARY:   Eric Vargas is a 77year old y/o fem evaluation with primary c/o R shoulder pain s/p reverse TSA. The results of the objective tests and measures show limited PROM R shoulder,  R AROM and strength not tested due to protocol.   Functional deficits include but are not limited to ADLs including d was instructed in and issued a HEP for: wear sling  Treatment provided today: STM to R biceps and deltoid, PROM flex in scap plane  Charges: PT Tomasa High Complexity, TEx1      Total Timed Treatment: 10 min     Total Treatment Time: 45 min      PLAN OF CARE of treatment and while under my care.         X______________________________________________ Date________________  Certification From: 5/7/8518      To: 11/2/2020

## 2020-08-05 ENCOUNTER — TELEPHONE (OUTPATIENT)
Dept: RHEUMATOLOGY | Facility: CLINIC | Age: 65
End: 2020-08-05

## 2020-08-05 RX ORDER — ACETAMINOPHEN AND CODEINE PHOSPHATE 300; 30 MG/1; MG/1
TABLET ORAL
Qty: 90 TABLET | Refills: 0 | Status: SHIPPED | OUTPATIENT
Start: 2020-08-05 | End: 2020-09-08

## 2020-08-05 NOTE — TELEPHONE ENCOUNTER
Talked to pt. - she doesn't want to take norco -anymore - she doesn's want anything strong. Trevin Dean is out of office. Will send in tyelnol #3 since she states she doesn't want norco .    She doesn't have anything to take right now.    She is scheduled

## 2020-08-05 NOTE — TELEPHONE ENCOUNTER
Patient had a full right shoulder replacement  On 7/10/2020 by Dr. Krista Clark. hospitals has started doing physical therapy for the last 1.5 weeks. hospitals does not have any pain medications and is having pain.  hospitals contacted Dr. Abhijit Larsen office, but was in

## 2020-08-05 NOTE — TELEPHONE ENCOUNTER
Rx request for Acetaminophen-Codeine, please review and sign off if appropriate. Thank you.     Last office visit: 2/28/2020  Last Refill: 3/26/2020 Rx NOT in patients current med list FYI

## 2020-08-06 ENCOUNTER — OFFICE VISIT (OUTPATIENT)
Dept: PHYSICAL THERAPY | Age: 65
End: 2020-08-06
Attending: ORTHOPAEDIC SURGERY
Payer: MEDICARE

## 2020-08-06 PROCEDURE — 97140 MANUAL THERAPY 1/> REGIONS: CPT

## 2020-08-06 PROCEDURE — 97110 THERAPEUTIC EXERCISES: CPT

## 2020-08-06 NOTE — PROGRESS NOTES
Dx:  Aftercare following right shoulder joint replacement surgery (Z47.1,Z96.611)    Insurance (Authorized # of Visits):  8 approved visits   [address] [facility] Physician: Dr. Clyde Craig    Next MD visit: Next week (date not given)  Fall Risk: standar overhead cabinets without pain or restriction. 3.  Pt will improve shoulder abduction AROM to >120 degrees to improve ability to don deodorant, don/doff shirts, and wash hair.    4.  Pt will increase shoulder AROM IR to 70 deg to be able to reach in back p

## 2020-08-11 ENCOUNTER — TELEPHONE (OUTPATIENT)
Dept: PHYSICAL THERAPY | Age: 65
End: 2020-08-11

## 2020-08-13 ENCOUNTER — OFFICE VISIT (OUTPATIENT)
Dept: PHYSICAL THERAPY | Age: 65
End: 2020-08-13
Attending: ORTHOPAEDIC SURGERY
Payer: MEDICARE

## 2020-08-13 PROCEDURE — 97110 THERAPEUTIC EXERCISES: CPT

## 2020-08-13 PROCEDURE — 97140 MANUAL THERAPY 1/> REGIONS: CPT

## 2020-08-13 NOTE — PROGRESS NOTES
Dx:  Aftercare following right shoulder joint replacement surgery (Z47.1,Z96.611)    Insurance (Authorized # of Visits):  8 approved visits   10 [facility] Physician: Dr. Anthony Brown    Next MD visit: Next week (date not given)  Fall Risk: standar middle deltoid, bicep and anterior pac and shoulder to decreased tightness and improved tissue mobility    Scar mobilization, friction massage to improved scar mobility  PROM right shoulder with supine position    Manual Therapy:  STM; Right middle deltoid

## 2020-08-18 ENCOUNTER — APPOINTMENT (OUTPATIENT)
Dept: PHYSICAL THERAPY | Age: 65
End: 2020-08-18
Attending: ORTHOPAEDIC SURGERY
Payer: MEDICARE

## 2020-08-18 ENCOUNTER — TELEPHONE (OUTPATIENT)
Dept: PHYSICAL THERAPY | Age: 65
End: 2020-08-18

## 2020-08-19 ENCOUNTER — TELEPHONE (OUTPATIENT)
Dept: PHYSICAL THERAPY | Age: 65
End: 2020-08-19

## 2020-08-20 ENCOUNTER — APPOINTMENT (OUTPATIENT)
Dept: PHYSICAL THERAPY | Age: 65
End: 2020-08-20
Attending: ORTHOPAEDIC SURGERY
Payer: MEDICARE

## 2020-08-25 ENCOUNTER — APPOINTMENT (OUTPATIENT)
Dept: PHYSICAL THERAPY | Age: 65
End: 2020-08-25
Attending: ORTHOPAEDIC SURGERY
Payer: MEDICARE

## 2020-08-27 ENCOUNTER — APPOINTMENT (OUTPATIENT)
Dept: PHYSICAL THERAPY | Age: 65
End: 2020-08-27
Attending: ORTHOPAEDIC SURGERY
Payer: MEDICARE

## 2020-09-01 ENCOUNTER — APPOINTMENT (OUTPATIENT)
Dept: PHYSICAL THERAPY | Age: 65
End: 2020-09-01
Attending: ORTHOPAEDIC SURGERY
Payer: MEDICARE

## 2020-09-03 ENCOUNTER — TELEPHONE (OUTPATIENT)
Dept: INTERNAL MEDICINE CLINIC | Facility: CLINIC | Age: 65
End: 2020-09-03

## 2020-09-03 ENCOUNTER — APPOINTMENT (OUTPATIENT)
Dept: PHYSICAL THERAPY | Age: 65
End: 2020-09-03
Attending: ORTHOPAEDIC SURGERY
Payer: MEDICARE

## 2020-09-03 NOTE — TELEPHONE ENCOUNTER
Seen in OV on 6/26/20 for Chest Pressure. Per patient she gets daily chest pain and at times feels short of breath. Asked if she has gastric reflux, per pt she does not.  Patient was anxious on the phone and wanted to bring someone with her to the office vi

## 2020-09-04 ENCOUNTER — OFFICE VISIT (OUTPATIENT)
Dept: INTERNAL MEDICINE CLINIC | Facility: CLINIC | Age: 65
End: 2020-09-04
Payer: MEDICARE

## 2020-09-04 VITALS
BODY MASS INDEX: 27.94 KG/M2 | WEIGHT: 148 LBS | DIASTOLIC BLOOD PRESSURE: 84 MMHG | TEMPERATURE: 97 F | HEIGHT: 61 IN | SYSTOLIC BLOOD PRESSURE: 150 MMHG | HEART RATE: 76 BPM

## 2020-09-04 DIAGNOSIS — R07.9 CHEST PAIN, UNSPECIFIED TYPE: Primary | ICD-10-CM

## 2020-09-04 DIAGNOSIS — F41.9 ANXIETY: ICD-10-CM

## 2020-09-04 PROCEDURE — 99214 OFFICE O/P EST MOD 30 MIN: CPT | Performed by: INTERNAL MEDICINE

## 2020-09-04 NOTE — PROGRESS NOTES
Patient ID: Aylin Pang is a 77year old female. Patient presents with:  Chest Pressure: Chest pressure with chest pain, unsure if this is related due to heartburn. One episode yesterday and then at night.         HISTORY OF PRESENT ILLNESS:   RONNY Cruz Procedure Laterality Date   • APPENDECTOMY      appendicitis   • BREAST BIOPSY Left 09/07/2011    fibrocystic changes   • CATARACT     • COLONOSCOPY  7/2009    incomplete (chronic constipation)   • COLONOSCOPY N/A 12/19/2019    Performed by Ang Haas taking differently: Take 10 mg by mouth daily as needed.  ), Disp: 60 tablet, Rfl: 2  •  albuterol sulfate (2.5 MG/3ML) 0.083% Inhalation Nebu Soln, Take 3 mL (2.5 mg total) by nebulization every 4 (four) hours as needed for Wheezing., Disp: 60 ampule, Rfl file        Relationship status: Not on file      Intimate partner violence:        Fear of current or ex partner: Not on file        Emotionally abused: Not on file        Physically abused: Not on file        Forced sexual activity: Not on file    Other right now when she gets home. · Breathing exercises discussed. · Call psychiatrist for further recommendations. Return if symptoms worsen or fail to improve.     Camelia Kelly MD  9/4/2020

## 2020-09-07 ENCOUNTER — HOSPITAL ENCOUNTER (EMERGENCY)
Facility: HOSPITAL | Age: 65
Discharge: HOME | End: 2020-09-07
Attending: EMERGENCY MEDICINE
Payer: MEDICARE

## 2020-09-07 ENCOUNTER — APPOINTMENT (OUTPATIENT)
Dept: ULTRASOUND IMAGING | Facility: HOSPITAL | Age: 65
End: 2020-09-07
Attending: EMERGENCY MEDICINE
Payer: MEDICARE

## 2020-09-07 VITALS
HEIGHT: 61 IN | BODY MASS INDEX: 27 KG/M2 | TEMPERATURE: 98 F | HEART RATE: 79 BPM | OXYGEN SATURATION: 97 % | DIASTOLIC BLOOD PRESSURE: 78 MMHG | WEIGHT: 143 LBS | SYSTOLIC BLOOD PRESSURE: 130 MMHG | RESPIRATION RATE: 16 BRPM

## 2020-09-07 DIAGNOSIS — G62.9 NEUROPATHY: ICD-10-CM

## 2020-09-07 DIAGNOSIS — M79.89 SWOLLEN ARM: Primary | ICD-10-CM

## 2020-09-07 PROCEDURE — 93971 EXTREMITY STUDY: CPT | Performed by: EMERGENCY MEDICINE

## 2020-09-07 PROCEDURE — 99284 EMERGENCY DEPT VISIT MOD MDM: CPT

## 2020-09-07 RX ORDER — PREDNISONE 20 MG/1
40 TABLET ORAL DAILY
Qty: 8 TABLET | Refills: 0 | Status: SHIPPED | OUTPATIENT
Start: 2020-09-07 | End: 2020-09-11

## 2020-09-07 NOTE — ED INITIAL ASSESSMENT (HPI)
Patient presents to ED with c/o of right arm swelling, coldness, and numbness since shoulder surgery on 7/10/20. Patient states she was prescribed gabapentin, but it is not helping and just makes her tired.  Patient states she finds her hands to be cold in

## 2020-09-07 NOTE — ED PROVIDER NOTES
Patient Seen in: [facility] [place] [facility] Emergency Department    History   Patient presents with:  Numbness Weakness  Swelling Edema    Stated Complaint: hand numbness    HPI    HPI: Kylah Renteria is a 77year old female who presents with r arm swelling had bella Arthrocentesis of the right knee joint   • OTHER SURGICAL HISTORY      Arthrocentesis of the left knee joint   • OTHER SURGICAL HISTORY  08/2019    rotator cuff   • SHOULDER TOTAL Right 7/10/2020    Performed by Davin Radford MD at [address] bypass surgery/MI   • Arthritis Mother         possible Rheumatoid Arthritis   • Colon Cancer Maternal Grandmother    • Asthma Daughter    • Breast Cancer Sister 36   • Ovarian Cancer Sister    • Other (Other) Sister         flu H1N1   • Breast Cancer Solorzano (pod=[id number])    Result Date: 9/7/2020  CONCLUSION: No evidence of right upper extremity DVT.     Dictated by (CST): Landry Omalley MD on 9/07/2020 at 7:28 PM     Finalized by (CST): Landry Omalley MD on 9/07/2020 at 7:28 PM              MDM     Will tr

## 2020-09-08 ENCOUNTER — APPOINTMENT (OUTPATIENT)
Dept: PHYSICAL THERAPY | Age: 65
End: 2020-09-08
Attending: ORTHOPAEDIC SURGERY
Payer: MEDICARE

## 2020-09-08 NOTE — TELEPHONE ENCOUNTER
LOV:2/28/20   Last Refilled:#90, 0rfs  8/5/20    Future Appointments   Date Time Provider Cirilo Racheal   9/17/2020  2:50 PM [facility]   12/21/2020  1:30 PM Mil Tomas MD [facility]       Please advise.

## 2020-09-08 NOTE — ED NOTES
Care assumed from Chantale Mittal, [address] [facility]. Pt currently resting comfortably at this time. Pt states her shoulder has some pain but is able to squeeze my hand and feel me touching her arm. The coloring of the right arm is the same as the left at this time.  +2 radial pul

## 2020-09-09 RX ORDER — ACETAMINOPHEN AND CODEINE PHOSPHATE 300; 30 MG/1; MG/1
TABLET ORAL
Qty: 90 TABLET | Refills: 0 | Status: SHIPPED | OUTPATIENT
Start: 2020-09-09 | End: 2020-10-05

## 2020-09-10 ENCOUNTER — APPOINTMENT (OUTPATIENT)
Dept: PHYSICAL THERAPY | Age: 65
End: 2020-09-10
Attending: ORTHOPAEDIC SURGERY
Payer: MEDICARE

## 2020-09-17 ENCOUNTER — OFFICE VISIT (OUTPATIENT)
Dept: RHEUMATOLOGY | Facility: CLINIC | Age: 65
End: 2020-09-17
Payer: MEDICARE

## 2020-09-17 ENCOUNTER — LAB ENCOUNTER (OUTPATIENT)
Dept: LAB | Age: 65
End: 2020-09-17
Attending: INTERNAL MEDICINE
Payer: MEDICARE

## 2020-09-17 VITALS
HEIGHT: 61 IN | RESPIRATION RATE: 16 BRPM | BODY MASS INDEX: 27 KG/M2 | SYSTOLIC BLOOD PRESSURE: 122 MMHG | WEIGHT: 143 LBS | DIASTOLIC BLOOD PRESSURE: 80 MMHG | HEART RATE: 76 BPM

## 2020-09-17 DIAGNOSIS — D64.9 ANEMIA, UNSPECIFIED TYPE: ICD-10-CM

## 2020-09-17 DIAGNOSIS — M15.9 GENERALIZED OA: Primary | ICD-10-CM

## 2020-09-17 DIAGNOSIS — M25.50 HYPERMOBILITY ARTHRALGIA: ICD-10-CM

## 2020-09-17 DIAGNOSIS — M15.9 GENERALIZED OA: ICD-10-CM

## 2020-09-17 LAB
ALBUMIN SERPL-MCNC: 4.1 G/DL (ref 3.4–5)
ALBUMIN/GLOB SERPL: 1.3 {RATIO} (ref 1–2)
ALP LIVER SERPL-CCNC: 104 U/L (ref 55–142)
ALT SERPL-CCNC: 17 U/L (ref 13–56)
ANION GAP SERPL CALC-SCNC: 5 MMOL/L (ref 0–18)
AST SERPL-CCNC: 13 U/L (ref 15–37)
BILIRUB SERPL-MCNC: 0.3 MG/DL (ref 0.1–2)
BUN BLD-MCNC: 7 MG/DL (ref 7–18)
BUN/CREAT SERPL: 12.7 (ref 10–20)
CALCIUM BLD-MCNC: 9.4 MG/DL (ref 8.5–10.1)
CHLORIDE SERPL-SCNC: 96 MMOL/L (ref 98–112)
CO2 SERPL-SCNC: 31 MMOL/L (ref 21–32)
CREAT BLD-MCNC: 0.55 MG/DL (ref 0.55–1.02)
DEPRECATED RDW RBC AUTO: 42.8 FL (ref 35.1–46.3)
ERYTHROCYTE [DISTWIDTH] IN BLOOD BY AUTOMATED COUNT: 13.2 % (ref 11–15)
GLOBULIN PLAS-MCNC: 3.2 G/DL (ref 2.8–4.4)
GLUCOSE BLD-MCNC: 86 MG/DL (ref 70–99)
HCT VFR BLD AUTO: 38 % (ref 35–48)
HGB BLD-MCNC: 12.4 G/DL (ref 12–16)
M PROTEIN MFR SERPL ELPH: 7.3 G/DL (ref 6.4–8.2)
MCH RBC QN AUTO: 28.8 PG (ref 26–34)
MCHC RBC AUTO-ENTMCNC: 32.6 G/DL (ref 31–37)
MCV RBC AUTO: 88.4 FL (ref 80–100)
OSMOLALITY SERPL CALC.SUM OF ELEC: 271 MOSM/KG (ref 275–295)
PATIENT FASTING Y/N/NP: NO
PLATELET # BLD AUTO: 315 10(3)UL (ref 150–450)
POTASSIUM SERPL-SCNC: 4.4 MMOL/L (ref 3.5–5.1)
RBC # BLD AUTO: 4.3 X10(6)UL (ref 3.8–5.3)
SODIUM SERPL-SCNC: 132 MMOL/L (ref 136–145)
WBC # BLD AUTO: 6.3 X10(3) UL (ref 4–11)

## 2020-09-17 PROCEDURE — 80053 COMPREHEN METABOLIC PANEL: CPT

## 2020-09-17 PROCEDURE — 85027 COMPLETE CBC AUTOMATED: CPT

## 2020-09-17 PROCEDURE — 99214 OFFICE O/P EST MOD 30 MIN: CPT | Performed by: INTERNAL MEDICINE

## 2020-09-17 PROCEDURE — 36415 COLL VENOUS BLD VENIPUNCTURE: CPT

## 2020-09-17 NOTE — PROGRESS NOTES
Frannie Mahmood is a 77year old female. HPI:   Patient presents with:   Follow - Up: Generalized OA, patient c/o rib pain  onset x3 days  Medication Follow-Up  Knee Pain: B/L      I saw Darlin Rincon on 9/17/2020  She is a pleasant 77year old who has been ha tired.     7/3/2017  She has pain in her knees with sitting or standing. She feels her knees hurt more than before the surgery. Her knees really hurt. By the end of the night her knees can't beart it. She had to quit her job.    Her right shoudler is much side. Her left hip is throbbing and her left arm is stiff. She got a right lower back injection and it helped only a little bit. The pain is higher than the injection spot. She was given kenalog 20mg injection by PA on 9/6/2018. It did not help her. ameena.          HISTORY:  Past Medical History:   Diagnosis Date   • Asthma    • Bipolar disorder ([facility]    • Cataract    • COPD (chronic obstructive pulmonary disease) ([facility] [address]    • Depression    • Essential hypertension    • Osteoarthritis     R knee surgery RASH  Sulfanilamide           RASH      ROS:   All other ROS are negative.      PHYSICAL EXAM:   HEENT: Clear oropharynx, no oral ulcers, EOM intact, clear sclear, PERRLA, pleasant, no acute distress, no CAD, no neck tendnerness, good ROM,   No rashes  CV 5.40 M/UL 4.16 4.31   Hemoglobin      12.0 - 16.0 g/dL 12.7 13.2   Hematocrit      35.0 - 48.0 % 37.9 39.3   MCV      80.0 - 100.0 fL 91.2 91.3   MCH      27.0 - 32.0 pg 30.5 30.7   MCHC      32.0 - 37.0 g/dl 33.5 33.6   RDW      11.0 - 15.0 % 13.2 13.1 WBC      4.0 - 11.0 x10(3) uL 8.5 6.8   RBC      3.80 - 5.30 x10(6)uL 2.88 (L) 2.92 (L)   Hemoglobin      12.0 - 16.0 g/dL 8.6 (L) 8.9 (L)   Hematocrit      35.0 - 48.0 % 25.9 (L) 26.6 (L)   MCV      80.0 - 100.0 fL 89.9 91.1   MCH      26.0 - 34.0 pg 29 menisci as  described. 3. Surgical changes related to the patellar tendon, compatible with  reported patella audelia correction. 4. The anterior cruciate ligament is diminutive and may be partially  torn. 8/21/2017 - si joint xray   1.  Intact sacroiliac hypermobiltiy arthralgia -  - cont. Swimming, she is doing better on amitriptyline 10mg a dya - increase to 20mg at night if she needs to   5. oa in hands -  diffuse OA  6.  Low back pain - d/w her to do core strengthening exercises - gave her exericses for

## 2020-09-17 NOTE — PATIENT INSTRUCTIONS
1. Tylenol #3 -call when you need a refill   2. voltaren gel 1 % -   3. Follow up with dr. Trevin Moran for plexitis -   4. Return to clinic in 6-12 months.    5. Check labs and if ok then can send in ibuprofen

## 2020-09-19 ENCOUNTER — TELEPHONE (OUTPATIENT)
Dept: RHEUMATOLOGY | Facility: CLINIC | Age: 65
End: 2020-09-19

## 2020-09-21 RX ORDER — IBUPROFEN 600 MG/1
600 TABLET ORAL EVERY 8 HOURS PRN
Qty: 90 TABLET | Refills: 3 | Status: ON HOLD | OUTPATIENT
Start: 2020-09-21 | End: 2020-12-17

## 2020-09-21 NOTE — TELEPHONE ENCOUNTER
Spoke with pt and reviewed 9/17 CBC and CMP results. Pt verbalized understanding. Dr. Mariela Pillai - pt requesting ibuprofen refill as discussed at [facility]. Summary:  1. Tylenol #3 -call when you need a refill   2. voltaren gel 1 % -   3.  Follow up with

## 2020-09-23 PROBLEM — M25.511 ACUTE PAIN OF RIGHT SHOULDER: Status: ACTIVE | Noted: 2020-09-23

## 2020-10-02 RX ORDER — ACETAMINOPHEN AND CODEINE PHOSPHATE 300; 30 MG/1; MG/1
TABLET ORAL
Qty: 90 TABLET | Refills: 0 | OUTPATIENT
Start: 2020-10-02

## 2020-10-05 ENCOUNTER — NURSE TRIAGE (OUTPATIENT)
Dept: INTERNAL MEDICINE CLINIC | Facility: CLINIC | Age: 65
End: 2020-10-05

## 2020-10-05 RX ORDER — ACETAMINOPHEN AND CODEINE PHOSPHATE 300; 30 MG/1; MG/1
1 TABLET ORAL EVERY 8 HOURS PRN
Qty: 90 TABLET | Refills: 0 | Status: SHIPPED | OUTPATIENT
Start: 2020-10-05 | End: 2020-11-03

## 2020-10-05 NOTE — TELEPHONE ENCOUNTER
Action Requested: Summary for Provider     []  Critical Lab, Recommendations Needed  [] Need Additional Advice  []   FYI    []   Need Orders  [] Need Medications Sent to Pharmacy  []  Other     SUMMARY:   Spoke to patient.  States she has a bad cold with ru

## 2020-10-06 ENCOUNTER — TELEPHONE (OUTPATIENT)
Dept: RHEUMATOLOGY | Facility: CLINIC | Age: 65
End: 2020-10-06

## 2020-10-06 NOTE — TELEPHONE ENCOUNTER
Spoke with Juan David Gay at [address] [place] [address], script was received but is refill too soon. Per Juan David Gay medication can be filled tomorrow. Left message for patient regarding script below.

## 2020-10-06 NOTE — TELEPHONE ENCOUNTER
Per patient, pharmacy has not received script for acetaminophen. Please advise. Acetaminophen-Codeine #3 300-30 MG Oral Tab 90 tablet 0 10/5/2020     Sig - Route:  Take 1 tablet by mouth every 8 (eight) hours as needed for Pain. - Oral    Sent to pharma

## 2020-11-03 RX ORDER — ACETAMINOPHEN AND CODEINE PHOSPHATE 300; 30 MG/1; MG/1
1 TABLET ORAL EVERY 8 HOURS PRN
Qty: 90 TABLET | Refills: 5 | Status: SHIPPED | OUTPATIENT
Start: 2020-11-03 | End: 2021-02-23

## 2020-11-03 NOTE — PROGRESS NOTES
Patient seen in follow up. No overnight events. Patient denies any chest pain or sob. Denies any cough. Denies abdominal pain. Discussed with nurse  Chart reviewed.      04/24/17  1200   BP: 129/72   Pulse: 96   Temp:    Resp: 20       Intake/Output QUEtiapine Fumarate 50 MG Oral Tab Take 50 mg by mouth nightly. ClonazePAM (KLONOPIN) 2 MG Oral Tab Take 2 mg by mouth nightly. ARIpiprazole (ABILIFY) 5 MG Oral Tab Take 7.5 mg by mouth nightly.      DULoxetine HCl (CYMBALTA) 60 MG Oral Cap DR Socorro Williamson [Takes medication as prescribed] : takes [None] : Patient does not have any barriers to medication adherence

## 2020-11-08 ENCOUNTER — HOSPITAL ENCOUNTER (EMERGENCY)
Facility: HOSPITAL | Age: 65
Discharge: HOME | End: 2020-11-08
Attending: EMERGENCY MEDICINE
Payer: MEDICARE

## 2020-11-08 VITALS
HEIGHT: 62 IN | BODY MASS INDEX: 24.29 KG/M2 | RESPIRATION RATE: 18 BRPM | TEMPERATURE: 97 F | WEIGHT: 132 LBS | HEART RATE: 72 BPM | DIASTOLIC BLOOD PRESSURE: 82 MMHG | OXYGEN SATURATION: 96 % | SYSTOLIC BLOOD PRESSURE: 141 MMHG

## 2020-11-08 DIAGNOSIS — M75.41 SHOULDER IMPINGEMENT, RIGHT: Primary | ICD-10-CM

## 2020-11-08 PROCEDURE — 99282 EMERGENCY DEPT VISIT SF MDM: CPT

## 2020-11-09 ENCOUNTER — NURSE TRIAGE (OUTPATIENT)
Dept: INTERNAL MEDICINE CLINIC | Facility: CLINIC | Age: 65
End: 2020-11-09

## 2020-11-09 NOTE — ED PROVIDER NOTES
Patient Seen in: [facility] Emergency Department    History   Patient presents with:  Arm or Hand Injury      HPI    Patient presents to the ED complaining of right shoulder pain that started tonight while eating cake.   She states that she did fal OTHER SURGICAL HISTORY      Laparotomy (adhesions)   • OTHER SURGICAL HISTORY      Arthrocentesis of the right knee joint   • OTHER SURGICAL HISTORY      Arthrocentesis of the left knee joint   • OTHER SURGICAL HISTORY  08/2019    rotator cuff   • SHOULDER Topics      Concerns:        Caffeine Concern: Yes          tea, soda, 44 oz daily        Reaction to local anesthetic: No      ROS  Pertinent Positives: Right shoulder pain  All other organ systems are reviewed and are negative.     Constitutional and sue display      Imaging Results Available and Reviewed while in ED: No results found.   ED Medications Administered: Medications - No data to display      [facility]      11/08/20  2205   BP: 141/82   Pulse: 72   Resp: 18   Temp: 97.3 °F (36.3 °C)   TempSrc: Oral   S

## 2020-11-09 NOTE — TELEPHONE ENCOUNTER
Action Requested: Summary for Provider     []  Critical Lab, Recommendations Needed  [] Need Additional Advice  [x]   FYI    []   Need Orders  [] Need Medications Sent to Pharmacy  []  Other     SUMMARY: Patient calling stating  she was just informed she w

## 2020-11-09 NOTE — ED INITIAL ASSESSMENT (HPI)
Pt arrived to ED from home c/o pain to right shoulder with movement x approx 1 day. Pt states she had a fall last week and caught herself on her right arm. Pt states no pain when not moving. Pt states she had a surgery on her right shoulder in July.

## 2020-11-15 ENCOUNTER — TELEPHONE (OUTPATIENT)
Dept: OBGYN CLINIC | Facility: CLINIC | Age: 65
End: 2020-11-15

## 2020-11-15 NOTE — TELEPHONE ENCOUNTER
Paged on call with c/o onset of some stinging pain near underwear line / vagina followed by a purulent fluid with some odor when wiping after going to BR. It did not come from vagina but rather the skin area, No blood. No current pain.  I gave her guidance

## 2020-11-16 ENCOUNTER — OFFICE VISIT (OUTPATIENT)
Dept: OBGYN CLINIC | Facility: CLINIC | Age: 65
End: 2020-11-16
Payer: MEDICARE

## 2020-11-16 VITALS
DIASTOLIC BLOOD PRESSURE: 86 MMHG | HEART RATE: 77 BPM | SYSTOLIC BLOOD PRESSURE: 147 MMHG | WEIGHT: 148 LBS | BODY MASS INDEX: 27 KG/M2

## 2020-11-16 DIAGNOSIS — R18.8 GROIN FLUID COLLECTION: Primary | ICD-10-CM

## 2020-11-16 PROCEDURE — 99213 OFFICE O/P EST LOW 20 MIN: CPT | Performed by: OBSTETRICS & GYNECOLOGY

## 2020-11-16 NOTE — TELEPHONE ENCOUNTER
Pt made an appt to see Julian Eubanks [id number] at [address] [place], [place] [address]way [facility]. Pt give the  appt time.

## 2020-11-17 NOTE — PROGRESS NOTES
Jonathon Gonzalez is a 77year old female  No LMP recorded. Patient is postmenopausal. Patient presents with:  Gyn Problem: c/o bikini line lesions w/ yellow discharge and odor, irritation      Last seen 19.    On , when she went to bathroom, OTHER SURGICAL HISTORY      Arthrocentesis of the right knee joint   • OTHER SURGICAL HISTORY      Arthrocentesis of the left knee joint   • OTHER SURGICAL HISTORY  08/2019    rotator cuff   • SHOULDER TOTAL Right 7/10/2020    Performed by Yanet Lam (Patient not taking: Reported on 11/16/2020 ) 30 g 0   • cetirizine 10 MG Oral Tab Take 1 tablet (10 mg total) by mouth daily.  (Patient not taking: Reported on 11/16/2020 ) 60 tablet 2       ALLERGIES:    Sulfa Antibiotics       RASH  Sulfanilamide

## 2020-11-20 RX ORDER — AMLODIPINE BESYLATE 5 MG/1
TABLET ORAL
Qty: 30 TABLET | Refills: 0 | Status: ON HOLD | OUTPATIENT
Start: 2020-11-20 | End: 2020-12-17

## 2020-11-25 ENCOUNTER — APPOINTMENT (OUTPATIENT)
Dept: GENERAL RADIOLOGY | Facility: HOSPITAL | Age: 65
End: 2020-11-25
Attending: EMERGENCY MEDICINE
Payer: MEDICARE

## 2020-11-25 ENCOUNTER — HOSPITAL ENCOUNTER (EMERGENCY)
Facility: HOSPITAL | Age: 65
Discharge: HOME | End: 2020-11-25
Attending: EMERGENCY MEDICINE
Payer: MEDICARE

## 2020-11-25 ENCOUNTER — TELEPHONE (OUTPATIENT)
Dept: INTERNAL MEDICINE CLINIC | Facility: CLINIC | Age: 65
End: 2020-11-25

## 2020-11-25 VITALS
HEIGHT: 61 IN | DIASTOLIC BLOOD PRESSURE: 73 MMHG | SYSTOLIC BLOOD PRESSURE: 137 MMHG | TEMPERATURE: 98 F | HEART RATE: 69 BPM | RESPIRATION RATE: 16 BRPM | OXYGEN SATURATION: 96 % | BODY MASS INDEX: 27 KG/M2 | WEIGHT: 143 LBS

## 2020-11-25 DIAGNOSIS — R07.9 ACUTE CHEST PAIN: Primary | ICD-10-CM

## 2020-11-25 PROCEDURE — 93005 ELECTROCARDIOGRAM TRACING: CPT

## 2020-11-25 PROCEDURE — 80048 BASIC METABOLIC PNL TOTAL CA: CPT | Performed by: EMERGENCY MEDICINE

## 2020-11-25 PROCEDURE — 36415 COLL VENOUS BLD VENIPUNCTURE: CPT

## 2020-11-25 PROCEDURE — 99284 EMERGENCY DEPT VISIT MOD MDM: CPT

## 2020-11-25 PROCEDURE — 85025 COMPLETE CBC W/AUTO DIFF WBC: CPT | Performed by: EMERGENCY MEDICINE

## 2020-11-25 PROCEDURE — 84484 ASSAY OF TROPONIN QUANT: CPT | Performed by: EMERGENCY MEDICINE

## 2020-11-25 PROCEDURE — 71045 X-RAY EXAM CHEST 1 VIEW: CPT | Performed by: EMERGENCY MEDICINE

## 2020-11-25 PROCEDURE — 93010 ELECTROCARDIOGRAM REPORT: CPT | Performed by: EMERGENCY MEDICINE

## 2020-11-25 NOTE — TELEPHONE ENCOUNTER
Paged by patient with complaints of having an episode of heartburn associated with diaphoresis and some pain over her neck and the ears. This apparently happened about half an hour ago and this resolved completely. It recurred a few minutes after but that was milder and also resolved. She is currently asymptomatic. The patient does not appear to have any cardiac history before based on her chart . However I told her she should go to ER right away to be evaluated to make sure this is not cardiac. I had told her that she should have somebody else drive her to the emergency room. Patient said that he has relative with her right now and will drive her to ER right away.

## 2020-11-25 NOTE — ED INITIAL ASSESSMENT (HPI)
Pt arrived to ED from home c/o mid sternal chest pain that started approx 1 hour PTA. Pt reports suddenly experiencing diaphoresis and weakness. No facial droop, slurred speech, or physical deficits noted. Pt reports chest pain has improved.

## 2020-11-26 NOTE — ED PROVIDER NOTES
Patient Seen in: [facility] AND [facility] Department      History   Patient presents with:  Chest Pain Angina  Fatigue    Stated Complaint: CP    HPI     69-year-old female with multiple medical problems presents for evaluation of chest pain.   Patient WITH BICEPS TENODESIS   • OTHER SURGICAL HISTORY      Laparotomy (adhesions)   • OTHER SURGICAL HISTORY      Arthrocentesis of the right knee joint   • OTHER SURGICAL HISTORY      Arthrocentesis of the left knee joint   • OTHER SURGICAL HISTORY  08/2019 sounds. Abdominal:      General: Bowel sounds are normal. There is no distension. Palpations: Abdomen is soft. Tenderness: There is no abdominal tenderness. There is no guarding or rebound. Musculoskeletal: Normal range of motion.    Skin: [facility], XR CHEST PA + LAT     CHEST (FQI=[id number]), 11/15/2019, 3:44 PM.         INDICATIONS: Midsternal chest pain, diaphoresis, and weakness today. History of COPD and essential hypertension. TECHNIQUE:   Single view. Interpretation: normal sinus rhythm with a rate of Normal    Oxygen Saturation: 97% on room air, Normal    Consults: Orders Placed This Encounter      ED Consult to Cardiology      MD Discussions or Sign-Outs: Discussed with Dr. Víctor Varela, maddy rubio pressure.       Medications Prescribed:  Current Discharge Medication List

## 2020-11-26 NOTE — ED NOTES
Patient states she feels like she has a \"viktor horse\" type of pain in her chest starting about an hour ago. Has had chest pain in the past. States she has anxiety with associated cp previously. Denies sob.  States she has emphysema and always has a mild

## 2020-11-30 ENCOUNTER — TELEPHONE (OUTPATIENT)
Dept: INTERNAL MEDICINE CLINIC | Facility: CLINIC | Age: 65
End: 2020-11-30

## 2020-11-30 DIAGNOSIS — R07.9 ACUTE CHEST PAIN: Primary | ICD-10-CM

## 2020-11-30 NOTE — TELEPHONE ENCOUNTER
Patient was recently in the hospital and recommended to follow upw ith a cardiologist. The cardiologist she was referred to by the ER is with [facility], patient is looking for and [facility] doc and wondering if we have any recommnedations

## 2020-12-04 ENCOUNTER — OFFICE VISIT (OUTPATIENT)
Dept: CARDIOLOGY CLINIC | Facility: CLINIC | Age: 65
End: 2020-12-04
Payer: MEDICARE

## 2020-12-04 VITALS
HEIGHT: 61 IN | DIASTOLIC BLOOD PRESSURE: 77 MMHG | SYSTOLIC BLOOD PRESSURE: 124 MMHG | HEART RATE: 81 BPM | WEIGHT: 149 LBS | BODY MASS INDEX: 28.13 KG/M2

## 2020-12-04 DIAGNOSIS — R07.9 CHEST PAIN, UNSPECIFIED TYPE: Primary | ICD-10-CM

## 2020-12-04 PROCEDURE — 99203 OFFICE O/P NEW LOW 30 MIN: CPT | Performed by: NURSE PRACTITIONER

## 2020-12-04 PROCEDURE — 1111F DSCHRG MED/CURRENT MED MERGE: CPT | Performed by: NURSE PRACTITIONER

## 2020-12-04 NOTE — PROGRESS NOTES
Amber Arango is a 77year old female. Patient presents with:  Hospital F/U: Chest pain  Edema: right arm/ recent surgery Right shoulder    HPI:   Patient comes in today for consultation. She sees Dr. Tamir Napoles. She is here for follow-up from being in the ER. 920164-9.6 UNIT/GM-% External Ointment Apply 1 Application topically 2 (two) times daily. 30 g 0   • cetirizine 10 MG Oral Tab Take 1 tablet (10 mg total) by mouth daily.  60 tablet 2   • albuterol sulfate (2.5 MG/3ML) 0.083% Inhalation Nebu Soln Take 3 mL (67.6 kg)   BMI 28.15 kg/m²   GENERAL: well developed, well nourished,in no apparent distress  SKIN: no rashes,no suspicious lesions  HEENT: atraumatic, normocephalic,ears and throat are clear  NECK: supple,no adenopathy,no bruits  LUNGS: clear to ausculta

## 2020-12-05 ENCOUNTER — APPOINTMENT (OUTPATIENT)
Dept: LAB | Facility: HOSPITAL | Age: 65
End: 2020-12-05
Attending: NURSE PRACTITIONER
Payer: MEDICARE

## 2020-12-05 DIAGNOSIS — R07.9 CHEST PAIN, UNSPECIFIED TYPE: ICD-10-CM

## 2020-12-08 ENCOUNTER — HOSPITAL ENCOUNTER (OUTPATIENT)
Dept: CV DIAGNOSTICS | Facility: HOSPITAL | Age: 65
Discharge: HOME | End: 2020-12-08
Attending: NURSE PRACTITIONER
Payer: MEDICARE

## 2020-12-08 DIAGNOSIS — R07.9 CHEST PAIN, UNSPECIFIED TYPE: ICD-10-CM

## 2020-12-08 PROCEDURE — 93017 CV STRESS TEST TRACING ONLY: CPT | Performed by: NURSE PRACTITIONER

## 2020-12-08 PROCEDURE — 93018 CV STRESS TEST I&R ONLY: CPT | Performed by: NURSE PRACTITIONER

## 2020-12-08 PROCEDURE — 93350 STRESS TTE ONLY: CPT | Performed by: NURSE PRACTITIONER

## 2020-12-08 PROCEDURE — 93016 CV STRESS TEST SUPVJ ONLY: CPT | Performed by: NURSE PRACTITIONER

## 2020-12-10 ENCOUNTER — TELEPHONE (OUTPATIENT)
Dept: CARDIOLOGY CLINIC | Facility: CLINIC | Age: 65
End: 2020-12-10

## 2020-12-10 ENCOUNTER — LAB ENCOUNTER (OUTPATIENT)
Dept: LAB | Age: 65
End: 2020-12-10
Attending: NURSE PRACTITIONER
Payer: MEDICARE

## 2020-12-10 DIAGNOSIS — R07.9 CHEST PAIN, UNSPECIFIED TYPE: ICD-10-CM

## 2020-12-10 DIAGNOSIS — Z01.810 PREOP CARDIOVASCULAR EXAM: ICD-10-CM

## 2020-12-10 DIAGNOSIS — R07.9 CHEST PAIN, UNSPECIFIED TYPE: Primary | ICD-10-CM

## 2020-12-10 DIAGNOSIS — R94.39 ABNORMAL STRESS TEST: ICD-10-CM

## 2020-12-10 PROCEDURE — 80048 BASIC METABOLIC PNL TOTAL CA: CPT

## 2020-12-10 PROCEDURE — 85025 COMPLETE CBC W/AUTO DIFF WBC: CPT

## 2020-12-10 PROCEDURE — 36415 COLL VENOUS BLD VENIPUNCTURE: CPT

## 2020-12-10 NOTE — H&P
The above referenced H&P was reviewed by Puma Ryder MD on 12/17/2020, the patient was examined and no significant changes have occurred in the patient's condition since the H&P was performed.   Risks and benefits were discussed, proceed with procedure a

## 2020-12-10 NOTE — TELEPHONE ENCOUNTER
.. Procedure left heart cath w/ possible PCI  scheduled for 12/17/20 with Dr Thomas Harman  ([facility]). Betasept given to patient with instructions. Pre Admission Testing instructions were provided. Directions to go to interventional radiology given.  In

## 2020-12-15 ENCOUNTER — LAB ENCOUNTER (OUTPATIENT)
Dept: LAB | Age: 65
End: 2020-12-15
Attending: INTERNAL MEDICINE
Payer: MEDICARE

## 2020-12-15 DIAGNOSIS — Z01.818 PREOP TESTING: ICD-10-CM

## 2020-12-15 LAB
SARS-COV-2 RNA SPEC QL NAA+PROBE: NOT DETECTED
SPECIMEN SOURCE: NORMAL

## 2020-12-17 ENCOUNTER — HOSPITAL ENCOUNTER (OUTPATIENT)
Dept: INTERVENTIONAL RADIOLOGY/VASCULAR | Facility: HOSPITAL | Age: 65
Discharge: HOME | End: 2020-12-17
Attending: INTERNAL MEDICINE | Admitting: INTERNAL MEDICINE
Payer: MEDICARE

## 2020-12-17 VITALS
BODY MASS INDEX: 28 KG/M2 | OXYGEN SATURATION: 95 % | TEMPERATURE: 98 F | RESPIRATION RATE: 19 BRPM | SYSTOLIC BLOOD PRESSURE: 107 MMHG | WEIGHT: 148 LBS | HEART RATE: 66 BPM | DIASTOLIC BLOOD PRESSURE: 55 MMHG

## 2020-12-17 DIAGNOSIS — R94.39 ABNORMAL STRESS TEST: ICD-10-CM

## 2020-12-17 DIAGNOSIS — Z01.818 PREOP TESTING: Primary | ICD-10-CM

## 2020-12-17 PROCEDURE — B2151ZZ FLUOROSCOPY OF LEFT HEART USING LOW OSMOLAR CONTRAST: ICD-10-PCS | Performed by: INTERNAL MEDICINE

## 2020-12-17 PROCEDURE — 93454 CORONARY ARTERY ANGIO S&I: CPT

## 2020-12-17 PROCEDURE — 36415 COLL VENOUS BLD VENIPUNCTURE: CPT

## 2020-12-17 PROCEDURE — 99152 MOD SED SAME PHYS/QHP 5/>YRS: CPT | Performed by: INTERNAL MEDICINE

## 2020-12-17 PROCEDURE — 93458 L HRT ARTERY/VENTRICLE ANGIO: CPT | Performed by: INTERNAL MEDICINE

## 2020-12-17 PROCEDURE — 4A023N7 MEASUREMENT OF CARDIAC SAMPLING AND PRESSURE, LEFT HEART, PERCUTANEOUS APPROACH: ICD-10-PCS | Performed by: INTERNAL MEDICINE

## 2020-12-17 PROCEDURE — B2111ZZ FLUOROSCOPY OF MULTIPLE CORONARY ARTERIES USING LOW OSMOLAR CONTRAST: ICD-10-PCS | Performed by: INTERNAL MEDICINE

## 2020-12-17 PROCEDURE — 93458 L HRT ARTERY/VENTRICLE ANGIO: CPT

## 2020-12-17 PROCEDURE — 99152 MOD SED SAME PHYS/QHP 5/>YRS: CPT

## 2020-12-17 RX ORDER — NITROGLYCERIN 20 MG/100ML
INJECTION INTRAVENOUS
Status: COMPLETED
Start: 2020-12-17 | End: 2020-12-17

## 2020-12-17 RX ORDER — ATORVASTATIN CALCIUM 20 MG/1
20 TABLET, FILM COATED ORAL NIGHTLY
Qty: 30 TABLET | Refills: 5 | Status: SHIPPED | OUTPATIENT
Start: 2020-12-17 | End: 2021-03-01

## 2020-12-17 RX ORDER — VERAPAMIL HYDROCHLORIDE 2.5 MG/ML
INJECTION, SOLUTION INTRAVENOUS
Status: DISCONTINUED
Start: 2020-12-17 | End: 2020-12-17 | Stop reason: WASHOUT

## 2020-12-17 RX ORDER — NITROGLYCERIN 20 MG/100ML
INJECTION INTRAVENOUS
Status: DISCONTINUED
Start: 2020-12-17 | End: 2020-12-17 | Stop reason: WASHOUT

## 2020-12-17 RX ORDER — LIDOCAINE HYDROCHLORIDE 20 MG/ML
INJECTION, SOLUTION EPIDURAL; INFILTRATION; INTRACAUDAL; PERINEURAL
Status: COMPLETED
Start: 2020-12-17 | End: 2020-12-17

## 2020-12-17 RX ORDER — ASPIRIN 81 MG/1
81 TABLET ORAL ONCE
COMMUNITY
End: 2021-04-27 | Stop reason: ALTCHOICE

## 2020-12-17 RX ORDER — HEPARIN SODIUM 1000 [USP'U]/ML
INJECTION, SOLUTION INTRAVENOUS; SUBCUTANEOUS
Status: DISCONTINUED
Start: 2020-12-17 | End: 2020-12-17 | Stop reason: WASHOUT

## 2020-12-17 RX ORDER — SODIUM CHLORIDE 9 MG/ML
INJECTION, SOLUTION INTRAVENOUS
Status: DISCONTINUED | OUTPATIENT
Start: 2020-12-17 | End: 2020-12-17

## 2020-12-17 RX ORDER — MIDAZOLAM HYDROCHLORIDE 1 MG/ML
INJECTION INTRAMUSCULAR; INTRAVENOUS
Status: COMPLETED
Start: 2020-12-17 | End: 2020-12-17

## 2020-12-17 NOTE — PROCEDURES
Cardiologist: Shane Richardson MD  Primary Proceduralist: Shane Richardson MD  Procedure Performed: LHC, COR and LV  Date of Procedure: 12/17/2020   Indication: Abnormal stress echo, atypical chest discomfort    Summary of findings: Severe diffuse disease vers MD  12/17/20

## 2020-12-21 ENCOUNTER — OFFICE VISIT (OUTPATIENT)
Dept: OBGYN CLINIC | Facility: CLINIC | Age: 65
End: 2020-12-21
Payer: MEDICARE

## 2020-12-21 VITALS
HEART RATE: 76 BPM | SYSTOLIC BLOOD PRESSURE: 147 MMHG | BODY MASS INDEX: 29 KG/M2 | DIASTOLIC BLOOD PRESSURE: 86 MMHG | WEIGHT: 151.38 LBS

## 2020-12-21 DIAGNOSIS — Z12.31 ENCOUNTER FOR SCREENING MAMMOGRAM FOR BREAST CANCER: ICD-10-CM

## 2020-12-21 DIAGNOSIS — Z01.419 ENCOUNTER FOR GYNECOLOGICAL EXAMINATION: Primary | ICD-10-CM

## 2020-12-21 PROCEDURE — G0101 CA SCREEN;PELVIC/BREAST EXAM: HCPCS | Performed by: OBSTETRICS & GYNECOLOGY

## 2020-12-21 NOTE — PROGRESS NOTES
Luis Alfredo Brizuela is a 77year old female  No LMP recorded. Patient is postmenopausal. here for annual exam.       Last seen 20. Last pap 2019 normal with neg HPV. Last visit, had fluid release from right groin area that was purulent.   Maribel Gan LOCALIZATION WIRE 1 SITE LEFT (CPT=19281)      2011   • OTHER Left 03/22/2019    LEFT REVERSE TOTAL SHOULDER ARTHROPLASTY WITH BICEPS TENODESIS   • OTHER SURGICAL HISTORY      Laparotomy (adhesions)   • OTHER SURGICAL HISTORY      Arthrocentesis of the rig (120 mg total) by mouth nightly. 30 tablet 5   • atorvastatin 20 MG Oral Tab Take 1 tablet (20 mg total) by mouth nightly. 30 tablet 5   • Acetaminophen-Codeine #3 300-30 MG Oral Tab Take 1 tablet by mouth every 8 (eight) hours as needed for Pain.  90 table rhythm  Lungs:  Clear to asculation  Breast: normal without palpable masses, tenderness, asymmetry, nipple discharge, nipple retraction or skin changes  Abdomen:  soft, nontender, nondistended, no masses  Psychiatric:  Oriented to time, place, person and s

## 2020-12-24 ENCOUNTER — TELEPHONE (OUTPATIENT)
Dept: PULMONOLOGY | Facility: CLINIC | Age: 65
End: 2020-12-24

## 2020-12-24 ENCOUNTER — TELEPHONE (OUTPATIENT)
Dept: INTERNAL MEDICINE CLINIC | Facility: CLINIC | Age: 65
End: 2020-12-24

## 2020-12-24 NOTE — TELEPHONE ENCOUNTER
Dr. Bucio Mend, please see message below. Patient aware you are not in office and willing to wait for response upon your return. Thank you.

## 2020-12-24 NOTE — TELEPHONE ENCOUNTER
Patient informed of Dr. Viviane Menjivar recommendations  She would like to see Dr. Randolph Garland  Name and office phone number given to patient    Patient transferred to Cardiology

## 2020-12-24 NOTE — TELEPHONE ENCOUNTER
RN, please refer the patient to the [facility] cardiology department–[place] heart guys including Drs. Marylee Pfeiffer and Dr. Princess Rodgers

## 2020-12-24 NOTE — TELEPHONE ENCOUNTER
Patient requesting Dr Radha Davies to refer her to a Cardiologist.  Please call - aware Dr Radha Davies is not in office. Thank you.

## 2020-12-24 NOTE — TELEPHONE ENCOUNTER
Pt asking for doctor's recommendations for a cardiologist. States she she was supposed to have a stent placed on 12/17/2020 and was not able to have procedure done, states she was told \"her arteries were too thin and would blow up\".  Noted pt was advised

## 2020-12-28 ENCOUNTER — TELEPHONE (OUTPATIENT)
Dept: PULMONOLOGY | Facility: CLINIC | Age: 65
End: 2020-12-28

## 2021-01-05 NOTE — TELEPHONE ENCOUNTER
Spoke with pt and she was informed that Managed Care handles PA. Pt will reach out to managed care and pt voiced understanding.

## 2021-01-14 ENCOUNTER — TELEPHONE (OUTPATIENT)
Dept: CASE MANAGEMENT | Age: 66
End: 2021-01-14

## 2021-01-14 DIAGNOSIS — R91.1 LUNG NODULE: Primary | ICD-10-CM

## 2021-01-14 DIAGNOSIS — R59.0 MEDIASTINAL LYMPHADENOPATHY: ICD-10-CM

## 2021-01-14 NOTE — TELEPHONE ENCOUNTER
Pt is calling in regards to CT that Dr. Omero Sheppard has her do annually.  Please sign order if you agree with plan of care

## 2021-01-14 NOTE — TELEPHONE ENCOUNTER
CT chest order changed with IV contrast given history of mild mediastinal lymphadenopathy. We should also get her in non-urgently after CT to see Dr. Marvin Vega for follow-up. Thanks!

## 2021-01-15 ENCOUNTER — HOSPITAL ENCOUNTER (OUTPATIENT)
Dept: MAMMOGRAPHY | Age: 66
Discharge: HOME | End: 2021-01-15
Attending: OBSTETRICS & GYNECOLOGY
Payer: MEDICARE

## 2021-01-15 DIAGNOSIS — Z12.31 ENCOUNTER FOR SCREENING MAMMOGRAM FOR BREAST CANCER: ICD-10-CM

## 2021-01-15 PROCEDURE — 77063 BREAST TOMOSYNTHESIS BI: CPT | Performed by: OBSTETRICS & GYNECOLOGY

## 2021-01-15 PROCEDURE — 77067 SCR MAMMO BI INCL CAD: CPT | Performed by: OBSTETRICS & GYNECOLOGY

## 2021-01-15 NOTE — TELEPHONE ENCOUNTER
Informed patient of message below. Appointment scheduled with Dr. Emily Wilkinson on  2/22/21 at 4:30 pm.  Verified date, time, place and where to park. Patient verbalized understanding.

## 2021-01-21 ENCOUNTER — HOSPITAL ENCOUNTER (OUTPATIENT)
Dept: CT IMAGING | Facility: HOSPITAL | Age: 66
Discharge: HOME | End: 2021-01-21
Attending: INTERNAL MEDICINE
Payer: MEDICARE

## 2021-01-21 ENCOUNTER — TELEPHONE (OUTPATIENT)
Dept: OBGYN CLINIC | Facility: CLINIC | Age: 66
End: 2021-01-21

## 2021-01-21 DIAGNOSIS — R59.0 MEDIASTINAL LYMPHADENOPATHY: ICD-10-CM

## 2021-01-21 DIAGNOSIS — R91.1 LUNG NODULE: ICD-10-CM

## 2021-01-21 LAB — CREAT BLD-MCNC: 0.5 MG/DL (ref 0.55–1.02)

## 2021-01-21 PROCEDURE — 82565 ASSAY OF CREATININE: CPT

## 2021-01-21 PROCEDURE — 71260 CT THORAX DX C+: CPT | Performed by: INTERNAL MEDICINE

## 2021-01-22 ENCOUNTER — TELEPHONE (OUTPATIENT)
Dept: PULMONOLOGY | Facility: CLINIC | Age: 66
End: 2021-01-22

## 2021-01-22 DIAGNOSIS — Z87.891 PERSONAL HISTORY OF TOBACCO USE, PRESENTING HAZARDS TO HEALTH: Primary | ICD-10-CM

## 2021-01-22 NOTE — TELEPHONE ENCOUNTER
Dr. Major Ty, patient requesting CT scan results done on 1/21/21. Patient aware Dr. Major Ty out of office and willing to wait for his review.

## 2021-01-22 NOTE — TELEPHONE ENCOUNTER
RN, please call the patient to let her know that the nodular changes in the lung are unchanged over many years and likely represents scar. She does not need any further CT scans to evaluate these abnormalities.   However, please ask her when she quit smoki

## 2021-01-22 NOTE — TELEPHONE ENCOUNTER
Spoke to patient and relayed Dr. Darrick Barton message as shown below. Patient verbalized understanding and states quit smoking less than 15 years ago and unsure how many pack's per year she used before when she did smoke. Please advise. Thank you.

## 2021-01-22 NOTE — TELEPHONE ENCOUNTER
Patient requesting to speak with RN regarding CT Scan results. Please call - aware Dr Omero Sheppard is not in office. Thank you.

## 2021-01-23 NOTE — TELEPHONE ENCOUNTER
RN, simply make inquiry as to whether or not she smoked a pack per day for 30 or more years and let me know.

## 2021-01-25 ENCOUNTER — TELEPHONE (OUTPATIENT)
Dept: INTERNAL MEDICINE CLINIC | Facility: CLINIC | Age: 66
End: 2021-01-25

## 2021-01-25 NOTE — TELEPHONE ENCOUNTER
I spoke with patient to discuss LDCT screening program. Risks vs benefits discussed and patient agreeable to yearly LDCT scan. LDCT ordered and placed in chronic calendar.

## 2021-01-26 NOTE — TELEPHONE ENCOUNTER
Patient states she is unable to access [employer]. email :[email or web address]   .[phone]

## 2021-02-16 ENCOUNTER — OFFICE VISIT (OUTPATIENT)
Dept: CARDIOLOGY CLINIC | Facility: CLINIC | Age: 66
End: 2021-02-16
Payer: MEDICARE

## 2021-02-16 VITALS
OXYGEN SATURATION: 100 % | WEIGHT: 155 LBS | SYSTOLIC BLOOD PRESSURE: 157 MMHG | BODY MASS INDEX: 28.52 KG/M2 | HEART RATE: 85 BPM | HEIGHT: 62 IN | DIASTOLIC BLOOD PRESSURE: 88 MMHG

## 2021-02-16 DIAGNOSIS — R07.9 CHEST PAIN, UNSPECIFIED TYPE: Primary | ICD-10-CM

## 2021-02-16 DIAGNOSIS — I10 ESSENTIAL HYPERTENSION: ICD-10-CM

## 2021-02-16 DIAGNOSIS — E78.49 OTHER HYPERLIPIDEMIA: ICD-10-CM

## 2021-02-16 PROCEDURE — 99215 OFFICE O/P EST HI 40 MIN: CPT | Performed by: INTERNAL MEDICINE

## 2021-02-16 NOTE — PROGRESS NOTES
[place] [facility]  PROGRESS NOTE    Marcie Seaman is a 77year old female. Patient presents with:   Follow - Up: Lt Heart Cath 12/17/20  Chest Pain  Dyspnea    HPI:   This is a pleasant 77year old female with history of bipolar disorder hypertension COPD eleva Past Medical History:   Diagnosis Date   • Asthma    • Bipolar disorder ([facility] [address]    • Cataract    • COPD (chronic obstructive pulmonary disease) ([facility] [address]    • Depression    • Essential hypertension    • High cholesterol    • Osteoarthritis     R knee surgery exertion  CARDIOVASCULAR:See HPI  GI: denies abdominal pain and denies heartburn  NEURO: denies headaches  Remainder of review of systems is completed and negative    EXAM:   /88 (BP Location: Right arm, Patient Position: Sitting, Cuff Size: adult)

## 2021-02-16 NOTE — PATIENT INSTRUCTIONS
Increase verapamil to 240 mg daily. May use 2-120 mg tablets daily and see if it helps. Monitor record resting blood pressure for a week and call with results. If it helps when we refill the medicine we will adjust the dose.   If feeling worse on medicat

## 2021-02-18 ENCOUNTER — LAB ENCOUNTER (OUTPATIENT)
Dept: LAB | Facility: HOSPITAL | Age: 66
End: 2021-02-18
Attending: INTERNAL MEDICINE
Payer: MEDICARE

## 2021-02-18 DIAGNOSIS — E78.49 OTHER HYPERLIPIDEMIA: ICD-10-CM

## 2021-02-18 PROBLEM — G56.21 CUBITAL TUNNEL SYNDROME ON RIGHT: Status: ACTIVE | Noted: 2021-02-18

## 2021-02-18 PROBLEM — G56.01 RIGHT CARPAL TUNNEL SYNDROME: Status: ACTIVE | Noted: 2021-02-18

## 2021-02-18 LAB
ALT SERPL-CCNC: 32 U/L
AST SERPL-CCNC: 20 U/L (ref 15–37)
CHOLEST SMN-MCNC: 139 MG/DL (ref ?–200)
HDLC SERPL-MCNC: 71 MG/DL (ref 40–59)
LDLC SERPL CALC-MCNC: 56 MG/DL (ref ?–100)
NONHDLC SERPL-MCNC: 68 MG/DL (ref ?–130)
PATIENT FASTING Y/N/NP: YES
TRIGL SERPL-MCNC: 60 MG/DL (ref 30–149)
VLDLC SERPL CALC-MCNC: 12 MG/DL (ref 0–30)

## 2021-02-18 PROCEDURE — 84450 TRANSFERASE (AST) (SGOT): CPT

## 2021-02-18 PROCEDURE — 36415 COLL VENOUS BLD VENIPUNCTURE: CPT

## 2021-02-18 PROCEDURE — 84460 ALANINE AMINO (ALT) (SGPT): CPT

## 2021-02-18 PROCEDURE — 80061 LIPID PANEL: CPT

## 2021-02-19 ENCOUNTER — TELEPHONE (OUTPATIENT)
Dept: CARDIOLOGY CLINIC | Facility: CLINIC | Age: 66
End: 2021-02-19

## 2021-02-19 DIAGNOSIS — E78.49 OTHER HYPERLIPIDEMIA: Primary | ICD-10-CM

## 2021-02-19 NOTE — TELEPHONE ENCOUNTER
Brenda Clifton requesting RN to fax Echo Stress, EKG with resting, as well as all labs to [phone] attn: Brenda Clifton. For additional questions please call. Thank you.

## 2021-02-19 NOTE — TELEPHONE ENCOUNTER
Message sent via pbsi, orders for labs entered. ANSHUL Hargrove [facility] Clinical Staff             Lipids are stable, continue present management. Recheck in 1 yr.

## 2021-02-20 ENCOUNTER — LAB ENCOUNTER (OUTPATIENT)
Dept: LAB | Age: 66
End: 2021-02-20
Attending: ORTHOPAEDIC SURGERY
Payer: MEDICARE

## 2021-02-20 DIAGNOSIS — G56.01 CARPAL TUNNEL SYNDROME ON RIGHT: Primary | ICD-10-CM

## 2021-02-20 DIAGNOSIS — E78.49 OTHER HYPERLIPIDEMIA: ICD-10-CM

## 2021-02-20 LAB
ALT SERPL-CCNC: 34 U/L
ANION GAP SERPL CALC-SCNC: 3 MMOL/L (ref 0–18)
AST SERPL-CCNC: 20 U/L (ref 15–37)
BUN BLD-MCNC: 10 MG/DL (ref 7–18)
BUN/CREAT SERPL: 16.9 (ref 10–20)
CALCIUM BLD-MCNC: 9.4 MG/DL (ref 8.5–10.1)
CHLORIDE SERPL-SCNC: 104 MMOL/L (ref 98–112)
CHOLEST SMN-MCNC: 139 MG/DL (ref ?–200)
CO2 SERPL-SCNC: 29 MMOL/L (ref 21–32)
CREAT BLD-MCNC: 0.59 MG/DL
GLUCOSE BLD-MCNC: 95 MG/DL (ref 70–99)
HDLC SERPL-MCNC: 71 MG/DL (ref 40–59)
LDLC SERPL CALC-MCNC: 56 MG/DL (ref ?–100)
NONHDLC SERPL-MCNC: 68 MG/DL (ref ?–130)
OSMOLALITY SERPL CALC.SUM OF ELEC: 281 MOSM/KG (ref 275–295)
PATIENT FASTING Y/N/NP: YES
PATIENT FASTING Y/N/NP: YES
POTASSIUM SERPL-SCNC: 4.3 MMOL/L (ref 3.5–5.1)
SARS-COV-2 RNA RESP QL NAA+PROBE: NOT DETECTED
SODIUM SERPL-SCNC: 136 MMOL/L (ref 136–145)
TRIGL SERPL-MCNC: 58 MG/DL (ref 30–149)
VLDLC SERPL CALC-MCNC: 12 MG/DL (ref 0–30)

## 2021-02-20 PROCEDURE — 80061 LIPID PANEL: CPT

## 2021-02-20 PROCEDURE — 80048 BASIC METABOLIC PNL TOTAL CA: CPT

## 2021-02-20 PROCEDURE — 84450 TRANSFERASE (AST) (SGOT): CPT

## 2021-02-20 PROCEDURE — 36415 COLL VENOUS BLD VENIPUNCTURE: CPT

## 2021-02-20 PROCEDURE — 84460 ALANINE AMINO (ALT) (SGPT): CPT

## 2021-02-22 ENCOUNTER — OFFICE VISIT (OUTPATIENT)
Dept: PULMONOLOGY | Facility: CLINIC | Age: 66
End: 2021-02-22
Payer: MEDICARE

## 2021-02-22 ENCOUNTER — TELEPHONE (OUTPATIENT)
Dept: CARDIOLOGY CLINIC | Facility: CLINIC | Age: 66
End: 2021-02-22

## 2021-02-22 VITALS
TEMPERATURE: 98 F | DIASTOLIC BLOOD PRESSURE: 83 MMHG | WEIGHT: 152 LBS | BODY MASS INDEX: 27.97 KG/M2 | OXYGEN SATURATION: 97 % | HEIGHT: 62 IN | HEART RATE: 75 BPM | RESPIRATION RATE: 18 BRPM | SYSTOLIC BLOOD PRESSURE: 150 MMHG

## 2021-02-22 DIAGNOSIS — R07.9 CHEST PAIN, UNSPECIFIED TYPE: Primary | ICD-10-CM

## 2021-02-22 PROCEDURE — 99214 OFFICE O/P EST MOD 30 MIN: CPT | Performed by: INTERNAL MEDICINE

## 2021-02-22 NOTE — PROGRESS NOTES
The patient is 24-year-old female who [place] from prior evaluation who comes in now for follow-up. She has had a pulmonary nodule which was tracked over many years with serial CT scanning and she does not need any more scans.   She quit smoking 19 years

## 2021-02-22 NOTE — TELEPHONE ENCOUNTER
Calling if okay for surgical clearance , for herself. Not clear that the surgeon is asking, will be twilight sleep. Surgery tomorrow. Denies chest pains.      11 AM:  2/17  177/99 - 69   2/18 136/82  - 64    2/19  146/84 - 70    2/21 119/74 - 70    2/22

## 2021-02-22 NOTE — TELEPHONE ENCOUNTER
Pt is calling to get her lab results that she had done last week, she is having surgery tomorrow.  Please call

## 2021-02-22 NOTE — TELEPHONE ENCOUNTER
Spoke with Eric, said she's having surgery to help with nerve pain. Dr. Clyde Craig doing surgery. She states she is feeling well over all, except for the nerve pain.  Advised then per APN she can proceed with surgery and generate letter, sent electronically t

## 2021-02-24 ENCOUNTER — TELEPHONE (OUTPATIENT)
Dept: INTERNAL MEDICINE CLINIC | Facility: CLINIC | Age: 66
End: 2021-02-24

## 2021-03-01 RX ORDER — VERAPAMIL HYDROCHLORIDE 240 MG/1
240 TABLET, FILM COATED, EXTENDED RELEASE ORAL NIGHTLY
Qty: 90 TABLET | Refills: 1 | Status: SHIPPED | OUTPATIENT
Start: 2021-03-01 | End: 2021-08-17

## 2021-03-01 RX ORDER — ATORVASTATIN CALCIUM 20 MG/1
20 TABLET, FILM COATED ORAL NIGHTLY
Qty: 90 TABLET | Refills: 1 | Status: SHIPPED | OUTPATIENT
Start: 2021-03-01 | End: 2021-08-24

## 2021-03-01 NOTE — TELEPHONE ENCOUNTER
S/w Eric, needs refill as verapamil was increased, see BP reading on TE 2/22/21. Doses verified , creatinine reviewed. Passes protocol.

## 2021-03-01 NOTE — TELEPHONE ENCOUNTER
Pt called to request refill:       Current Outpatient Medications:     •  Verapamil HCl  MG Oral Tab CR, Take 1 tablet (120 mg total) by mouth nightly., Disp: 30 tablet, Rfl: 5     •  atorvastatin 20 MG Oral Tab, Take 1 tablet (20 mg total) by mouth

## 2021-03-04 ENCOUNTER — TELEMEDICINE (OUTPATIENT)
Dept: RHEUMATOLOGY | Facility: CLINIC | Age: 66
End: 2021-03-04
Payer: MEDICARE

## 2021-03-04 DIAGNOSIS — M25.50 HYPERMOBILITY ARTHRALGIA: ICD-10-CM

## 2021-03-04 DIAGNOSIS — Z23 NEED FOR VACCINATION: ICD-10-CM

## 2021-03-04 DIAGNOSIS — M15.9 GENERALIZED OA: Primary | ICD-10-CM

## 2021-03-04 PROCEDURE — 99214 OFFICE O/P EST MOD 30 MIN: CPT | Performed by: INTERNAL MEDICINE

## 2021-03-04 NOTE — PROGRESS NOTES
This visit is conducted using Telemedicine with live, interactive video and audio. Patient has been referred to the [facility] website at [email or web address] to review the yearly Consent to Treat document.     Patient understands and accepts financial res right shoudler pain now -for the 5-6- months after she started working   She has hand pain and wrist pain and elft ankel pain as well. She also got her left knee repalced in 6.8/2015. Wanda.  #3 did help in the past.   She feels ibuprofen makes ehr tir Eliot Broderick gave injections in both shoulders. It helped her right shoulder, but not her left shoulder. She fell in June 2018 -   She felt that her left shoulder pain is gradual. The pain is really bad in the morning. She will try to lay on her right side. getting a pleuisy of her left chest wall - this has started about 3 days ago. On prednisone she eflt better. Her hands hurt and swell. It's hard to use. 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 She has to slow down.    Her grandson helps her with the baki tablet (120 mg total) by mouth nightly. 30 tablet 5   • QUEtiapine Fumarate 50 MG Oral Tab Take 50 mg by mouth nightly. • cetirizine 10 MG Oral Tab Take 1 tablet (10 mg total) by mouth daily.  60 tablet 2   • albuterol sulfate (2.5 MG/3ML) 0.083% Inhala (L)   CREATININE      0.50 - 1.50 mg/dL 0.57 0.44 (L)   CALCIUM      8.5 - 10.5 mg/dL 9.7 9.3   ALT (SGPT)      14 - 54 U/L  13 (L)   AST (SGOT)      15 - 41 U/L  15   ALKALINE PHOSPHATASE      32 - 100 U/L  67   Total Bilirubin      0.3 - 1.2 mg/dL  0.6 111   RHEUMATOID FACTOR      <11 IU/mL <5 8   C-REACTIVE PROTEIN      0.0 - 0.9 mg/dL 0.6 0.7     Component      Latest Ref Rng & Units 7/12/2020 7/11/2020   Glucose      70 - 99 mg/dL  117 (H)   Sodium      136 - 145 mmol/L  137   Potassium      3.5 - 5.1 Cholesterol      40 - 59 mg/dL  71 (H) 71 (H)   Triglycerides      30 - 149 mg/dL  58 60   LDL Cholesterol Calc      <100 mg/dL  56 56   VLDL      0 - 30 mg/dL  12 12   NON HDL CHOL      <130 mg/dL  68 68   ALT (SGPT)      13 - 56 U/L  34 32   AST (SGOT) degeneration and lower lumbar spine. 3. Moderate left greater than right hip osteoarthritis. 4. No suspicious bone lesion or fracture. 5. Atherosclerotic vascular calcification.     9/6/2018 - left shoulder xray   3 view x-rays of left shoulder were obta she needs to   5. oa in hands -  diffuse OA  6. Low back pain - d/w her to do core strengthening exercises - gave her exericses for low back pain   Not taking amittriyline to 20mg   7.  Left shoulder pain -  sonia wary of surgery - check mri left shoulder -

## 2021-03-16 ENCOUNTER — TELEPHONE (OUTPATIENT)
Dept: DERMATOLOGY CLINIC | Facility: CLINIC | Age: 66
End: 2021-03-16

## 2021-03-16 NOTE — TELEPHONE ENCOUNTER
LOV 8/2019 - Pt states she is getting fungus on her toenail again and is asking for a refill of terbinafine. Pt states she is unable to drive because she had surgery on her arms and is unable to use her arms.   Pt also states she is unable to get anyone to

## 2021-03-16 NOTE — TELEPHONE ENCOUNTER
It is not appropriate to just refill this. Now there are interactions with her other meds now. It may be appropriate to take again, but she needs to be seen.   OK to wait for now until she recovers to come in    She just had a telemed visit with Dr. LUCIANO--pe

## 2021-03-16 NOTE — TELEPHONE ENCOUNTER
Patient asking for refill of medication for fungus on toe. Unable to come in for an appointment.  Please advise    LOV 8/9/2019

## 2021-03-17 NOTE — TELEPHONE ENCOUNTER
Pt informed, voiced understanding. She will CB once she recovers from her surgery. She refused televisit.

## 2021-03-23 ENCOUNTER — TELEPHONE (OUTPATIENT)
Dept: DERMATOLOGY CLINIC | Facility: CLINIC | Age: 66
End: 2021-03-23

## 2021-03-24 NOTE — TELEPHONE ENCOUNTER
S/w pt, pt requested a refill of terbinafine, last RXed in 2019, informed she will need to be seen first, I did move up her appt to 3/29/21. This is for her toenails to right foot. Pt states they are once again discolored and thick.

## 2021-03-25 NOTE — TELEPHONE ENCOUNTER
Spoke with patient. Verified name and . Informed patient per Dr. Naveen Brown to please keep appointment for Monday 3/29/21 at 5 pm for refills. Patient verbalizes understanding, no further questions at this time.

## 2021-03-29 ENCOUNTER — OFFICE VISIT (OUTPATIENT)
Dept: DERMATOLOGY CLINIC | Facility: CLINIC | Age: 66
End: 2021-03-29
Payer: MEDICARE

## 2021-03-29 DIAGNOSIS — L30.9 DERMATITIS: ICD-10-CM

## 2021-03-29 DIAGNOSIS — B35.1 ONYCHOMYCOSIS: Primary | ICD-10-CM

## 2021-03-29 PROCEDURE — 99213 OFFICE O/P EST LOW 20 MIN: CPT | Performed by: DERMATOLOGY

## 2021-03-29 RX ORDER — PREDNISONE 20 MG/1
TABLET ORAL
COMMUNITY
End: 2021-04-09

## 2021-03-29 RX ORDER — PREDNISONE 10 MG/1
TABLET ORAL
COMMUNITY
End: 2021-04-09

## 2021-03-29 RX ORDER — ACETAMINOPHEN AND CODEINE PHOSPHATE 300; 30 MG/1; MG/1
TABLET ORAL
COMMUNITY
Start: 2021-03-21 | End: 2021-04-13

## 2021-03-29 RX ORDER — SAL ACID/UREA/PETROLATUM,WHITE 5 %-10 %
1 OINTMENT (GRAM) TOPICAL DAILY
Qty: 10 ML | Refills: 1 | Status: SHIPPED | OUTPATIENT
Start: 2021-03-29 | End: 2021-04-09

## 2021-03-29 RX ORDER — DIAZEPAM 5 MG/1
TABLET ORAL
COMMUNITY
End: 2021-04-27 | Stop reason: ALTCHOICE

## 2021-03-29 RX ORDER — AMMONIUM LACTATE 12 G/100G
1 LOTION TOPICAL 2 TIMES DAILY
Qty: 500 G | Refills: 11 | Status: SHIPPED | OUTPATIENT
Start: 2021-03-29 | End: 2021-04-09

## 2021-03-29 RX ORDER — TERBINAFINE HYDROCHLORIDE 250 MG/1
250 TABLET ORAL DAILY
Qty: 30 TABLET | Refills: 1 | Status: SHIPPED | OUTPATIENT
Start: 2021-03-29 | End: 2021-10-06 | Stop reason: ALTCHOICE

## 2021-03-29 RX ORDER — GABAPENTIN 100 MG/1
CAPSULE ORAL
COMMUNITY
End: 2021-04-09

## 2021-04-08 ENCOUNTER — TELEPHONE (OUTPATIENT)
Dept: OBGYN CLINIC | Facility: CLINIC | Age: 66
End: 2021-04-08

## 2021-04-08 NOTE — TELEPHONE ENCOUNTER
Patient indicates she had bump that burst and now is red and left a hole, patient indicates it had a bad odor. Patient was informed to call if this occurred again. Please call at [phone],[email or web address]. fall

## 2021-04-08 NOTE — TELEPHONE ENCOUNTER
Pt called and notified of Eligio Magaña and appt changed. Pt accepts appt and verbalizes understanding.

## 2021-04-08 NOTE — TELEPHONE ENCOUNTER
Pt calling stating last night she had a \"bump\" smaller then a dime in size \"burst open\" and drain large amount of clear, foul smelling drainage. Pt states it is located in the increase of her leg along the pantie line on the right side. Pt states no pain in the area, only slight burning when it first started to drain. But that has since resolved. Pt does state that the area is red and there is a small hole. Pt denies any bleeding of the area. Pt states small amount of drainage, and has placed a clean Kotex pad in the area. Pt denies any fever. Does states he had some chills last night, but states they have resolved. Pt denies any UTI or constipation issues. Pt instructed to keep the area clean, to change pad when soiled. Pt also instructed to call if she develops pain, bleeding, fever, chills or flu like symptoms. Pt verbalizes understanding. Pt offered next available appt. Res24 used on 4/20 with CHANG, pt accepts appt. To CHANG to please review and advise. Is this appt ok, or does pt need to be seen sooner?      Last annual: 12/21/2020 with CHANG, normal, HPV neg  Mammo: 1/15/2021 Normal

## 2021-04-09 ENCOUNTER — OFFICE VISIT (OUTPATIENT)
Dept: OBGYN CLINIC | Facility: CLINIC | Age: 66
End: 2021-04-09
Payer: MEDICARE

## 2021-04-09 VITALS
BODY MASS INDEX: 29 KG/M2 | SYSTOLIC BLOOD PRESSURE: 146 MMHG | WEIGHT: 152.38 LBS | DIASTOLIC BLOOD PRESSURE: 83 MMHG | HEART RATE: 74 BPM

## 2021-04-09 DIAGNOSIS — N76.4 VULVAR BOIL: Primary | ICD-10-CM

## 2021-04-09 PROCEDURE — 99213 OFFICE O/P EST LOW 20 MIN: CPT | Performed by: OBSTETRICS & GYNECOLOGY

## 2021-04-09 RX ORDER — CEPHALEXIN 500 MG/1
500 CAPSULE ORAL 2 TIMES DAILY
Qty: 14 CAPSULE | Refills: 0 | Status: SHIPPED | OUTPATIENT
Start: 2021-04-09 | End: 2021-04-16

## 2021-04-09 NOTE — PROGRESS NOTES
Curry Montesinos is a 74year old female  No LMP recorded. Patient is postmenopausal. Patient presents with:  Gyn Problem: draining lesion ON VAGINAL AREA    Last seen 2020. Had a vulvar boil on right groin by underwear line.   It opened and florentino KNEE SURGERY Right     osteoarthritis   • JESSICA LOCALIZATION WIRE 1 SITE LEFT (CPT=19281)      2011   • OTHER Left 03/22/2019    LEFT REVERSE TOTAL SHOULDER ARTHROPLASTY WITH BICEPS TENODESIS   • OTHER SURGICAL HISTORY      Laparotomy (adhesions)   • OTHER S tabs \before procedure     • QUEtiapine Fumarate 50 MG Oral Tab Take 50 mg by mouth nightly. • cetirizine 10 MG Oral Tab Take 1 tablet (10 mg total) by mouth daily.  60 tablet 2   • albuterol sulfate (2.5 MG/3ML) 0.083% Inhalation Nebu Soln Take 3 mL (2

## 2021-04-11 NOTE — PROGRESS NOTES
Maye Francisco is a 74year old female. Patient presents with:  Derm Problem: LOV 8/9/19. pt presenting today with nail fungus to R big toe for a month. Denies pain or itching. pt previously used  Terbinafine with improvement.  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 99307, L Total Knee, w/, Global Exp.8/30/15 6/8/2015   • S 1 capsule (500 mg total) by mouth 2 (two) times daily for 7 days.  14 capsule 0   • nystatin-triamcinolone 991345-3.1 UNIT/GM-% External Cream nystatin-triamcinolone 100,000 unit/g-0.1 % topical cream     • cetirizine 10 MG Oral Tab Take 1 tablet (10 mg tot Laparotomy (adhesions)   • OTHER SURGICAL HISTORY      Arthrocentesis of the right knee joint   • OTHER SURGICAL HISTORY      Arthrocentesis of the left knee joint   • OTHER SURGICAL HISTORY  08/2019    rotator cuff   • SHOULDER TOTAL Right 7/10/2020    Pe feeding: Not Asked        Reaction to local anesthetic: No    Social History Narrative      Not on file    Social Determinants of Health  Financial Resource Strain:       Difficulty of Paying Living Expenses:   Food Insecurity:       Worried About Running requesting  refills    Past notes/ records and appropriate/relevant lab results including pathology and past body maps reviewed. Updated and new information noted in current visit.      Follow concern with fungal changes on right toenail has been worsening onychomycosis  Pulse therapy with oral terbinafine discussed. Urea cream to feet and around nails add kerasal    Please refer to map for specific lesions. See additional diagnoses. Pros cons of various therapies, risks benefits discussed. Pathophysiology

## 2021-04-13 RX ORDER — ACETAMINOPHEN AND CODEINE PHOSPHATE 300; 30 MG/1; MG/1
1 TABLET ORAL EVERY 8 HOURS PRN
Qty: 90 TABLET | Refills: 0 | Status: SHIPPED | OUTPATIENT
Start: 2021-04-20 | End: 2021-05-06

## 2021-04-15 ENCOUNTER — LAB ENCOUNTER (OUTPATIENT)
Dept: LAB | Facility: HOSPITAL | Age: 66
End: 2021-04-15
Attending: INTERNAL MEDICINE
Payer: MEDICARE

## 2021-04-15 ENCOUNTER — NURSE TRIAGE (OUTPATIENT)
Dept: INTERNAL MEDICINE CLINIC | Facility: CLINIC | Age: 66
End: 2021-04-15

## 2021-04-15 DIAGNOSIS — R53.83 OTHER FATIGUE: ICD-10-CM

## 2021-04-15 DIAGNOSIS — R68.83 CHILLS: ICD-10-CM

## 2021-04-15 DIAGNOSIS — R68.83 CHILLS: Primary | ICD-10-CM

## 2021-04-15 NOTE — TELEPHONE ENCOUNTER
Action Requested: Summary for Provider     []  Critical Lab, Recommendations Needed  [] Need Additional Advice  []   FYI    []   Need Orders  [] Need Medications Sent to Pharmacy  []  Other     SUMMARY: pt states that she received the Pzifer vaccine on 4/1

## 2021-04-15 NOTE — TELEPHONE ENCOUNTER
Dr Raciel Chapin: patient called back; she would really like a covid test done. Patient daughter is upset patient received covid vaccines. Patient described side effects: fever, chills, and states she forgot to mention she had blurred vision twice.  But no long

## 2021-04-16 NOTE — TELEPHONE ENCOUNTER
Patient called regarding results informed her that results are normal and stable, no further questions

## 2021-04-27 ENCOUNTER — OFFICE VISIT (OUTPATIENT)
Dept: INTERNAL MEDICINE CLINIC | Facility: CLINIC | Age: 66
End: 2021-04-27
Payer: MEDICARE

## 2021-04-27 VITALS
HEART RATE: 72 BPM | HEIGHT: 61 IN | SYSTOLIC BLOOD PRESSURE: 155 MMHG | WEIGHT: 154 LBS | DIASTOLIC BLOOD PRESSURE: 90 MMHG | BODY MASS INDEX: 29.07 KG/M2 | TEMPERATURE: 98 F

## 2021-04-27 DIAGNOSIS — M17.12 PRIMARY OSTEOARTHRITIS OF LEFT KNEE: ICD-10-CM

## 2021-04-27 DIAGNOSIS — Z00.00 ENCOUNTER FOR ANNUAL HEALTH EXAMINATION: Primary | ICD-10-CM

## 2021-04-27 DIAGNOSIS — I10 ESSENTIAL HYPERTENSION: Chronic | ICD-10-CM

## 2021-04-27 DIAGNOSIS — Z96.653 HX OF TOTAL KNEE ARTHROPLASTY, BILATERAL: ICD-10-CM

## 2021-04-27 DIAGNOSIS — Z78.0 POSTMENOPAUSAL: ICD-10-CM

## 2021-04-27 DIAGNOSIS — N63.25 BREAST LUMP ON LEFT SIDE AT 3 O'CLOCK POSITION: ICD-10-CM

## 2021-04-27 DIAGNOSIS — E78.49 OTHER HYPERLIPIDEMIA: ICD-10-CM

## 2021-04-27 DIAGNOSIS — M17.11 PRIMARY OSTEOARTHRITIS OF RIGHT KNEE: ICD-10-CM

## 2021-04-27 DIAGNOSIS — J43.2 CENTRILOBULAR EMPHYSEMA (HCC): ICD-10-CM

## 2021-04-27 DIAGNOSIS — Z13.6 SCREENING FOR CARDIOVASCULAR CONDITION: ICD-10-CM

## 2021-04-27 DIAGNOSIS — Z12.11 COLON CANCER SCREENING: ICD-10-CM

## 2021-04-27 DIAGNOSIS — I73.00 RAYNAUD'S DISEASE WITHOUT GANGRENE: ICD-10-CM

## 2021-04-27 DIAGNOSIS — L82.1 SEBORRHEIC KERATOSES: ICD-10-CM

## 2021-04-27 DIAGNOSIS — Z96.612 S/P REVERSE TOTAL SHOULDER ARTHROPLASTY, LEFT: ICD-10-CM

## 2021-04-27 PROBLEM — G89.29 CHRONIC PAIN OF LEFT INGUINAL REGION: Status: RESOLVED | Noted: 2018-09-13 | Resolved: 2021-04-27

## 2021-04-27 PROBLEM — J18.9 COMMUNITY ACQUIRED PNEUMONIA OF RIGHT MIDDLE LOBE OF LUNG: Status: RESOLVED | Noted: 2019-09-30 | Resolved: 2021-04-27

## 2021-04-27 PROBLEM — M89.9 BONE LESION: Status: RESOLVED | Noted: 2018-12-07 | Resolved: 2021-04-27

## 2021-04-27 PROBLEM — R07.9 CHEST PAIN: Status: RESOLVED | Noted: 2020-06-16 | Resolved: 2021-04-27

## 2021-04-27 PROBLEM — M54.32 SCIATICA OF LEFT SIDE: Status: RESOLVED | Noted: 2018-01-16 | Resolved: 2021-04-27

## 2021-04-27 PROBLEM — R10.32 CHRONIC PAIN OF LEFT INGUINAL REGION: Status: RESOLVED | Noted: 2018-09-13 | Resolved: 2021-04-27

## 2021-04-27 PROBLEM — M12.811 ROTATOR CUFF ARTHROPATHY, RIGHT: Status: RESOLVED | Noted: 2018-07-12 | Resolved: 2021-04-27

## 2021-04-27 PROBLEM — M47.817 LUMBOSACRAL SPONDYLOSIS: Chronic | Status: RESOLVED | Noted: 2018-08-24 | Resolved: 2021-04-27

## 2021-04-27 PROBLEM — G56.21 CUBITAL TUNNEL SYNDROME ON RIGHT: Status: RESOLVED | Noted: 2021-02-18 | Resolved: 2021-04-27

## 2021-04-27 PROBLEM — M12.819 ROTATOR CUFF ARTHROPATHY: Status: RESOLVED | Noted: 2019-03-22 | Resolved: 2021-04-27

## 2021-04-27 PROBLEM — M25.562 ACUTE PAIN OF BOTH KNEES: Status: RESOLVED | Noted: 2018-07-30 | Resolved: 2021-04-27

## 2021-04-27 PROBLEM — Z96.611 AFTERCARE FOLLOWING RIGHT SHOULDER JOINT REPLACEMENT SURGERY: Status: RESOLVED | Noted: 2020-07-10 | Resolved: 2021-04-27

## 2021-04-27 PROBLEM — M25.561 ACUTE PAIN OF BOTH KNEES: Status: RESOLVED | Noted: 2018-07-30 | Resolved: 2021-04-27

## 2021-04-27 PROBLEM — Z47.1 AFTERCARE FOLLOWING RIGHT SHOULDER JOINT REPLACEMENT SURGERY: Status: RESOLVED | Noted: 2020-07-10 | Resolved: 2021-04-27

## 2021-04-27 PROBLEM — Z47.1 AFTERCARE FOLLOWING LEFT SHOULDER JOINT REPLACEMENT SURGERY: Status: RESOLVED | Noted: 2019-03-22 | Resolved: 2021-04-27

## 2021-04-27 PROBLEM — M75.101 RIGHT ROTATOR CUFF TEAR ARTHROPATHY: Status: RESOLVED | Noted: 2019-06-26 | Resolved: 2021-04-27

## 2021-04-27 PROBLEM — M25.511 ACUTE PAIN OF RIGHT SHOULDER: Status: RESOLVED | Noted: 2020-09-23 | Resolved: 2021-04-27

## 2021-04-27 PROBLEM — S80.01XA CONTUSION OF RIGHT KNEE: Status: RESOLVED | Noted: 2018-07-12 | Resolved: 2021-04-27

## 2021-04-27 PROBLEM — M12.812 LEFT ROTATOR CUFF TEAR ARTHROPATHY: Status: RESOLVED | Noted: 2019-02-21 | Resolved: 2021-04-27

## 2021-04-27 PROBLEM — M12.811 RIGHT ROTATOR CUFF TEAR ARTHROPATHY: Status: RESOLVED | Noted: 2019-06-26 | Resolved: 2021-04-27

## 2021-04-27 PROBLEM — M75.102 LEFT ROTATOR CUFF TEAR ARTHROPATHY: Status: RESOLVED | Noted: 2019-02-21 | Resolved: 2021-04-27

## 2021-04-27 PROBLEM — R73.9 HYPERGLYCEMIA: Status: RESOLVED | Noted: 2017-04-23 | Resolved: 2021-04-27

## 2021-04-27 PROBLEM — G56.01 RIGHT CARPAL TUNNEL SYNDROME: Status: RESOLVED | Noted: 2021-02-18 | Resolved: 2021-04-27

## 2021-04-27 PROBLEM — S80.02XA CONTUSION OF LEFT KNEE: Status: RESOLVED | Noted: 2018-07-12 | Resolved: 2021-04-27

## 2021-04-27 PROCEDURE — G0439 PPPS, SUBSEQ VISIT: HCPCS | Performed by: INTERNAL MEDICINE

## 2021-04-27 NOTE — PATIENT INSTRUCTIONS
Eric Aviles's SCREENING SCHEDULE   Tests on this list are recommended by your physician but may not be covered, or covered at this frequency, by your insurer. Please check with your insurance carrier before scheduling to verify coverage.    PREVENTATIVE every 10 years- more often if abnormal Colonoscopy due on 12/19/2029 Update [facility] if applicable    Flex Sigmoidoscopy Screen  Covered every 5 years No results found for this or any previous visit. No flowsheet data found.      Fecal Occult Bloo Pneumococcal 13 (Prevnar)  Covered Once after 65 Orders placed or performed in visit on 11/29/18   • PNEUMOCOCCAL VACC, 13 IMELDA IM    Please get once after your 65th birthday    Pneumococcal 23 (Pneumovax)  Covered Once after 65 Orders placed or performed i

## 2021-04-27 NOTE — PROGRESS NOTES
HPI:   Roselie Prader is a 74year old female who presents for a MA (Medicare Advantage) Supervisit (Once per calendar year). Patient presents for above. Here for her Medicare advantage exam.    History of hypercholesterolemia on medium dose Lipitor. Ability/Status   Eric Ureña has some abnormal functions as listed below:  She has Walking problems based on screening of functional status.    Difficulty walking?: Yes       Depression Screening (PHQ-2/PHQ-9): Over the LAST 2 WEEKS   Little interest or pl keratoses     Hx of total knee arthroplasty, bilateral     Essential hypertension     S/P reverse total shoulder arthroplasty, left     Centrilobular emphysema (HCC)     Breast lump on left side at 3 o'clock position     Other hyperlipidemia    Wt Readings Pulmonary emphysema ([facility] [address] Rib fracture (2015), Sciatica, Seborrheic keratoses (3/7/2017), Sx.6/1/15, CPT. 56533, L Total Knee, w/, Global Exp.8/30/15 (6/8/2015), Sx.6/8/16, CPT. 80461, R Total Knee Replacement, w/, Global Exp.9/6/16 (1/1 Hematological: Negative. Psychiatric/Behavioral: The patient is nervous/anxious.       EXAM:   /90 (BP Location: Right arm, Patient Position: Sitting, Cuff Size: adult)   Pulse 72   Temp 97.7 °F (36.5 °C) (Temporal)   Ht 5' 1\" (1.549 m)   Wt 154 Mucous membranes are moist.   Eyes:      General: No scleral icterus. Extraocular Movements: Extraocular movements intact. Pupils: Pupils are equal, round, and reactive to light.    Cardiovascular:      Rate and Rhythm: Normal rate and regular rhyt COMP METABOLIC PANEL (14); Future  -     TSH W REFLEX TO FREE T4; Future  -     CBC WITH DIFFERENTIAL WITH PLATELET; Future    Other hyperlipidemia  -     LIPID PANEL; Future  -     COMP METABOLIC PANEL (14);  Future    Centrilobular emphysema ([address]  Follow or Procedure External Lab or Procedure   Diabetes Screening      HbgA1C   Annually HEMOGLOBIN A1C (%)   Date Value   04/01/2020 5.8 (A)    No flowsheet data found.     Fasting Blood Sugar (FSB)Annually Glucose (mg/dL)   Date Value   02/20/2021 95          C Moderate/High Risk No vaccine history found Medium/high risk factors:   End-stage renal disease   Hemophiliacs who received Factor VIII or IX concentrates   Clients of institutions for the mentally retarded   Persons who live in the same house as a HepB vi

## 2021-04-28 ENCOUNTER — LAB ENCOUNTER (OUTPATIENT)
Dept: LAB | Age: 66
End: 2021-04-28
Attending: INTERNAL MEDICINE
Payer: MEDICARE

## 2021-04-28 ENCOUNTER — HOSPITAL ENCOUNTER (OUTPATIENT)
Dept: BONE DENSITY | Age: 66
Discharge: HOME | End: 2021-04-28
Attending: INTERNAL MEDICINE
Payer: MEDICARE

## 2021-04-28 DIAGNOSIS — Z13.6 SCREENING FOR CARDIOVASCULAR CONDITION: ICD-10-CM

## 2021-04-28 DIAGNOSIS — E78.49 OTHER HYPERLIPIDEMIA: ICD-10-CM

## 2021-04-28 DIAGNOSIS — Z00.00 ENCOUNTER FOR ANNUAL HEALTH EXAMINATION: ICD-10-CM

## 2021-04-28 DIAGNOSIS — Z78.0 POSTMENOPAUSAL: ICD-10-CM

## 2021-04-28 PROCEDURE — 84443 ASSAY THYROID STIM HORMONE: CPT

## 2021-04-28 PROCEDURE — 80053 COMPREHEN METABOLIC PANEL: CPT

## 2021-04-28 PROCEDURE — 77080 DXA BONE DENSITY AXIAL: CPT | Performed by: INTERNAL MEDICINE

## 2021-04-28 PROCEDURE — 36415 COLL VENOUS BLD VENIPUNCTURE: CPT

## 2021-04-28 PROCEDURE — 80061 LIPID PANEL: CPT

## 2021-04-28 PROCEDURE — 85025 COMPLETE CBC W/AUTO DIFF WBC: CPT

## 2021-04-29 ENCOUNTER — TELEPHONE (OUTPATIENT)
Dept: INTERNAL MEDICINE CLINIC | Facility: CLINIC | Age: 66
End: 2021-04-29

## 2021-04-29 NOTE — TELEPHONE ENCOUNTER
Pt asking if test results available, states she doesn't use MyChart. Informed pt doctor has not yet reviewed results and of she doesn't hear from the office in the next few days to contact the office for assistance.

## 2021-04-29 NOTE — TELEPHONE ENCOUNTER
Patient calling for results of labs and Dexa scan, advised Dr Raciel Chapin will need some time to review, we will be back in touch.

## 2021-05-05 ENCOUNTER — TELEPHONE (OUTPATIENT)
Dept: INTERNAL MEDICINE CLINIC | Facility: CLINIC | Age: 66
End: 2021-05-05

## 2021-05-05 DIAGNOSIS — E87.1 HYPONATREMIA: Primary | ICD-10-CM

## 2021-05-05 NOTE — TELEPHONE ENCOUNTER
Patient calling in regards to lab results below.  States Cymbalta works \" very well for her , I do not want to switch meds\"      She \" forgot \" to mention that she drinks 144 ounces of Diet  Pepsi soda  ( with Caffeine ) per day and about 16 ounces of w

## 2021-05-06 ENCOUNTER — OFFICE VISIT (OUTPATIENT)
Dept: RHEUMATOLOGY | Facility: CLINIC | Age: 66
End: 2021-05-06
Payer: MEDICARE

## 2021-05-06 VITALS
DIASTOLIC BLOOD PRESSURE: 74 MMHG | WEIGHT: 150 LBS | HEIGHT: 61 IN | HEART RATE: 79 BPM | SYSTOLIC BLOOD PRESSURE: 127 MMHG | BODY MASS INDEX: 28.32 KG/M2 | RESPIRATION RATE: 16 BRPM

## 2021-05-06 DIAGNOSIS — M13.0 POLYARTHRITIS: ICD-10-CM

## 2021-05-06 DIAGNOSIS — M25.50 HYPERMOBILITY ARTHRALGIA: ICD-10-CM

## 2021-05-06 DIAGNOSIS — M15.9 GENERALIZED OA: Primary | ICD-10-CM

## 2021-05-06 PROCEDURE — 99214 OFFICE O/P EST MOD 30 MIN: CPT | Performed by: INTERNAL MEDICINE

## 2021-05-06 RX ORDER — ACETAMINOPHEN AND CODEINE PHOSPHATE 300; 30 MG/1; MG/1
1 TABLET ORAL EVERY 8 HOURS PRN
Qty: 90 TABLET | Refills: 5 | Status: SHIPPED | OUTPATIENT
Start: 2021-05-06 | End: 2021-11-11

## 2021-05-06 RX ORDER — HYDROXYCHLOROQUINE SULFATE 200 MG/1
200 TABLET, FILM COATED ORAL 2 TIMES DAILY
Qty: 60 TABLET | Refills: 1 | Status: SHIPPED | OUTPATIENT
Start: 2021-05-06 | End: 2021-10-06 | Stop reason: ALTCHOICE

## 2021-05-06 RX ORDER — METHYLPREDNISOLONE 4 MG/1
TABLET ORAL
Qty: 1 PACKAGE | Refills: 0 | Status: SHIPPED | OUTPATIENT
Start: 2021-05-06 | End: 2021-10-06 | Stop reason: ALTCHOICE

## 2021-05-06 NOTE — PROGRESS NOTES
Elias Daughters is a 74year old female. HPI:   Patient presents with:  Osteoarthritis  Medication Follow-Up  Shoulder Pain: B/L  Knee Pain: B/L      I saw Eric Aviles.   She is a pleasant 77year old who has been having a history of osteoarthritis of he knees with sitting or standing. She feels her knees hurt more than before the surgery. Her knees really hurt. By the end of the night her knees can't beart it. She had to quit her job. Her right shoudler is much better after the injection.  Her hands so arm is stiff. She got a right lower back injection and it helped only a little bit. The pain is higher than the injection spot. She was given kenalog 20mg injection by PA on 9/6/2018. It did not help her.    She fell in June 2018 and her knees are hurti alto fo pain in her left hand. And knees. 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 Faustina Jones and it's better. The tylenol #3 the pain is good. Her knees pain is not doing well over the winter.  Everything daily. For 10 days each month 30 tablet 1   • Verapamil HCl  MG Oral Tab CR Take 1 tablet (240 mg total) by mouth nightly. 90 tablet 1   • atorvastatin 20 MG Oral Tab Take 1 tablet (20 mg total) by mouth nightly.  90 tablet 1   • QUEtiapine Fumarate 5 32 - 100 U/L  67   Total Bilirubin      0.3 - 1.2 mg/dL  0.6   TOTAL PROTEIN      5.9 - 8.4 g/dL  7.0   Albumin      3.5 - 4.8 g/dL  4.3   GLOBULIN, TOTAL      2.5 - 3.7 g/dL  2.7   A/G RATIO      1.0 - 2.0  1.6   ANION GAP      0 - 18 8 10   BUN/CREA RA Sodium      136 - 145 mmol/L  137   Potassium      3.5 - 5.1 mmol/L  4.4   Chloride      98 - 112 mmol/L  102   Carbon Dioxide, Total      21.0 - 32.0 mmol/L  28.0   ANION GAP      0 - 18 mmol/L  7   BUN      7 - 18 mg/dL  8   CREATININE      0.55 - 1.02 mg/dL  68 68   ALT (SGPT)      13 - 56 U/L  34 32   AST (SGOT)      15 - 37 U/L  20 20     12/18/2015 - b/l hand xray   Osteoarthritis of both hands are roughly symmetric involvement of the first  carpometacarpal joints bilaterally and moderate to severe d left shoulder xray   3 view x-rays of left shoulder were obtained and reviewed today. There is no evidence of fracture or high riding humerus.  Mild glenohumeral and AC joint arthritis     4/28/2021 - DEXA  LEFT FEMORAL NECK   BMD: 0.6 14 gm/sq. cm. T SCORE autoimmune disease and other causes for her Raynaud's and joint pain. She can take tylenol #3 prn for the pain as well.    2. Bipolar  - on cymbalta and alexafy, dr. Mari Bo  f/u  3. Raynaud's - was on amlodipine 5mg a day, --->now on verapamil  was given

## 2021-05-06 NOTE — TELEPHONE ENCOUNTER
Follow-up in one month for re-evaluation. I will place an order for a BMP. Have her do before she sees me.

## 2021-05-06 NOTE — TELEPHONE ENCOUNTER
Spoke with patient ( verified) and relayed Dr. Marilin Jalloh message below--patient verbalizes understanding and agreement--will complete BMP prior to 2021 f/u visit. No further questions/concerns at this time.     Future Appointments   Date Time Provider

## 2021-05-06 NOTE — PATIENT INSTRUCTIONS
1. Tylenol #3 -  2. voltaren gel 1 % - over the counter. 3. Try adding plaqueinl - aka hydroxychlroquine 200mg twice a day   4. Return to clinic in 2 months   5.  Medrol luis manuel   Plaquenil Oral Tablet 200 mg  Uses  This medicine is used for the following pur appointments for medical exams and tests while on this medicine. Cautions  Tell your doctor and pharmacist if you ever had an allergic reaction to a medicine.  Symptoms of an allergic reaction can include trouble breathing, skin rash, itching, swelling, or your pharmacist if this medicine can interact with any of your other medicines. Be sure to tell them about all the medicines you take. Please tell all your doctors and dentists that you are on this medicine before they provide care.   Do not start or stop reactions to this medicine. Symptoms can include difficulty breathing, skin rash, itching, swelling, or severe dizziness. If you notice any of these symptoms, seek medical help quickly.   Extra  Please speak with your doctor, nurse, or pharmacist if you hav

## 2021-05-11 ENCOUNTER — HOSPITAL ENCOUNTER (OUTPATIENT)
Age: 66
Discharge: HOME | End: 2021-05-11
Attending: EMERGENCY MEDICINE
Payer: MEDICARE

## 2021-05-11 ENCOUNTER — APPOINTMENT (OUTPATIENT)
Dept: GENERAL RADIOLOGY | Age: 66
End: 2021-05-11
Attending: EMERGENCY MEDICINE
Payer: MEDICARE

## 2021-05-11 VITALS
TEMPERATURE: 98 F | RESPIRATION RATE: 18 BRPM | SYSTOLIC BLOOD PRESSURE: 128 MMHG | HEART RATE: 82 BPM | OXYGEN SATURATION: 98 % | DIASTOLIC BLOOD PRESSURE: 63 MMHG

## 2021-05-11 DIAGNOSIS — M25.562 ACUTE PAIN OF BOTH KNEES: Primary | ICD-10-CM

## 2021-05-11 DIAGNOSIS — M25.561 ACUTE PAIN OF BOTH KNEES: Primary | ICD-10-CM

## 2021-05-11 PROCEDURE — 73560 X-RAY EXAM OF KNEE 1 OR 2: CPT | Performed by: EMERGENCY MEDICINE

## 2021-05-11 PROCEDURE — 99214 OFFICE O/P EST MOD 30 MIN: CPT

## 2021-05-11 PROCEDURE — 99213 OFFICE O/P EST LOW 20 MIN: CPT

## 2021-05-11 NOTE — ED PROVIDER NOTES
Patient Seen in: [facility]      History   Patient presents with:  Musculoskeletal Problem    Stated Complaint: Knee pain    HPI/Subjective:   HPI    The patient is a 78-year-old female with past history of hypertension, COPD, status post patel primary re-repair   • INGUINAL HERNIA REPAIR Left 05/20/2009   • KNEE SURGERY Right     osteoarthritis   • JESSICA LOCALIZATION WIRE 1 SITE LEFT (CPT=19281)      2011   • OTHER Left 03/22/2019    LEFT REVERSE TOTAL SHOULDER ARTHROPLASTY WITH BICEPS TENODESIS oriented ×3. The patient's motor strength is 5 out of 5 and symmetric in the upper and lower extremities bilaterally  Extremities: Out swelling and tenderness to the medial aspect of the left knee.   Bilateral total knee replacement incisions, well-healed

## 2021-05-19 ENCOUNTER — TELEPHONE (OUTPATIENT)
Dept: INTERNAL MEDICINE CLINIC | Facility: CLINIC | Age: 66
End: 2021-05-19

## 2021-05-19 NOTE — TELEPHONE ENCOUNTER
Pt is scheduled to see Dr. Ben Galdamez on 6-1-21. Pt would like to know if the doctor would like her to do blood work prior to her appt. Please call pt and let her know.

## 2021-05-20 NOTE — TELEPHONE ENCOUNTER
Spoke to patient and informed her that there is a BMP order for her to have done a few days before her appointment

## 2021-05-20 NOTE — TELEPHONE ENCOUNTER
Dr. Javier Villarreal, patient is schedule for a a follow up on 06/01/2021. Patient is requesting blood test order for appointment. Please advise.

## 2021-05-24 ENCOUNTER — LAB ENCOUNTER (OUTPATIENT)
Dept: LAB | Age: 66
End: 2021-05-24
Attending: INTERNAL MEDICINE
Payer: MEDICARE

## 2021-05-24 DIAGNOSIS — E87.1 HYPONATREMIA: ICD-10-CM

## 2021-05-24 PROCEDURE — 36415 COLL VENOUS BLD VENIPUNCTURE: CPT

## 2021-05-24 PROCEDURE — 80048 BASIC METABOLIC PNL TOTAL CA: CPT

## 2021-05-26 ENCOUNTER — TELEPHONE (OUTPATIENT)
Dept: NEPHROLOGY | Facility: CLINIC | Age: 66
End: 2021-05-26

## 2021-05-26 NOTE — TELEPHONE ENCOUNTER
Left message to call back to see if patient can accept this appointment. Dr. Saranya Cadena is opening up the date/time for this patient.

## 2021-05-26 NOTE — TELEPHONE ENCOUNTER
Billie ROUSSEAU from Dr. Yariel Winters office called in stating pt needs to be seen as soon as possible for low sodium. Pt is currently scheduled in July but needs sooner appt. The schedule is currently booked out for nephrology.  Please follow up with Daphney Stout

## 2021-06-02 ENCOUNTER — TELEPHONE (OUTPATIENT)
Dept: INTERNAL MEDICINE CLINIC | Facility: CLINIC | Age: 66
End: 2021-06-02

## 2021-06-02 NOTE — TELEPHONE ENCOUNTER
Patient called stating she was having trouble getting into her [employer] account. Phone number for [employer] provided.

## 2021-06-08 ENCOUNTER — OFFICE VISIT (OUTPATIENT)
Dept: NEPHROLOGY | Facility: CLINIC | Age: 66
End: 2021-06-08
Payer: MEDICARE

## 2021-06-08 VITALS
DIASTOLIC BLOOD PRESSURE: 79 MMHG | SYSTOLIC BLOOD PRESSURE: 143 MMHG | HEART RATE: 74 BPM | BODY MASS INDEX: 28.89 KG/M2 | HEIGHT: 61 IN | WEIGHT: 153 LBS

## 2021-06-08 DIAGNOSIS — E87.1 HYPONATREMIA: Primary | ICD-10-CM

## 2021-06-08 DIAGNOSIS — M17.12 PRIMARY OSTEOARTHRITIS OF LEFT KNEE: ICD-10-CM

## 2021-06-08 DIAGNOSIS — I10 ESSENTIAL HYPERTENSION: Chronic | ICD-10-CM

## 2021-06-08 DIAGNOSIS — I73.00 RAYNAUD'S DISEASE WITHOUT GANGRENE: ICD-10-CM

## 2021-06-08 PROCEDURE — 99204 OFFICE O/P NEW MOD 45 MIN: CPT | Performed by: INTERNAL MEDICINE

## 2021-06-08 NOTE — PATIENT INSTRUCTIONS
Try to not drink more than about 64 ounces a day of fluid    This includes soda water or juice etc.      Repeat your labs    In 4 weeks they will include blood and urine    I will call you with the results no change in medications    You can increase a lit

## 2021-06-09 ENCOUNTER — OFFICE VISIT (OUTPATIENT)
Dept: INTERNAL MEDICINE CLINIC | Facility: CLINIC | Age: 66
End: 2021-06-09
Payer: MEDICARE

## 2021-06-09 VITALS
WEIGHT: 152.19 LBS | BODY MASS INDEX: 28.73 KG/M2 | HEIGHT: 61 IN | DIASTOLIC BLOOD PRESSURE: 83 MMHG | SYSTOLIC BLOOD PRESSURE: 135 MMHG | HEART RATE: 67 BPM

## 2021-06-09 DIAGNOSIS — E87.1 HYPONATREMIA: Primary | ICD-10-CM

## 2021-06-09 PROCEDURE — 99213 OFFICE O/P EST LOW 20 MIN: CPT | Performed by: INTERNAL MEDICINE

## 2021-06-09 NOTE — PATIENT INSTRUCTIONS
Hyponatremia  Hyponatremia means low sodium levels in the blood. This health problem most often occurs after prolonged vomiting or diarrhea. Both of these conditions cause your body to lose too much water and sodium.    Hyponatremia can also result from d

## 2021-06-09 NOTE — PROGRESS NOTES
Patient ID: Omayra Silveira is a 74year old female. Patient presents with:  Checkup  Follow - Up       HISTORY OF PRESENT ILLNESS:   HPI  Patient presents for above. Here for routine follow-up. Has had sequential abnormal low sodium levels.   She is curre excision, partial mesh removal, primary re-repair   • INGUINAL HERNIA REPAIR Left 05/20/2009   • KNEE SURGERY Right     osteoarthritis   • JESSICA LOCALIZATION WIRE 1 SITE LEFT (CPT=19281)      2011   • OTHER Left 03/22/2019    LEFT REVERSE TOTAL SHOULDER ARTH children: Not on file      Years of education: Not on file      Highest education level: Not on file    Occupational History      Not on file    Tobacco Use      Smoking status: Former Smoker        Packs/day: 1.00        Years: 30.00        Pack years: 27     Frequency of Communication with Friends and Family:       Frequency of Social Gatherings with Friends and Family:       Attends [employer] Services:       Active Member of Clubs or Organizations:       Attends Club or Organization Meetings:       [employer]

## 2021-06-10 ENCOUNTER — TELEPHONE (OUTPATIENT)
Dept: NEPHROLOGY | Facility: CLINIC | Age: 66
End: 2021-06-10

## 2021-06-10 PROBLEM — E87.1 HYPONATREMIA: Status: ACTIVE | Noted: 2021-06-10

## 2021-06-11 NOTE — PROGRESS NOTES
Dear Thais Valente    Progress Note     Charis Britt    I had the pleasure of seeing Ms. Tasneem Roland today  She is a pleasant 78-year-old white female who has been having recent hyponatremia lately  She really denies having any major symptoms from it  But it has dipped i • Visual impairment     WEARS READER      Past Surgical History:   Procedure Laterality Date   • APPENDECTOMY      appendicitis   • BREAST BIOPSY Left 09/07/2011    fibrocystic changes   • CATARACT     • COLONOSCOPY  7/2009    incomplete (chronic constip 90 tablet 1   • atorvastatin 20 MG Oral Tab Take 1 tablet (20 mg total) by mouth nightly. 90 tablet 1   • QUEtiapine Fumarate 50 MG Oral Tab Take 50 mg by mouth nightly. • ClonazePAM (KLONOPIN) 2 MG Oral Tab Take 4 mg by mouth nightly.     2   • ARIpipr present, no abnormal bruising noted  Back/Spine: no abnormalities noted  Musculoskeletal: no deformities  Extremities: no edema  Neurological:  Grossly normal       ASSESSMENT/PLAN:   Assessment   Hyponatremia  (primary encounter diagnosis)  Essential hype

## 2021-06-23 NOTE — TELEPHONE ENCOUNTER
Pt requesting refill for nebulizer solution.  Pt states she currently has a W[employer]OU[employer] aerosol nebulizer

## 2021-06-24 RX ORDER — ALBUTEROL SULFATE 2.5 MG/3ML
2.5 SOLUTION RESPIRATORY (INHALATION) EVERY 6 HOURS PRN
Qty: 90 EACH | Refills: 0 | Status: SHIPPED | OUTPATIENT
Start: 2021-06-24

## 2021-06-24 RX ORDER — ALBUTEROL SULFATE 90 UG/1
2 AEROSOL, METERED RESPIRATORY (INHALATION) EVERY 6 HOURS PRN
Qty: 1 EACH | Refills: 5 | Status: SHIPPED | OUTPATIENT
Start: 2021-06-24

## 2021-06-24 NOTE — TELEPHONE ENCOUNTER
Patient reports needing new inhaler and nebulizer medication that was prescribed in hospital a year ago. Weather changes are making her short of breath at times. Currently using  albuterol HFA inhaler. Albuterol inhaler and nebs Rxs pended.   LOV 2

## 2021-07-08 ENCOUNTER — LAB ENCOUNTER (OUTPATIENT)
Dept: LAB | Age: 66
End: 2021-07-08
Attending: INTERNAL MEDICINE
Payer: MEDICARE

## 2021-07-08 DIAGNOSIS — E87.1 HYPONATREMIA: ICD-10-CM

## 2021-07-08 LAB
ANION GAP SERPL CALC-SCNC: 5 MMOL/L (ref 0–18)
BUN BLD-MCNC: 11 MG/DL (ref 7–18)
BUN/CREAT SERPL: 18 (ref 10–20)
CALCIUM BLD-MCNC: 9.3 MG/DL (ref 8.5–10.1)
CHLORIDE SERPL-SCNC: 100 MMOL/L (ref 98–112)
CO2 SERPL-SCNC: 29 MMOL/L (ref 21–32)
CREAT BLD-MCNC: 0.61 MG/DL
GLUCOSE BLD-MCNC: 79 MG/DL (ref 70–99)
OSMOLALITY SERPL CALC.SUM OF ELEC: 276 MOSM/KG (ref 275–295)
OSMOLALITY UR: 111 MOSM/KG (ref 300–1100)
PATIENT FASTING Y/N/NP: NO
POTASSIUM SERPL-SCNC: 4.5 MMOL/L (ref 3.5–5.1)
SODIUM SERPL-SCNC: 134 MMOL/L (ref 136–145)
SODIUM SERPL-SCNC: 21 MMOL/L

## 2021-07-08 PROCEDURE — 36415 COLL VENOUS BLD VENIPUNCTURE: CPT

## 2021-07-08 PROCEDURE — 80048 BASIC METABOLIC PNL TOTAL CA: CPT

## 2021-07-08 PROCEDURE — 84300 ASSAY OF URINE SODIUM: CPT

## 2021-07-08 PROCEDURE — 83935 ASSAY OF URINE OSMOLALITY: CPT

## 2021-07-12 ENCOUNTER — LAB ENCOUNTER (OUTPATIENT)
Dept: LAB | Facility: HOSPITAL | Age: 66
End: 2021-07-12
Attending: ORTHOPAEDIC SURGERY
Payer: MEDICARE

## 2021-07-12 ENCOUNTER — HOSPITAL ENCOUNTER (OUTPATIENT)
Dept: GENERAL RADIOLOGY | Facility: HOSPITAL | Age: 66
Discharge: HOME | End: 2021-07-12
Attending: ORTHOPAEDIC SURGERY
Payer: MEDICARE

## 2021-07-12 ENCOUNTER — OFFICE VISIT (OUTPATIENT)
Dept: ORTHOPEDICS CLINIC | Facility: CLINIC | Age: 66
End: 2021-07-12
Payer: MEDICARE

## 2021-07-12 VITALS — HEIGHT: 61 IN | WEIGHT: 148 LBS | BODY MASS INDEX: 27.94 KG/M2

## 2021-07-12 DIAGNOSIS — Z96.653 PAIN OF BOTH LOWER EXTREMITIES DUE TO BILATERAL TOTAL KNEE REPLACEMENTS, INITIAL ENCOUNTER (HCC): ICD-10-CM

## 2021-07-12 DIAGNOSIS — T84.84XA PAIN OF BOTH LOWER EXTREMITIES DUE TO BILATERAL TOTAL KNEE REPLACEMENTS, INITIAL ENCOUNTER (HCC): Primary | ICD-10-CM

## 2021-07-12 DIAGNOSIS — T84.84XA PAIN OF BOTH LOWER EXTREMITIES DUE TO BILATERAL TOTAL KNEE REPLACEMENTS, INITIAL ENCOUNTER (HCC): ICD-10-CM

## 2021-07-12 DIAGNOSIS — Z96.653 PAIN OF BOTH LOWER EXTREMITIES DUE TO BILATERAL TOTAL KNEE REPLACEMENTS, INITIAL ENCOUNTER (HCC): Primary | ICD-10-CM

## 2021-07-12 DIAGNOSIS — R52 PAIN: ICD-10-CM

## 2021-07-12 LAB
CRP SERPL HS-MCNC: 3.64 MG/L (ref ?–3)
ERYTHROCYTE [SEDIMENTATION RATE] IN BLOOD: 11 MM/HR

## 2021-07-12 PROCEDURE — 85652 RBC SED RATE AUTOMATED: CPT

## 2021-07-12 PROCEDURE — 86141 C-REACTIVE PROTEIN HS: CPT

## 2021-07-12 PROCEDURE — 99204 OFFICE O/P NEW MOD 45 MIN: CPT | Performed by: ORTHOPAEDIC SURGERY

## 2021-07-12 PROCEDURE — 73564 X-RAY EXAM KNEE 4 OR MORE: CPT | Performed by: ORTHOPAEDIC SURGERY

## 2021-07-12 PROCEDURE — 36415 COLL VENOUS BLD VENIPUNCTURE: CPT

## 2021-07-12 NOTE — H&P
NURSING INTAKE COMMENTS: Patient presents with:  Knee Pain: Bilateral knee pain L>R x 2-3 years LTKA 2017  RTKA 2018 Dr Jennifer Corona.   C/o ongoing pain to knees land left knee swelling      HPI: This 74year old female presents today with for painful bilateral • JESSICA LOCALIZATION WIRE 1 SITE LEFT (CPT=19281)      2011   • OTHER Left 03/22/2019    LEFT REVERSE TOTAL SHOULDER ARTHROPLASTY WITH BICEPS TENODESIS   • OTHER SURGICAL HISTORY      Laparotomy (adhesions)   • OTHER SURGICAL HISTORY      Arthrocentesis of not taking: Reported on 7/12/2021 ) 60 tablet 1       Sulfa Antibiotics       RASH  Sulfanilamide           RASH  Family History   Problem Relation Age of Onset   • Dementia Father         Alzheimer's   • Lipids Father         hyperlipidemia   • Hypertensi bleeding    Physical Examination:    Ht 5' 1\" (1.549 m)   Wt 148 lb (67.1 kg)   BMI 27.96 kg/m²   General appearance: alert and oriented, not in acute distress  Head and neck: Atraumatic, normocephalic, PERRLA  Respiratory: Regular, unlabored breathing  L and CRP. If either these is positive then I would recommend aspiration of the knee. Patient will follow-up after the bone scan discuss results and further treatment options. The above note was creating using Dragon speech recognition technology.  Ulises

## 2021-07-15 ENCOUNTER — OFFICE VISIT (OUTPATIENT)
Dept: RHEUMATOLOGY | Facility: CLINIC | Age: 66
End: 2021-07-15
Payer: MEDICARE

## 2021-07-15 VITALS
DIASTOLIC BLOOD PRESSURE: 83 MMHG | BODY MASS INDEX: 29.27 KG/M2 | SYSTOLIC BLOOD PRESSURE: 134 MMHG | WEIGHT: 155 LBS | HEIGHT: 61 IN | HEART RATE: 67 BPM

## 2021-07-15 DIAGNOSIS — M13.0 POLYARTHRITIS: ICD-10-CM

## 2021-07-15 DIAGNOSIS — M25.50 HYPERMOBILITY ARTHRALGIA: ICD-10-CM

## 2021-07-15 DIAGNOSIS — M15.9 GENERALIZED OA: Primary | ICD-10-CM

## 2021-07-15 PROCEDURE — 99214 OFFICE O/P EST MOD 30 MIN: CPT | Performed by: INTERNAL MEDICINE

## 2021-07-15 NOTE — PATIENT INSTRUCTIONS
1. Tylenol #3 -  2. voltaren gel 1 % - over the counter. 3. Ok to stop hydroxychlroquine   4. Return to clinic in 6 months or as needed.    5. Follow up dr. Westley Burns and bone scan

## 2021-07-15 NOTE — PROGRESS NOTES
Rajiv Jasso is a 74year old female. HPI:   No chief complaint on file. I saw Tu Baumann. She is a pleasant 77year old who has been having a history of osteoarthritis of her knees and myofascial discomfort. She also has Raynaud's.  She has ar before the surgery. Her knees really hurt. By the end of the night her knees can't beart it. She had to quit her job. Her right shoudler is much better after the injection. Her hands sometimes hurts. No other joint pains really.    She did the PT exerci only a little bit. The pain is higher than the injection spot. She was given kenalog 20mg injection by PA on 9/6/2018. It did not help her. She fell in June 2018 and her knees are hurting as well.      2/28/2020  The last month, mid January, her right h her plexitis in he rarm and she just had a repaeet sx with dr. Betty Potts and it's better. The tylenol #3 the pain is good. Her knees pain is not doing well over the winter. Everything was hurting.    shes' doenst' want to do physical therapy fo nae knee Family Hx Reviewed     Medications (Active prior to today's visit):  Current Outpatient Medications   Medication Sig Dispense Refill   • albuterol sulfate (2.5 MG/3ML) 0.083% Inhalation Nebu Soln Take 3 mL (2.5 mg total) by nebulization every 6 (six) yomaira dip - has severe heberdon nodes  Left 2nd and 3rd mcp +1 swelling , mild tender   Right shoulder tender   ;eft shoulder not tender   Right and left knee very tender - no swelling   Hyperpigmentation over right knee - but skin is lax - not tight    oa and h %   31.3   Monocytes %      %   7.7   Eosinophils %      %   1.5   Basophils %      %   0.7   Immature Granulocyte %      %   0.2   Glucose      70 - 99 mg/dL 79 93    Sodium      136 - 145 mmol/L 134 (L) 129 (L)    Potassium      3.5 - 5.1 mmol/L 4.5 4.2 tricompartmental osteoarthritis with  trace joint effusion. Multifocal full-thickness chondral defect as  described. 2. Complex tearing involving the medial greater than lateral menisci as  described.   3. Surgical changes related to the patellar tendon, c her about pseudogout occl being treated with methotrexate or leflunomide - she doenst' want to try medication that might decrease her immune system right now  -  tyelno.l #3 she takes it as needed#90  - hx of shoulder injections - right shoudler pain - s/o

## 2021-07-16 ENCOUNTER — TELEPHONE (OUTPATIENT)
Dept: NEPHROLOGY | Facility: CLINIC | Age: 66
End: 2021-07-16

## 2021-07-16 ENCOUNTER — OFFICE VISIT (OUTPATIENT)
Dept: NEPHROLOGY | Facility: CLINIC | Age: 66
End: 2021-07-16
Payer: MEDICARE

## 2021-07-16 VITALS
HEART RATE: 71 BPM | BODY MASS INDEX: 29.64 KG/M2 | HEIGHT: 61 IN | WEIGHT: 157 LBS | DIASTOLIC BLOOD PRESSURE: 69 MMHG | SYSTOLIC BLOOD PRESSURE: 110 MMHG

## 2021-07-16 DIAGNOSIS — E87.1 HYPONATREMIA: Primary | ICD-10-CM

## 2021-07-16 PROCEDURE — 99212 OFFICE O/P EST SF 10 MIN: CPT | Performed by: INTERNAL MEDICINE

## 2021-07-16 NOTE — PATIENT INSTRUCTIONS
Great job on your sodium is 134    Please try to keep your fluids to about 60 ounces per day unless it is very hot and you are dehydrated    Liberalize your sodium    Please do lab test around October 1 no need to fast    No appointment needed at this time

## 2021-07-17 NOTE — PROGRESS NOTES
Dear Josafat Patiño Note     Jony Baker    Is here for follow-up she is doing well she has hyponatremia but has gotten her sodium from 129 to 134.   Her urine lites were consistent with too much water drinking as her sodium in her urine was  21 her osmo SHOULDER ARTHROPLASTY WITH BICEPS TENODESIS   • OTHER SURGICAL HISTORY      Laparotomy (adhesions)   • OTHER SURGICAL HISTORY      Arthrocentesis of the right knee joint   • OTHER SURGICAL HISTORY      Arthrocentesis of the left knee joint   • OTHER SURGIC 7/12/2021 ) 1 Package 0   • Hydroxychloroquine Sulfate 200 MG Oral Tab Take 1 tablet (200 mg total) by mouth 2 (two) times daily.  (Patient not taking: Reported on 7/16/2021 ) 60 tablet 1   • Terbinafine HCl 250 MG Oral Tab Take 1 tablet (250 mg total) by m Hyponatremia  (primary encounter diagnosis)    1 hyponatremia stable liberal with salting food fluid restriction 60 ounces repeat labs in October she will see me again as needed commended her on her good work    Thank you,  Valery Dupont         Orders This Visit

## 2021-07-20 RX ORDER — HYDROXYCHLOROQUINE SULFATE 200 MG/1
TABLET, FILM COATED ORAL
Qty: 60 TABLET | Refills: 0 | OUTPATIENT
Start: 2021-07-20

## 2021-07-20 NOTE — TELEPHONE ENCOUNTER
Spoke with patient and she is no longer taking Plaquenil and was told it was ok to stop it per Dr Aziza Valenzuela. Refill request denied.

## 2021-08-09 ENCOUNTER — HOSPITAL ENCOUNTER (OUTPATIENT)
Dept: NUCLEAR MEDICINE | Facility: HOSPITAL | Age: 66
Discharge: HOME | End: 2021-08-09
Attending: ORTHOPAEDIC SURGERY
Payer: MEDICARE

## 2021-08-09 DIAGNOSIS — T84.84XA PAIN OF BOTH LOWER EXTREMITIES DUE TO BILATERAL TOTAL KNEE REPLACEMENTS, INITIAL ENCOUNTER (HCC): ICD-10-CM

## 2021-08-09 DIAGNOSIS — Z96.653 PAIN OF BOTH LOWER EXTREMITIES DUE TO BILATERAL TOTAL KNEE REPLACEMENTS, INITIAL ENCOUNTER (HCC): ICD-10-CM

## 2021-08-09 PROCEDURE — 78315 BONE IMAGING 3 PHASE: CPT | Performed by: ORTHOPAEDIC SURGERY

## 2021-08-11 ENCOUNTER — TELEPHONE (OUTPATIENT)
Dept: ORTHOPEDICS CLINIC | Facility: CLINIC | Age: 66
End: 2021-08-11

## 2021-08-11 DIAGNOSIS — T84.84XA PAIN OF BOTH LOWER EXTREMITIES DUE TO BILATERAL TOTAL KNEE REPLACEMENTS, INITIAL ENCOUNTER (HCC): Primary | ICD-10-CM

## 2021-08-11 DIAGNOSIS — Z96.653 PAIN OF BOTH LOWER EXTREMITIES DUE TO BILATERAL TOTAL KNEE REPLACEMENTS, INITIAL ENCOUNTER (HCC): Primary | ICD-10-CM

## 2021-08-11 NOTE — TELEPHONE ENCOUNTER
Per result notes-  Ramone Silveira MD   8/9/2021 11:20 PM CDT       Results reviewed. Released to [address] Tests show no significant abnormalities     Does pt need to f/u in office to discuss further?

## 2021-08-12 NOTE — TELEPHONE ENCOUNTER
I discussed the patient's laboratory studies and bone scan with her.   Bone scan is normal and does not show any evidence of loosening around her knee replacements and besides a slightly elevated CRP her laboratory studies are essentially normal and not con

## 2021-08-17 RX ORDER — VERAPAMIL HYDROCHLORIDE 240 MG/1
240 TABLET, FILM COATED, EXTENDED RELEASE ORAL EVERY EVENING
Qty: 90 TABLET | Refills: 1 | Status: SHIPPED | OUTPATIENT
Start: 2021-08-17

## 2021-08-17 NOTE — TELEPHONE ENCOUNTER
Dose verified as below. Creatinine stable. Meets refill protocol criteria. Refill sent. Chart reviewed, verapamil was 120 mg nightly 2/16/21 was instructed \"Increase verapamil to 240 mg daily. May use 2-120 mg tablets daily and see if it helps.   Monit

## 2021-08-24 RX ORDER — ATORVASTATIN CALCIUM 20 MG/1
20 TABLET, FILM COATED ORAL NIGHTLY
Qty: 90 TABLET | Refills: 1 | Status: SHIPPED | OUTPATIENT
Start: 2021-08-24

## 2021-08-27 ENCOUNTER — TELEPHONE (OUTPATIENT)
Dept: ORTHOPEDICS CLINIC | Facility: CLINIC | Age: 66
End: 2021-08-27

## 2021-08-27 NOTE — TELEPHONE ENCOUNTER
Per pt returning a call from Dr. Terry Devi office. Per pt they called Stefan's phone. Per pt does not know who or why they called.  Please advise

## 2021-08-30 ENCOUNTER — TELEPHONE (OUTPATIENT)
Dept: PULMONOLOGY | Facility: CLINIC | Age: 66
End: 2021-08-30

## 2021-08-30 NOTE — TELEPHONE ENCOUNTER
Spoke with patient. Patient informed of CDC guidelines/recommendations/qualifications for booster shot for immunocompromised. Patient verbalized understanding.

## 2021-08-30 NOTE — TELEPHONE ENCOUNTER
Patient wants to know if her COPD qualifies her to obtain COVID19 Booster injection. Please call to discuss. Thank you.

## 2021-09-23 NOTE — LETTER
12/20/2019              [address]         Dear Tala Monreal,    The polyp removed from your colon during your colonoscopy represents benign tissue.  A colonoscopy is recommended in 10 years for color Just see if someone cancels on Tuesday or Thursday next week and put her in there. We can wait until we see if someone cancels. Keep chart open.

## 2021-10-06 ENCOUNTER — NURSE TRIAGE (OUTPATIENT)
Dept: INTERNAL MEDICINE CLINIC | Facility: CLINIC | Age: 66
End: 2021-10-06

## 2021-10-06 ENCOUNTER — TELEMEDICINE (OUTPATIENT)
Dept: INTERNAL MEDICINE CLINIC | Facility: CLINIC | Age: 66
End: 2021-10-06

## 2021-10-06 ENCOUNTER — LAB ENCOUNTER (OUTPATIENT)
Dept: LAB | Age: 66
End: 2021-10-06
Attending: INTERNAL MEDICINE
Payer: MEDICARE

## 2021-10-06 DIAGNOSIS — R05.9 COUGH: ICD-10-CM

## 2021-10-06 DIAGNOSIS — R53.83 FATIGUE, UNSPECIFIED TYPE: ICD-10-CM

## 2021-10-06 DIAGNOSIS — R25.1 OCCASIONAL TREMORS: ICD-10-CM

## 2021-10-06 DIAGNOSIS — R53.83 FATIGUE, UNSPECIFIED TYPE: Primary | ICD-10-CM

## 2021-10-06 PROCEDURE — 99213 OFFICE O/P EST LOW 20 MIN: CPT | Performed by: INTERNAL MEDICINE

## 2021-10-06 NOTE — TELEPHONE ENCOUNTER
Action Requested: Summary for Provider     []  Critical Lab, Recommendations Needed  [] Need Additional Advice  []   FYI    []   Need Orders  [] Need Medications Sent to Pharmacy  []  Other     SUMMARY: Patient scheduled for video visit today at 3:45 p.m.

## 2021-10-06 NOTE — PROGRESS NOTES
Patient ID: Filemon Lynch is a 74year old female. Patient presents with:  Sick Call         HISTORY OF PRESENT ILLNESS:   Patient presents for above. This visit is conducted using Telemedicine with live, interactive video and audio.   Patient with a 1 day COLONOSCOPY;  Surgeon: Ally Lai MD;  Location: [address] [place] [address] ENDOSCOPY   • HERNIA SURGERY Left 12/21/2011    neuroma excision, partial mesh removal, primary re-repair   • INGUINAL HERNIA REPAIR Left 05/20/2009   • KNEE SURGERY Right     osteoarthritis   • RASH    Social History    Socioeconomic History      Marital status:       Spouse name: Not on file      Number of children: Not on file      Years of education: Not on file      Highest education level: Not on file    Occupational History Transportation (Non-Medical):  Not on file  Physical Activity:       Days of Exercise per Week: Not on file      Minutes of Exercise per Session: Not on file  Stress:       Feeling of Stress : Not on file  Social Connections:       Frequency of Communicatio provide continuity of care in the best interest of the provider-patient relationship, due to the COVID -19 public health crisis/national emergency where restrictions of face-to-face office visits are ongoing.  Every conscious effort was taken to allow for s

## 2021-10-07 ENCOUNTER — TELEPHONE (OUTPATIENT)
Dept: INTERNAL MEDICINE CLINIC | Facility: CLINIC | Age: 66
End: 2021-10-07

## 2021-10-07 NOTE — TELEPHONE ENCOUNTER
Calling and wants to follow up her COVID test result, informed that it is still showing \"in process\"  And turn around time is 48-72 hrs, test was just done yesterday, stated understanding.

## 2021-10-15 ENCOUNTER — TELEPHONE (OUTPATIENT)
Dept: NEPHROLOGY | Facility: CLINIC | Age: 66
End: 2021-10-15

## 2021-10-15 ENCOUNTER — OFFICE VISIT (OUTPATIENT)
Dept: NEPHROLOGY | Facility: CLINIC | Age: 66
End: 2021-10-15
Payer: MEDICARE

## 2021-10-15 ENCOUNTER — LAB ENCOUNTER (OUTPATIENT)
Dept: LAB | Facility: HOSPITAL | Age: 66
End: 2021-10-15
Attending: INTERNAL MEDICINE
Payer: MEDICARE

## 2021-10-15 VITALS
HEART RATE: 62 BPM | HEIGHT: 61 IN | WEIGHT: 152 LBS | BODY MASS INDEX: 28.7 KG/M2 | SYSTOLIC BLOOD PRESSURE: 136 MMHG | DIASTOLIC BLOOD PRESSURE: 83 MMHG

## 2021-10-15 DIAGNOSIS — I10 ESSENTIAL HYPERTENSION: Primary | Chronic | ICD-10-CM

## 2021-10-15 DIAGNOSIS — E87.1 HYPONATREMIA: ICD-10-CM

## 2021-10-15 PROCEDURE — 99212 OFFICE O/P EST SF 10 MIN: CPT | Performed by: INTERNAL MEDICINE

## 2021-10-15 PROCEDURE — 80048 BASIC METABOLIC PNL TOTAL CA: CPT

## 2021-10-15 PROCEDURE — 36415 COLL VENOUS BLD VENIPUNCTURE: CPT

## 2021-10-15 NOTE — PROGRESS NOTES
Dear [place] Note     Roselie Prader    Was here to see me she forgot to do her labs so she will do a sodium level today  Is trying to stay under 70 ounces of fluid per day she joined a health club her bipolar disorder is doing fine    HISTORY:  Pas 2015 and 2016   • TUBAL LIGATION        Social History    Tobacco Use      Smoking status: Former Smoker        Packs/day: 1.00        Years: 30.00        Pack years: 30        Types: Cigarettes        Quit date: 2002        Years since quittin. 7 Negative for dysuria and hematuria  Endocrine:  Negative for abnormal sleep patterns  Hema/Lymph:  Negative for easy bleeding and easy bruising  Integumentary:  Negative for pruritus and rash  Musculoskeletal:  Negative for bone/joint symptoms  Neurologica

## 2021-10-15 NOTE — PATIENT INSTRUCTIONS
Please do labs today    Try to stick with under 70 ounces of fluid per day    You look well good to see you Eric    I will be in touch with you about your labs

## 2021-10-19 ENCOUNTER — TELEPHONE (OUTPATIENT)
Dept: NEPHROLOGY | Facility: CLINIC | Age: 66
End: 2021-10-19

## 2021-10-19 DIAGNOSIS — I10 ESSENTIAL HYPERTENSION: Primary | ICD-10-CM

## 2021-10-19 NOTE — TELEPHONE ENCOUNTER
Please notify labs good.  Stay on fluid restriction.  Repeat bmp Jan. Please place order.  See me after labs             From Dr. Fred Burton

## 2021-10-23 ENCOUNTER — HOSPITAL ENCOUNTER (OUTPATIENT)
Age: 66
Discharge: HOME | End: 2021-10-23
Attending: EMERGENCY MEDICINE
Payer: MEDICARE

## 2021-10-23 ENCOUNTER — APPOINTMENT (OUTPATIENT)
Dept: GENERAL RADIOLOGY | Age: 66
End: 2021-10-23
Attending: EMERGENCY MEDICINE
Payer: MEDICARE

## 2021-10-23 VITALS
TEMPERATURE: 98 F | HEART RATE: 64 BPM | OXYGEN SATURATION: 97 % | DIASTOLIC BLOOD PRESSURE: 77 MMHG | RESPIRATION RATE: 18 BRPM | SYSTOLIC BLOOD PRESSURE: 135 MMHG

## 2021-10-23 DIAGNOSIS — S60.551A FOREIGN BODY, HAND, SUPERFICIAL, RIGHT, INITIAL ENCOUNTER: ICD-10-CM

## 2021-10-23 DIAGNOSIS — S60.221A CONTUSION OF RIGHT HAND, INITIAL ENCOUNTER: Primary | ICD-10-CM

## 2021-10-23 PROCEDURE — 73130 X-RAY EXAM OF HAND: CPT | Performed by: EMERGENCY MEDICINE

## 2021-10-23 PROCEDURE — 99213 OFFICE O/P EST LOW 20 MIN: CPT

## 2021-10-23 RX ORDER — CEPHALEXIN 500 MG/1
500 CAPSULE ORAL 3 TIMES DAILY
Qty: 15 CAPSULE | Refills: 0 | Status: SHIPPED | OUTPATIENT
Start: 2021-10-23 | End: 2021-10-28

## 2021-10-23 NOTE — ED PROVIDER NOTES
Patient Seen in: [facility]      History   Patient presents with:  Derm Problem  Fall    Stated Complaint: right hand injury    Subjective:   HPI    The patient is a 67-year-old female with past history of bipolar disorder, hypertension who p TENODESIS   • OTHER SURGICAL HISTORY      Laparotomy (adhesions)   • OTHER SURGICAL HISTORY      Arthrocentesis of the right knee joint   • OTHER SURGICAL HISTORY      Arthrocentesis of the left knee joint   • OTHER SURGICAL HISTORY  08/2019    geremias f has several (approximately 10) areas of small puncture. There are few superficial foreign bodies observed. Approximately 4 small foreign bodies were removed consistent with a small filament of vegetative etiology. There is minimal surrounding erythema.  The

## 2021-10-26 ENCOUNTER — OFFICE VISIT (OUTPATIENT)
Dept: INTERNAL MEDICINE CLINIC | Facility: CLINIC | Age: 66
End: 2021-10-26
Payer: MEDICARE

## 2021-10-26 VITALS
RESPIRATION RATE: 16 BRPM | HEIGHT: 61 IN | BODY MASS INDEX: 28.7 KG/M2 | SYSTOLIC BLOOD PRESSURE: 140 MMHG | HEART RATE: 76 BPM | WEIGHT: 152 LBS | DIASTOLIC BLOOD PRESSURE: 82 MMHG

## 2021-10-26 DIAGNOSIS — Z00.00 ENCOUNTER FOR ANNUAL HEALTH EXAMINATION: Primary | ICD-10-CM

## 2021-10-26 DIAGNOSIS — I10 ESSENTIAL HYPERTENSION: Chronic | ICD-10-CM

## 2021-10-26 DIAGNOSIS — E87.1 HYPONATREMIA: ICD-10-CM

## 2021-10-26 DIAGNOSIS — E78.49 OTHER HYPERLIPIDEMIA: ICD-10-CM

## 2021-10-26 DIAGNOSIS — Z12.11 COLON CANCER SCREENING: ICD-10-CM

## 2021-10-26 DIAGNOSIS — J43.2 CENTRILOBULAR EMPHYSEMA (HCC): ICD-10-CM

## 2021-10-26 DIAGNOSIS — F41.8 DEPRESSION WITH ANXIETY: ICD-10-CM

## 2021-10-26 PROCEDURE — 99214 OFFICE O/P EST MOD 30 MIN: CPT | Performed by: INTERNAL MEDICINE

## 2021-10-26 NOTE — PROGRESS NOTES
HPI:   Charis Britt is a 74year old female who presents for a Medicare Subsequent Annual Wellness visit (Pt already had Initial Annual Wellness). History of hypercholesterolemia on medium dose Lipitor. Tolerating medication.   Diet improved but unabl medications?: 1-Yes  Does pain affect your day to day activities?: 0-No  Have you had any memory issues?: 1-Yes  Fall/Risk Scorin  Do you feel unsteady when standing or walking?: Yes  Do you worry about falling?: Yes  Have you fallen in the past year?: ORTHOPEDIC)  Lena Mccoy MD ([facility])  Jag Cota MD (Interventional, Cardiology)  Yehuda Raymond MD (PULMONARY DISEASES)  Ghislaine Messer MD ([facility])  Matt Hammond MD (RHEUMATOLOGY)    Patient Active Problem List: mouth nightly. Take 60mg daily.         MEDICAL INFORMATION:   She  has a past medical history of Asthma, Bipolar disorder ([facility] [address] Cataract, COPD (chronic obstructive pulmonary disease) ([facility] [address] Depression, Essential hypertension, High cholesterol, Sanam Lafrankiery Negative. HENT: Negative. Eyes: Negative. Respiratory: Negative. Cardiovascular: Negative. Gastrointestinal: Negative. Genitourinary: Negative. Musculoskeletal: Positive for arthralgias. Skin: Negative. Neurological: Negative. Eye Chart Acuity: 20/50   Left Eye Visual Acuity: Uncorrected Left Eye Chart Acuity: 20/50   Both Eyes Visual Acuity: Uncorrected Both Eyes Chart Acuity: 20/50   Able To Tolerate Visual Acuity: Yes      Physical Exam  Constitutional:       Appearance: Norm Vaccine Pfizer 30 mcg/0.3 ml 03/27/2021, 04/13/2021   • FLU VAC High Dose 65 YRS & Older PRSV Free (41939) 09/11/2019   • FLU VAC QIV SPLIT 3 YRS AND OLDER (64385) 11/09/2016   • FLUAD High Dose 72 yr and older (24585) 11/29/2018   • FLULAVAL 6 months & ol pounds without trying?: 2 - No  Has your appetite been poor?: No  How does the patient maintain a good energy level?: Other  How would you describe your daily physical activity?: Moderate  How would you describe your current health state?: Fair  How do you Covered every 4 years -    Fecal Occult Blood Test Covered annually -   Bone Density Screening    Bone density screening    Covered every 2 years after age 72 if diagnosed with risk of osteoporosis or estrogen deficiency.     Covered yearly for long-term g 10/15/2021       Drug Serum Conc Annually No results found for: DIGOXIN, DIG, VALP               Chronic Obstructive Pulmonary Disease (COPD)    Spirometry Annually Spirometry date: 09/12/2017

## 2021-10-26 NOTE — PATIENT INSTRUCTIONS
Eric Aviles's SCREENING SCHEDULE   Tests on this list are recommended by your physician but may not be covered, or covered at this frequency, by your insurer. Please check with your insurance carrier before scheduling to verify coverage.    PREVENTATIV 04/28/2021      No recommendations at this time   Pap and Pelvic    Pap   Covered every 2 years for women at normal risk;  Annually if at high risk -  No recommendations at this time    Chlamydia Annually if high risk -  No recommendations at this time   Sc the [employer]. http://www. idph.[facility]. [place]/public/books/advin.htm  A link to the [employer]. This site has a lot of good information including definitions of the different types of Advance Directives.  It

## 2021-10-27 ENCOUNTER — LAB ENCOUNTER (OUTPATIENT)
Dept: LAB | Age: 66
End: 2021-10-27
Attending: INTERNAL MEDICINE
Payer: MEDICARE

## 2021-10-27 ENCOUNTER — TELEPHONE (OUTPATIENT)
Dept: PULMONOLOGY | Facility: CLINIC | Age: 66
End: 2021-10-27

## 2021-10-27 ENCOUNTER — TELEPHONE (OUTPATIENT)
Dept: INTERNAL MEDICINE CLINIC | Facility: CLINIC | Age: 66
End: 2021-10-27

## 2021-10-27 DIAGNOSIS — E87.1 HYPONATREMIA: ICD-10-CM

## 2021-10-27 DIAGNOSIS — Z00.00 ENCOUNTER FOR ANNUAL HEALTH EXAMINATION: ICD-10-CM

## 2021-10-27 DIAGNOSIS — E78.49 OTHER HYPERLIPIDEMIA: ICD-10-CM

## 2021-10-27 PROCEDURE — 80053 COMPREHEN METABOLIC PANEL: CPT

## 2021-10-27 PROCEDURE — 36415 COLL VENOUS BLD VENIPUNCTURE: CPT

## 2021-10-27 PROCEDURE — 84443 ASSAY THYROID STIM HORMONE: CPT

## 2021-10-27 PROCEDURE — 80061 LIPID PANEL: CPT

## 2021-10-27 PROCEDURE — 85025 COMPLETE CBC W/AUTO DIFF WBC: CPT

## 2021-10-27 RX ORDER — PREDNISONE 20 MG/1
TABLET ORAL
Qty: 10 TABLET | Refills: 0 | Status: SHIPPED | OUTPATIENT
Start: 2021-10-27 | End: 2021-11-11

## 2021-10-27 NOTE — TELEPHONE ENCOUNTER
RN, can give short course prednisone and encouraged to take Robitussin-DM. I am not can give a narcotic cough medication.

## 2021-10-27 NOTE — TELEPHONE ENCOUNTER
Pt aware of Dr. Kim Day orders below. Encouraged her to take Robitussin-DM as instructed on the bottle. [facility] Reassured her. She voiced understanding & was agreeable.

## 2021-10-27 NOTE — TELEPHONE ENCOUNTER
The patient is calling for the hand doctor phone number and name. Given below per 10/23/21 urgent care notes. Follow-Ups: Follow up with Dayan Diaz MD (SURGERY, PLASTIC);  Call on Monday for an appointment

## 2021-10-27 NOTE — TELEPHONE ENCOUNTER
C/o constant somewhat productive cough, white/biege sputum, wheezing, & terrible fatigue onset months. Denies any other sx. Using albuterol sulfate HFA & nebulizer. Encouraged pt to discuss her concern of possible anemia w/ PCP. Requests cough syrup.  Stts

## 2021-10-28 ENCOUNTER — OFFICE VISIT (OUTPATIENT)
Dept: SURGERY | Facility: CLINIC | Age: 66
End: 2021-10-28
Payer: MEDICARE

## 2021-10-28 DIAGNOSIS — S60.551A: Primary | ICD-10-CM

## 2021-10-28 PROCEDURE — 99204 OFFICE O/P NEW MOD 45 MIN: CPT | Performed by: PLASTIC SURGERY

## 2021-10-28 NOTE — H&P
Ashleigh Swan is a 74year old female that presents with Patient presents with: Injury: Volar RH   .     REFERRED BY:  Tone Pollard    Pacemaker: No  Latex Allergy: no  Coumadin: No  Plavix: No  Other anticoagulants: No  Cardiac stents: No    HAND DOMINA Sx.6/8/16, CPT. 07313, R Total Knee Replacement, w/, Global Exp.9/6/16 1/14/2016   • Visual impairment     WEARS READER        SURGICAL  Past Surgical History:   Procedure Laterality Date   • APPENDECTOMY      appendicitis   • BREAST BIOPSY Left 0 nebulization every 6 (six) hours as needed for Wheezing or Shortness of Breath. 90 each 0   • Albuterol Sulfate  (90 Base) MCG/ACT Inhalation Aero Soln Inhale 2 puffs into the lungs every 6 (six) hours as needed for Wheezing or Shortness of Breath. Normocephalic  EYES: Conjunctiva - Right: Normal, Left: Normal; EOMI  EARS: Inspection - Right: Normal, Left: Normal  NECK/THYROID: Inspection - Normal, Palpation - Normal, Thyroid gland - Normal, No adenopathy  RESPIRATORY: Inspection - Normal, Effort - N

## 2021-11-08 ENCOUNTER — TELEPHONE (OUTPATIENT)
Dept: OBGYN CLINIC | Facility: CLINIC | Age: 66
End: 2021-11-08

## 2021-11-08 NOTE — TELEPHONE ENCOUNTER
Pt calling to report that she has been having left breast pain x 2 weeks. Pt stated that she felt a small lump on breast above her nipple yesterday but now it is gone.  Pt stated that she the breast feels like a sharp, shooting pain that is now extending in

## 2021-11-11 ENCOUNTER — OFFICE VISIT (OUTPATIENT)
Dept: OBGYN CLINIC | Facility: CLINIC | Age: 66
End: 2021-11-11
Payer: MEDICARE

## 2021-11-11 VITALS
DIASTOLIC BLOOD PRESSURE: 74 MMHG | BODY MASS INDEX: 28 KG/M2 | WEIGHT: 149.19 LBS | HEART RATE: 79 BPM | SYSTOLIC BLOOD PRESSURE: 123 MMHG

## 2021-11-11 DIAGNOSIS — N64.4 MASTALGIA: Primary | ICD-10-CM

## 2021-11-11 PROCEDURE — 99213 OFFICE O/P EST LOW 20 MIN: CPT | Performed by: OBSTETRICS & GYNECOLOGY

## 2021-11-11 NOTE — PROGRESS NOTES
Kayla Muñoz is a 74year old female  No LMP recorded. Patient is postmenopausal. Patient presents with:  Gyn Problem: BREAST PAIN , FOUND A LUMP UNDER LEFT BREAST- PER PT     Last seen 21. Having left breast pain for 4 months.   Initially fel Surgeon: Justino Elliott MD;  Location: [address] [place] [address] ENDOSCOPY   • HERNIA SURGERY Left 12/21/2011    neuroma excision, partial mesh removal, primary re-repair   • INGUINAL HERNIA REPAIR Left 05/20/2009   • KNEE SURGERY Right     osteoarthritis   • JESSICA Mcclure by mouth nightly. • DULoxetine 60 MG Oral Cap DR Particles Take 60 mg by mouth nightly. Take 60mg daily.           ALLERGIES:    Sulfa Antibiotics       RASH  Sulfanilamide           RASH      PHYSICAL EXAM:   /74   Pulse 79   Wt 149 lb 3.2 oz (

## 2021-11-18 ENCOUNTER — HOSPITAL ENCOUNTER (OUTPATIENT)
Dept: MAMMOGRAPHY | Facility: HOSPITAL | Age: 66
Discharge: HOME | End: 2021-11-18
Attending: OBSTETRICS & GYNECOLOGY
Payer: MEDICARE

## 2021-11-18 ENCOUNTER — HOSPITAL ENCOUNTER (OUTPATIENT)
Dept: ULTRASOUND IMAGING | Facility: HOSPITAL | Age: 66
Discharge: HOME | End: 2021-11-18
Attending: OBSTETRICS & GYNECOLOGY
Payer: MEDICARE

## 2021-11-18 DIAGNOSIS — N64.4 MASTALGIA: ICD-10-CM

## 2021-11-18 PROCEDURE — 76642 ULTRASOUND BREAST LIMITED: CPT | Performed by: OBSTETRICS & GYNECOLOGY

## 2021-11-18 PROCEDURE — 77065 DX MAMMO INCL CAD UNI: CPT | Performed by: OBSTETRICS & GYNECOLOGY

## 2021-11-18 PROCEDURE — 77061 BREAST TOMOSYNTHESIS UNI: CPT | Performed by: OBSTETRICS & GYNECOLOGY

## 2021-11-22 ENCOUNTER — TELEPHONE (OUTPATIENT)
Dept: PULMONOLOGY | Facility: CLINIC | Age: 66
End: 2021-11-22

## 2021-11-22 RX ORDER — ACETAMINOPHEN AND CODEINE PHOSPHATE 300; 30 MG/1; MG/1
1 TABLET ORAL EVERY 8 HOURS PRN
Qty: 90 TABLET | Refills: 5 | Status: ON HOLD | OUTPATIENT
Start: 2021-11-22 | End: 2022-04-15

## 2021-11-22 NOTE — PROGRESS NOTES
Mammogram with ultrasound-- axillary lymph node with slight thickening. Recommended 3 month left axilla ultrasound.

## 2021-11-22 NOTE — TELEPHONE ENCOUNTER
I spoke with the patient. She has runny nose and cough. I encouraged her to go to the [facility] care center to get a Covid testing. I have also encouraged antihistamines in the meantime.

## 2021-11-24 ENCOUNTER — TELEPHONE (OUTPATIENT)
Dept: OBGYN CLINIC | Facility: CLINIC | Age: 66
End: 2021-11-24

## 2021-11-24 DIAGNOSIS — N64.4 BREAST PAIN: Primary | ICD-10-CM

## 2021-11-24 DIAGNOSIS — R92.8 ABNORMAL MAMMOGRAM: Primary | ICD-10-CM

## 2021-11-24 NOTE — TELEPHONE ENCOUNTER
----- Message from Jarrod Menjivar MD sent at 11/21/2021  8:41 PM CST -----  Mammogram with ultrasound-- axillary lymph node with slight thickening. Recommended 3 month left axilla ultrasound.

## 2021-11-24 NOTE — TELEPHONE ENCOUNTER
Called mammo dept and spoke with tech, states pt can do Kelvin mammo and left axillary US at the same time. recs for pt to schedule appt first week in February. Pt notified and agrees.

## 2021-11-24 NOTE — TELEPHONE ENCOUNTER
----- Message from Doug Sánchez MD sent at 11/21/2021  8:41 PM CST -----  Mammogram with ultrasound-- axillary lymph node with slight thickening. Recommended 3 month left axilla ultrasound.

## 2021-11-27 NOTE — TELEPHONE ENCOUNTER
Pt informed of below and states understanding. She asked if she should be seen sooner than 3 months or is this common to wait? Pt states she still has the pain that brought her in, in fact it is a little worse now.  She states it is a \"hard to describe the

## 2021-11-29 NOTE — TELEPHONE ENCOUNTER
If her symptoms are worse, then get 2nd opinion from general surgery/breast specialist.  See Dr Mckoy Me

## 2021-11-29 NOTE — TELEPHONE ENCOUNTER
Referral placed for Dr Jonah Washburn. Pt informed of JLKs recs and provided with # to Dr Jonah Washburn to schedule appt.

## 2021-11-30 ENCOUNTER — TELEPHONE (OUTPATIENT)
Dept: PULMONOLOGY | Facility: CLINIC | Age: 66
End: 2021-11-30

## 2021-11-30 RX ORDER — PREDNISONE 20 MG/1
TABLET ORAL
Qty: 10 TABLET | Refills: 0 | Status: SHIPPED | OUTPATIENT
Start: 2021-11-30

## 2021-11-30 NOTE — TELEPHONE ENCOUNTER
Left message on patient's voice mail that prescription was sent to [facility] in [place], [place].

## 2021-11-30 NOTE — TELEPHONE ENCOUNTER
Spoke with patient states she has productive cough with white sputum and sore throat, using nebulizer 1x/day and Robitussin DM with relief, denies fever, chest pain, chest tightness, shortness of breath, nasal discharge, congestion.  Patient requesting shor

## 2021-12-05 ENCOUNTER — HOSPITAL ENCOUNTER (OUTPATIENT)
Age: 66
Discharge: HOME | End: 2021-12-05
Attending: EMERGENCY MEDICINE
Payer: MEDICARE

## 2021-12-05 ENCOUNTER — APPOINTMENT (OUTPATIENT)
Dept: GENERAL RADIOLOGY | Age: 66
End: 2021-12-05
Attending: EMERGENCY MEDICINE
Payer: MEDICARE

## 2021-12-05 VITALS
HEART RATE: 71 BPM | TEMPERATURE: 98 F | RESPIRATION RATE: 18 BRPM | OXYGEN SATURATION: 97 % | DIASTOLIC BLOOD PRESSURE: 75 MMHG | SYSTOLIC BLOOD PRESSURE: 149 MMHG

## 2021-12-05 DIAGNOSIS — J06.9 VIRAL URI WITH COUGH: Primary | ICD-10-CM

## 2021-12-05 PROCEDURE — 99213 OFFICE O/P EST LOW 20 MIN: CPT

## 2021-12-05 PROCEDURE — 71046 X-RAY EXAM CHEST 2 VIEWS: CPT | Performed by: EMERGENCY MEDICINE

## 2021-12-05 PROCEDURE — 87880 STREP A ASSAY W/OPTIC: CPT

## 2021-12-05 NOTE — ED INITIAL ASSESSMENT (HPI)
Patient with family member + COVID at home. Has hx of emphysema, + cough, + congestion. Taking taking cough medication and steroids.

## 2021-12-05 NOTE — ED PROVIDER NOTES
Patient Seen in: [facility]      History   Patient presents with:  Covid-19 Test    Stated Complaint: rapid covid    Subjective:   HPI    The patient is a 33-year-old female with a past history of bipolar disorder, hypertension, COPD who pres removal, primary re-repair   • INGUINAL HERNIA REPAIR Left 05/20/2009   • KNEE SURGERY Right     osteoarthritis   • JESSICA LOCALIZATION WIRE 1 SITE LEFT (CPT=19281)      2011   • OTHER Left 03/22/2019    LEFT REVERSE TOTAL SHOULDER ARTHROPLASTY WITH BICEPS TE alert and oriented ×3.   The patient's motor strength is 5 out of 5 and symmetric in the upper and lower extremities bilaterally  Extremities: No focal swelling or tenderness  Skin: No pallor, no redness or warmth to the touch      ED Course     Labs Review

## 2021-12-07 ENCOUNTER — TELEPHONE (OUTPATIENT)
Dept: INTERNAL MEDICINE CLINIC | Facility: CLINIC | Age: 66
End: 2021-12-07

## 2021-12-07 DIAGNOSIS — Z20.822 ENCOUNTER FOR LABORATORY TESTING FOR COVID-19 VIRUS: Primary | ICD-10-CM

## 2021-12-08 ENCOUNTER — LAB ENCOUNTER (OUTPATIENT)
Dept: LAB | Age: 66
End: 2021-12-08
Attending: INTERNAL MEDICINE
Payer: MEDICARE

## 2021-12-08 DIAGNOSIS — Z20.822 ENCOUNTER FOR LABORATORY TESTING FOR COVID-19 VIRUS: ICD-10-CM

## 2021-12-09 ENCOUNTER — TELEPHONE (OUTPATIENT)
Dept: INTERNAL MEDICINE CLINIC | Facility: CLINIC | Age: 66
End: 2021-12-09

## 2021-12-09 NOTE — TELEPHONE ENCOUNTER
Requesting COVID test result that was done yesterday, still showing \"in process\", informed that it will take up to 72 hrs for the test result,stated understanding.

## 2021-12-10 ENCOUNTER — PATIENT MESSAGE (OUTPATIENT)
Dept: INTERNAL MEDICINE CLINIC | Facility: CLINIC | Age: 66
End: 2021-12-10

## 2021-12-10 NOTE — TELEPHONE ENCOUNTER
Patient calling to request results for COVID-19 test. She was advised that test results are still in process and that it can take up to 72 hours for results to return- that we will reach out to her with results but that she can also call again to follow up

## 2021-12-15 ENCOUNTER — TELEPHONE (OUTPATIENT)
Dept: MAMMOGRAPHY | Facility: HOSPITAL | Age: 66
End: 2021-12-15

## 2021-12-15 ENCOUNTER — OFFICE VISIT (OUTPATIENT)
Dept: SURGERY | Facility: CLINIC | Age: 66
End: 2021-12-15
Payer: COMMERCIAL

## 2021-12-15 VITALS
BODY MASS INDEX: 28.44 KG/M2 | TEMPERATURE: 98 F | DIASTOLIC BLOOD PRESSURE: 64 MMHG | HEIGHT: 61 IN | RESPIRATION RATE: 18 BRPM | WEIGHT: 150.63 LBS | OXYGEN SATURATION: 100 % | HEART RATE: 79 BPM | SYSTOLIC BLOOD PRESSURE: 124 MMHG

## 2021-12-15 DIAGNOSIS — R59.0 AXILLARY ADENOPATHY: Primary | ICD-10-CM

## 2021-12-15 PROCEDURE — 99204 OFFICE O/P NEW MOD 45 MIN: CPT | Performed by: SURGERY

## 2021-12-15 PROCEDURE — 3008F BODY MASS INDEX DOCD: CPT | Performed by: SURGERY

## 2021-12-15 PROCEDURE — 3074F SYST BP LT 130 MM HG: CPT | Performed by: SURGERY

## 2021-12-15 PROCEDURE — 3078F DIAST BP <80 MM HG: CPT | Performed by: SURGERY

## 2021-12-15 NOTE — TELEPHONE ENCOUNTER
This nurse introduced self and role of breast coordinator. Discussed recommended breast biopsy with patient. Pt was recommended by Dr. Gary Turner to have a  Left axilla .  Hx saw Dr Ava Munoz for second opinion with Dr Ava Munoz ultrasound guided biopsy  Left axilla on 11/18/2021 at 2:11 PM             Reviewed pertinent patient history, family history of cancer, and patient medications.    Patient reports taking the following medications and over-the-counter supplements:   Current Outpatient Medications   Medication S biopsy. Informed patient to call cardiologist to go off aspirin. She denies usage      -Blood thinners/antiplatelet medications (Coumadin, Plavix  xeralto , effient brilinta   eloquis etc) should be held at the  direction of your physician.   Radiology's is normal after biopsy but that prolonged or increased pain and bruising should be reported to the ordering physician. Educated patient that they should bring a sports bra or form fitting bra on day of procedure.  Educated patient that this helps with com

## 2021-12-16 ENCOUNTER — HOSPITAL ENCOUNTER (OUTPATIENT)
Dept: ULTRASOUND IMAGING | Facility: HOSPITAL | Age: 66
Discharge: HOME | End: 2021-12-16
Attending: OBSTETRICS & GYNECOLOGY
Payer: MEDICARE

## 2021-12-16 DIAGNOSIS — R92.8 ABNORMAL MAMMOGRAM: ICD-10-CM

## 2021-12-16 PROCEDURE — 76882 US LMTD JT/FCL EVL NVASC XTR: CPT | Performed by: OBSTETRICS & GYNECOLOGY

## 2021-12-16 NOTE — PROGRESS NOTES
Breast Surgery New Patient Consultation    This is the first visit for this 74year old woman, referred by Dr. Suzette Yin, who presents for evaluation of axillary adenopathy.     History of Present Illness:   Ms. Jenni Haywood is a 74year old woman who presents wi • CATARACT     • COLONOSCOPY  7/2009    incomplete (chronic constipation)   • COLONOSCOPY N/A 11/8/2018    Procedure: COLONOSCOPY;  Surgeon: Alana Thomas MD;  Location: [facility] ENDOSCOPY   • COLONOSCOPY N/A 12/19/2019    Procedure: COLONOSCOPY;  Surge total) by nebulization every 6 (six) hours as needed for Wheezing or Shortness of Breath., Disp: 90 each, Rfl: 0  Albuterol Sulfate  (90 Base) MCG/ACT Inhalation Aero Soln, Inhale 2 puffs into the lungs every 6 (six) hours as needed for Wheezing or +chills, +night sweats, fatigue, +generalized weakness, +change in appetite or weight loss.     HEENT:     The patient denies eye irritation, +cataracts, redness, glaucoma, yellowing of the eyes, +change in vision, color blindness, or wearing contacts/glass seizure/epilepsy, speech problems, coordination problems, +trembling/tremors, fainting/black outs, dizziness, memory problems, loss of sensation/numbness, problems walking, +weakness, +tingling or burning in hands/feet.  There is no history of abusive relat mildly nodular. There are no dominant masses in the breast. The axillary tail is normal aside from tenderness to palpation with no discretely palpable lymph node. Abdomen: The abdomen is soft, flat and non tender. The liver is not enlarged.  There are n satisfaction. She has been  encouraged to contact the office with any questions or concerns prior to her next appointment.

## 2021-12-17 ENCOUNTER — HOSPITAL ENCOUNTER (OUTPATIENT)
Dept: ULTRASOUND IMAGING | Facility: HOSPITAL | Age: 66
Discharge: HOME | End: 2021-12-17
Attending: SURGERY
Payer: MEDICARE

## 2021-12-17 ENCOUNTER — HOSPITAL ENCOUNTER (OUTPATIENT)
Dept: MAMMOGRAPHY | Facility: HOSPITAL | Age: 66
Discharge: HOME | End: 2021-12-17
Attending: SURGERY
Payer: MEDICARE

## 2021-12-17 DIAGNOSIS — R59.0 AXILLARY ADENOPATHY: ICD-10-CM

## 2021-12-17 DIAGNOSIS — R59.0 AXILLARY LYMPHADENOPATHY: ICD-10-CM

## 2021-12-17 PROCEDURE — 76882 US LMTD JT/FCL EVL NVASC XTR: CPT | Performed by: SURGERY

## 2021-12-22 ENCOUNTER — TELEPHONE (OUTPATIENT)
Dept: INTERNAL MEDICINE CLINIC | Facility: CLINIC | Age: 66
End: 2021-12-22

## 2021-12-22 ENCOUNTER — VIRTUAL PHONE E/M (OUTPATIENT)
Dept: INTERNAL MEDICINE CLINIC | Facility: CLINIC | Age: 66
End: 2021-12-22
Payer: MEDICARE

## 2021-12-22 DIAGNOSIS — J02.9 SORE THROAT: Primary | ICD-10-CM

## 2021-12-22 DIAGNOSIS — R05.9 COUGH: ICD-10-CM

## 2021-12-22 PROCEDURE — 99442 PHONE E/M BY PHYS 11-20 MIN: CPT | Performed by: INTERNAL MEDICINE

## 2021-12-22 RX ORDER — PREDNISONE 20 MG/1
40 TABLET ORAL DAILY
Qty: 10 TABLET | Refills: 0 | Status: SHIPPED | OUTPATIENT
Start: 2021-12-22 | End: 2021-12-27

## 2021-12-22 NOTE — PROGRESS NOTES
Patient ID: Rajiv Jasso is a 74year old female. Patient presents with:  Sore Throat  Sick Call       Virtual/Telephone Check-In    Eric Aviles verbally consents to a Virtual/Telephone Check-In service on 12/22/21.  Patient understands and accepts financ removal, primary re-repair   • INGUINAL HERNIA REPAIR Left 05/20/2009   • KNEE SURGERY Right     osteoarthritis   • JESSICA LOCALIZATION WIRE 1 SITE LEFT (CPT=19281)      2011   • OTHER Left 03/22/2019    LEFT REVERSE TOTAL SHOULDER ARTHROPLASTY WITH BICEPS TE daily. , Disp: , Rfl:     Allergies:  Sulfa Antibiotics       RASH  Sulfanilamide           RASH    Social History    Socioeconomic History      Marital status:       Spouse name: Not on file      Number of children: Not on file      Years of educa [employer] in the Past: Not Asked        Hx of Radiation Treatments: Not Asked        Regular use of sun block: Not Asked    Social History Narrative      Not on file    Social Determinants of Health  Financial Resource Strain: Not on file  Food Insecu of care above. This note was prepared using [employer] voice recognition dictation software. As a result errors may occur. When identified these errors have been corrected.  While every attempt is made to correct errors during dictation discrepancies

## 2021-12-22 NOTE — PROGRESS NOTES
Ultrasound-- left axillary lymph node unchanged.   Pt needs to contact Dr Nuha Muhammad office for further management since she saw Dr Nuha Muhammad on 12/15 for 2nd opinion

## 2021-12-22 NOTE — TELEPHONE ENCOUNTER
Patient was seen in Urgent Care on 12/5 and diagnosed with URI with cough. She had a sore throat at that time and it improved but it is back again. She was negative for strep on that day.  She was given a steroid for her cough and has been taking Robitussin

## 2021-12-23 DIAGNOSIS — R59.9 ADENOPATHY: Primary | ICD-10-CM

## 2021-12-27 ENCOUNTER — TELEPHONE (OUTPATIENT)
Dept: SURGERY | Facility: CLINIC | Age: 66
End: 2021-12-27

## 2021-12-27 NOTE — TELEPHONE ENCOUNTER
LVM to patient, patient is to call central scheduling and set up a date for the imaging due in March. Order is in the system.

## 2022-01-01 NOTE — ED INITIAL ASSESSMENT (HPI)
Pt presents with bilateral knee pain x 5 months, getting worse, pt had bilateral knee replacement in 2016 & 2017.
[place] with exposure to COVID-19 virus

## 2022-01-04 ENCOUNTER — LAB ENCOUNTER (OUTPATIENT)
Dept: LAB | Age: 67
End: 2022-01-04
Attending: INTERNAL MEDICINE
Payer: MEDICARE

## 2022-01-04 DIAGNOSIS — I10 ESSENTIAL HYPERTENSION: ICD-10-CM

## 2022-01-04 LAB
ANION GAP SERPL CALC-SCNC: 7 MMOL/L (ref 0–18)
BUN BLD-MCNC: 9 MG/DL (ref 7–18)
BUN/CREAT SERPL: 15.3 (ref 10–20)
CALCIUM BLD-MCNC: 9.4 MG/DL (ref 8.5–10.1)
CHLORIDE SERPL-SCNC: 96 MMOL/L (ref 98–112)
CO2 SERPL-SCNC: 27 MMOL/L (ref 21–32)
CREAT BLD-MCNC: 0.59 MG/DL
FASTING STATUS PATIENT QL REPORTED: NO
GLUCOSE BLD-MCNC: 88 MG/DL (ref 70–99)
OSMOLALITY SERPL CALC.SUM OF ELEC: 268 MOSM/KG (ref 275–295)
POTASSIUM SERPL-SCNC: 4.3 MMOL/L (ref 3.5–5.1)
SODIUM SERPL-SCNC: 130 MMOL/L (ref 136–145)

## 2022-01-04 PROCEDURE — 80048 BASIC METABOLIC PNL TOTAL CA: CPT

## 2022-01-04 PROCEDURE — 36415 COLL VENOUS BLD VENIPUNCTURE: CPT

## 2022-01-06 ENCOUNTER — OFFICE VISIT (OUTPATIENT)
Dept: NEPHROLOGY | Facility: CLINIC | Age: 67
End: 2022-01-06
Payer: MEDICARE

## 2022-01-06 VITALS
BODY MASS INDEX: 28.7 KG/M2 | WEIGHT: 152 LBS | SYSTOLIC BLOOD PRESSURE: 125 MMHG | HEIGHT: 61 IN | HEART RATE: 68 BPM | DIASTOLIC BLOOD PRESSURE: 60 MMHG

## 2022-01-06 DIAGNOSIS — E87.1 HYPONATREMIA: Primary | ICD-10-CM

## 2022-01-06 DIAGNOSIS — I10 ESSENTIAL HYPERTENSION: Chronic | ICD-10-CM

## 2022-01-06 PROCEDURE — 99213 OFFICE O/P EST LOW 20 MIN: CPT | Performed by: INTERNAL MEDICINE

## 2022-01-06 RX ORDER — LEVOFLOXACIN 500 MG/1
500 TABLET, FILM COATED ORAL DAILY
Qty: 7 TABLET | Refills: 1 | Status: SHIPPED | OUTPATIENT
Start: 2022-01-06

## 2022-01-06 NOTE — PATIENT INSTRUCTIONS
levaquin one day x 7d    Follow w dr Demian Jarvis early march    Video visit mid march    Happy and healthy new year arelis

## 2022-01-07 ENCOUNTER — TELEPHONE (OUTPATIENT)
Dept: PULMONOLOGY | Facility: CLINIC | Age: 67
End: 2022-01-07

## 2022-01-07 RX ORDER — PREDNISONE 20 MG/1
TABLET ORAL
Qty: 10 TABLET | Refills: 0 | Status: SHIPPED | OUTPATIENT
Start: 2022-01-07

## 2022-01-07 NOTE — TELEPHONE ENCOUNTER
Incoming call from patient. Patient states she is still having symptoms since November.  Patient reports dull back pain (7 out of 10) to left upper back and right lower back, hurts so bad that she has to lay down, congested, wheezing at night, shortness of

## 2022-01-07 NOTE — TELEPHONE ENCOUNTER
Spoke with patient. Patient informed of Dr. Ovidio Miller message below. Patient agreeable states she will take Prednisone if needed. Dr. Vonda Dowling- Please review/sign pended order.

## 2022-01-07 NOTE — TELEPHONE ENCOUNTER
Spoke with patient. Patient informed of prescription and verified pharmacy. Advised patient if symptoms worsen to be evaluated in the immediate care. Patient verbalized understanding.

## 2022-01-07 NOTE — TELEPHONE ENCOUNTER
No new recs except may need the prednisone short course. Could send in for her and then fill Rx but only take if needs.

## 2022-01-07 NOTE — TELEPHONE ENCOUNTER
Pt states she had appt yesterday with Dr. Percy Griggs who stated her lungs \" sounded terrible. \"  Pt states Dr. Percy Griggs prescribed antibiotics and informed her to contact Dr. Julio Espitia. Pt states she has back pain as well.  Pt states she has a cough and wheezing at ni

## 2022-01-10 ENCOUNTER — TELEPHONE (OUTPATIENT)
Dept: NEPHROLOGY | Facility: CLINIC | Age: 67
End: 2022-01-10

## 2022-01-10 NOTE — TELEPHONE ENCOUNTER
pt. states that she is returning the nurses call and she is not sure why she is trying to reach her.

## 2022-01-11 NOTE — PROGRESS NOTES
Progress Note     Kendrick Rosado    Is doing well no complaints she has been drinking a lot of water lately and her sodium did go down to 130 she has hyponatremia SIADH she is currently been having some worsening bronchitis she has not responded to [employer] Arthrocentesis of the right knee joint   • OTHER SURGICAL HISTORY      Arthrocentesis of the left knee joint   • OTHER SURGICAL HISTORY  08/2019    rotator cuff   • TOTAL KNEE REPLACEMENT Bilateral 2015 and 2016   • TUBAL LIGATION        Social History 40MG daily for 3 days and then take 20MG daily for 4 days.  10 tablet 0       Allergies:    Sulfa Antibiotics       RASH  Sulfanilamide           RASH      ROS:     Constitutional:  Negative for decreased activity, fever, irritability and lethargy  ENMT:  N bronchitis    Thank you Roman Rodríguez MD    Orders This Visit:  Orders Placed This Encounter      Basic Metabolic Panel (8)      Meds This Visit:  Requested Prescriptions     Signed Prescriptions Disp Refills   • levoFLOXacin 500 MG Oral Tab 7 tablet 1

## 2022-01-26 ENCOUNTER — APPOINTMENT (OUTPATIENT)
Dept: GENERAL RADIOLOGY | Facility: HOSPITAL | Age: 67
End: 2022-01-26
Attending: EMERGENCY MEDICINE
Payer: MEDICARE

## 2022-01-26 ENCOUNTER — HOSPITAL ENCOUNTER (EMERGENCY)
Facility: HOSPITAL | Age: 67
Discharge: HOME | End: 2022-01-26
Attending: EMERGENCY MEDICINE
Payer: MEDICARE

## 2022-01-26 VITALS
WEIGHT: 150 LBS | OXYGEN SATURATION: 94 % | DIASTOLIC BLOOD PRESSURE: 70 MMHG | HEIGHT: 60 IN | BODY MASS INDEX: 29.45 KG/M2 | RESPIRATION RATE: 17 BRPM | TEMPERATURE: 99 F | SYSTOLIC BLOOD PRESSURE: 129 MMHG | HEART RATE: 64 BPM

## 2022-01-26 DIAGNOSIS — R07.9 CHEST PAIN OF UNCERTAIN ETIOLOGY: Primary | ICD-10-CM

## 2022-01-26 LAB
ANION GAP SERPL CALC-SCNC: 4 MMOL/L (ref 0–18)
BASOPHILS # BLD AUTO: 0.03 X10(3) UL (ref 0–0.2)
BASOPHILS NFR BLD AUTO: 0.6 %
BUN BLD-MCNC: 9 MG/DL (ref 7–18)
BUN/CREAT SERPL: 15.8 (ref 10–20)
CALCIUM BLD-MCNC: 9.6 MG/DL (ref 8.5–10.1)
CHLORIDE SERPL-SCNC: 98 MMOL/L (ref 98–112)
CO2 SERPL-SCNC: 28 MMOL/L (ref 21–32)
CREAT BLD-MCNC: 0.57 MG/DL
DEPRECATED RDW RBC AUTO: 43.5 FL (ref 35.1–46.3)
EOSINOPHIL # BLD AUTO: 0.09 X10(3) UL (ref 0–0.7)
EOSINOPHIL NFR BLD AUTO: 1.9 %
ERYTHROCYTE [DISTWIDTH] IN BLOOD BY AUTOMATED COUNT: 12.8 % (ref 11–15)
GLUCOSE BLD-MCNC: 88 MG/DL (ref 70–99)
HCT VFR BLD AUTO: 37.3 %
HGB BLD-MCNC: 11.8 G/DL
IMM GRANULOCYTES # BLD AUTO: 0.01 X10(3) UL (ref 0–1)
IMM GRANULOCYTES NFR BLD: 0.2 %
LYMPHOCYTES # BLD AUTO: 1.37 X10(3) UL (ref 1–4)
LYMPHOCYTES NFR BLD AUTO: 29.6 %
MCH RBC QN AUTO: 29.2 PG (ref 26–34)
MCHC RBC AUTO-ENTMCNC: 31.6 G/DL (ref 31–37)
MCV RBC AUTO: 92.3 FL
MONOCYTES # BLD AUTO: 0.39 X10(3) UL (ref 0.1–1)
MONOCYTES NFR BLD AUTO: 8.4 %
NEUTROPHILS # BLD AUTO: 2.74 X10 (3) UL (ref 1.5–7.7)
NEUTROPHILS # BLD AUTO: 2.74 X10(3) UL (ref 1.5–7.7)
NEUTROPHILS NFR BLD AUTO: 59.3 %
OSMOLALITY SERPL CALC.SUM OF ELEC: 268 MOSM/KG (ref 275–295)
PLATELET # BLD AUTO: 234 10(3)UL (ref 150–450)
POTASSIUM SERPL-SCNC: 4.1 MMOL/L (ref 3.5–5.1)
RBC # BLD AUTO: 4.04 X10(6)UL
SODIUM SERPL-SCNC: 130 MMOL/L (ref 136–145)
TROPONIN I HIGH SENSITIVITY: 4 NG/L
WBC # BLD AUTO: 4.6 X10(3) UL (ref 4–11)

## 2022-01-26 PROCEDURE — 80048 BASIC METABOLIC PNL TOTAL CA: CPT | Performed by: EMERGENCY MEDICINE

## 2022-01-26 PROCEDURE — 99284 EMERGENCY DEPT VISIT MOD MDM: CPT

## 2022-01-26 PROCEDURE — 93005 ELECTROCARDIOGRAM TRACING: CPT

## 2022-01-26 PROCEDURE — 93010 ELECTROCARDIOGRAM REPORT: CPT | Performed by: EMERGENCY MEDICINE

## 2022-01-26 PROCEDURE — 84484 ASSAY OF TROPONIN QUANT: CPT | Performed by: EMERGENCY MEDICINE

## 2022-01-26 PROCEDURE — 71045 X-RAY EXAM CHEST 1 VIEW: CPT | Performed by: EMERGENCY MEDICINE

## 2022-01-26 PROCEDURE — 85025 COMPLETE CBC W/AUTO DIFF WBC: CPT | Performed by: EMERGENCY MEDICINE

## 2022-01-26 PROCEDURE — 36415 COLL VENOUS BLD VENIPUNCTURE: CPT

## 2022-01-26 NOTE — ED PROVIDER NOTES
Patient Seen in: [facility] Emergency Department    History   Patient presents with:  Chest Pain Angina      HPI    78-year-old female presents the ER with complaint of left-sided chest tightness.   Patient with a past medical history of bipolar diso SHOULDER ARTHROPLASTY WITH BICEPS TENODESIS   • OTHER SURGICAL HISTORY      Laparotomy (adhesions)   • OTHER SURGICAL HISTORY      Arthrocentesis of the right knee joint   • OTHER SURGICAL HISTORY      Arthrocentesis of the left knee joint   • OTHER SURGIC Yes      ROS  All pertinent positives for the review of systems are mentioned in the HPI  All other organ systems are reviewed and are negative. Constitutional and vital signs reviewed.       Social History and Family History elements reviewed from today symmetric. Psychiatric:         Behavior: Behavior normal.         Thought Content:  Thought content normal.         Judgment: Judgment normal.         ED Course        Labs Reviewed   BASIC METABOLIC PANEL (8) - Abnormal; Notable for the following compon rhythm    Differential Diagnosis/ Diagnostic Considerations: ACS, anxiety, chest wall pain,    Medical Record Review: I personally reviewed available prior medical records for any recent pertinent discharge summaries, testing, and procedures and reviewed t

## 2022-01-26 NOTE — ED INITIAL ASSESSMENT (HPI)
Pt reports chest pain for a few days reports \"cramping sensation\", pt also reports multiple other symptoms R arm pain, back pain and blurred vision for 2 days

## 2022-02-08 ENCOUNTER — TELEPHONE (OUTPATIENT)
Dept: INTERNAL MEDICINE CLINIC | Facility: CLINIC | Age: 67
End: 2022-02-08

## 2022-02-08 NOTE — TELEPHONE ENCOUNTER
Pt calling back stating that this Dr is no longer in practice. Requesting referral for new hand surgeon but she states to make sure its not for a Dr Steven Castro. Please advise.

## 2022-02-08 NOTE — TELEPHONE ENCOUNTER
Patient states she has had feeling of needles in her arm and her arm feels cold ever since shoulder surgery February 2/20/21. Patient states she had another surgery and still doing same thing. Patient states she was seeing Ross Castellanos at [facility]. Patient asking for a referral for a different orthopedic surgeon for second opinion. Please advise.

## 2022-02-08 NOTE — TELEPHONE ENCOUNTER
Advised patient of new referral information and it still has to be authorized by her insurance.  Perico Patton [phone]

## 2022-02-08 NOTE — TELEPHONE ENCOUNTER
Patient declined scheduling appointment with Dr. Eula Wilson. Only wanted to place request for orthopedic referral. Patient states since last surgery her hand is cold, numb and sometimes will go up arm around elbow.

## 2022-02-09 NOTE — TELEPHONE ENCOUNTER
I am not aware of any other hand specialists. She can look into [facility] in [place] or call her insurance to see who is in network.  She can also call Dr. Stevens Learn office to see if he can see her for carpel tunnel

## 2022-02-09 NOTE — TELEPHONE ENCOUNTER
Patient contacted, per patient will contact insurance and call back and inform us what provider is in network.

## 2022-02-19 ENCOUNTER — HOSPITAL ENCOUNTER (OUTPATIENT)
Dept: MAMMOGRAPHY | Facility: HOSPITAL | Age: 67
Discharge: HOME | End: 2022-02-19
Attending: INTERNAL MEDICINE
Payer: MEDICARE

## 2022-02-19 DIAGNOSIS — Z00.00 ENCOUNTER FOR ANNUAL HEALTH EXAMINATION: ICD-10-CM

## 2022-02-19 PROCEDURE — 77067 SCR MAMMO BI INCL CAD: CPT | Performed by: INTERNAL MEDICINE

## 2022-02-19 PROCEDURE — 77063 BREAST TOMOSYNTHESIS BI: CPT | Performed by: INTERNAL MEDICINE

## 2022-02-24 ENCOUNTER — TELEPHONE (OUTPATIENT)
Dept: NEPHROLOGY | Facility: CLINIC | Age: 67
End: 2022-02-24

## 2022-02-24 ENCOUNTER — OFFICE VISIT (OUTPATIENT)
Dept: OBGYN CLINIC | Facility: CLINIC | Age: 67
End: 2022-02-24
Payer: MEDICARE

## 2022-02-24 VITALS
SYSTOLIC BLOOD PRESSURE: 134 MMHG | WEIGHT: 151 LBS | HEART RATE: 72 BPM | BODY MASS INDEX: 29 KG/M2 | DIASTOLIC BLOOD PRESSURE: 79 MMHG

## 2022-02-24 DIAGNOSIS — B37.3 VULVOVAGINITIS DUE TO YEAST: Primary | ICD-10-CM

## 2022-02-24 PROCEDURE — 99213 OFFICE O/P EST LOW 20 MIN: CPT | Performed by: NURSE PRACTITIONER

## 2022-02-24 RX ORDER — NYSTATIN 100000 U/G
1 CREAM TOPICAL 2 TIMES DAILY
Qty: 30 G | Refills: 0 | Status: SHIPPED | OUTPATIENT
Start: 2022-02-24

## 2022-02-28 ENCOUNTER — OFFICE VISIT (OUTPATIENT)
Dept: DERMATOLOGY CLINIC | Facility: CLINIC | Age: 67
End: 2022-02-28
Payer: MEDICARE

## 2022-02-28 ENCOUNTER — TELEPHONE (OUTPATIENT)
Dept: DERMATOLOGY CLINIC | Facility: CLINIC | Age: 67
End: 2022-02-28

## 2022-02-28 DIAGNOSIS — L73.2 HIDRADENITIS SUPPURATIVA: Primary | ICD-10-CM

## 2022-02-28 PROCEDURE — 99203 OFFICE O/P NEW LOW 30 MIN: CPT | Performed by: PHYSICIAN ASSISTANT

## 2022-02-28 RX ORDER — CLINDAMYCIN PHOSPHATE 10 UG/ML
1 LOTION TOPICAL DAILY
Qty: 60 ML | Refills: 2 | Status: SHIPPED | OUTPATIENT
Start: 2022-02-28

## 2022-02-28 NOTE — TELEPHONE ENCOUNTER
LOV 2/28/22. Pt would like to know if there are alteratives? If not, proceed with PA if available? Please advise, ty.

## 2022-03-01 NOTE — TELEPHONE ENCOUNTER
S/w pt - yes, she would like to try the mupirocin, will pend.    [employer] updated of this (number below)

## 2022-03-01 NOTE — TELEPHONE ENCOUNTER
Call from Pharmacy [phone] ref# [id number]    Please call.  Additional questions for Clindamycin Phosphate Lotion

## 2022-03-12 ENCOUNTER — TELEPHONE (OUTPATIENT)
Dept: NEPHROLOGY | Facility: CLINIC | Age: 67
End: 2022-03-12

## 2022-03-14 NOTE — TELEPHONE ENCOUNTER
pt states that she is not able to accept appt for 4/8/2022 at 4:20pm, as her daugh will be having surgery and she will need to babysit her grand-son.

## 2022-03-15 ENCOUNTER — HOSPITAL ENCOUNTER (OUTPATIENT)
Dept: ULTRASOUND IMAGING | Facility: HOSPITAL | Age: 67
Discharge: HOME | End: 2022-03-15
Attending: SURGERY
Payer: MEDICARE

## 2022-03-15 DIAGNOSIS — R59.9 ADENOPATHY: ICD-10-CM

## 2022-03-15 PROCEDURE — 76882 US LMTD JT/FCL EVL NVASC XTR: CPT | Performed by: SURGERY

## 2022-03-16 ENCOUNTER — TELEPHONE (OUTPATIENT)
Dept: SURGERY | Facility: CLINIC | Age: 67
End: 2022-03-16

## 2022-03-16 NOTE — TELEPHONE ENCOUNTER
Calling pt in regards to vm left requesting Axillary US results, concerns to recommendations of repeating US every 3 mons, and still having a lot of arm pain. Informed pt that per Dr. Lauran Opitz, *Do not think we need to repeat in 3 months as all is stable. Pain not related to the lymph node-recommend she see her PCP to determine if perhaps cervical spine or other local problems happening in the are*. Pt verbalized understanding, and stated she has an appt with PCP coming up and will f/u with PCP then. All questions answered. Encouraged pt to call or MyChart message office with any other questions or concerns.

## 2022-03-17 ENCOUNTER — OFFICE VISIT (OUTPATIENT)
Dept: RHEUMATOLOGY | Facility: CLINIC | Age: 67
End: 2022-03-17
Payer: MEDICARE

## 2022-03-17 VITALS
SYSTOLIC BLOOD PRESSURE: 139 MMHG | HEART RATE: 79 BPM | BODY MASS INDEX: 29.45 KG/M2 | RESPIRATION RATE: 16 BRPM | HEIGHT: 60 IN | WEIGHT: 150 LBS | DIASTOLIC BLOOD PRESSURE: 84 MMHG

## 2022-03-17 DIAGNOSIS — M54.50 CHRONIC MIDLINE LOW BACK PAIN WITHOUT SCIATICA: ICD-10-CM

## 2022-03-17 DIAGNOSIS — M25.50 HYPERMOBILITY ARTHRALGIA: ICD-10-CM

## 2022-03-17 DIAGNOSIS — M15.9 GENERALIZED OA: Primary | ICD-10-CM

## 2022-03-17 DIAGNOSIS — G89.29 CHRONIC MIDLINE LOW BACK PAIN WITHOUT SCIATICA: ICD-10-CM

## 2022-03-17 PROCEDURE — 96372 THER/PROPH/DIAG INJ SC/IM: CPT | Performed by: INTERNAL MEDICINE

## 2022-03-17 PROCEDURE — 99214 OFFICE O/P EST MOD 30 MIN: CPT | Performed by: INTERNAL MEDICINE

## 2022-03-17 RX ORDER — METHYLPREDNISOLONE 4 MG/1
TABLET ORAL
Qty: 1 EACH | Refills: 0 | Status: SHIPPED | OUTPATIENT
Start: 2022-03-17

## 2022-03-17 RX ORDER — TRIAMCINOLONE ACETONIDE 40 MG/ML
40 INJECTION, SUSPENSION INTRA-ARTICULAR; INTRAMUSCULAR ONCE
Status: COMPLETED | OUTPATIENT
Start: 2022-03-17 | End: 2022-03-17

## 2022-03-17 RX ADMIN — TRIAMCINOLONE ACETONIDE 40 MG: 40 INJECTION, SUSPENSION INTRA-ARTICULAR; INTRAMUSCULAR at 12:10:00

## 2022-03-17 NOTE — PATIENT INSTRUCTIONS
1. Tylenol #3 -  2. voltaren gel 1 % - over the counter. 3. Kenalog 40mg im x 1   4. Medrol luis manuel   5. Physical thearpy for back   6.   Return to clinic in 3-6 months

## 2022-03-17 NOTE — PROGRESS NOTES
Patient Receive an IM TRIAMCINOLONE ACETONIDE STEROID injection given on Right Deltoid. Patient tolerated well with no complications.

## 2022-04-07 ENCOUNTER — APPOINTMENT (OUTPATIENT)
Dept: CT IMAGING | Facility: HOSPITAL | Age: 67
End: 2022-04-07
Attending: EMERGENCY MEDICINE
Payer: MEDICARE

## 2022-04-07 ENCOUNTER — APPOINTMENT (OUTPATIENT)
Dept: GENERAL RADIOLOGY | Facility: HOSPITAL | Age: 67
End: 2022-04-07
Attending: EMERGENCY MEDICINE
Payer: MEDICARE

## 2022-04-07 ENCOUNTER — HOSPITAL ENCOUNTER (EMERGENCY)
Facility: HOSPITAL | Age: 67
Discharge: HOME | End: 2022-04-08
Attending: EMERGENCY MEDICINE
Payer: MEDICARE

## 2022-04-07 VITALS
HEART RATE: 73 BPM | TEMPERATURE: 98 F | HEIGHT: 61 IN | RESPIRATION RATE: 16 BRPM | BODY MASS INDEX: 25.49 KG/M2 | DIASTOLIC BLOOD PRESSURE: 51 MMHG | SYSTOLIC BLOOD PRESSURE: 124 MMHG | OXYGEN SATURATION: 94 % | WEIGHT: 135 LBS

## 2022-04-07 DIAGNOSIS — S42.401B OPEN FRACTURE OF DISTAL END OF RIGHT HUMERUS, UNSPECIFIED FRACTURE MORPHOLOGY, INITIAL ENCOUNTER: Primary | ICD-10-CM

## 2022-04-07 LAB — SARS-COV-2 RNA RESP QL NAA+PROBE: NOT DETECTED

## 2022-04-07 PROCEDURE — 73200 CT UPPER EXTREMITY W/O DYE: CPT | Performed by: EMERGENCY MEDICINE

## 2022-04-07 PROCEDURE — 73070 X-RAY EXAM OF ELBOW: CPT | Performed by: EMERGENCY MEDICINE

## 2022-04-07 PROCEDURE — 96376 TX/PRO/DX INJ SAME DRUG ADON: CPT

## 2022-04-07 PROCEDURE — 96375 TX/PRO/DX INJ NEW DRUG ADDON: CPT

## 2022-04-07 PROCEDURE — 99284 EMERGENCY DEPT VISIT MOD MDM: CPT

## 2022-04-07 PROCEDURE — 96365 THER/PROPH/DIAG IV INF INIT: CPT

## 2022-04-07 RX ORDER — MORPHINE SULFATE 4 MG/ML
4 INJECTION, SOLUTION INTRAMUSCULAR; INTRAVENOUS ONCE
Status: COMPLETED | OUTPATIENT
Start: 2022-04-07 | End: 2022-04-07

## 2022-04-07 RX ORDER — ONDANSETRON 2 MG/ML
4 INJECTION INTRAMUSCULAR; INTRAVENOUS ONCE
Status: COMPLETED | OUTPATIENT
Start: 2022-04-07 | End: 2022-04-07

## 2022-04-08 RX ORDER — HYDROCODONE BITARTRATE AND ACETAMINOPHEN 5; 325 MG/1; MG/1
1 TABLET ORAL EVERY 6 HOURS PRN
Qty: 10 TABLET | Refills: 0 | Status: SHIPPED | OUTPATIENT
Start: 2022-04-08 | End: 2022-04-15

## 2022-04-08 RX ORDER — AMOXICILLIN AND CLAVULANATE POTASSIUM 875; 125 MG/1; MG/1
1 TABLET, FILM COATED ORAL 2 TIMES DAILY
Qty: 14 TABLET | Refills: 0 | Status: SHIPPED | OUTPATIENT
Start: 2022-04-08 | End: 2022-04-15

## 2022-04-08 NOTE — ED INITIAL ASSESSMENT (HPI)
PT ARRIVED VIA [facility] C/O FALL AND LEFT ARM PAIN. PER PT SHE FELL AND USED LEFT ARM TO BRACE THE FALL. PT STATES SHE IS UNABLE TO MOVE ARM SINCE FALL. PT DENIES HITTING HEAD, LOC.  PT DENIES BLOOD THINNER USE.

## 2022-04-08 NOTE — CM/SW NOTE
Called by Dominick Tripathi to arrange discharge transportation for patient. ERCM met with patient - patient has money - patient is able to ambulate independently. ERCM confirmed pt's address on face sheet is correct and the address she would like to be discharge home to - pt confirmed address on face sheet is correct and is the address she would like to be discharged home to. ERCM ordered [employer] for patient. ETA 10-20MIN. ERCM informed Graciela Asher RN to please assist in getting dressed and wheel patient to [facility] to await her cab's arrival MITCH Olson RN v/u.     ERCM informed Ana Claudio RN of the above and to please ensure patient gets into her cab safely upon it's arrival. Alyson Silvestre RN v/u.

## 2022-04-12 ENCOUNTER — LAB ENCOUNTER (OUTPATIENT)
Dept: LAB | Age: 67
End: 2022-04-12
Attending: ORTHOPAEDIC SURGERY
Payer: MEDICARE

## 2022-04-12 DIAGNOSIS — Z01.818 PRE-OP TESTING: ICD-10-CM

## 2022-04-13 LAB — SARS-COV-2 RNA RESP QL NAA+PROBE: NOT DETECTED

## 2022-04-15 ENCOUNTER — ANESTHESIA EVENT (OUTPATIENT)
Dept: SURGERY | Facility: HOSPITAL | Age: 67
End: 2022-04-15
Payer: MEDICARE

## 2022-04-15 ENCOUNTER — HOSPITAL ENCOUNTER (INPATIENT)
Facility: HOSPITAL | Age: 67
LOS: 1 days | Discharge: HOME | DRG: 493 | End: 2022-04-19
Attending: ORTHOPAEDIC SURGERY | Admitting: ORTHOPAEDIC SURGERY
Payer: MEDICARE

## 2022-04-15 ENCOUNTER — HOSPITAL ENCOUNTER (INPATIENT)
Facility: HOSPITAL | Age: 67
LOS: 1 days | Discharge: HOME HEALTH | End: 2022-04-19
Attending: ORTHOPAEDIC SURGERY | Admitting: ORTHOPAEDIC SURGERY
Payer: MEDICARE

## 2022-04-15 ENCOUNTER — APPOINTMENT (OUTPATIENT)
Dept: GENERAL RADIOLOGY | Facility: HOSPITAL | Age: 67
End: 2022-04-15
Attending: ORTHOPAEDIC SURGERY
Payer: MEDICARE

## 2022-04-15 ENCOUNTER — APPOINTMENT (OUTPATIENT)
Dept: GENERAL RADIOLOGY | Facility: HOSPITAL | Age: 67
DRG: 493 | End: 2022-04-15
Attending: ORTHOPAEDIC SURGERY
Payer: MEDICARE

## 2022-04-15 ENCOUNTER — ANESTHESIA (OUTPATIENT)
Dept: SURGERY | Facility: HOSPITAL | Age: 67
End: 2022-04-15
Payer: MEDICARE

## 2022-04-15 DIAGNOSIS — Z01.818 PRE-OP TESTING: Primary | ICD-10-CM

## 2022-04-15 PROBLEM — S42.411B: Status: ACTIVE | Noted: 2022-04-15

## 2022-04-15 PROBLEM — S42.411A SUPRACONDYLAR FRACTURE OF RIGHT HUMERUS: Status: ACTIVE | Noted: 2022-04-15

## 2022-04-15 PROCEDURE — 76000 FLUOROSCOPY <1 HR PHYS/QHP: CPT | Performed by: ORTHOPAEDIC SURGERY

## 2022-04-15 PROCEDURE — 0PSF04Z REPOSITION RIGHT HUMERAL SHAFT WITH INTERNAL FIXATION DEVICE, OPEN APPROACH: ICD-10-PCS | Performed by: ORTHOPAEDIC SURGERY

## 2022-04-15 PROCEDURE — 99232 SBSQ HOSP IP/OBS MODERATE 35: CPT | Performed by: HOSPITALIST

## 2022-04-15 DEVICE — SCREW CORT NON LOCK 3.5X22: Type: IMPLANTABLE DEVICE | Status: FUNCTIONAL

## 2022-04-15 DEVICE — SCREW CORT LOCK PURPLE 3.5X18: Type: IMPLANTABLE DEVICE | Status: FUNCTIONAL

## 2022-04-15 DEVICE — SCREW CORT LOCK PURPLE 3.5X42: Type: IMPLANTABLE DEVICE | Status: FUNCTIONAL

## 2022-04-15 DEVICE — SCREW CORT LOCK PURPLE 3.5X12: Type: IMPLANTABLE DEVICE | Status: FUNCTIONAL

## 2022-04-15 DEVICE — IMPLANTABLE DEVICE: Type: IMPLANTABLE DEVICE | Status: FUNCTIONAL

## 2022-04-15 DEVICE — SCREW CORT LOCK PURPLE 3.5X22: Type: IMPLANTABLE DEVICE | Status: FUNCTIONAL

## 2022-04-15 DEVICE — SCREW CORT LOCK PURPLE 3.5X46: Type: IMPLANTABLE DEVICE | Status: FUNCTIONAL

## 2022-04-15 DEVICE — SCREW CORT LOCK PURPLE 3.5X16: Type: IMPLANTABLE DEVICE | Status: FUNCTIONAL

## 2022-04-15 DEVICE — SCREW CORT LOCK PURPLE 3.5X40: Type: IMPLANTABLE DEVICE | Status: FUNCTIONAL

## 2022-04-15 DEVICE — PLATE LOCK COMM 3.5X14H: Type: IMPLANTABLE DEVICE | Status: FUNCTIONAL

## 2022-04-15 DEVICE — SCREW CORT LOCK PURPLE 3.5X30: Type: IMPLANTABLE DEVICE | Status: FUNCTIONAL

## 2022-04-15 DEVICE — SCREW CORT LOCK PURPLE 3.5X20: Type: IMPLANTABLE DEVICE | Status: FUNCTIONAL

## 2022-04-15 RX ORDER — SENNOSIDES 8.6 MG
17.2 TABLET ORAL NIGHTLY PRN
Status: DISCONTINUED | OUTPATIENT
Start: 2022-04-15 | End: 2022-04-19

## 2022-04-15 RX ORDER — OXYCODONE HYDROCHLORIDE 5 MG/1
5 TABLET ORAL EVERY 4 HOURS PRN
Status: DISCONTINUED | OUTPATIENT
Start: 2022-04-15 | End: 2022-04-17

## 2022-04-15 RX ORDER — MORPHINE SULFATE 10 MG/ML
6 INJECTION, SOLUTION INTRAMUSCULAR; INTRAVENOUS EVERY 10 MIN PRN
Status: DISCONTINUED | OUTPATIENT
Start: 2022-04-15 | End: 2022-04-15 | Stop reason: HOSPADM

## 2022-04-15 RX ORDER — HYDROMORPHONE HYDROCHLORIDE 1 MG/ML
0.4 INJECTION, SOLUTION INTRAMUSCULAR; INTRAVENOUS; SUBCUTANEOUS EVERY 2 HOUR PRN
Status: DISCONTINUED | OUTPATIENT
Start: 2022-04-15 | End: 2022-04-17

## 2022-04-15 RX ORDER — GLYCOPYRROLATE 0.2 MG/ML
INJECTION, SOLUTION INTRAMUSCULAR; INTRAVENOUS AS NEEDED
Status: DISCONTINUED | OUTPATIENT
Start: 2022-04-15 | End: 2022-04-15 | Stop reason: SURG

## 2022-04-15 RX ORDER — SODIUM CHLORIDE, SODIUM LACTATE, POTASSIUM CHLORIDE, CALCIUM CHLORIDE 600; 310; 30; 20 MG/100ML; MG/100ML; MG/100ML; MG/100ML
INJECTION, SOLUTION INTRAVENOUS CONTINUOUS
Status: DISCONTINUED | OUTPATIENT
Start: 2022-04-15 | End: 2022-04-18

## 2022-04-15 RX ORDER — MORPHINE SULFATE 4 MG/ML
4 INJECTION, SOLUTION INTRAMUSCULAR; INTRAVENOUS EVERY 10 MIN PRN
Status: DISCONTINUED | OUTPATIENT
Start: 2022-04-15 | End: 2022-04-15 | Stop reason: HOSPADM

## 2022-04-15 RX ORDER — CEFAZOLIN SODIUM/WATER 2 G/20 ML
2 SYRINGE (ML) INTRAVENOUS ONCE
Status: COMPLETED | OUTPATIENT
Start: 2022-04-15 | End: 2022-04-15

## 2022-04-15 RX ORDER — ONDANSETRON 2 MG/ML
4 INJECTION INTRAMUSCULAR; INTRAVENOUS ONCE AS NEEDED
Status: DISCONTINUED | OUTPATIENT
Start: 2022-04-15 | End: 2022-04-15 | Stop reason: HOSPADM

## 2022-04-15 RX ORDER — ONDANSETRON 2 MG/ML
INJECTION INTRAMUSCULAR; INTRAVENOUS AS NEEDED
Status: DISCONTINUED | OUTPATIENT
Start: 2022-04-15 | End: 2022-04-15 | Stop reason: SURG

## 2022-04-15 RX ORDER — PROCHLORPERAZINE EDISYLATE 5 MG/ML
10 INJECTION INTRAMUSCULAR; INTRAVENOUS EVERY 6 HOURS PRN
Status: ACTIVE | OUTPATIENT
Start: 2022-04-15 | End: 2022-04-17

## 2022-04-15 RX ORDER — TRANEXAMIC ACID 10 MG/ML
INJECTION, SOLUTION INTRAVENOUS AS NEEDED
Status: DISCONTINUED | OUTPATIENT
Start: 2022-04-15 | End: 2022-04-15 | Stop reason: SURG

## 2022-04-15 RX ORDER — NEOSTIGMINE METHYLSULFATE 1 MG/ML
INJECTION INTRAVENOUS AS NEEDED
Status: DISCONTINUED | OUTPATIENT
Start: 2022-04-15 | End: 2022-04-15 | Stop reason: SURG

## 2022-04-15 RX ORDER — HALOPERIDOL 5 MG/ML
0.25 INJECTION INTRAMUSCULAR ONCE AS NEEDED
Status: DISCONTINUED | OUTPATIENT
Start: 2022-04-15 | End: 2022-04-15 | Stop reason: HOSPADM

## 2022-04-15 RX ORDER — HYDROMORPHONE HYDROCHLORIDE 1 MG/ML
INJECTION, SOLUTION INTRAMUSCULAR; INTRAVENOUS; SUBCUTANEOUS AS NEEDED
Status: DISCONTINUED | OUTPATIENT
Start: 2022-04-15 | End: 2022-04-15 | Stop reason: SURG

## 2022-04-15 RX ORDER — SODIUM PHOSPHATE, DIBASIC AND SODIUM PHOSPHATE, MONOBASIC 7; 19 G/133ML; G/133ML
1 ENEMA RECTAL ONCE AS NEEDED
Status: DISCONTINUED | OUTPATIENT
Start: 2022-04-15 | End: 2022-04-19

## 2022-04-15 RX ORDER — PROCHLORPERAZINE EDISYLATE 5 MG/ML
5 INJECTION INTRAMUSCULAR; INTRAVENOUS ONCE AS NEEDED
Status: DISCONTINUED | OUTPATIENT
Start: 2022-04-15 | End: 2022-04-15 | Stop reason: HOSPADM

## 2022-04-15 RX ORDER — HYDROCODONE BITARTRATE AND ACETAMINOPHEN 5; 325 MG/1; MG/1
2 TABLET ORAL AS NEEDED
Status: DISCONTINUED | OUTPATIENT
Start: 2022-04-15 | End: 2022-04-15 | Stop reason: HOSPADM

## 2022-04-15 RX ORDER — CLONAZEPAM 0.5 MG/1
2 TABLET ORAL NIGHTLY PRN
Status: DISCONTINUED | OUTPATIENT
Start: 2022-04-15 | End: 2022-04-19

## 2022-04-15 RX ORDER — ACETAMINOPHEN 500 MG
1000 TABLET ORAL ONCE
Status: COMPLETED | OUTPATIENT
Start: 2022-04-15 | End: 2022-04-15

## 2022-04-15 RX ORDER — MORPHINE SULFATE 4 MG/ML
2 INJECTION, SOLUTION INTRAMUSCULAR; INTRAVENOUS EVERY 10 MIN PRN
Status: DISCONTINUED | OUTPATIENT
Start: 2022-04-15 | End: 2022-04-15 | Stop reason: HOSPADM

## 2022-04-15 RX ORDER — NALOXONE HYDROCHLORIDE 0.4 MG/ML
80 INJECTION, SOLUTION INTRAMUSCULAR; INTRAVENOUS; SUBCUTANEOUS AS NEEDED
Status: DISCONTINUED | OUTPATIENT
Start: 2022-04-15 | End: 2022-04-15 | Stop reason: HOSPADM

## 2022-04-15 RX ORDER — HYDROMORPHONE HYDROCHLORIDE 1 MG/ML
0.2 INJECTION, SOLUTION INTRAMUSCULAR; INTRAVENOUS; SUBCUTANEOUS EVERY 5 MIN PRN
Status: DISCONTINUED | OUTPATIENT
Start: 2022-04-15 | End: 2022-04-15 | Stop reason: HOSPADM

## 2022-04-15 RX ORDER — DEXAMETHASONE SODIUM PHOSPHATE 4 MG/ML
VIAL (ML) INJECTION AS NEEDED
Status: DISCONTINUED | OUTPATIENT
Start: 2022-04-15 | End: 2022-04-15 | Stop reason: SURG

## 2022-04-15 RX ORDER — SODIUM CHLORIDE, SODIUM LACTATE, POTASSIUM CHLORIDE, CALCIUM CHLORIDE 600; 310; 30; 20 MG/100ML; MG/100ML; MG/100ML; MG/100ML
INJECTION, SOLUTION INTRAVENOUS CONTINUOUS
Status: DISCONTINUED | OUTPATIENT
Start: 2022-04-15 | End: 2022-04-15 | Stop reason: HOSPADM

## 2022-04-15 RX ORDER — ALBUTEROL SULFATE 2.5 MG/3ML
2.5 SOLUTION RESPIRATORY (INHALATION) EVERY 6 HOURS PRN
Status: DISCONTINUED | OUTPATIENT
Start: 2022-04-15 | End: 2022-04-19

## 2022-04-15 RX ORDER — HYDROMORPHONE HYDROCHLORIDE 1 MG/ML
0.6 INJECTION, SOLUTION INTRAMUSCULAR; INTRAVENOUS; SUBCUTANEOUS EVERY 5 MIN PRN
Status: DISCONTINUED | OUTPATIENT
Start: 2022-04-15 | End: 2022-04-15 | Stop reason: HOSPADM

## 2022-04-15 RX ORDER — CEFAZOLIN SODIUM/WATER 2 G/20 ML
2 SYRINGE (ML) INTRAVENOUS EVERY 8 HOURS
Status: COMPLETED | OUTPATIENT
Start: 2022-04-15 | End: 2022-04-16

## 2022-04-15 RX ORDER — DOXEPIN HYDROCHLORIDE 50 MG/1
1 CAPSULE ORAL DAILY
Status: DISCONTINUED | OUTPATIENT
Start: 2022-04-16 | End: 2022-04-19

## 2022-04-15 RX ORDER — QUETIAPINE FUMARATE 25 MG/1
50 TABLET, FILM COATED ORAL NIGHTLY
Status: DISCONTINUED | OUTPATIENT
Start: 2022-04-15 | End: 2022-04-19

## 2022-04-15 RX ORDER — MAGNESIUM SULFATE HEPTAHYDRATE 500 MG/ML
INJECTION, SOLUTION INTRAMUSCULAR; INTRAVENOUS AS NEEDED
Status: DISCONTINUED | OUTPATIENT
Start: 2022-04-15 | End: 2022-04-15 | Stop reason: SURG

## 2022-04-15 RX ORDER — ONDANSETRON 2 MG/ML
4 INJECTION INTRAMUSCULAR; INTRAVENOUS EVERY 4 HOURS PRN
Status: ACTIVE | OUTPATIENT
Start: 2022-04-15 | End: 2022-04-17

## 2022-04-15 RX ORDER — ARIPIPRAZOLE 2 MG/1
5 TABLET ORAL NIGHTLY
Status: DISCONTINUED | OUTPATIENT
Start: 2022-04-15 | End: 2022-04-19

## 2022-04-15 RX ORDER — DULOXETIN HYDROCHLORIDE 60 MG/1
60 CAPSULE, DELAYED RELEASE ORAL NIGHTLY
Status: DISCONTINUED | OUTPATIENT
Start: 2022-04-15 | End: 2022-04-19

## 2022-04-15 RX ORDER — BISACODYL 10 MG
10 SUPPOSITORY, RECTAL RECTAL
Status: DISCONTINUED | OUTPATIENT
Start: 2022-04-15 | End: 2022-04-19

## 2022-04-15 RX ORDER — HYDROMORPHONE HYDROCHLORIDE 1 MG/ML
0.8 INJECTION, SOLUTION INTRAMUSCULAR; INTRAVENOUS; SUBCUTANEOUS EVERY 2 HOUR PRN
Status: DISCONTINUED | OUTPATIENT
Start: 2022-04-15 | End: 2022-04-17

## 2022-04-15 RX ORDER — ROCURONIUM BROMIDE 10 MG/ML
INJECTION, SOLUTION INTRAVENOUS AS NEEDED
Status: DISCONTINUED | OUTPATIENT
Start: 2022-04-15 | End: 2022-04-15 | Stop reason: SURG

## 2022-04-15 RX ORDER — ATORVASTATIN CALCIUM 20 MG/1
20 TABLET, FILM COATED ORAL NIGHTLY
Status: DISCONTINUED | OUTPATIENT
Start: 2022-04-15 | End: 2022-04-19

## 2022-04-15 RX ORDER — ALBUTEROL SULFATE 90 UG/1
2 AEROSOL, METERED RESPIRATORY (INHALATION) EVERY 6 HOURS PRN
Status: DISCONTINUED | OUTPATIENT
Start: 2022-04-15 | End: 2022-04-19

## 2022-04-15 RX ORDER — LIDOCAINE HYDROCHLORIDE 10 MG/ML
INJECTION, SOLUTION EPIDURAL; INFILTRATION; INTRACAUDAL; PERINEURAL AS NEEDED
Status: DISCONTINUED | OUTPATIENT
Start: 2022-04-15 | End: 2022-04-15 | Stop reason: SURG

## 2022-04-15 RX ORDER — HYDROMORPHONE HYDROCHLORIDE 1 MG/ML
0.4 INJECTION, SOLUTION INTRAMUSCULAR; INTRAVENOUS; SUBCUTANEOUS EVERY 5 MIN PRN
Status: DISCONTINUED | OUTPATIENT
Start: 2022-04-15 | End: 2022-04-15 | Stop reason: HOSPADM

## 2022-04-15 RX ORDER — OXYCODONE HYDROCHLORIDE 5 MG/1
10 TABLET ORAL EVERY 4 HOURS PRN
Status: DISCONTINUED | OUTPATIENT
Start: 2022-04-15 | End: 2022-04-17

## 2022-04-15 RX ORDER — HYDROCODONE BITARTRATE AND ACETAMINOPHEN 5; 325 MG/1; MG/1
1 TABLET ORAL AS NEEDED
Status: DISCONTINUED | OUTPATIENT
Start: 2022-04-15 | End: 2022-04-15 | Stop reason: HOSPADM

## 2022-04-15 RX ORDER — POLYETHYLENE GLYCOL 3350 17 G/17G
17 POWDER, FOR SOLUTION ORAL DAILY PRN
Status: DISCONTINUED | OUTPATIENT
Start: 2022-04-15 | End: 2022-04-19

## 2022-04-15 RX ADMIN — MAGNESIUM SULFATE HEPTAHYDRATE 1 G: 500 INJECTION, SOLUTION INTRAMUSCULAR; INTRAVENOUS at 17:47:00

## 2022-04-15 RX ADMIN — TRANEXAMIC ACID 1000 MG: 10 INJECTION, SOLUTION INTRAVENOUS at 17:16:00

## 2022-04-15 RX ADMIN — GLYCOPYRROLATE 0.8 MG: 0.2 INJECTION, SOLUTION INTRAMUSCULAR; INTRAVENOUS at 19:32:00

## 2022-04-15 RX ADMIN — ROCURONIUM BROMIDE 50 MG: 10 INJECTION, SOLUTION INTRAVENOUS at 16:58:00

## 2022-04-15 RX ADMIN — DEXAMETHASONE SODIUM PHOSPHATE 4 MG: 4 MG/ML VIAL (ML) INJECTION at 16:58:00

## 2022-04-15 RX ADMIN — ONDANSETRON 4 MG: 2 INJECTION INTRAMUSCULAR; INTRAVENOUS at 19:32:00

## 2022-04-15 RX ADMIN — HYDROMORPHONE HYDROCHLORIDE 0.5 MG: 1 INJECTION, SOLUTION INTRAMUSCULAR; INTRAVENOUS; SUBCUTANEOUS at 17:34:00

## 2022-04-15 RX ADMIN — NEOSTIGMINE METHYLSULFATE 4 MG: 1 INJECTION INTRAVENOUS at 19:32:00

## 2022-04-15 RX ADMIN — CEFAZOLIN SODIUM/WATER 2 G: 2 G/20 ML SYRINGE (ML) INTRAVENOUS at 16:58:00

## 2022-04-15 RX ADMIN — HYDROMORPHONE HYDROCHLORIDE 0.5 MG: 1 INJECTION, SOLUTION INTRAMUSCULAR; INTRAVENOUS; SUBCUTANEOUS at 19:32:00

## 2022-04-15 RX ADMIN — LIDOCAINE HYDROCHLORIDE 50 MG: 10 INJECTION, SOLUTION EPIDURAL; INFILTRATION; INTRACAUDAL; PERINEURAL at 16:58:00

## 2022-04-15 RX ADMIN — SODIUM CHLORIDE, SODIUM LACTATE, POTASSIUM CHLORIDE, CALCIUM CHLORIDE: 600; 310; 30; 20 INJECTION, SOLUTION INTRAVENOUS at 20:06:00

## 2022-04-15 NOTE — ANESTHESIA PROCEDURE NOTES
Airway  Date/Time: 4/15/2022 5:01 PM  Urgency: Elective      General Information and Staff    Patient location during procedure: OR  Anesthesiologist: Terri Ochoa MD  Performed: anesthesiologist     Indications and Patient Condition  Indications for airway management: anesthesia  Sedation level: deep  Preoxygenated: yes  Patient position: sniffing  Mask difficulty assessment: 1 - vent by mask    Final Airway Details  Final airway type: endotracheal airway      Successful airway: ETT  Cuffed: yes   Successful intubation technique: direct laryngoscopy  Endotracheal tube insertion site: oral  Blade: Oswald  Blade size: #3  ETT size (mm): 7.0    Cormack-Lehane Classification: grade I - full view of glottis  Placement verified by: chest auscultation and capnometry   Measured from: teeth  ETT to teeth (cm): 21  Number of attempts at approach: 1

## 2022-04-16 LAB
ANION GAP SERPL CALC-SCNC: 4 MMOL/L (ref 0–18)
BASOPHILS # BLD AUTO: 0.02 X10(3) UL (ref 0–0.2)
BASOPHILS NFR BLD AUTO: 0.3 %
BUN BLD-MCNC: 5 MG/DL (ref 7–18)
BUN/CREAT SERPL: 10.4 (ref 10–20)
CALCIUM BLD-MCNC: 8.8 MG/DL (ref 8.5–10.1)
CHLORIDE SERPL-SCNC: 99 MMOL/L (ref 98–112)
CO2 SERPL-SCNC: 28 MMOL/L (ref 21–32)
CREAT BLD-MCNC: 0.48 MG/DL
DEPRECATED RDW RBC AUTO: 45.5 FL (ref 35.1–46.3)
EOSINOPHIL # BLD AUTO: 0.02 X10(3) UL (ref 0–0.7)
EOSINOPHIL NFR BLD AUTO: 0.3 %
ERYTHROCYTE [DISTWIDTH] IN BLOOD BY AUTOMATED COUNT: 13.2 % (ref 11–15)
GLUCOSE BLD-MCNC: 113 MG/DL (ref 70–99)
HCT VFR BLD AUTO: 33.2 %
HGB BLD-MCNC: 10.7 G/DL
IMM GRANULOCYTES # BLD AUTO: 0.01 X10(3) UL (ref 0–1)
IMM GRANULOCYTES NFR BLD: 0.1 %
LYMPHOCYTES # BLD AUTO: 0.62 X10(3) UL (ref 1–4)
LYMPHOCYTES NFR BLD AUTO: 8.8 %
MCH RBC QN AUTO: 30.1 PG (ref 26–34)
MCHC RBC AUTO-ENTMCNC: 32.2 G/DL (ref 31–37)
MCV RBC AUTO: 93.5 FL
MONOCYTES # BLD AUTO: 0.59 X10(3) UL (ref 0.1–1)
MONOCYTES NFR BLD AUTO: 8.4 %
NEUTROPHILS # BLD AUTO: 5.76 X10 (3) UL (ref 1.5–7.7)
NEUTROPHILS # BLD AUTO: 5.76 X10(3) UL (ref 1.5–7.7)
NEUTROPHILS NFR BLD AUTO: 82.1 %
OSMOLALITY SERPL CALC.SUM OF ELEC: 270 MOSM/KG (ref 275–295)
PLATELET # BLD AUTO: 282 10(3)UL (ref 150–450)
POTASSIUM SERPL-SCNC: 4 MMOL/L (ref 3.5–5.1)
RBC # BLD AUTO: 3.55 X10(6)UL
SODIUM SERPL-SCNC: 131 MMOL/L (ref 136–145)
WBC # BLD AUTO: 7 X10(3) UL (ref 4–11)

## 2022-04-16 PROCEDURE — 99233 SBSQ HOSP IP/OBS HIGH 50: CPT | Performed by: HOSPITALIST

## 2022-04-16 RX ORDER — ACETAMINOPHEN 325 MG/1
650 TABLET ORAL EVERY 6 HOURS PRN
Status: DISCONTINUED | OUTPATIENT
Start: 2022-04-16 | End: 2022-04-19

## 2022-04-16 NOTE — PLAN OF CARE
Problem: Patient Centered Care  Goal: Patient preferences are identified and integrated in the patient's plan of care  Description: Interventions:  - What would you like us to know as we care for you?   - Provide timely, complete, and accurate information to patient/family  - Incorporate patient and family knowledge, values, beliefs, and cultural backgrounds into the planning and delivery of care  - Encourage patient/family to participate in care and decision-making at the level they choose  - Honor patient and family perspectives and choices  Outcome: Progressing     Problem: PAIN - ADULT  Goal: Verbalizes/displays adequate comfort level or patient's stated pain goal  Description: INTERVENTIONS:  - Encourage pt to monitor pain and request assistance  - Assess pain using appropriate pain scale  - Administer analgesics based on type and severity of pain and evaluate response  - Implement non-pharmacological measures as appropriate and evaluate response  - Consider cultural and social influences on pain and pain management  - Manage/alleviate anxiety  - Utilize distraction and/or relaxation techniques  - Monitor for opioid side effects  - Notify MD/LIP if interventions unsuccessful or patient reports new pain  - Anticipate increased pain with activity and pre-medicate as appropriate  Outcome: Progressing     Problem: RISK FOR INFECTION - ADULT  Goal: Absence of fever/infection during anticipated neutropenic period  Description: INTERVENTIONS  - Monitor WBC  - Administer growth factors as ordered  - Implement neutropenic guidelines  Outcome: Progressing     Problem: DISCHARGE PLANNING  Goal: Discharge to home or other facility with appropriate resources  Description: INTERVENTIONS:  - Identify barriers to discharge w/pt and caregiver  - Include patient/family/discharge partner in discharge planning  - Arrange for needed discharge resources and transportation as appropriate  - Identify discharge learning needs (meds, wound care, etc)  - Arrange for interpreters to assist at discharge as needed  - Consider post-discharge preferences of patient/family/discharge partner  - Complete POLST form as appropriate  - Assess patient's ability to be responsible for managing their own health  - Refer to Case Management Department for coordinating discharge planning if the patient needs post-hospital services based on physician/LIP order or complex needs related to functional status, cognitive ability or social support system  Outcome: Progressing     Problem: SAFETY ADULT - FALL  Goal: Free from fall injury  Description: INTERVENTIONS:  - Assess pt frequently for physical needs  - Identify cognitive and physical deficits and behaviors that affect risk of falls. - [place] fall precautions as indicated by assessment.  - Educate pt/family on patient safety including physical limitations  - Instruct pt to call for assistance with activity based on assessment  - Modify environment to reduce risk of injury  - Provide assistive devices as appropriate  - Consider OT/PT consult to assist with strengthening/mobility  - Encourage toileting schedule  Outcome: Progressing    Eric is stable, drowsy and sleepy but arousable in am, has numbness and tingling + to right upper extremity, medicated for pain prn. Breathing on room air, encouraged the use of IS every hour while awake. Able to void post op.  tolerated diet well . Dressing to right elbow/arm posterior mold with acewrap. Sling in place. Edema noticed on fingers elevated on pillow at all time. Ice packs in use prn. scds in use. scheduled to work with therapy, unable to work with therapy due to drowsiness. But laterin the day Able to ambulate with assist. Safety measures applied and reviewed, Non skid socks in use. bed and chair exit alarm is in use,call light within reach. Bed is in lowest position.  Dc on hold till tomorrow, needs to get cleared with therapy and medical.   @5120 Dr. Kaylie Lopez notified about pt's status hold discharge til tomorrow for therapy.

## 2022-04-16 NOTE — BRIEF OP NOTE
Pre-Operative Diagnosis: open fracture distal humerus, bicondylar (t-y fracture) right, initial encounter     Post-Operative Diagnosis: open fracture distal humerus, bicondylar (t-y fracture) right, initial encounter      Procedure Performed:   right distal humerus open reduction internal fixation    Surgeon(s) and Role:     * Zahida Batres MD - Primary    Assistant(s):  PA: Sonny Lang PA-C     Surgical Findings: reduced     Specimen: none     Estimated Blood Loss: Blood Output: 100 mL (4/15/2022  7:34 PM)      Dictation Number:  #[id number]    Ceasar Blandon MD  4/15/2022  7:44 PM

## 2022-04-16 NOTE — PLAN OF CARE
POD 1. A&O x4, drowsy. On 2L O2. RUE in sling and elevated. SCDs for DVT prophylaxis. Pain managed with oxy PRN. Plan to discharge home when medically cleared. Call light within reach, safety measures in place. Problem: Patient Centered Care  Goal: Patient preferences are identified and integrated in the patient's plan of care  Description: Interventions:  - What would you like us to know as we care for you?  I fell and broke my arm   - Provide timely, complete, and accurate information to patient/family  - Incorporate patient and family knowledge, values, beliefs, and cultural backgrounds into the planning and delivery of care  - Encourage patient/family to participate in care and decision-making at the level they choose  - Honor patient and family perspectives and choices  Outcome: Progressing     Problem: PAIN - ADULT  Goal: Verbalizes/displays adequate comfort level or patient's stated pain goal  Description: INTERVENTIONS:  - Encourage pt to monitor pain and request assistance  - Assess pain using appropriate pain scale  - Administer analgesics based on type and severity of pain and evaluate response  - Implement non-pharmacological measures as appropriate and evaluate response  - Consider cultural and social influences on pain and pain management  - Manage/alleviate anxiety  - Utilize distraction and/or relaxation techniques  - Monitor for opioid side effects  - Notify MD/LIP if interventions unsuccessful or patient reports new pain  - Anticipate increased pain with activity and pre-medicate as appropriate  Outcome: Progressing     Problem: RISK FOR INFECTION - ADULT  Goal: Absence of fever/infection during anticipated neutropenic period  Description: INTERVENTIONS  - Monitor WBC  - Administer growth factors as ordered  - Implement neutropenic guidelines  Outcome: Progressing     Problem: DISCHARGE PLANNING  Goal: Discharge to home or other facility with appropriate resources  Description: INTERVENTIONS:  - Identify barriers to discharge w/pt and caregiver  - Include patient/family/discharge partner in discharge planning  - Arrange for needed discharge resources and transportation as appropriate  - Identify discharge learning needs (meds, wound care, etc)  - Arrange for interpreters to assist at discharge as needed  - Consider post-discharge preferences of patient/family/discharge partner  - Complete POLST form as appropriate  - Assess patient's ability to be responsible for managing their own health  - Refer to Case Management Department for coordinating discharge planning if the patient needs post-hospital services based on physician/LIP order or complex needs related to functional status, cognitive ability or social support system  Outcome: Progressing

## 2022-04-17 LAB
ANION GAP SERPL CALC-SCNC: 7 MMOL/L (ref 0–18)
BUN BLD-MCNC: 7 MG/DL (ref 7–18)
BUN/CREAT SERPL: 14.6 (ref 10–20)
CALCIUM BLD-MCNC: 8.6 MG/DL (ref 8.5–10.1)
CHLORIDE SERPL-SCNC: 90 MMOL/L (ref 98–112)
CO2 SERPL-SCNC: 29 MMOL/L (ref 21–32)
CREAT BLD-MCNC: 0.48 MG/DL
DEPRECATED RDW RBC AUTO: 43.4 FL (ref 35.1–46.3)
ERYTHROCYTE [DISTWIDTH] IN BLOOD BY AUTOMATED COUNT: 13 % (ref 11–15)
GLUCOSE BLD-MCNC: 133 MG/DL (ref 70–99)
HCT VFR BLD AUTO: 32.6 %
HGB BLD-MCNC: 10.8 G/DL
MCH RBC QN AUTO: 30.3 PG (ref 26–34)
MCHC RBC AUTO-ENTMCNC: 33.1 G/DL (ref 31–37)
MCV RBC AUTO: 91.6 FL
OSMOLALITY SERPL CALC.SUM OF ELEC: 262 MOSM/KG (ref 275–295)
PLATELET # BLD AUTO: 330 10(3)UL (ref 150–450)
POTASSIUM SERPL-SCNC: 3.8 MMOL/L (ref 3.5–5.1)
RBC # BLD AUTO: 3.56 X10(6)UL
SODIUM SERPL-SCNC: 126 MMOL/L (ref 136–145)
WBC # BLD AUTO: 9.8 X10(3) UL (ref 4–11)

## 2022-04-17 PROCEDURE — 99233 SBSQ HOSP IP/OBS HIGH 50: CPT | Performed by: HOSPITALIST

## 2022-04-17 RX ORDER — HYDROCODONE BITARTRATE AND ACETAMINOPHEN 5; 325 MG/1; MG/1
1 TABLET ORAL EVERY 6 HOURS PRN
Status: DISCONTINUED | OUTPATIENT
Start: 2022-04-17 | End: 2022-04-19

## 2022-04-17 NOTE — PLAN OF CARE
Pt is A&O x4, drowsy but arousable. Sling in place. Complaints of numbness to RUE. Edema noticed to right hand's fingers, elevated on pillow. Given Tylenol, Oxycodone PRN and ice pack for pain. Up with standby assist. SCDs for DVT prophylaxis. Plan is TBD. Call light within reach. Safety precaution in place.      Problem: Patient Centered Care  Goal: Patient preferences are identified and integrated in the patient's plan of care  Description: Interventions:  - What would you like us to know as we care for you?   - Provide timely, complete, and accurate information to patient/family  - Incorporate patient and family knowledge, values, beliefs, and cultural backgrounds into the planning and delivery of care  - Encourage patient/family to participate in care and decision-making at the level they choose  - Honor patient and family perspectives and choices  Outcome: Progressing     Problem: PAIN - ADULT  Goal: Verbalizes/displays adequate comfort level or patient's stated pain goal  Description: INTERVENTIONS:  - Encourage pt to monitor pain and request assistance  - Assess pain using appropriate pain scale  - Administer analgesics based on type and severity of pain and evaluate response  - Implement non-pharmacological measures as appropriate and evaluate response  - Consider cultural and social influences on pain and pain management  - Manage/alleviate anxiety  - Utilize distraction and/or relaxation techniques  - Monitor for opioid side effects  - Notify MD/LIP if interventions unsuccessful or patient reports new pain  - Anticipate increased pain with activity and pre-medicate as appropriate  Outcome: Progressing     Problem: RISK FOR INFECTION - ADULT  Goal: Absence of fever/infection during anticipated neutropenic period  Description: INTERVENTIONS  - Monitor WBC  - Administer growth factors as ordered  - Implement neutropenic guidelines  Outcome: Progressing     Problem: DISCHARGE PLANNING  Goal: Discharge to home or other facility with appropriate resources  Description: INTERVENTIONS:  - Identify barriers to discharge w/pt and caregiver  - Include patient/family/discharge partner in discharge planning  - Arrange for needed discharge resources and transportation as appropriate  - Identify discharge learning needs (meds, wound care, etc)  - Arrange for interpreters to assist at discharge as needed  - Consider post-discharge preferences of patient/family/discharge partner  - Complete POLST form as appropriate  - Assess patient's ability to be responsible for managing their own health  - Refer to Case Management Department for coordinating discharge planning if the patient needs post-hospital services based on physician/LIP order or complex needs related to functional status, cognitive ability or social support system  Outcome: Progressing     Problem: SAFETY ADULT - FALL  Goal: Free from fall injury  Description: INTERVENTIONS:  - Assess pt frequently for physical needs  - Identify cognitive and physical deficits and behaviors that affect risk of falls.   - [place] fall precautions as indicated by assessment.  - Educate pt/family on patient safety including physical limitations  - Instruct pt to call for assistance with activity based on assessment  - Modify environment to reduce risk of injury  - Provide assistive devices as appropriate  - Consider OT/PT consult to assist with strengthening/mobility  - Encourage toileting schedule  Outcome: Progressing

## 2022-04-17 NOTE — PHYSICAL THERAPY NOTE
PHYSICAL THERAPY TREATMENT NOTE - INPATIENT     Room Number: 407/407-A       Presenting Problem: R humeral fx status post ORIF        Problem List  Principal Problem:    Supracondylar fracture of right humerus  Active Problems:    Essential hypertension    Centrilobular emphysema ([address]    Other hyperlipidemia      PHYSICAL THERAPY ASSESSMENT     PPE donned this session to include: gloves, face mask, goggles. Patient received supine in bed, agreeable to PT treatment however pt very drowsy/lethargic and has trouble maintaining conversation. Pt oriented to place, disoriented to time and situation. She requires frequent loud/firm cues to maintain eyes open and follow commands. RN present for session and aware of pt's cognition. Pt requiring Mod A for safe mobilization with SBA of RN for safety. Needs cues for NWB of RUE. Limited to bed > chair activity due to lethargy. Bed Mobility - Supine > sit with Mod A needed at trunk and LEs. Cues for NWB to RUE  Transfers - Sit <> stand with HHA to LUE and Mod A - cues for upright posture and maintaining eyes open/looking up. Gait - Pt took few shuffled steps from bed > chair with Mod A needed and SBA of RN for safety. Pt with unsteady gait, forward flexed posture and needs cues to maintain focus on target. The patient's Approx Degree of Impairment: 72.57% has been calculated based on documentation in the [facility] '6 clicks' Inpatient Basic Mobility Short Form. Research supports that patients with this level of impairment may benefit from NICHOLAS. DISCHARGE RECOMMENDATIONS  PT Discharge Recommendations: Sub-acute rehabilitation     PLAN  PT Treatment Plan: Bed mobility; Body mechanics; Energy conservation;Patient education; Family education;Gait training;Range of motion;Strengthening;Stoop training;Transfer training;Balance training    SUBJECTIVE  \"I'm just so tired, I didn't have this with my other surgeries. \"    OBJECTIVE  Precautions: Limb alert - right;Bed/chair alarm (sling Rue)    WEIGHT BEARING RESTRICTION  R Upper Extremity: Non-Weight Bearing             PAIN ASSESSMENT   Rating:  (Did not rate)  Location: R arm  Management Techniques: Relaxation;Repositioning; Body mechanics    BALANCE  Static Sitting: Fair -  Dynamic Sitting: Poor +  Static Standing: Poor  Dynamic Standing: Poor    ACTIVITY TOLERANCE                          O2 WALK  Oxygen Therapy  SPO2% on Room Air at Rest: 91  SPO2% Ambulation on Oxygen: 93    AM-PAC '6-Clicks' INPATIENT SHORT FORM - BASIC MOBILITY  How much difficulty does the patient currently have. .. Patient Difficulty: Turning over in bed (including adjusting bedclothes, sheets and blankets)?: A Lot   Patient Difficulty: Sitting down on and standing up from a chair with arms (e.g., wheelchair, bedside commode, etc.): A Lot   Patient Difficulty: Moving from lying on back to sitting on the side of the bed?: A Lot   How much help from another person does the patient currently need. .. Help from Another: Moving to and from a bed to a chair (including a wheelchair)?: A Lot   Help from Another: Need to walk in hospital room?: A Lot   Help from Another: Climbing 3-5 steps with a railing?: Total     AM-PAC Score:  Raw Score: 11   Approx Degree of Impairment: 72.57%   Standardized Score (AM-PAC Scale): 33.86   CMS Modifier (G-Code): CL    FUNCTIONAL ABILITY STATUS  Functional Mobility/Gait Assessment  Gait Assistance: Moderate assistance  Distance (ft): 3 ft  Assistive Device:  (HHA)  Pattern: Shuffle (Unsteady, forward flexed)    Patient End of Session: Up in chair;Needs met;Call light within reach;RN aware of session/findings; All patient questions and concerns addressed; Alarm set    CURRENT GOALS   Goals to be met by: 4/30  Patient Goal Patient's self-stated goal is: unstated    Goal #1 Patient is able to demonstrate supine - sit EOB @ level: minimum assistance     Goal #1   Current Status Mod A   Goal #2 Patient is able to demonstrate transfers Sit to/from Stand at assistance level: minimum assistance with cane - straight      Goal #2  Current Status Mod A with HHA   Goal #3 Patient is able to ambulate 150 feet with assist device: cane - straight at assistance level: minimum assistance   Goal #3   Current Status 3 ft with Mod A and HHA   Goal #4 Patient will negotiate 1 stairs/one curb w/ assistive device and supervision   Goal #4   Current Status NT   Goal #5 Patient to demonstrate independence with home activity/exercise instructions provided to patient in preparation for discharge.    Goal #5   Current Status Ongoing

## 2022-04-17 NOTE — CM/SW NOTE
SW informed by pt RN that PT recommendation is for SNF. Family is refusing. Pt resides with spouse and currently requires 2L O2, and does not use at home. MAURICE LVM for pt's dtr to discuss discharge planning. Unable to access bedside, pt is sleeping. SW uploaded tentative HH referral/F2F via [employer]. SW/CM to remain available for support and/or discharge planning.      WATSNO [place], [facility]  Social Work   [id number]:#[id number]

## 2022-04-17 NOTE — PLAN OF CARE
Pt is A&O x4, drowsy but arousable. Sling in place. Edema noticed to right hand's fingers, elevated on pillow. Given Tylenol, Oxycodone PRN and ice pack for pain. Up with standby assist. SCDs for DVT prophylaxis. Call light within reach. Safety precaution in place. Plan is to go home tomorrow with home health once clearances have been met. SW is checking on [facility] and will update. Patient and  want to have patient go home instead of rehab if possible and PT says it is possible as long as patient can be more awake and doing the steps needed.   Problem: Patient Centered Care  Goal: Patient preferences are identified and integrated in the patient's plan of care  Description: Interventions:  - What would you like us to know as we care for you?   - Provide timely, complete, and accurate information to patient/family  - Incorporate patient and family knowledge, values, beliefs, and cultural backgrounds into the planning and delivery of care  - Encourage patient/family to participate in care and decision-making at the level they choose  - Honor patient and family perspectives and choices  Outcome: Progressing     Problem: PAIN - ADULT  Goal: Verbalizes/displays adequate comfort level or patient's stated pain goal  Description: INTERVENTIONS:  - Encourage pt to monitor pain and request assistance  - Assess pain using appropriate pain scale  - Administer analgesics based on type and severity of pain and evaluate response  - Implement non-pharmacological measures as appropriate and evaluate response  - Consider cultural and social influences on pain and pain management  - Manage/alleviate anxiety  - Utilize distraction and/or relaxation techniques  - Monitor for opioid side effects  - Notify MD/LIP if interventions unsuccessful or patient reports new pain  - Anticipate increased pain with activity and pre-medicate as appropriate  Outcome: Progressing     Problem: RISK FOR INFECTION - ADULT  Goal: Absence of fever/infection during anticipated neutropenic period  Description: INTERVENTIONS  - Monitor WBC  - Administer growth factors as ordered  - Implement neutropenic guidelines  Outcome: Progressing     Problem: DISCHARGE PLANNING  Goal: Discharge to home or other facility with appropriate resources  Description: INTERVENTIONS:  - Identify barriers to discharge w/pt and caregiver  - Include patient/family/discharge partner in discharge planning  - Arrange for needed discharge resources and transportation as appropriate  - Identify discharge learning needs (meds, wound care, etc)  - Arrange for interpreters to assist at discharge as needed  - Consider post-discharge preferences of patient/family/discharge partner  - Complete POLST form as appropriate  - Assess patient's ability to be responsible for managing their own health  - Refer to Case Management Department for coordinating discharge planning if the patient needs post-hospital services based on physician/LIP order or complex needs related to functional status, cognitive ability or social support system  Outcome: Progressing     Problem: SAFETY ADULT - FALL  Goal: Free from fall injury  Description: INTERVENTIONS:  - Assess pt frequently for physical needs  - Identify cognitive and physical deficits and behaviors that affect risk of falls.   - [place] fall precautions as indicated by assessment.  - Educate pt/family on patient safety including physical limitations  - Instruct pt to call for assistance with activity based on assessment  - Modify environment to reduce risk of injury  - Provide assistive devices as appropriate  - Consider OT/PT consult to assist with strengthening/mobility  - Encourage toileting schedule  Outcome: Progressing

## 2022-04-18 LAB
ANION GAP SERPL CALC-SCNC: 7 MMOL/L (ref 0–18)
BUN BLD-MCNC: 9 MG/DL (ref 7–18)
BUN/CREAT SERPL: 24.3 (ref 10–20)
CALCIUM BLD-MCNC: 8.9 MG/DL (ref 8.5–10.1)
CHLORIDE SERPL-SCNC: 92 MMOL/L (ref 98–112)
CO2 SERPL-SCNC: 29 MMOL/L (ref 21–32)
CREAT BLD-MCNC: 0.37 MG/DL
DEPRECATED RDW RBC AUTO: 43 FL (ref 35.1–46.3)
ERYTHROCYTE [DISTWIDTH] IN BLOOD BY AUTOMATED COUNT: 13 % (ref 11–15)
GLUCOSE BLD-MCNC: 109 MG/DL (ref 70–99)
HCT VFR BLD AUTO: 29.1 %
HGB BLD-MCNC: 9.9 G/DL
MCH RBC QN AUTO: 30.9 PG (ref 26–34)
MCHC RBC AUTO-ENTMCNC: 34 G/DL (ref 31–37)
MCV RBC AUTO: 90.9 FL
OSMOLALITY SERPL CALC.SUM OF ELEC: 265 MOSM/KG (ref 275–295)
PLATELET # BLD AUTO: 319 10(3)UL (ref 150–450)
POTASSIUM SERPL-SCNC: 4 MMOL/L (ref 3.5–5.1)
RBC # BLD AUTO: 3.2 X10(6)UL
SODIUM SERPL-SCNC: 126 MMOL/L (ref 136–145)
SODIUM SERPL-SCNC: 128 MMOL/L (ref 136–145)
URATE SERPL-MCNC: 2.2 MG/DL
WBC # BLD AUTO: 8.5 X10(3) UL (ref 4–11)

## 2022-04-18 PROCEDURE — 99223 1ST HOSP IP/OBS HIGH 75: CPT | Performed by: INTERNAL MEDICINE

## 2022-04-18 PROCEDURE — 99233 SBSQ HOSP IP/OBS HIGH 50: CPT | Performed by: HOSPITALIST

## 2022-04-18 RX ORDER — SODIUM CHLORIDE 1000 MG
2 TABLET, SOLUBLE MISCELLANEOUS ONCE
Status: COMPLETED | OUTPATIENT
Start: 2022-04-18 | End: 2022-04-18

## 2022-04-18 NOTE — OPERATIVE REPORT
[facility]    PATIENT'S NAME: Hank Flynn   ATTENDING PHYSICIAN: Caryn Owusu MD   OPERATING PHYSICIAN: Caryn Owusu MD   PATIENT ACCOUNT#:   [de-identified]    LOCATION:  [address] [place] [address] #:   [id number]       YOB: 1955  ADMISSION DATE:       04/15/2022      OPERATION DATE:  04/15/2022    OPERATIVE REPORT    PREOPERATIVE DIAGNOSIS:  Right grade 1 open displaced bicondylar distal humerus fracture. POSTOPERATIVE DIAGNOSIS:  Right grade 1 open displaced bicondylar distal humerus fracture. PROCEDURE:    1. Right distal humerus open reduction, internal fixation. 2.   Interpretation of fluoroscopy. ASSISTANT:  Khushboo Deleon PA-C. ANESTHESIA:  General and supplemental local anesthesia. BLOOD LOSS:  Approximately 100 mL. COMPLICATIONS:  None. CONDITION:  The patient was aroused from anesthesia and transferred to recovery room in stable condition. INSTRUMENTATION UTILIZED:  Biomet distal humeral locking plate and olecranon locking plate. INDICATIONS:  The patient is a 57-year-old female who, based on history, physical examination, and imaging studies, was diagnosed as having a closed displaced distal humerus fracture with intra-articular extension and comminution. The risks, indications, and benefits of procedures of both operative and nonoperative treatment were thoroughly described to the patient. The patient desired surgery. Surgery recommended was a right distal humerus open reduction, internal fixation and associated procedures. Risks include bleeding, infection, neurovascular injury, chronic pain, chronic disability, failure of the procedure, stiffness, malunion, nonunion, delayed union, posttraumatic arthritis, and the potential need for additional surgery as well as anesthetic complications including death. The patient consented to the procedure. All of her questions were answered.   She was in agreement with the treatment plan.    OPERATIVE TECHNIQUE:  On 04/15/2022, the patient was seen and evaluated preoperatively. Her right elbow was identified as the correct operative site. My initials were placed. She was transferred to the operating room and transferred to the operating table in supine position. General anesthesia was induced. Preoperatively, she was given Ancef as antibiotic prophylaxis within 1 hour of incision time. She was also given tranexamic acid. Her right upper extremity was prepped and draped in a sterile fashion. The tourniquet was insufflated to 250 mmHg following Esmarch exsanguination. A longitudinal incision was made posteriorly with full-thickness skin flaps developed. On initial presentation, there was a poke hole laceration that was potentially an open fracture. On the exploration of the wound, there was no defect on the triceps and no evidence of extension of the fracture to the skin or subcutaneous tissues casting into doubt the possibility this was an open fracture. The patient's poke hole had healed by the time of surgery. The patient's wound was irrigated. Distally, the ECU and FCU fascia were divided and retracted to expose the olecranon. The ulnar nerve was identified, protected, and transposed anteriorly. The patient had elevation of her triceps tendon posteriorly and brachialis anteriorly. The division proceeded between the brachioradialis and the triceps tendon extending distally between the anconeus and triceps tendon. The patient had Biomet olecranon locking plate placed and its position confirmed on fluoroscopy with anterior, posterior, and lateral x-ray views. The plate was removed. An osteotomy was performed. The triceps was elevated to allow for retraction of the osteotomy and exposure of the articular surface. There were 2 major articular fragments with minor comminution of the fracture site.   The distal humerus articular surface was reduced and held with provisional fixation. The medial and lateral condylar ridges were then reduced into each other and to the distal humeral shaft. Biomet distal humeral locking plates were both placed medially and laterally. Provisional fixation was used to hold the plates confirming the proper position of the plates and to further reduce the fracture fragments. The plates were lagged to the bone proximally, and reduction of the fracture and position of the plate was confirmed on fluoroscopy with anterior, posterior, and lateral x-ray views. Locking screws were placed both proximally and distally with multiple locking screws across the fracture with all locking screws across the fracture placed through the plate. Bicondylar fixation was achieved and the articular surfaces were stable following open reduction and internal fixation. Care was taken to prevent penetration of the olecranon fossa with the screws or comminuted fragments within the fossa that were excised. The patient had anterior, posterior, and lateral x-ray views that confirmed reduction of fracture, stability of the fracture, and proper length and position of the plate and screws. The patient then had an olecranon osteotomy, reduced and held with provisional fixation. The olecranon plate was placed and lagged distally to compress the fracture and additional locking screws were placed both proximally and distally and along the articular surface. Fluoroscopy with anterior, posterior, and lateral x-ray views confirmed reduction of olecranon osteotomy and proper length and position of the plate and screws. The patient's wound was then thoroughly and copiously irrigated, and the fascia between the ECU and FCU tendons were closed with #1 Ethibond suture. Also fascia between the brachioradialis and the triceps tendon and between the pronator and triceps tendon were closed with #1 Ethibond suture. The wound was once again irrigated.   Local anesthesia was given at the articular surface and at the fracture site as well as in the skin edges with 1% lidocaine and 0.5% Marcaine in a 1:1 mixture, and then skin was closed in layers with 2-0 Vicryl subcutaneous stitch and 3-0 Prolene skin suture. A large sterile bulky soft dressing was placed followed by posterior splint in 90 degrees of flexion. The patient's tourniquet was deflated before wound closure. Blood loss was approximately 100 mL. Complications are none. Condition:  The patient was aroused from anesthesia and transferred to the recovery room in stable condition. DISPOSITION:  She was admitted for postoperative medical management, postoperative pain control, postoperative DVT prophylaxis, and postoperative antibiotic therapy. All of her questions were answered, and she was in agreement with the treatment plan.      Dictated By Janette Hercules MD  d: 04/15/2022 19:50:26  t: 04/16/2022 01:42:27  Job [id number]  SQJ/

## 2022-04-18 NOTE — PLAN OF CARE
POD#3. A& O x4. RUE sling in place. Ace wrap over post mold. Fingers swollen & numb. RUE elevated on pillow with ice pack to upper arm. Medicated with tylenol prn. Pt did not want to take [place]. Fall precautions maintained. Call light in reach & bed alarm on.  Plan to discharge home with hh.  Problem: Patient Centered Care  Goal: Patient preferences are identified and integrated in the patient's plan of care  Description: Interventions:  - What would you like us to know as we care for you? - Provide timely, complete, and accurate information to patient/family  - Incorporate patient and family knowledge, values, beliefs, and cultural backgrounds into the planning and delivery of care  - Encourage patient/family to participate in care and decision-making at the level they choose  - Honor patient and family perspectives and choices  Outcome: Progressing     Problem: PAIN - ADULT  Goal: Verbalizes/displays adequate comfort level or patient's stated pain goal  Description: INTERVENTIONS:  - Encourage pt to monitor pain and request assistance  - Assess pain using appropriate pain scale  - Administer analgesics based on type and severity of pain and evaluate response  - Implement non-pharmacological measures as appropriate and evaluate response  - Consider cultural and social influences on pain and pain management  - Manage/alleviate anxiety  - Utilize distraction and/or relaxation techniques  - Monitor for opioid side effects  - Notify MD/LIP if interventions unsuccessful or patient reports new pain  - Anticipate increased pain with activity and pre-medicate as appropriate  Outcome: Progressing     Problem: RISK FOR INFECTION - ADULT  Goal: Absence of fever/infection during anticipated neutropenic period  Description: INTERVENTIONS  - Monitor WBC  - Administer growth factors as ordered  - Implement neutropenic guidelines  Outcome: Progressing     Problem: DISCHARGE PLANNING  Goal: Discharge to home or other facility with appropriate resources  Description: INTERVENTIONS:  - Identify barriers to discharge w/pt and caregiver  - Include patient/family/discharge partner in discharge planning  - Arrange for needed discharge resources and transportation as appropriate  - Identify discharge learning needs (meds, wound care, etc)  - Arrange for interpreters to assist at discharge as needed  - Consider post-discharge preferences of patient/family/discharge partner  - Complete POLST form as appropriate  - Assess patient's ability to be responsible for managing their own health  - Refer to Case Management Department for coordinating discharge planning if the patient needs post-hospital services based on physician/LIP order or complex needs related to functional status, cognitive ability or social support system  Outcome: Progressing     Problem: SAFETY ADULT - FALL  Goal: Free from fall injury  Description: INTERVENTIONS:  - Assess pt frequently for physical needs  - Identify cognitive and physical deficits and behaviors that affect risk of falls.   - [place] fall precautions as indicated by assessment.  - Educate pt/family on patient safety including physical limitations  - Instruct pt to call for assistance with activity based on assessment  - Modify environment to reduce risk of injury  - Provide assistive devices as appropriate  - Consider OT/PT consult to assist with strengthening/mobility  - Encourage toileting schedule  Outcome: Progressing

## 2022-04-18 NOTE — PHYSICAL THERAPY NOTE
PHYSICAL THERAPY TREATMENT NOTE - INPATIENT     Room Number: 407/407-A       Presenting Problem: R humeral fx status post ORIF        Problem List  Principal Problem:    Supracondylar fracture of right humerus  Active Problems:    Essential hypertension    Centrilobular emphysema ([address]    Other hyperlipidemia      PHYSICAL THERAPY ASSESSMENT     Pt seen daily. Chart reviewed. RN approved pt participation . PTA wore mask,gloves and goggle. Pt refused OOB. PT RX limited to bed mobility,positioning for pt comfort as  well as for there. Pt educated on NWB status with functional mobility. Pt educated on The patient's Approx Degree of Impairment: 86.62% has been calculated based on documentation in the [facility] '6 clicks' Inpatient Basic Mobility Short Form. Research supports that patients with this level of impairment may benefit from Sub-acute Rehabilitation. DISCHARGE RECOMMENDATIONS  PT Discharge Recommendations: Sub-acute rehabilitation     PLAN  PT Treatment Plan: Bed mobility; Endurance; Patient education;Strengthening    SUBJECTIVE  Limited participation    OBJECTIVE  Precautions: Limb alert - right;Bed/chair alarm (sling Rue)    WEIGHT BEARING RESTRICTION  R Upper Extremity: Weight Bearing as Tolerated             PAIN ASSESSMENT   Ratin  Location: R arm  Management Techniques: Relaxation;Repositioning; Body mechanics    BALANCE  Static Sitting: Fair -  Dynamic Sitting: Poor +  Static Standing: Poor  Dynamic Standing: Poor    ACTIVITY TOLERANCE           BP: 100/54  BP Location: Left arm  BP Method: Automatic  Patient Position: Lying     O2 WALK       AM-PAC '6-Clicks' INPATIENT SHORT FORM - BASIC MOBILITY  How much difficulty does the patient currently have. ..   Patient Difficulty: Turning over in bed (including adjusting bedclothes, sheets and blankets)?: A Lot   Patient Difficulty: Sitting down on and standing up from a chair with arms (e.g., wheelchair, bedside commode, etc.): Unable   Patient Difficulty: Moving from lying on back to sitting on the side of the bed?: A Lot   How much help from another person does the patient currently need. .. Help from Another: Moving to and from a bed to a chair (including a wheelchair)?: Total   Help from Another: Need to walk in hospital room?: Total   Help from Another: Climbing 3-5 steps with a railing?: Total     AM-PAC Score:  Raw Score: 8   Approx Degree of Impairment: 86.62%   Standardized Score (AM-PAC Scale): 28.58   CMS Modifier (G-Code): CM    FUNCTIONAL ABILITY STATUS      Additional information:     THERAPEUTIC EXERCISES  Lower Extremity Ankle pumps  Glut sets  Quad sets     Position Supine       Patient End of Session: In bed;Call light within reach;RN aware of session/findings;Bracing education provided per handout; All patient questions and concerns addressed    CURRENT GOALS   Patient Goal Patient's self-stated goal is: unstated    Goal #1 Patient is able to demonstrate supine - sit EOB @ level: minimum assistance     Goal #1   Current Status Mod A   Goal #2 Patient is able to demonstrate transfers Sit to/from Stand at assistance level: minimum assistance with cane - straight      Goal #2  Current Status NT   Goal #3 Patient is able to ambulate 150 feet with assist device: cane - straight at assistance level: minimum assistance   Goal #3   Current Status NT   Goal #4 Patient will negotiate 1 stairs/one curb w/ assistive device and supervision   Goal #4   Current Status NT   Goal #5 Patient to demonstrate independence with home activity/exercise instructions provided to patient in preparation for discharge.    Goal #5   Current Status Ongoing

## 2022-04-18 NOTE — PLAN OF CARE
Pt is A& O x4. RUE sling in place. Ace wrap over post mold. Fingers swollen & numb. RUE elevated on pillow with ice pack to upper arm. Medicated with tylenol prn. Pt was in a lot pain she asked to get norco. Pt has low sodium levels, nephrology consulted and gave sodium pills, redrew and sodium didn't go up. Fall precautions maintained. Call light in reach & bed alarm on. Plan to discharge home with hh after clearances have been met  Note: Please measure patients I&Os per nephrology.     Problem: Patient Centered Care  Goal: Patient preferences are identified and integrated in the patient's plan of care  Description: Interventions:  - What would you like us to know as we care for you?   - Provide timely, complete, and accurate information to patient/family  - Incorporate patient and family knowledge, values, beliefs, and cultural backgrounds into the planning and delivery of care  - Encourage patient/family to participate in care and decision-making at the level they choose  - Honor patient and family perspectives and choices  Outcome: Progressing     Problem: PAIN - ADULT  Goal: Verbalizes/displays adequate comfort level or patient's stated pain goal  Description: INTERVENTIONS:  - Encourage pt to monitor pain and request assistance  - Assess pain using appropriate pain scale  - Administer analgesics based on type and severity of pain and evaluate response  - Implement non-pharmacological measures as appropriate and evaluate response  - Consider cultural and social influences on pain and pain management  - Manage/alleviate anxiety  - Utilize distraction and/or relaxation techniques  - Monitor for opioid side effects  - Notify MD/LIP if interventions unsuccessful or patient reports new pain  - Anticipate increased pain with activity and pre-medicate as appropriate  Outcome: Progressing     Problem: RISK FOR INFECTION - ADULT  Goal: Absence of fever/infection during anticipated neutropenic period  Description: INTERVENTIONS  - Monitor WBC  - Administer growth factors as ordered  - Implement neutropenic guidelines  Outcome: Progressing     Problem: DISCHARGE PLANNING  Goal: Discharge to home or other facility with appropriate resources  Description: INTERVENTIONS:  - Identify barriers to discharge w/pt and caregiver  - Include patient/family/discharge partner in discharge planning  - Arrange for needed discharge resources and transportation as appropriate  - Identify discharge learning needs (meds, wound care, etc)  - Arrange for interpreters to assist at discharge as needed  - Consider post-discharge preferences of patient/family/discharge partner  - Complete POLST form as appropriate  - Assess patient's ability to be responsible for managing their own health  - Refer to Case Management Department for coordinating discharge planning if the patient needs post-hospital services based on physician/LIP order or complex needs related to functional status, cognitive ability or social support system  Outcome: Progressing     Problem: SAFETY ADULT - FALL  Goal: Free from fall injury  Description: INTERVENTIONS:  - Assess pt frequently for physical needs  - Identify cognitive and physical deficits and behaviors that affect risk of falls.   - [place] fall precautions as indicated by assessment.  - Educate pt/family on patient safety including physical limitations  - Instruct pt to call for assistance with activity based on assessment  - Modify environment to reduce risk of injury  - Provide assistive devices as appropriate  - Consider OT/PT consult to assist with strengthening/mobility  - Encourage toileting schedule  Outcome: Progressing

## 2022-04-19 VITALS
TEMPERATURE: 98 F | DIASTOLIC BLOOD PRESSURE: 56 MMHG | WEIGHT: 145 LBS | BODY MASS INDEX: 27.38 KG/M2 | RESPIRATION RATE: 16 BRPM | HEART RATE: 72 BPM | HEIGHT: 61 IN | SYSTOLIC BLOOD PRESSURE: 111 MMHG | OXYGEN SATURATION: 95 %

## 2022-04-19 LAB
ANION GAP SERPL CALC-SCNC: 5 MMOL/L (ref 0–18)
BASOPHILS # BLD AUTO: 0.02 X10(3) UL (ref 0–0.2)
BASOPHILS NFR BLD AUTO: 0.3 %
BUN BLD-MCNC: 7 MG/DL (ref 7–18)
BUN/CREAT SERPL: 20.6 (ref 10–20)
CALCIUM BLD-MCNC: 9 MG/DL (ref 8.5–10.1)
CHLORIDE SERPL-SCNC: 100 MMOL/L (ref 98–112)
CO2 SERPL-SCNC: 29 MMOL/L (ref 21–32)
CREAT BLD-MCNC: 0.34 MG/DL
DEPRECATED RDW RBC AUTO: 46.6 FL (ref 35.1–46.3)
EOSINOPHIL # BLD AUTO: 0.09 X10(3) UL (ref 0–0.7)
EOSINOPHIL NFR BLD AUTO: 1.2 %
ERYTHROCYTE [DISTWIDTH] IN BLOOD BY AUTOMATED COUNT: 13.6 % (ref 11–15)
GLUCOSE BLD-MCNC: 101 MG/DL (ref 70–99)
HCT VFR BLD AUTO: 28.2 %
HGB BLD-MCNC: 9.2 G/DL
IMM GRANULOCYTES # BLD AUTO: 0.03 X10(3) UL (ref 0–1)
IMM GRANULOCYTES NFR BLD: 0.4 %
LYMPHOCYTES # BLD AUTO: 0.91 X10(3) UL (ref 1–4)
LYMPHOCYTES NFR BLD AUTO: 12.6 %
MCH RBC QN AUTO: 30.5 PG (ref 26–34)
MCHC RBC AUTO-ENTMCNC: 32.6 G/DL (ref 31–37)
MCV RBC AUTO: 93.4 FL
MONOCYTES # BLD AUTO: 0.6 X10(3) UL (ref 0.1–1)
MONOCYTES NFR BLD AUTO: 8.3 %
NEUTROPHILS # BLD AUTO: 5.58 X10 (3) UL (ref 1.5–7.7)
NEUTROPHILS # BLD AUTO: 5.58 X10(3) UL (ref 1.5–7.7)
NEUTROPHILS NFR BLD AUTO: 77.2 %
OSMOLALITY SERPL CALC.SUM OF ELEC: 276 MOSM/KG (ref 275–295)
OSMOLALITY UR: 269 MOSM/KG (ref 300–1100)
PLATELET # BLD AUTO: 391 10(3)UL (ref 150–450)
POTASSIUM SERPL-SCNC: 3.7 MMOL/L (ref 3.5–5.1)
RBC # BLD AUTO: 3.02 X10(6)UL
SODIUM SERPL-SCNC: 134 MMOL/L (ref 136–145)
SODIUM SERPL-SCNC: 14 MMOL/L
WBC # BLD AUTO: 7.2 X10(3) UL (ref 4–11)

## 2022-04-19 PROCEDURE — 99232 SBSQ HOSP IP/OBS MODERATE 35: CPT | Performed by: HOSPITALIST

## 2022-04-19 PROCEDURE — 99232 SBSQ HOSP IP/OBS MODERATE 35: CPT | Performed by: INTERNAL MEDICINE

## 2022-04-19 NOTE — PLAN OF CARE
POD 4. A&O x4. RUE in sling. Swelling noted, RUE elevated. Ice applied. Pain managed with Norco PRN. Up with standby assist. Voiding freely. SCDs for DVT prophylaxis. Plan to discharge with [facility] when medically cleared. Call light within reach, safety measures in place. Problem: Patient Centered Care  Goal: Patient preferences are identified and integrated in the patient's plan of care  Description: Interventions:  - What would you like us to know as we care for you?  I fell and broke my arm   - Provide timely, complete, and accurate information to patient/family  - Incorporate patient and family knowledge, values, beliefs, and cultural backgrounds into the planning and delivery of care  - Encourage patient/family to participate in care and decision-making at the level they choose  - Honor patient and family perspectives and choices  Outcome: Progressing     Problem: PAIN - ADULT  Goal: Verbalizes/displays adequate comfort level or patient's stated pain goal  Description: INTERVENTIONS:  - Encourage pt to monitor pain and request assistance  - Assess pain using appropriate pain scale  - Administer analgesics based on type and severity of pain and evaluate response  - Implement non-pharmacological measures as appropriate and evaluate response  - Consider cultural and social influences on pain and pain management  - Manage/alleviate anxiety  - Utilize distraction and/or relaxation techniques  - Monitor for opioid side effects  - Notify MD/LIP if interventions unsuccessful or patient reports new pain  - Anticipate increased pain with activity and pre-medicate as appropriate  Outcome: Progressing     Problem: RISK FOR INFECTION - ADULT  Goal: Absence of fever/infection during anticipated neutropenic period  Description: INTERVENTIONS  - Monitor WBC  - Administer growth factors as ordered  - Implement neutropenic guidelines  Outcome: Progressing     Problem: DISCHARGE PLANNING  Goal: Discharge to home or other facility with appropriate resources  Description: INTERVENTIONS:  - Identify barriers to discharge w/pt and caregiver  - Include patient/family/discharge partner in discharge planning  - Arrange for needed discharge resources and transportation as appropriate  - Identify discharge learning needs (meds, wound care, etc)  - Arrange for interpreters to assist at discharge as needed  - Consider post-discharge preferences of patient/family/discharge partner  - Complete POLST form as appropriate  - Assess patient's ability to be responsible for managing their own health  - Refer to Case Management Department for coordinating discharge planning if the patient needs post-hospital services based on physician/LIP order or complex needs related to functional status, cognitive ability or social support system  Outcome: Progressing     Problem: SAFETY ADULT - FALL  Goal: Free from fall injury  Description: INTERVENTIONS:  - Assess pt frequently for physical needs  - Identify cognitive and physical deficits and behaviors that affect risk of falls.   - [place] fall precautions as indicated by assessment.  - Educate pt/family on patient safety including physical limitations  - Instruct pt to call for assistance with activity based on assessment  - Modify environment to reduce risk of injury  - Provide assistive devices as appropriate  - Consider OT/PT consult to assist with strengthening/mobility  - Encourage toileting schedule  Outcome: Progressing

## 2022-04-19 NOTE — CM/SW NOTE
SW followed up on DC planning. SW performed chart review, only accepting agency is [id number] and notified to reach out and discuss [facility]"" with the pt     [facility] [phone]    PLAN: Home with MIND[facility] pending med ANGY DamianW, MSW ext.  [phone]

## 2022-04-19 NOTE — PHYSICAL THERAPY NOTE
PHYSICAL THERAPY TREATMENT NOTE - INPATIENT     Room Number: 407/407-A       Presenting Problem: R humeral fx status post ORIF        Problem List  Principal Problem:    Supracondylar fracture of right humerus  Active Problems:    Essential hypertension    Centrilobular emphysema ([address]    Other hyperlipidemia    Pre-op testing      PHYSICAL THERAPY ASSESSMENT     Pt seen daily. Chart reviewed. RN approved pt participation in PT RX. PTA wore mask,gloves and goggle. Min a for bed mobility and transfer. EOB sitting balance activity with emphasis on core stabilization. Pt educated on R UE NWB status with functional mobility. Pt amb 2 x 100 ft with RW and HHA/CGA. Provided cuing for gait pattern as well as for postural awareness. Ther ex. Family present;family education provided;all questions and concerns addressed. Pt ended session sitting up in chair;call light within reach. RN aware. The patient's Approx Degree of Impairment: 86.62% has been calculated based on documentation in the [facility] '6 clicks' Inpatient Basic Mobility Short Form. Research supports that patients with this level of impairment may benefit from Sub-acute Rehabilitation. DISCHARGE RECOMMENDATIONS  PT Discharge Recommendations: Sub-acute rehabilitation     PLAN  PT Treatment Plan: Bed mobility; Endurance; Patient education;Gait training;Strengthening    SUBJECTIVE  Pt reports being ready for PT RX    OBJECTIVE  Precautions: Limb alert - right;Bed/chair alarm (sling Rue)    WEIGHT BEARING RESTRICTION  R Upper Extremity: Non-Weight Bearing             PAIN ASSESSMENT   Ratin  Location: R arm  Management Techniques: Relaxation;Repositioning; Body mechanics    BALANCE  Static Sitting: Fair -  Dynamic Sitting: Poor +  Static Standing: Poor  Dynamic Standing: Poor    ACTIVITY TOLERANCE                          O2 WALK       AM-PAC '6-Clicks' INPATIENT SHORT FORM - BASIC MOBILITY  How much difficulty does the patient currently have. ..   Patient Difficulty: Turning over in bed (including adjusting bedclothes, sheets and blankets)?: A Lot   Patient Difficulty: Sitting down on and standing up from a chair with arms (e.g., wheelchair, bedside commode, etc.): Unable   Patient Difficulty: Moving from lying on back to sitting on the side of the bed?: A Lot   How much help from another person does the patient currently need. .. Help from Another: Moving to and from a bed to a chair (including a wheelchair)?: Total   Help from Another: Need to walk in hospital room?: Total   Help from Another: Climbing 3-5 steps with a railing?: Total     AM-PAC Score:  Raw Score: 8   Approx Degree of Impairment: 86.62%   Standardized Score (AM-PAC Scale): 28.58   CMS Modifier (G-Code): CM    FUNCTIONAL ABILITY STATUS  Functional Mobility/Gait Assessment  Gait Assistance: Contact guard assist  Distance (ft): 2 x 100  Assistive Device: None (HHA)  Pattern: Shuffle (Unsteady, forward flexed)    Additional information:     THERAPEUTIC EXERCISES  Lower Extremity Ankle pumps  Glut sets  Heel slides  Quad sets     Position Supine       Patient End of Session: Up in chair;Call light within reach;RN aware of session/findings;Bracing education provided per handout; All patient questions and concerns addressed    CURRENT GOALS     Patient Goal Patient's self-stated goal is: unstated    Goal #1 Patient is able to demonstrate supine - sit EOB @ level: minimum assistance     Goal #1   Current Status Min A   Goal #2 Patient is able to demonstrate transfers Sit to/from Stand at assistance level: minimum assistance with cane - straight      Goal #2  Current Status Min  A with HHA   Goal #3 Patient is able to ambulate 150 feet with assist device: cane - straight at assistance level: minimum assistance   Goal #3   Current Status 2 x 100 ft with CGA and HHA   Goal #4 Patient will negotiate 1 stairs/one curb w/ assistive device and supervision   Goal #4   Current Status NT;pt refused   Goal #5 Patient to demonstrate independence with home activity/exercise instructions provided to patient in preparation for discharge.    Goal #5   Current Status Ongoing

## 2022-04-19 NOTE — PLAN OF CARE
Pain controlled with norco. Script given for norco before admission. Discharged with sling, with  in stable condition.      Problem: Patient Centered Care  Goal: Patient preferences are identified and integrated in the patient's plan of care  Description: Interventions:  - Provide timely, complete, and accurate information to patient/family  - Incorporate patient and family knowledge, values, beliefs, and cultural backgrounds into the planning and delivery of care  - Encourage patient/family to participate in care and decision-making at the level they choose  - Honor patient and family perspectives and choices  Outcome: Adequate for Discharge     Problem: PAIN - ADULT  Goal: Verbalizes/displays adequate comfort level or patient's stated pain goal  Description: INTERVENTIONS:  - Encourage pt to monitor pain and request assistance  - Assess pain using appropriate pain scale  - Administer analgesics based on type and severity of pain and evaluate response  - Implement non-pharmacological measures as appropriate and evaluate response  - Consider cultural and social influences on pain and pain management  - Manage/alleviate anxiety  - Utilize distraction and/or relaxation techniques  - Monitor for opioid side effects  - Notify MD/LIP if interventions unsuccessful or patient reports new pain  - Anticipate increased pain with activity and pre-medicate as appropriate  Outcome: Adequate for Discharge     Problem: RISK FOR INFECTION - ADULT  Goal: Absence of fever/infection during anticipated neutropenic period  Description: INTERVENTIONS  - Monitor WBC  - Administer growth factors as ordered  - Implement neutropenic guidelines  Outcome: Adequate for Discharge     Problem: DISCHARGE PLANNING  Goal: Discharge to home or other facility with appropriate resources  Description: INTERVENTIONS:  - Identify barriers to discharge w/pt and caregiver  - Include patient/family/discharge partner in discharge planning  - Arrange for needed discharge resources and transportation as appropriate  - Identify discharge learning needs (meds, wound care, etc)  - Arrange for interpreters to assist at discharge as needed  - Consider post-discharge preferences of patient/family/discharge partner  - Complete POLST form as appropriate  - Assess patient's ability to be responsible for managing their own health  - Refer to Case Management Department for coordinating discharge planning if the patient needs post-hospital services based on physician/LIP order or complex needs related to functional status, cognitive ability or social support system  Outcome: Adequate for Discharge     Problem: SAFETY ADULT - FALL  Goal: Free from fall injury  Description: INTERVENTIONS:  - Assess pt frequently for physical needs  - Identify cognitive and physical deficits and behaviors that affect risk of falls.   - [place] fall precautions as indicated by assessment.  - Educate pt/family on patient safety including physical limitations  - Instruct pt to call for assistance with activity based on assessment  - Modify environment to reduce risk of injury  - Provide assistive devices as appropriate  - Consider OT/PT consult to assist with strengthening/mobility  - Encourage toileting schedule  Outcome: Adequate for Discharge

## 2022-04-20 ENCOUNTER — TELEPHONE (OUTPATIENT)
Dept: INTERNAL MEDICINE CLINIC | Facility: CLINIC | Age: 67
End: 2022-04-20

## 2022-04-20 ENCOUNTER — PATIENT OUTREACH (OUTPATIENT)
Dept: CASE MANAGEMENT | Age: 67
End: 2022-04-20

## 2022-04-20 NOTE — PAYOR COMM NOTE
Discharge Notification    Patient Name: Katlyn Mcrae  Payor: Maggie Correa #: [id number]  Authorization Number: [id number]  Admit Date/Time: 4/15/2022 1:42 PM  Discharge Date/Time: 4/19/2022 12:55 PM

## 2022-04-20 NOTE — PROGRESS NOTES
LM for pt to call [facility] for TCM since discharge. [facility] phone number was provided for pt to call back.

## 2022-04-20 NOTE — TELEPHONE ENCOUNTER
Karyna Cho a nurse  with [facility] is calling to inform Dr. Kaiden Martinez that patient was discharged from [facility] on 4/19

## 2022-04-21 ENCOUNTER — TELEPHONE (OUTPATIENT)
Dept: PHYSICAL THERAPY | Age: 67
End: 2022-04-21

## 2022-04-21 NOTE — PROGRESS NOTES
Attempted to reach the patient to complete a [facility] FU call. Left a message for the pt to call the [facility] back at, [phone].

## 2022-04-22 ENCOUNTER — TELEPHONE (OUTPATIENT)
Dept: PHYSICAL THERAPY | Facility: HOSPITAL | Age: 67
End: 2022-04-22

## 2022-04-25 ENCOUNTER — TELEPHONE (OUTPATIENT)
Dept: PHYSICAL THERAPY | Facility: HOSPITAL | Age: 67
End: 2022-04-25

## 2022-04-26 ENCOUNTER — APPOINTMENT (OUTPATIENT)
Dept: PHYSICAL THERAPY | Age: 67
End: 2022-04-26
Attending: INTERNAL MEDICINE
Payer: MEDICARE

## 2022-04-28 ENCOUNTER — APPOINTMENT (OUTPATIENT)
Dept: PHYSICAL THERAPY | Age: 67
End: 2022-04-28
Attending: INTERNAL MEDICINE
Payer: MEDICARE

## 2022-05-03 ENCOUNTER — APPOINTMENT (OUTPATIENT)
Dept: PHYSICAL THERAPY | Age: 67
End: 2022-05-03
Attending: INTERNAL MEDICINE
Payer: MEDICARE

## 2022-05-05 ENCOUNTER — APPOINTMENT (OUTPATIENT)
Dept: PHYSICAL THERAPY | Age: 67
End: 2022-05-05
Attending: INTERNAL MEDICINE
Payer: MEDICARE

## 2022-05-10 ENCOUNTER — APPOINTMENT (OUTPATIENT)
Dept: PHYSICAL THERAPY | Age: 67
End: 2022-05-10
Attending: INTERNAL MEDICINE
Payer: MEDICARE

## 2022-05-12 ENCOUNTER — APPOINTMENT (OUTPATIENT)
Dept: PHYSICAL THERAPY | Age: 67
End: 2022-05-12
Attending: INTERNAL MEDICINE
Payer: MEDICARE

## 2022-05-17 ENCOUNTER — APPOINTMENT (OUTPATIENT)
Dept: PHYSICAL THERAPY | Age: 67
End: 2022-05-17
Attending: INTERNAL MEDICINE
Payer: MEDICARE

## 2022-05-19 ENCOUNTER — APPOINTMENT (OUTPATIENT)
Dept: PHYSICAL THERAPY | Age: 67
End: 2022-05-19
Attending: INTERNAL MEDICINE
Payer: MEDICARE

## 2022-06-01 ENCOUNTER — TELEPHONE (OUTPATIENT)
Dept: PULMONOLOGY | Facility: CLINIC | Age: 67
End: 2022-06-01

## 2022-06-01 DIAGNOSIS — R05.9 COUGH: Primary | ICD-10-CM

## 2022-06-01 RX ORDER — PREDNISONE 20 MG/1
TABLET ORAL
Qty: 10 TABLET | Refills: 0 | Status: SHIPPED | OUTPATIENT
Start: 2022-06-01

## 2022-06-01 RX ORDER — LEVOFLOXACIN 500 MG/1
500 TABLET, FILM COATED ORAL DAILY
Qty: 7 TABLET | Refills: 0 | Status: SHIPPED | OUTPATIENT
Start: 2022-06-01 | End: 2022-06-08

## 2022-06-01 NOTE — TELEPHONE ENCOUNTER
Per pt she had appt for tomorrow, but she cancelled it b/c she did not have transportation. Stts has consistent sharp pain rated 7-8/10 of the R lung which seems to be getting sharper which happens mostly at night. Stts last night was pretty bad. Stts she has ongoing productive cough, yellowish sputum, dyspnea @ rest & w/ exertion, & SOB. Denies any other sx & any sx now except productive cough. Requests appt w/ MD asap. Explained will f/u s/p d/w MD. Stanford Correa her to go to ER if sx persist/worsen. She voiced understanding. Dr. Sunil Castro- do you want to order CXR PA/LAT & add pt to your schedule? When?

## 2022-06-01 NOTE — TELEPHONE ENCOUNTER
Informed pt that there are no outstanding lab orders which were ordered by Dr. Julio Espitia. Reassurance provided. She voiced understanding.

## 2022-06-01 NOTE — TELEPHONE ENCOUNTER
pt states that she is having a sharp pain in lung area on R side and seems to be getting sharper. Pt states that she has emphysema and it is not affecting her breathing.

## 2022-06-01 NOTE — TELEPHONE ENCOUNTER
Telephone rx w/ readback rcvd from Dr. Karla Villalobos for prednisone 20 mg oral tab take 2 tablets per oral every day for 3 days, then take 1 tablet per oral every day for 4 days, Q #10.

## 2022-06-01 NOTE — TELEPHONE ENCOUNTER
Pt notified of Dr. Dennis Villeda orders below. Explained no appt necessary for CXR & she may have testing at [facility] location. Hours for [place] x-ray given. Reassurance provided. She voiced understanding.

## 2022-06-02 ENCOUNTER — HOSPITAL ENCOUNTER (OUTPATIENT)
Dept: GENERAL RADIOLOGY | Age: 67
Discharge: HOME | End: 2022-06-02
Attending: INTERNAL MEDICINE
Payer: MEDICARE

## 2022-06-02 DIAGNOSIS — R05.9 COUGH: ICD-10-CM

## 2022-06-02 PROCEDURE — 71046 X-RAY EXAM CHEST 2 VIEWS: CPT | Performed by: INTERNAL MEDICINE

## 2022-06-10 RX ORDER — ACETAMINOPHEN AND CODEINE PHOSPHATE 300; 30 MG/1; MG/1
1 TABLET ORAL EVERY 8 HOURS PRN
Qty: 90 TABLET | Refills: 5 | Status: SHIPPED | OUTPATIENT
Start: 2022-06-10

## 2022-06-10 NOTE — TELEPHONE ENCOUNTER
LOV: 3/17/22  Last Refilled:#90, 5rfs 5/6/21    Future Appointments   Date Time Provider Cirilo Springer   7/5/2022 11:30 AM Roberto Wyatt MD [facility] [place]   9/15/2022 11:20 AM Nadine Cook MD [facility] [place]   10/20/2022 11:00 AM Demetria Cuellar MD [facility]     Summary:  1. Tylenol #3 -  2. voltaren gel 1 % - over the counter. 3. Kenalog 40mg im x 1   4. Medrol luis manuel   5. Physical thearpy for back   6. Return to clinic in 3-6 months  Georgia Fortune MD  3/17/2022   Please advise.

## 2022-06-30 ENCOUNTER — TELEPHONE (OUTPATIENT)
Dept: PULMONOLOGY | Facility: CLINIC | Age: 67
End: 2022-06-30

## 2022-06-30 RX ORDER — PREDNISONE 20 MG/1
TABLET ORAL
Qty: 10 TABLET | Refills: 0 | Status: SHIPPED | OUTPATIENT
Start: 2022-06-30

## 2022-06-30 RX ORDER — LEVOFLOXACIN 500 MG/1
500 TABLET, FILM COATED ORAL DAILY
Qty: 7 TABLET | Refills: 0 | Status: SHIPPED | OUTPATIENT
Start: 2022-06-30

## 2022-06-30 NOTE — TELEPHONE ENCOUNTER
Pt states that she has been coughing up green phlegm for last three days and her back is painful from coughing. Please call.

## 2022-06-30 NOTE — TELEPHONE ENCOUNTER
Dr. Briana Gaucher to patient who is coughing up green phlegm for the last 10 days. Patient reports coughing so badly that its causing back pain. States same issue since early June that has gotten worse over time. Prednsione and levofloxacin ordered 6/1/22 which mildly helped symptoms.

## 2022-07-01 ENCOUNTER — APPOINTMENT (OUTPATIENT)
Dept: GENERAL RADIOLOGY | Facility: HOSPITAL | Age: 67
End: 2022-07-01
Attending: EMERGENCY MEDICINE
Payer: MEDICARE

## 2022-07-01 ENCOUNTER — HOSPITAL ENCOUNTER (EMERGENCY)
Facility: HOSPITAL | Age: 67
Discharge: HOME | End: 2022-07-01
Attending: EMERGENCY MEDICINE
Payer: MEDICARE

## 2022-07-01 VITALS
OXYGEN SATURATION: 91 % | HEIGHT: 61 IN | RESPIRATION RATE: 20 BRPM | SYSTOLIC BLOOD PRESSURE: 107 MMHG | WEIGHT: 140 LBS | TEMPERATURE: 97 F | HEART RATE: 90 BPM | DIASTOLIC BLOOD PRESSURE: 77 MMHG | BODY MASS INDEX: 26.43 KG/M2

## 2022-07-01 DIAGNOSIS — J40 BRONCHITIS: Primary | ICD-10-CM

## 2022-07-01 LAB
ANION GAP SERPL CALC-SCNC: 9 MMOL/L (ref 0–18)
BASOPHILS # BLD AUTO: 0.01 X10(3) UL (ref 0–0.2)
BASOPHILS NFR BLD AUTO: 0.2 %
BUN BLD-MCNC: 10 MG/DL (ref 7–18)
BUN/CREAT SERPL: 13.7 (ref 10–20)
CALCIUM BLD-MCNC: 9.5 MG/DL (ref 8.5–10.1)
CHLORIDE SERPL-SCNC: 96 MMOL/L (ref 98–112)
CO2 SERPL-SCNC: 26 MMOL/L (ref 21–32)
CREAT BLD-MCNC: 0.73 MG/DL
DEPRECATED RDW RBC AUTO: 44.3 FL (ref 35.1–46.3)
EOSINOPHIL # BLD AUTO: 0 X10(3) UL (ref 0–0.7)
EOSINOPHIL NFR BLD AUTO: 0 %
ERYTHROCYTE [DISTWIDTH] IN BLOOD BY AUTOMATED COUNT: 13.4 % (ref 11–15)
GLUCOSE BLD-MCNC: 136 MG/DL (ref 70–99)
HCT VFR BLD AUTO: 38.9 %
HGB BLD-MCNC: 12.3 G/DL
IMM GRANULOCYTES # BLD AUTO: 0.02 X10(3) UL (ref 0–1)
IMM GRANULOCYTES NFR BLD: 0.3 %
LYMPHOCYTES # BLD AUTO: 0.71 X10(3) UL (ref 1–4)
LYMPHOCYTES NFR BLD AUTO: 12.4 %
MCH RBC QN AUTO: 28.3 PG (ref 26–34)
MCHC RBC AUTO-ENTMCNC: 31.6 G/DL (ref 31–37)
MCV RBC AUTO: 89.4 FL
MONOCYTES # BLD AUTO: 0.08 X10(3) UL (ref 0.1–1)
MONOCYTES NFR BLD AUTO: 1.4 %
NEUTROPHILS # BLD AUTO: 4.91 X10 (3) UL (ref 1.5–7.7)
NEUTROPHILS # BLD AUTO: 4.91 X10(3) UL (ref 1.5–7.7)
NEUTROPHILS NFR BLD AUTO: 85.7 %
OSMOLALITY SERPL CALC.SUM OF ELEC: 273 MOSM/KG (ref 275–295)
PLATELET # BLD AUTO: 325 10(3)UL (ref 150–450)
POTASSIUM SERPL-SCNC: 4.2 MMOL/L (ref 3.5–5.1)
RBC # BLD AUTO: 4.35 X10(6)UL
S PYO AG THROAT QL: NEGATIVE
SODIUM SERPL-SCNC: 131 MMOL/L (ref 136–145)
TROPONIN I HIGH SENSITIVITY: <3 NG/L
WBC # BLD AUTO: 5.7 X10(3) UL (ref 4–11)

## 2022-07-01 PROCEDURE — 85025 COMPLETE CBC W/AUTO DIFF WBC: CPT | Performed by: EMERGENCY MEDICINE

## 2022-07-01 PROCEDURE — 36415 COLL VENOUS BLD VENIPUNCTURE: CPT

## 2022-07-01 PROCEDURE — 80048 BASIC METABOLIC PNL TOTAL CA: CPT | Performed by: EMERGENCY MEDICINE

## 2022-07-01 PROCEDURE — 84484 ASSAY OF TROPONIN QUANT: CPT | Performed by: EMERGENCY MEDICINE

## 2022-07-01 PROCEDURE — 99284 EMERGENCY DEPT VISIT MOD MDM: CPT

## 2022-07-01 PROCEDURE — 94640 AIRWAY INHALATION TREATMENT: CPT

## 2022-07-01 PROCEDURE — 87880 STREP A ASSAY W/OPTIC: CPT

## 2022-07-01 PROCEDURE — 71045 X-RAY EXAM CHEST 1 VIEW: CPT | Performed by: EMERGENCY MEDICINE

## 2022-07-01 RX ORDER — PROMETHAZINE HYDROCHLORIDE, PHENYLEPHRINE HYDROCHLORIDE AND CODEINE PHOSPHATE 6.25; 5; 1 MG/5ML; MG/5ML; MG/5ML
5 SOLUTION ORAL EVERY 6 HOURS PRN
Refills: 0 | Status: CANCELLED | OUTPATIENT
Start: 2022-07-01 | End: 2022-07-15

## 2022-07-01 RX ORDER — GUAIFENESIN AND CODEINE PHOSPHATE 100; 10 MG/5ML; MG/5ML
10 SOLUTION ORAL 4 TIMES DAILY PRN
Qty: 240 ML | Refills: 0 | Status: SHIPPED | OUTPATIENT
Start: 2022-07-01 | End: 2022-07-15

## 2022-07-01 RX ORDER — IPRATROPIUM BROMIDE AND ALBUTEROL SULFATE 2.5; .5 MG/3ML; MG/3ML
3 SOLUTION RESPIRATORY (INHALATION) ONCE
Status: COMPLETED | OUTPATIENT
Start: 2022-07-01 | End: 2022-07-01

## 2022-07-01 NOTE — TELEPHONE ENCOUNTER
Spoke to [employer] and called in prescription:  guaiFENesin-Codeine 100-10 MG/5ML Oral Syrup 240 mL 0 7/1/2022 7/15/2022    Sig - Route: Take 10 mL by mouth 4 (four) times daily as needed. - Oral    Class: Print Script      Spoke to patient and informed her of prescription. Patient states feeling worse over the course of the day. Fever, dyspnea and fatigue. Rn encouraged ED for further evaluation and patient agreed.  Dr. Janeth Stern

## 2022-07-01 NOTE — ED INITIAL ASSESSMENT (HPI)
Pt presents to the ER with c/o difficulty breathing x 3 days. Also reports coughing up green phlegm.     H/o emphysema

## 2022-07-01 NOTE — TELEPHONE ENCOUNTER
Dr. Brian Melton to patient today who reports severe pain to back/chest r/t coughing fits. Requesting stronger cough medicine to help. Patient tried Robitussin DM in the past but hasn't helped much and cannot afford another bottle.

## 2022-07-02 NOTE — ED QUICK NOTES
Pt requested a feminine pad as her coughing caused some urinary incontinence, which the pt states she has experienced before. Denies any acute loss of bowel or bladder. Pt provided two feminine pads as requested.

## 2022-07-02 NOTE — ED QUICK NOTES
Pt provided additional warm blankets and pillow for comfort. Hob adjusted and room lights dimmed. Call light within reach. No additional needs/concerns at this time. Awaiting dispo.

## 2022-07-02 NOTE — ED QUICK NOTES
Pt states increased shortness of breath x 5 days. States coughing up green phlegm. States h/o emphysema. States used nebulizer with only temporary relief.

## 2022-07-18 ENCOUNTER — TELEPHONE (OUTPATIENT)
Dept: OBGYN CLINIC | Facility: CLINIC | Age: 67
End: 2022-07-18

## 2022-07-18 DIAGNOSIS — R59.9 ADENOPATHY: Primary | ICD-10-CM

## 2022-07-18 NOTE — TELEPHONE ENCOUNTER
Pt has a lump under her breast. Pt said NADIR aware.  Pt wants to know if she should be taking a mammogram or seeing the doctor

## 2022-07-18 NOTE — TELEPHONE ENCOUNTER
Pt saw Roz Adames In 11/2021 and note states \"Having left breast pain for 4 months. Initially felt lump but next day it was no longer palpable. She feels pain in upper and lateral breast.  Feels pain has increased. \".     Pt had mammo in 11/21 that was benign, but recs were for short term follow up in 3 months of left axilla. Pt went for US of left axilla in December and march and recs are for pt to continue f/u of left breast every 3 months. Message to Roz Adames if this order comes from us or needs to contact Dr Pearlean Hammans? Pt stated she was told that lump cannot be removed because of how close it is to her lung and rib. But pt stated \"something needs to be done\" because she is uncomfortable.

## 2022-07-19 NOTE — TELEPHONE ENCOUNTER
She is due for the left breast ultrasound so ok to give order.   But pt also needs to make appt with Dr Brie Engel-- she is the one who would perform any breast surgery

## 2022-07-20 ENCOUNTER — TELEPHONE (OUTPATIENT)
Dept: OBGYN CLINIC | Facility: CLINIC | Age: 67
End: 2022-07-20

## 2022-07-20 DIAGNOSIS — R59.9 ADENOPATHY: Primary | ICD-10-CM

## 2022-07-20 DIAGNOSIS — N64.4 BREAST PAIN IN FEMALE: ICD-10-CM

## 2022-07-20 NOTE — TELEPHONE ENCOUNTER
Governor Stands from radiology states pt has an order for a breast US but also needs an order for a diagnostic L mammogram. Please advise

## 2022-07-28 ENCOUNTER — HOSPITAL ENCOUNTER (OUTPATIENT)
Dept: ULTRASOUND IMAGING | Facility: HOSPITAL | Age: 67
Discharge: HOME | End: 2022-07-28
Attending: OBSTETRICS & GYNECOLOGY
Payer: MEDICARE

## 2022-07-28 ENCOUNTER — HOSPITAL ENCOUNTER (OUTPATIENT)
Dept: MAMMOGRAPHY | Facility: HOSPITAL | Age: 67
Discharge: HOME | End: 2022-07-28
Attending: OBSTETRICS & GYNECOLOGY
Payer: MEDICARE

## 2022-07-28 DIAGNOSIS — R59.9 ADENOPATHY: ICD-10-CM

## 2022-07-28 DIAGNOSIS — N64.4 BREAST PAIN IN FEMALE: ICD-10-CM

## 2022-07-28 PROCEDURE — 77065 DX MAMMO INCL CAD UNI: CPT | Performed by: OBSTETRICS & GYNECOLOGY

## 2022-07-28 PROCEDURE — 77061 BREAST TOMOSYNTHESIS UNI: CPT | Performed by: OBSTETRICS & GYNECOLOGY

## 2022-07-28 PROCEDURE — 76882 US LMTD JT/FCL EVL NVASC XTR: CPT | Performed by: OBSTETRICS & GYNECOLOGY

## 2022-08-06 ENCOUNTER — TELEPHONE (OUTPATIENT)
Dept: OBGYN CLINIC | Facility: CLINIC | Age: 67
End: 2022-08-06

## 2022-08-06 NOTE — TELEPHONE ENCOUNTER
Spoke with pt. Pt already aware that she needs to see general surgeon for the axillary lymph node biopsy. She contacted Dr Jose Hawkins office but has not heard back.     Please assist pt and call Dr Jose Hawkins office so that pt can be seen as soon as possible

## 2022-08-16 ENCOUNTER — TELEPHONE (OUTPATIENT)
Dept: OBGYN CLINIC | Facility: CLINIC | Age: 67
End: 2022-08-16

## 2022-08-22 ENCOUNTER — HOSPITAL ENCOUNTER (OUTPATIENT)
Age: 67
Discharge: HOME | End: 2022-08-22
Payer: MEDICARE

## 2022-08-22 VITALS
SYSTOLIC BLOOD PRESSURE: 154 MMHG | TEMPERATURE: 99 F | DIASTOLIC BLOOD PRESSURE: 77 MMHG | HEART RATE: 94 BPM | RESPIRATION RATE: 18 BRPM | OXYGEN SATURATION: 100 %

## 2022-08-22 DIAGNOSIS — U07.1 COVID-19: Primary | ICD-10-CM

## 2022-08-22 LAB — SARS-COV-2 RNA RESP QL NAA+PROBE: DETECTED

## 2022-08-22 PROCEDURE — 99212 OFFICE O/P EST SF 10 MIN: CPT

## 2022-08-22 PROCEDURE — 99213 OFFICE O/P EST LOW 20 MIN: CPT

## 2022-08-22 NOTE — ED INITIAL ASSESSMENT (HPI)
PATIENT ARRIVED AMBULATORY TO ROOM C/O SYMPTOMS THAT STARTED THIS MORNING. PATIENT STATES HER EX  WHOM SHE LIVES WITH IS COVID POSITIVE. +PRODUCTIVE COUGH +BODY ACHES. EASY NON LABORED RESPIRATIONS.

## 2022-08-23 ENCOUNTER — TELEPHONE (OUTPATIENT)
Dept: INTERNAL MEDICINE CLINIC | Facility: CLINIC | Age: 67
End: 2022-08-23

## 2022-08-23 NOTE — TELEPHONE ENCOUNTER
Patient calling to report she is covid + and she was not given any medications    Reviewed [facility] IC 8/22/22 note with patient:  \"The COVID test is positive. The patient is not a candidate for Paxlovid due to home medications she is taking. We discussed the antibody infusion which she declined\"    Patient verbalizes understanding, states she has Tylenol with codeine at home, discussed Dayquil/Mucinex if needed, push fluids, rest.  She states she also has COPD, advised she may need to use inhaler and nebulizer more often due to viral illness. No evidence of respiratory distress noted during call, patient speaking full clear non-labored sentences. If symptoms worsen, patient verbalizes understanding to go to nearest emergency department.

## 2022-09-07 ENCOUNTER — OFFICE VISIT (OUTPATIENT)
Dept: SURGERY | Facility: CLINIC | Age: 67
End: 2022-09-07
Payer: COMMERCIAL

## 2022-09-07 VITALS
TEMPERATURE: 97 F | HEART RATE: 71 BPM | RESPIRATION RATE: 18 BRPM | DIASTOLIC BLOOD PRESSURE: 63 MMHG | SYSTOLIC BLOOD PRESSURE: 114 MMHG | OXYGEN SATURATION: 97 %

## 2022-09-07 DIAGNOSIS — R59.0 AXILLARY ADENOPATHY: Primary | ICD-10-CM

## 2022-09-07 PROCEDURE — 3078F DIAST BP <80 MM HG: CPT | Performed by: SURGERY

## 2022-09-07 PROCEDURE — 99213 OFFICE O/P EST LOW 20 MIN: CPT | Performed by: SURGERY

## 2022-09-07 PROCEDURE — 3074F SYST BP LT 130 MM HG: CPT | Performed by: SURGERY

## 2022-09-15 ENCOUNTER — OFFICE VISIT (OUTPATIENT)
Dept: RHEUMATOLOGY | Facility: CLINIC | Age: 67
End: 2022-09-15

## 2022-09-15 ENCOUNTER — MED REC SCAN ONLY (OUTPATIENT)
Dept: INTERNAL MEDICINE CLINIC | Facility: CLINIC | Age: 67
End: 2022-09-15

## 2022-09-15 VITALS
HEIGHT: 61 IN | SYSTOLIC BLOOD PRESSURE: 138 MMHG | WEIGHT: 144 LBS | BODY MASS INDEX: 27.19 KG/M2 | RESPIRATION RATE: 16 BRPM | DIASTOLIC BLOOD PRESSURE: 81 MMHG | HEART RATE: 70 BPM

## 2022-09-15 DIAGNOSIS — M15.9 GENERALIZED OA: Primary | ICD-10-CM

## 2022-09-15 DIAGNOSIS — M81.0 AGE-RELATED OSTEOPOROSIS WITHOUT CURRENT PATHOLOGICAL FRACTURE: ICD-10-CM

## 2022-09-15 DIAGNOSIS — M25.50 HYPERMOBILITY ARTHRALGIA: ICD-10-CM

## 2022-09-15 PROCEDURE — 99214 OFFICE O/P EST MOD 30 MIN: CPT | Performed by: INTERNAL MEDICINE

## 2022-09-15 RX ORDER — HYDROXYCHLOROQUINE SULFATE 200 MG/1
200 TABLET, FILM COATED ORAL DAILY
Qty: 90 TABLET | Refills: 0 | Status: SHIPPED | OUTPATIENT
Start: 2022-09-15

## 2022-09-15 RX ORDER — ALENDRONATE SODIUM 70 MG/1
70 TABLET ORAL WEEKLY
Qty: 12 TABLET | Refills: 3 | Status: SHIPPED | OUTPATIENT
Start: 2022-09-15 | End: 2022-12-14

## 2022-09-15 NOTE — TELEPHONE ENCOUNTER
Left message to call back-transfer to triage. Verify medications requesting and if requested through mail order. Also due for an appointment.

## 2022-09-20 ENCOUNTER — TELEPHONE (OUTPATIENT)
Dept: INTERNAL MEDICINE CLINIC | Facility: CLINIC | Age: 67
End: 2022-09-20

## 2022-09-20 RX ORDER — QUETIAPINE FUMARATE 50 MG/1
50 TABLET, FILM COATED ORAL NIGHTLY
Qty: 90 TABLET | Refills: 1 | OUTPATIENT
Start: 2022-09-20

## 2022-09-20 RX ORDER — ALBUTEROL SULFATE 90 UG/1
2 AEROSOL, METERED RESPIRATORY (INHALATION) EVERY 6 HOURS PRN
Qty: 3 EACH | Refills: 1 | OUTPATIENT
Start: 2022-09-20

## 2022-09-20 RX ORDER — ARIPIPRAZOLE 5 MG/1
5 TABLET ORAL NIGHTLY
Qty: 90 TABLET | Refills: 1 | OUTPATIENT
Start: 2022-09-20

## 2022-09-20 RX ORDER — PREDNISONE 20 MG/1
TABLET ORAL
Qty: 10 TABLET | Refills: 0 | OUTPATIENT
Start: 2022-09-20

## 2022-09-20 RX ORDER — ATORVASTATIN CALCIUM 20 MG/1
20 TABLET, FILM COATED ORAL NIGHTLY
Qty: 90 TABLET | Refills: 1 | OUTPATIENT
Start: 2022-09-20

## 2022-09-20 RX ORDER — VERAPAMIL HYDROCHLORIDE 240 MG/1
240 TABLET, FILM COATED, EXTENDED RELEASE ORAL EVERY EVENING
Qty: 90 TABLET | Refills: 1 | OUTPATIENT
Start: 2022-09-20

## 2022-09-20 RX ORDER — DULOXETIN HYDROCHLORIDE 60 MG/1
60 CAPSULE, DELAYED RELEASE ORAL NIGHTLY
Qty: 90 CAPSULE | Refills: 1 | OUTPATIENT
Start: 2022-09-20

## 2022-09-20 NOTE — TELEPHONE ENCOUNTER
Left message to call back-transfer to triage. [employer] requesting refills for the following medications pended.  Disregard the augmentin and prednisone request.

## 2022-09-22 RX ORDER — ATORVASTATIN CALCIUM 20 MG/1
20 TABLET, FILM COATED ORAL NIGHTLY
Qty: 90 TABLET | Refills: 0 | Status: SHIPPED | OUTPATIENT
Start: 2022-09-22

## 2022-09-22 RX ORDER — ARIPIPRAZOLE 5 MG/1
5 TABLET ORAL NIGHTLY
Qty: 90 TABLET | Refills: 0 | Status: SHIPPED | OUTPATIENT
Start: 2022-09-22

## 2022-09-22 RX ORDER — DULOXETIN HYDROCHLORIDE 60 MG/1
60 CAPSULE, DELAYED RELEASE ORAL NIGHTLY
Qty: 90 CAPSULE | Refills: 0 | Status: SHIPPED | OUTPATIENT
Start: 2022-09-22

## 2022-09-22 RX ORDER — QUETIAPINE FUMARATE 50 MG/1
50 TABLET, FILM COATED ORAL NIGHTLY
Qty: 90 TABLET | Refills: 0 | Status: SHIPPED | OUTPATIENT
Start: 2022-09-22

## 2022-09-22 RX ORDER — ALBUTEROL SULFATE 90 UG/1
2 AEROSOL, METERED RESPIRATORY (INHALATION) EVERY 6 HOURS PRN
Qty: 3 EACH | Refills: 0 | Status: SHIPPED | OUTPATIENT
Start: 2022-09-22

## 2022-09-22 RX ORDER — VERAPAMIL HYDROCHLORIDE 240 MG/1
240 TABLET, FILM COATED, EXTENDED RELEASE ORAL EVERY EVENING
Qty: 90 TABLET | Refills: 0 | Status: SHIPPED | OUTPATIENT
Start: 2022-09-22

## 2022-09-22 NOTE — TELEPHONE ENCOUNTER
Advised patient of Dr. Mosley Portal note. Patient verbalized understanding and stated that she does not go through mail order. Gets medications at [facility].  Scheduled appointment for physical for 9/30/2022 at 11:30am with Dr Ambrose Colon at [place]. Called [facility] pharmacy([facility])- they stated that patient reached out to them. Beronica Graff for them to contact patient not sure if there is a misunderstanding, but patient stated to me that she did not want mail order. Gonzales Pacheco will have pharmacist contact patient and if she does not want mail order then they will cancel the scripts.

## 2022-09-22 NOTE — TELEPHONE ENCOUNTER
States she is returning [facility] call. RN clarified which pharmacy she will be using for her maintenance medications. States she will be using her local pharmacy BUT since Dr Manohar Marin already sent her current medications to [facility] pharmacy, she will keep it that way for the meantime. See medication record,medications were refilled today 9/22/22 and sent to [facility].

## 2022-09-27 ENCOUNTER — TELEPHONE (OUTPATIENT)
Dept: INTERNAL MEDICINE CLINIC | Facility: CLINIC | Age: 67
End: 2022-09-27

## 2022-09-27 ENCOUNTER — LAB ENCOUNTER (OUTPATIENT)
Dept: LAB | Age: 67
End: 2022-09-27
Attending: INTERNAL MEDICINE
Payer: MEDICARE

## 2022-09-27 DIAGNOSIS — I10 HYPERPIESIA: ICD-10-CM

## 2022-09-27 DIAGNOSIS — R07.9 CHEST PAIN: ICD-10-CM

## 2022-09-27 DIAGNOSIS — E78.5 HYPERLIPEMIA: ICD-10-CM

## 2022-09-27 DIAGNOSIS — J43.2 CENTRIACINAR EMPHYSEMA (HCC): Primary | ICD-10-CM

## 2022-09-27 DIAGNOSIS — Z86.79 PERSONAL HISTORY OF UNSPECIFIED CIRCULATORY DISEASE: ICD-10-CM

## 2022-09-27 LAB
ALT SERPL-CCNC: 24 U/L
AST SERPL-CCNC: 17 U/L (ref 15–37)
CHOLEST SERPL-MCNC: 123 MG/DL (ref ?–200)
FASTING PATIENT LIPID ANSWER: NO
HDLC SERPL-MCNC: 60 MG/DL (ref 40–59)
LDLC SERPL CALC-MCNC: 45 MG/DL (ref ?–100)
NONHDLC SERPL-MCNC: 63 MG/DL (ref ?–130)
TRIGL SERPL-MCNC: 93 MG/DL (ref 30–149)
TSI SER-ACNC: 0.97 MIU/ML (ref 0.36–3.74)
VLDLC SERPL CALC-MCNC: 13 MG/DL (ref 0–30)

## 2022-09-27 PROCEDURE — 36415 COLL VENOUS BLD VENIPUNCTURE: CPT

## 2022-09-27 PROCEDURE — 80061 LIPID PANEL: CPT

## 2022-09-27 PROCEDURE — 84443 ASSAY THYROID STIM HORMONE: CPT

## 2022-09-27 PROCEDURE — 84460 ALANINE AMINO (ALT) (SGPT): CPT

## 2022-09-27 PROCEDURE — 84450 TRANSFERASE (AST) (SGOT): CPT

## 2022-10-03 RX ORDER — ALBUTEROL SULFATE 90 UG/1
2 AEROSOL, METERED RESPIRATORY (INHALATION) EVERY 6 HOURS PRN
Qty: 6.7 G | Refills: 3 | OUTPATIENT
Start: 2022-10-03

## 2022-10-12 ENCOUNTER — OFFICE VISIT (OUTPATIENT)
Dept: INTERNAL MEDICINE CLINIC | Facility: CLINIC | Age: 67
End: 2022-10-12
Payer: MEDICARE

## 2022-10-12 ENCOUNTER — TELEPHONE (OUTPATIENT)
Dept: OBGYN CLINIC | Facility: CLINIC | Age: 67
End: 2022-10-12

## 2022-10-12 ENCOUNTER — LAB ENCOUNTER (OUTPATIENT)
Dept: LAB | Age: 67
End: 2022-10-12
Attending: INTERNAL MEDICINE
Payer: MEDICARE

## 2022-10-12 VITALS
BODY MASS INDEX: 27.64 KG/M2 | HEART RATE: 71 BPM | WEIGHT: 146.38 LBS | SYSTOLIC BLOOD PRESSURE: 125 MMHG | DIASTOLIC BLOOD PRESSURE: 83 MMHG | HEIGHT: 61 IN

## 2022-10-12 DIAGNOSIS — F41.8 DEPRESSION WITH ANXIETY: ICD-10-CM

## 2022-10-12 DIAGNOSIS — Z00.00 ENCOUNTER FOR ANNUAL HEALTH EXAMINATION: ICD-10-CM

## 2022-10-12 DIAGNOSIS — Z12.11 COLON CANCER SCREENING: ICD-10-CM

## 2022-10-12 DIAGNOSIS — Z00.00 ENCOUNTER FOR ANNUAL HEALTH EXAMINATION: Primary | ICD-10-CM

## 2022-10-12 DIAGNOSIS — I10 ESSENTIAL HYPERTENSION: Chronic | ICD-10-CM

## 2022-10-12 DIAGNOSIS — E78.49 OTHER HYPERLIPIDEMIA: ICD-10-CM

## 2022-10-12 DIAGNOSIS — J43.2 CENTRILOBULAR EMPHYSEMA (HCC): ICD-10-CM

## 2022-10-12 DIAGNOSIS — N63.25 BREAST LUMP ON LEFT SIDE AT 3 O'CLOCK POSITION: ICD-10-CM

## 2022-10-12 PROBLEM — Z01.818 PRE-OP TESTING: Status: RESOLVED | Noted: 2022-04-15 | Resolved: 2022-10-12

## 2022-10-12 LAB
ALBUMIN SERPL-MCNC: 4.2 G/DL (ref 3.4–5)
ALBUMIN/GLOB SERPL: 1.4 {RATIO} (ref 1–2)
ALP LIVER SERPL-CCNC: 77 U/L
ALT SERPL-CCNC: 21 U/L
ANION GAP SERPL CALC-SCNC: 10 MMOL/L (ref 0–18)
AST SERPL-CCNC: 17 U/L (ref 15–37)
BASOPHILS # BLD AUTO: 0.04 X10(3) UL (ref 0–0.2)
BASOPHILS NFR BLD AUTO: 0.6 %
BILIRUB SERPL-MCNC: 0.7 MG/DL (ref 0.1–2)
BUN BLD-MCNC: 10 MG/DL (ref 7–18)
BUN/CREAT SERPL: 17.2 (ref 10–20)
CALCIUM BLD-MCNC: 9.1 MG/DL (ref 8.5–10.1)
CHLORIDE SERPL-SCNC: 98 MMOL/L (ref 98–112)
CO2 SERPL-SCNC: 26 MMOL/L (ref 21–32)
CREAT BLD-MCNC: 0.58 MG/DL
CREAT UR-SCNC: 14.1 MG/DL
DEPRECATED RDW RBC AUTO: 49.1 FL (ref 35.1–46.3)
EOSINOPHIL # BLD AUTO: 0.11 X10(3) UL (ref 0–0.7)
EOSINOPHIL NFR BLD AUTO: 1.8 %
ERYTHROCYTE [DISTWIDTH] IN BLOOD BY AUTOMATED COUNT: 14.5 % (ref 11–15)
FASTING STATUS PATIENT QL REPORTED: NO
GFR SERPLBLD BASED ON 1.73 SQ M-ARVRAT: 98 ML/MIN/1.73M2 (ref 60–?)
GLOBULIN PLAS-MCNC: 3.1 G/DL (ref 2.8–4.4)
GLUCOSE BLD-MCNC: 87 MG/DL (ref 70–99)
HCT VFR BLD AUTO: 37.5 %
HGB BLD-MCNC: 12 G/DL
IMM GRANULOCYTES # BLD AUTO: 0.01 X10(3) UL (ref 0–1)
IMM GRANULOCYTES NFR BLD: 0.2 %
LYMPHOCYTES # BLD AUTO: 2.04 X10(3) UL (ref 1–4)
LYMPHOCYTES NFR BLD AUTO: 32.7 %
MCH RBC QN AUTO: 29.5 PG (ref 26–34)
MCHC RBC AUTO-ENTMCNC: 32 G/DL (ref 31–37)
MCV RBC AUTO: 92.1 FL
MICROALBUMIN UR-MCNC: <0.5 MG/DL
MONOCYTES # BLD AUTO: 0.45 X10(3) UL (ref 0.1–1)
MONOCYTES NFR BLD AUTO: 7.2 %
NEUTROPHILS # BLD AUTO: 3.59 X10 (3) UL (ref 1.5–7.7)
NEUTROPHILS # BLD AUTO: 3.59 X10(3) UL (ref 1.5–7.7)
NEUTROPHILS NFR BLD AUTO: 57.5 %
OSMOLALITY SERPL CALC.SUM OF ELEC: 276 MOSM/KG (ref 275–295)
PLATELET # BLD AUTO: 278 10(3)UL (ref 150–450)
POTASSIUM SERPL-SCNC: 4 MMOL/L (ref 3.5–5.1)
PROT SERPL-MCNC: 7.3 G/DL (ref 6.4–8.2)
RBC # BLD AUTO: 4.07 X10(6)UL
SODIUM SERPL-SCNC: 134 MMOL/L (ref 136–145)
WBC # BLD AUTO: 6.2 X10(3) UL (ref 4–11)

## 2022-10-12 PROCEDURE — 80053 COMPREHEN METABOLIC PANEL: CPT

## 2022-10-12 PROCEDURE — 82043 UR ALBUMIN QUANTITATIVE: CPT

## 2022-10-12 PROCEDURE — 36415 COLL VENOUS BLD VENIPUNCTURE: CPT

## 2022-10-12 PROCEDURE — 85025 COMPLETE CBC W/AUTO DIFF WBC: CPT

## 2022-10-12 PROCEDURE — 82570 ASSAY OF URINE CREATININE: CPT

## 2022-10-12 PROCEDURE — G0439 PPPS, SUBSEQ VISIT: HCPCS | Performed by: INTERNAL MEDICINE

## 2022-10-12 NOTE — TELEPHONE ENCOUNTER
Pt calling to report that she would like to speak with JLK directly regarding her breast concerns. Pt did not go into much detail but stated that she wants to know JLKs opinion before she talks to Dr Rome Santana if her next mammo should be done in 6 months or sooner. Pt also stated that there was a new lump noted \"down near her rib\" as well that she would like to discuss with JLK. Message to Radha Mitchell to please call pt.

## 2022-10-17 NOTE — TELEPHONE ENCOUNTER
Spoke with pt. Called home phone. States cell phone is broken. Has been having breast pain for a week. Has increased caffeine. Seen Dr Mercado Apo 9/2022. Unable to do ultrasound guided biopsy of lymph node. Has repeat imaging on 2/2023.   Pt will decrease caffeine, take motrin, and monitor

## 2022-10-26 ENCOUNTER — TELEPHONE (OUTPATIENT)
Dept: RHEUMATOLOGY | Facility: CLINIC | Age: 67
End: 2022-10-26

## 2022-10-26 RX ORDER — METHYLPREDNISOLONE 4 MG/1
TABLET ORAL
Qty: 1 EACH | Refills: 0 | Status: SHIPPED | OUTPATIENT
Start: 2022-10-26

## 2022-10-26 NOTE — TELEPHONE ENCOUNTER
Patient is having a lot of pain on her back, and hips due to starting a new job and the weather. Patient would like to know if a steroid would be a good option for her to help with the pain. Patient starts work a 3:30 and would like a call back at her home phone number with a suggestion. Please advise.

## 2022-10-27 NOTE — TELEPHONE ENCOUNTER
Pt contacted and aware Medrol [facility]ose[facility] sent the pharmacy. Directions reviewed and patient verbalized understanding.

## 2022-11-04 ENCOUNTER — MED REC SCAN ONLY (OUTPATIENT)
Dept: INTERNAL MEDICINE CLINIC | Facility: CLINIC | Age: 67
End: 2022-11-04

## 2022-11-28 RX ORDER — ACETAMINOPHEN AND CODEINE PHOSPHATE 300; 30 MG/1; MG/1
1 TABLET ORAL EVERY 8 HOURS PRN
Qty: 90 TABLET | Refills: 1 | Status: SHIPPED | OUTPATIENT
Start: 2022-11-28

## 2022-12-05 ENCOUNTER — MOBILE ENCOUNTER (OUTPATIENT)
Dept: INTERNAL MEDICINE CLINIC | Facility: CLINIC | Age: 67
End: 2022-12-05

## 2022-12-05 DIAGNOSIS — R68.89 FLU-LIKE SYMPTOMS: Primary | ICD-10-CM

## 2022-12-06 ENCOUNTER — VIRTUAL PHONE E/M (OUTPATIENT)
Dept: INTERNAL MEDICINE CLINIC | Facility: CLINIC | Age: 67
End: 2022-12-06
Payer: MEDICARE

## 2022-12-06 ENCOUNTER — LAB ENCOUNTER (OUTPATIENT)
Dept: LAB | Age: 67
End: 2022-12-06
Attending: PHYSICIAN ASSISTANT
Payer: MEDICARE

## 2022-12-06 DIAGNOSIS — R68.89 FLU-LIKE SYMPTOMS: ICD-10-CM

## 2022-12-06 DIAGNOSIS — R05.1 ACUTE COUGH: ICD-10-CM

## 2022-12-06 DIAGNOSIS — J43.2 CENTRILOBULAR EMPHYSEMA (HCC): Primary | ICD-10-CM

## 2022-12-06 PROCEDURE — 99442 PHONE E/M BY PHYS 11-20 MIN: CPT | Performed by: INTERNAL MEDICINE

## 2022-12-06 PROCEDURE — 87637 SARSCOV2&INF A&B&RSV AMP PRB: CPT

## 2022-12-06 RX ORDER — LEVOFLOXACIN 500 MG/1
500 TABLET, FILM COATED ORAL DAILY
Qty: 7 TABLET | Refills: 0 | Status: SHIPPED | OUTPATIENT
Start: 2022-12-06 | End: 2022-12-13

## 2022-12-06 NOTE — PROGRESS NOTES
Patient paged on call provider with flu like sx of cough,congestion, body aches, diarrhea, headaches. Has tested for covid/flu/rsv. Testing ordered.  otc management advised, red flag sx discussed

## 2022-12-09 LAB
FLUAV + FLUBV RNA SPEC NAA+PROBE: NOT DETECTED
FLUAV + FLUBV RNA SPEC NAA+PROBE: NOT DETECTED
RSV RNA SPEC NAA+PROBE: NOT DETECTED
SARS-COV-2 RNA RESP QL NAA+PROBE: NOT DETECTED

## 2022-12-12 ENCOUNTER — TELEPHONE (OUTPATIENT)
Dept: OBGYN CLINIC | Facility: CLINIC | Age: 67
End: 2022-12-12

## 2022-12-12 NOTE — TELEPHONE ENCOUNTER
Pt calling about left breast lump. She is a tiny lump that is burning now. Pt is having it watched by Dr. Anurag Stewart, but doesn't have diagnostic until February of 2023.     Pls advise

## 2022-12-12 NOTE — TELEPHONE ENCOUNTER
Pt notified to reach out to Dr. Cynthia Keys office. They may want to see her in office or try to move imaging up sooner. Patient verbalized understanding. Provided pt with Dr. Cynthia Keys office phone.

## 2022-12-13 ENCOUNTER — HOSPITAL ENCOUNTER (OUTPATIENT)
Dept: ULTRASOUND IMAGING | Facility: HOSPITAL | Age: 67
Discharge: HOME | End: 2022-12-13
Attending: OBSTETRICS & GYNECOLOGY
Payer: MEDICARE

## 2022-12-13 ENCOUNTER — HOSPITAL ENCOUNTER (OUTPATIENT)
Dept: MAMMOGRAPHY | Facility: HOSPITAL | Age: 67
Discharge: HOME | End: 2022-12-13
Payer: MEDICARE

## 2022-12-13 DIAGNOSIS — R59.0 AXILLARY ADENOPATHY: ICD-10-CM

## 2022-12-13 DIAGNOSIS — R59.9 ADENOPATHY: ICD-10-CM

## 2022-12-13 PROCEDURE — 76642 ULTRASOUND BREAST LIMITED: CPT | Performed by: OBSTETRICS & GYNECOLOGY

## 2022-12-13 PROCEDURE — 77066 DX MAMMO INCL CAD BI: CPT

## 2022-12-13 PROCEDURE — 77062 BREAST TOMOSYNTHESIS BI: CPT

## 2023-01-17 ENCOUNTER — TELEPHONE (OUTPATIENT)
Dept: PULMONOLOGY | Facility: CLINIC | Age: 68
End: 2023-01-17

## 2023-01-17 ENCOUNTER — HOSPITAL ENCOUNTER (OUTPATIENT)
Age: 68
Discharge: HOME | End: 2023-01-17
Payer: MEDICARE

## 2023-01-17 LAB — SARS-COV-2 RNA RESP QL NAA+PROBE: NOT DETECTED

## 2023-01-17 NOTE — TELEPHONE ENCOUNTER
Spoke with patient and informed of Dr. Winifred Fournier message below. Patient verbalized understanding.

## 2023-01-17 NOTE — ED INITIAL ASSESSMENT (HPI)
Patient presents for asymptomatic covid testing. Denies any complaints.  States needs test due to request of PMD.

## 2023-01-18 ENCOUNTER — OFFICE VISIT (OUTPATIENT)
Dept: RHEUMATOLOGY | Facility: CLINIC | Age: 68
End: 2023-01-18

## 2023-01-18 VITALS
DIASTOLIC BLOOD PRESSURE: 64 MMHG | HEIGHT: 61 IN | SYSTOLIC BLOOD PRESSURE: 96 MMHG | HEART RATE: 66 BPM | RESPIRATION RATE: 16 BRPM | WEIGHT: 140.38 LBS | BODY MASS INDEX: 26.51 KG/M2

## 2023-01-18 DIAGNOSIS — M54.50 ACUTE LEFT-SIDED LOW BACK PAIN WITHOUT SCIATICA: ICD-10-CM

## 2023-01-18 DIAGNOSIS — M81.0 AGE-RELATED OSTEOPOROSIS WITHOUT CURRENT PATHOLOGICAL FRACTURE: ICD-10-CM

## 2023-01-18 DIAGNOSIS — M25.50 HYPERMOBILITY ARTHRALGIA: ICD-10-CM

## 2023-01-18 DIAGNOSIS — M13.0 POLYARTHRITIS: ICD-10-CM

## 2023-01-18 DIAGNOSIS — M15.9 GENERALIZED OA: Primary | ICD-10-CM

## 2023-01-18 PROCEDURE — 96372 THER/PROPH/DIAG INJ SC/IM: CPT | Performed by: INTERNAL MEDICINE

## 2023-01-18 PROCEDURE — 99214 OFFICE O/P EST MOD 30 MIN: CPT | Performed by: INTERNAL MEDICINE

## 2023-01-18 RX ORDER — METHYLPREDNISOLONE 4 MG/1
TABLET ORAL
Qty: 1 EACH | Refills: 0 | Status: SHIPPED | OUTPATIENT
Start: 2023-01-18

## 2023-01-18 RX ORDER — IBUPROFEN 600 MG/1
600 TABLET ORAL EVERY 12 HOURS PRN
Qty: 60 TABLET | Refills: 1 | Status: SHIPPED | OUTPATIENT
Start: 2023-01-18

## 2023-01-18 RX ORDER — ACETAMINOPHEN AND CODEINE PHOSPHATE 300; 30 MG/1; MG/1
1 TABLET ORAL EVERY 8 HOURS PRN
Qty: 90 TABLET | Refills: 3 | Status: SHIPPED | OUTPATIENT
Start: 2023-01-27

## 2023-01-18 RX ORDER — KETOROLAC TROMETHAMINE 30 MG/ML
30 INJECTION, SOLUTION INTRAMUSCULAR; INTRAVENOUS ONCE
Status: COMPLETED | OUTPATIENT
Start: 2023-01-18 | End: 2023-01-18

## 2023-01-18 RX ADMIN — KETOROLAC TROMETHAMINE 30 MG: 30 INJECTION, SOLUTION INTRAMUSCULAR; INTRAVENOUS at 11:15:00

## 2023-01-18 NOTE — PATIENT INSTRUCTIONS
1. iburprofen 600mg twice a day as needed - start tomorrow. 2. Tylenol #3 -  3. Cont. hydroxychlrqouine 200mg ad ay   4. Cont. alendroante 70mg a week for preventing fractures -   5. Medrol luis manuel for lower back pain   6. ketorlac shot im x 1 today   7.  Return to clinic in 3 months

## 2023-01-28 ENCOUNTER — NURSE TRIAGE (OUTPATIENT)
Dept: INTERNAL MEDICINE CLINIC | Facility: CLINIC | Age: 68
End: 2023-01-28

## 2023-01-30 RX ORDER — ACETAMINOPHEN AND CODEINE PHOSPHATE 300; 30 MG/1; MG/1
TABLET ORAL
Qty: 90 TABLET | Refills: 0 | OUTPATIENT
Start: 2023-01-30

## 2023-02-27 ENCOUNTER — NURSE TRIAGE (OUTPATIENT)
Dept: INTERNAL MEDICINE CLINIC | Facility: CLINIC | Age: 68
End: 2023-02-27

## 2023-03-02 ENCOUNTER — OFFICE VISIT (OUTPATIENT)
Dept: INTERNAL MEDICINE CLINIC | Facility: CLINIC | Age: 68
End: 2023-03-02

## 2023-03-02 ENCOUNTER — TELEPHONE (OUTPATIENT)
Dept: DERMATOLOGY CLINIC | Facility: CLINIC | Age: 68
End: 2023-03-02

## 2023-03-02 VITALS
DIASTOLIC BLOOD PRESSURE: 80 MMHG | HEIGHT: 61 IN | WEIGHT: 142.63 LBS | BODY MASS INDEX: 26.93 KG/M2 | SYSTOLIC BLOOD PRESSURE: 120 MMHG | HEART RATE: 67 BPM | OXYGEN SATURATION: 96 %

## 2023-03-02 DIAGNOSIS — M79.605 PAIN OF LEFT LOWER EXTREMITY: Primary | ICD-10-CM

## 2023-03-02 PROCEDURE — 99213 OFFICE O/P EST LOW 20 MIN: CPT | Performed by: NURSE PRACTITIONER

## 2023-03-02 NOTE — ASSESSMENT & PLAN NOTE
Left leg pain and swelling. Patient has superficial varicose veins. No warmth or erythema. Patient wants to be checked for a blood clot.     Plan  Ultrasound of left leg stat

## 2023-03-09 ENCOUNTER — HOSPITAL ENCOUNTER (OUTPATIENT)
Dept: ULTRASOUND IMAGING | Facility: HOSPITAL | Age: 68
Discharge: HOME | End: 2023-03-09
Attending: NURSE PRACTITIONER
Payer: MEDICARE

## 2023-03-09 DIAGNOSIS — M79.605 PAIN OF LEFT LOWER EXTREMITY: ICD-10-CM

## 2023-03-09 PROCEDURE — 93971 EXTREMITY STUDY: CPT | Performed by: NURSE PRACTITIONER

## 2023-03-13 ENCOUNTER — NURSE TRIAGE (OUTPATIENT)
Dept: INTERNAL MEDICINE CLINIC | Facility: CLINIC | Age: 68
End: 2023-03-13

## 2023-03-13 NOTE — TELEPHONE ENCOUNTER
Patient called to report discoloration on nailbeds of left hand, near cuticle. This was noticed about 2 days ago, noticed blue hue on nailbed. Symptoms went away. Today no longer has blue hue on nailbed. Denied pain in hand. No other symptoms; no injuries. Hx of Raynauds; discussed symptom can be due to Raynauds. Advised to keep hands warm. Continue to monitor. IF notices any worsening changes in color, or severe pain, call back. Should be evaluated by physician. Patient verbalized understanding.

## 2023-03-15 ENCOUNTER — TELEPHONE (OUTPATIENT)
Dept: INTERNAL MEDICINE CLINIC | Facility: CLINIC | Age: 68
End: 2023-03-15

## 2023-03-15 ENCOUNTER — HOSPITAL ENCOUNTER (OUTPATIENT)
Age: 68
Discharge: HOME | End: 2023-03-15
Payer: MEDICARE

## 2023-03-15 ENCOUNTER — NURSE TRIAGE (OUTPATIENT)
Dept: INTERNAL MEDICINE CLINIC | Facility: CLINIC | Age: 68
End: 2023-03-15

## 2023-03-15 ENCOUNTER — APPOINTMENT (OUTPATIENT)
Dept: GENERAL RADIOLOGY | Age: 68
End: 2023-03-15
Attending: NURSE PRACTITIONER
Payer: MEDICARE

## 2023-03-15 VITALS
OXYGEN SATURATION: 97 % | SYSTOLIC BLOOD PRESSURE: 138 MMHG | HEART RATE: 82 BPM | RESPIRATION RATE: 20 BRPM | DIASTOLIC BLOOD PRESSURE: 58 MMHG | TEMPERATURE: 98 F

## 2023-03-15 DIAGNOSIS — J02.0 STREP PHARYNGITIS: Primary | ICD-10-CM

## 2023-03-15 LAB
S PYO AG THROAT QL IA.RAPID: POSITIVE
SARS-COV-2 RNA RESP QL NAA+PROBE: NOT DETECTED

## 2023-03-15 PROCEDURE — 71046 X-RAY EXAM CHEST 2 VIEWS: CPT | Performed by: NURSE PRACTITIONER

## 2023-03-15 PROCEDURE — 87651 STREP A DNA AMP PROBE: CPT | Performed by: NURSE PRACTITIONER

## 2023-03-15 PROCEDURE — 99214 OFFICE O/P EST MOD 30 MIN: CPT

## 2023-03-15 RX ORDER — IBUPROFEN 600 MG/1
600 TABLET ORAL EVERY 6 HOURS PRN
Qty: 30 TABLET | Refills: 0 | Status: SHIPPED | OUTPATIENT
Start: 2023-03-15

## 2023-03-15 RX ORDER — AMOXICILLIN 500 MG/1
500 TABLET, FILM COATED ORAL 2 TIMES DAILY
Qty: 20 TABLET | Refills: 0 | Status: SHIPPED | OUTPATIENT
Start: 2023-03-15 | End: 2023-03-25

## 2023-03-15 RX ORDER — BENZONATATE 100 MG/1
100 CAPSULE ORAL 3 TIMES DAILY PRN
Qty: 30 CAPSULE | Refills: 0 | Status: SHIPPED | OUTPATIENT
Start: 2023-03-15 | End: 2023-04-14

## 2023-03-15 RX ORDER — FLUTICASONE PROPIONATE 50 MCG
2 SPRAY, SUSPENSION (ML) NASAL DAILY
Qty: 16 G | Refills: 0 | Status: SHIPPED | OUTPATIENT
Start: 2023-03-15

## 2023-03-15 NOTE — ED INITIAL ASSESSMENT (HPI)
Presents with 4 days of headache, congestion, cough, and sore throat. C/o back pain and fatigue. States she slept most of the day. Denies chest pain.

## 2023-03-16 NOTE — PROGRESS NOTES
On Call    Patient was called and she was in route to the  for SOB. Reviewed UC notes.     CARLEE Moses  Working with Dr. Morejon Erps

## 2023-03-16 NOTE — TELEPHONE ENCOUNTER
Name:MARIXA                      3/16/2023 8:01:27 AM  ProfileId:   [id number]                   PagerID       [phone]  Department:[place] [facility]  ======================================================================    Message # [id number]         03/15/2023 06:01p   [RAMAN]  To:  From:  CONSOLE  Primary MD:  Phone#:  ----------------------------------------------------------------------  PT Halle Calle  1955, HARD TIME BREATHING, AND LIGHT HEADED,  [phone]  Paged at number :  Theodoro Laser: [phone] at : [facility]-  18:01

## 2023-03-16 NOTE — TELEPHONE ENCOUNTER
Spoke with patient last night and she was on her way to urgent care.     Jonelle Jeffries, ANP  Working with Ana Jackman

## 2023-03-16 NOTE — DISCHARGE INSTRUCTIONS
Increase water intake, drink warm tea/water, throat lozenges to help with throat pain. Take ibuprofen 600mg every 6 hours for pain and swelling   AND/OR  Take tylenol 650-1000mg every 6 hours for fever, chills, bodyaches. Take amoxicillin 500mg two times a day for 10 days, finish full course! Change toothbrush in 24 hours. Avoid sharing utensils, cups, foods with others. Avoid kissing. Take tessalon perles for cough three times a day  Flonase nasal spray to each nostril daily      RETURN OR GO TO ED for fever > 103 despite medication, difficulty swallowing saliva, shortness of breath, worsening swelling to throat that you cannot tolerate fluids.

## 2023-03-29 ENCOUNTER — TELEPHONE (OUTPATIENT)
Dept: INTERNAL MEDICINE CLINIC | Facility: CLINIC | Age: 68
End: 2023-03-29

## 2023-03-29 NOTE — TELEPHONE ENCOUNTER
Patient states she's been having increased swelling to lower bilateral legs - \"my veins are enlarged with the left side worse with swelling and becomes painful as the day goes on. \"    Patient scheduled with Dr. Madhavi Martinez 3/30 at 2:30 pm.

## 2023-04-12 ENCOUNTER — OFFICE VISIT (OUTPATIENT)
Dept: INTERNAL MEDICINE CLINIC | Facility: CLINIC | Age: 68
End: 2023-04-12

## 2023-04-12 VITALS
SYSTOLIC BLOOD PRESSURE: 136 MMHG | WEIGHT: 144 LBS | HEART RATE: 66 BPM | OXYGEN SATURATION: 96 % | DIASTOLIC BLOOD PRESSURE: 64 MMHG | BODY MASS INDEX: 27.19 KG/M2 | HEIGHT: 61 IN

## 2023-04-12 DIAGNOSIS — I10 ESSENTIAL HYPERTENSION: Chronic | ICD-10-CM

## 2023-04-12 DIAGNOSIS — F41.8 DEPRESSION WITH ANXIETY: ICD-10-CM

## 2023-04-12 DIAGNOSIS — I83.90 VARICOSE VEIN: Primary | ICD-10-CM

## 2023-04-12 PROCEDURE — 99214 OFFICE O/P EST MOD 30 MIN: CPT | Performed by: INTERNAL MEDICINE

## 2023-04-12 RX ORDER — ALENDRONATE SODIUM 70 MG/1
TABLET ORAL
COMMUNITY
Start: 2023-02-24 | End: 2023-04-12

## 2023-04-12 RX ORDER — ALBUTEROL SULFATE 2.5 MG/3ML
2.5 SOLUTION RESPIRATORY (INHALATION) EVERY 6 HOURS PRN
Qty: 90 EACH | Refills: 0 | Status: CANCELLED | OUTPATIENT
Start: 2023-04-12

## 2023-04-12 RX ORDER — ALBUTEROL SULFATE 90 UG/1
2 AEROSOL, METERED RESPIRATORY (INHALATION) EVERY 6 HOURS PRN
Qty: 3 EACH | Refills: 0 | Status: CANCELLED | OUTPATIENT
Start: 2023-04-12

## 2023-04-13 ENCOUNTER — TELEPHONE (OUTPATIENT)
Dept: INTERNAL MEDICINE CLINIC | Facility: CLINIC | Age: 68
End: 2023-04-13

## 2023-04-13 NOTE — TELEPHONE ENCOUNTER
Pt would like to know if the doctor can give her a note to restrict her from heavy lifting and bending at work. Per the patient she saw the doctor yesterday and was told that she has a bruised back. Pt would like to  the letter when ready. Please, call the patient with any questions. Pt would like to know if the note can be done today or tomorrow.

## 2023-04-13 NOTE — TELEPHONE ENCOUNTER
Letter written for restrictions for 1 week. If she requires longer duration then she will have to make an appointment.

## 2023-04-14 NOTE — TELEPHONE ENCOUNTER
Spoke to patient. Form is ready for  at  [place] office. Informed her if she requires a longer duration Dr Steve Be would like her to schedule an appt. Patient verbalized understanding.

## 2023-04-18 ENCOUNTER — TELEPHONE (OUTPATIENT)
Dept: DERMATOLOGY CLINIC | Facility: CLINIC | Age: 68
End: 2023-04-18

## 2023-04-18 NOTE — TELEPHONE ENCOUNTER
Pt advised she will need a f/u appt with [facility] to address toenail fungus since she hasn't been seen for this since 2021. Pt scheduled for 4/19/23.

## 2023-04-19 ENCOUNTER — OFFICE VISIT (OUTPATIENT)
Dept: RHEUMATOLOGY | Facility: CLINIC | Age: 68
End: 2023-04-19

## 2023-04-19 VITALS
RESPIRATION RATE: 16 BRPM | HEIGHT: 61 IN | WEIGHT: 144 LBS | BODY MASS INDEX: 27.19 KG/M2 | HEART RATE: 68 BPM | SYSTOLIC BLOOD PRESSURE: 135 MMHG | DIASTOLIC BLOOD PRESSURE: 68 MMHG

## 2023-04-19 DIAGNOSIS — M15.9 GENERALIZED OA: Primary | ICD-10-CM

## 2023-04-19 DIAGNOSIS — M54.50 ACUTE RIGHT-SIDED LOW BACK PAIN WITHOUT SCIATICA: ICD-10-CM

## 2023-04-19 DIAGNOSIS — M25.50 HYPERMOBILITY ARTHRALGIA: ICD-10-CM

## 2023-04-19 DIAGNOSIS — M81.0 AGE-RELATED OSTEOPOROSIS WITHOUT CURRENT PATHOLOGICAL FRACTURE: ICD-10-CM

## 2023-04-19 PROCEDURE — 99214 OFFICE O/P EST MOD 30 MIN: CPT | Performed by: INTERNAL MEDICINE

## 2023-04-19 RX ORDER — METHYLPREDNISOLONE 4 MG/1
TABLET ORAL
Qty: 1 EACH | Refills: 0 | Status: SHIPPED | OUTPATIENT
Start: 2023-04-19

## 2023-04-19 RX ORDER — ACETAMINOPHEN AND CODEINE PHOSPHATE 300; 30 MG/1; MG/1
1 TABLET ORAL EVERY 8 HOURS PRN
Qty: 90 TABLET | Refills: 3 | Status: SHIPPED | OUTPATIENT
Start: 2023-04-19

## 2023-04-19 NOTE — PATIENT INSTRUCTIONS
1. iburprofen 600mg twice a day as needed - for 2 weeks to help with the back -   2. Tylenol #3 -  3. Cont. hydroxychlrqouine 200mg ad ay   4. Cont. alendroante 70mg a week for preventing fractures -   5. Medrol luis manuel for lower back pain   6. Return to clinic in 6 months.    7. DEXA - any time in 5/1/2023 - or onward , can get with the mammogram -

## 2023-05-16 NOTE — TELEPHONE ENCOUNTER
She stts she is not seeing Dr. Rivas Bentley anymore b/c she was not happy w/ her. Pt stts the only reason she had to leave Dr. Nikos Whelan as primary was d/t insurance, but now she can see him again.  She stts she saw Dr. Nikos Whelan recently for diarrhea, still has diarrhe Subsequent Stages Histo Method Verbiage: Using a similar technique to that described above, a thin layer of tissue was removed from all areas where tumor was visible on the previous stage.  The tissue was again oriented, mapped, dyed, and processed as above.

## 2023-05-23 ENCOUNTER — NURSE TRIAGE (OUTPATIENT)
Dept: INTERNAL MEDICINE CLINIC | Facility: CLINIC | Age: 68
End: 2023-05-23

## 2023-05-24 ENCOUNTER — OFFICE VISIT (OUTPATIENT)
Dept: INTERNAL MEDICINE CLINIC | Facility: CLINIC | Age: 68
End: 2023-05-24

## 2023-05-24 ENCOUNTER — LAB ENCOUNTER (OUTPATIENT)
Dept: LAB | Age: 68
End: 2023-05-24
Attending: INTERNAL MEDICINE
Payer: MEDICARE

## 2023-05-24 VITALS
HEIGHT: 61 IN | BODY MASS INDEX: 26.81 KG/M2 | SYSTOLIC BLOOD PRESSURE: 116 MMHG | WEIGHT: 142 LBS | HEART RATE: 76 BPM | DIASTOLIC BLOOD PRESSURE: 64 MMHG | OXYGEN SATURATION: 95 %

## 2023-05-24 DIAGNOSIS — R68.89 FORGETFULNESS: Primary | ICD-10-CM

## 2023-05-24 DIAGNOSIS — R68.89 FORGETFULNESS: ICD-10-CM

## 2023-05-24 LAB
ALBUMIN SERPL-MCNC: 3.8 G/DL (ref 3.4–5)
ALBUMIN/GLOB SERPL: 1.2 {RATIO} (ref 1–2)
ALP LIVER SERPL-CCNC: 85 U/L
ALT SERPL-CCNC: 21 U/L
ANION GAP SERPL CALC-SCNC: 6 MMOL/L (ref 0–18)
AST SERPL-CCNC: 14 U/L (ref 15–37)
BASOPHILS # BLD AUTO: 0.04 X10(3) UL (ref 0–0.2)
BASOPHILS NFR BLD AUTO: 0.9 %
BILIRUB SERPL-MCNC: 0.7 MG/DL (ref 0.1–2)
BILIRUB UR QL: NEGATIVE
BUN BLD-MCNC: 8 MG/DL (ref 7–18)
BUN/CREAT SERPL: 12.9 (ref 10–20)
CALCIUM BLD-MCNC: 9.4 MG/DL (ref 8.5–10.1)
CHLORIDE SERPL-SCNC: 105 MMOL/L (ref 98–112)
CLARITY UR: CLEAR
CO2 SERPL-SCNC: 25 MMOL/L (ref 21–32)
COLOR UR: COLORLESS
CREAT BLD-MCNC: 0.62 MG/DL
DEPRECATED RDW RBC AUTO: 44.5 FL (ref 35.1–46.3)
EOSINOPHIL # BLD AUTO: 0.15 X10(3) UL (ref 0–0.7)
EOSINOPHIL NFR BLD AUTO: 3.2 %
ERYTHROCYTE [DISTWIDTH] IN BLOOD BY AUTOMATED COUNT: 13.2 % (ref 11–15)
FASTING STATUS PATIENT QL REPORTED: YES
GFR SERPLBLD BASED ON 1.73 SQ M-ARVRAT: 96 ML/MIN/1.73M2 (ref 60–?)
GLOBULIN PLAS-MCNC: 3.2 G/DL (ref 2.8–4.4)
GLUCOSE BLD-MCNC: 89 MG/DL (ref 70–99)
GLUCOSE UR-MCNC: NORMAL MG/DL
HCT VFR BLD AUTO: 39.2 %
HGB BLD-MCNC: 12.9 G/DL
HGB UR QL STRIP.AUTO: NEGATIVE
IMM GRANULOCYTES # BLD AUTO: 0.01 X10(3) UL (ref 0–1)
IMM GRANULOCYTES NFR BLD: 0.2 %
KETONES UR-MCNC: NEGATIVE MG/DL
LEUKOCYTE ESTERASE UR QL STRIP.AUTO: NEGATIVE
LYMPHOCYTES # BLD AUTO: 2.24 X10(3) UL (ref 1–4)
LYMPHOCYTES NFR BLD AUTO: 47.7 %
MCH RBC QN AUTO: 29.9 PG (ref 26–34)
MCHC RBC AUTO-ENTMCNC: 32.9 G/DL (ref 31–37)
MCV RBC AUTO: 91 FL
MONOCYTES # BLD AUTO: 0.39 X10(3) UL (ref 0.1–1)
MONOCYTES NFR BLD AUTO: 8.3 %
NEUTROPHILS # BLD AUTO: 1.87 X10 (3) UL (ref 1.5–7.7)
NEUTROPHILS # BLD AUTO: 1.87 X10(3) UL (ref 1.5–7.7)
NEUTROPHILS NFR BLD AUTO: 39.7 %
NITRITE UR QL STRIP.AUTO: NEGATIVE
OSMOLALITY SERPL CALC.SUM OF ELEC: 280 MOSM/KG (ref 275–295)
PH UR: 5.5 [PH] (ref 5–8)
PLATELET # BLD AUTO: 288 10(3)UL (ref 150–450)
POTASSIUM SERPL-SCNC: 4.3 MMOL/L (ref 3.5–5.1)
PROT SERPL-MCNC: 7 G/DL (ref 6.4–8.2)
PROT UR-MCNC: NEGATIVE MG/DL
RBC # BLD AUTO: 4.31 X10(6)UL
SODIUM SERPL-SCNC: 136 MMOL/L (ref 136–145)
SP GR UR STRIP: 1.01 (ref 1–1.03)
UROBILINOGEN UR STRIP-ACNC: NORMAL
WBC # BLD AUTO: 4.7 X10(3) UL (ref 4–11)

## 2023-05-24 PROCEDURE — 87086 URINE CULTURE/COLONY COUNT: CPT

## 2023-05-24 PROCEDURE — 99214 OFFICE O/P EST MOD 30 MIN: CPT | Performed by: INTERNAL MEDICINE

## 2023-05-24 PROCEDURE — 85025 COMPLETE CBC W/AUTO DIFF WBC: CPT

## 2023-05-24 PROCEDURE — 80053 COMPREHEN METABOLIC PANEL: CPT

## 2023-05-24 PROCEDURE — 36415 COLL VENOUS BLD VENIPUNCTURE: CPT

## 2023-05-25 ENCOUNTER — TELEPHONE (OUTPATIENT)
Dept: OBGYN CLINIC | Facility: CLINIC | Age: 68
End: 2023-05-25

## 2023-05-25 NOTE — TELEPHONE ENCOUNTER
Pt complains of burning with urination x 1 week. I do see UA and culture collect by PCP- neg for UTI. She reports drinking 8c water per day. Advised to follow-up with Dr. Zamzam Rapp for results. Pt no showed appt with CHANG this week for L breast lump. Assisted to reschedule for 5/31, but advised to follow-up with Dr. Stefan Casas as well. She called with this same issue in Dec and never called Dr. Stefan Casas. Patient verbalized understanding.

## 2023-05-26 ENCOUNTER — TELEPHONE (OUTPATIENT)
Dept: OBGYN CLINIC | Facility: CLINIC | Age: 68
End: 2023-05-26

## 2023-05-26 NOTE — TELEPHONE ENCOUNTER
Pt has an appointment with Xenia Zacarias next week for breast lump, wondering if she needs to call Dr. Odalis Driver

## 2023-05-26 NOTE — TELEPHONE ENCOUNTER
Pt reports she noticed \"disfigurement\" around the nipple area and she feels that there is something underneath this area. Denies pain. Pt has an appt with CHANG on 5/31 and asking if she should see Julian Eubanks [phone] or Dr Lauran Opitz. Pt last saw Dr Lauran Opitz on 9/7/22 for f/u for left axillary discomfort and chronic adenopathy. Pt reports she does not have any pending f/u with Dr Lauran Opitz. To ZACKK to please advise if pt should keep appt with you or should schedule with Dr Lauran Opitz?

## 2023-05-30 NOTE — TELEPHONE ENCOUNTER
I don't know whether she can be seen by Dr Lacie Seo in next 2 days.   Since she already has appt with me, I can examine and see if she needs imaging prior to seeing Dr Lacie Seo

## 2023-05-30 NOTE — TELEPHONE ENCOUNTER
LM on private VM to keep appt with JLK tomorrow. Carr Sires will give further recs from there. Pt encouraged to call back with any questions or concerns.

## 2023-05-31 ENCOUNTER — OFFICE VISIT (OUTPATIENT)
Dept: OBGYN CLINIC | Facility: CLINIC | Age: 68
End: 2023-05-31

## 2023-05-31 VITALS
DIASTOLIC BLOOD PRESSURE: 73 MMHG | WEIGHT: 142.38 LBS | BODY MASS INDEX: 27 KG/M2 | SYSTOLIC BLOOD PRESSURE: 118 MMHG | HEART RATE: 67 BPM

## 2023-05-31 DIAGNOSIS — N64.4 MASTALGIA: Primary | ICD-10-CM

## 2023-05-31 PROCEDURE — 99213 OFFICE O/P EST LOW 20 MIN: CPT | Performed by: OBSTETRICS & GYNECOLOGY

## 2023-06-14 ENCOUNTER — TELEPHONE (OUTPATIENT)
Dept: INTERNAL MEDICINE CLINIC | Facility: CLINIC | Age: 68
End: 2023-06-14

## 2023-06-14 NOTE — TELEPHONE ENCOUNTER
Patient calling requesting DR. Guevara contact number     Provided information for  Nick Ozuna  at [phone]    Patient is appreciative

## 2023-06-21 ENCOUNTER — APPOINTMENT (OUTPATIENT)
Dept: CT IMAGING | Facility: HOSPITAL | Age: 68
End: 2023-06-21
Attending: EMERGENCY MEDICINE
Payer: MEDICARE

## 2023-06-21 ENCOUNTER — APPOINTMENT (OUTPATIENT)
Dept: CV DIAGNOSTICS | Facility: HOSPITAL | Age: 68
End: 2023-06-21
Attending: Other
Payer: MEDICARE

## 2023-06-21 ENCOUNTER — HOSPITAL ENCOUNTER (OUTPATIENT)
Dept: MAMMOGRAPHY | Facility: HOSPITAL | Age: 68
Discharge: HOME | End: 2023-06-21
Attending: OBSTETRICS & GYNECOLOGY
Payer: MEDICARE

## 2023-06-21 ENCOUNTER — APPOINTMENT (OUTPATIENT)
Dept: GENERAL RADIOLOGY | Facility: HOSPITAL | Age: 68
End: 2023-06-21
Attending: EMERGENCY MEDICINE
Payer: MEDICARE

## 2023-06-21 ENCOUNTER — HOSPITAL ENCOUNTER (OUTPATIENT)
Facility: HOSPITAL | Age: 68
Setting detail: OBSERVATION
Discharge: HOME | End: 2023-06-22
Attending: EMERGENCY MEDICINE | Admitting: HOSPITALIST
Payer: MEDICARE

## 2023-06-21 VITALS
RESPIRATION RATE: 18 BRPM | DIASTOLIC BLOOD PRESSURE: 92 MMHG | OXYGEN SATURATION: 97 % | HEART RATE: 75 BPM | SYSTOLIC BLOOD PRESSURE: 150 MMHG

## 2023-06-21 DIAGNOSIS — N64.4 MASTALGIA: ICD-10-CM

## 2023-06-21 DIAGNOSIS — R20.0 ARM NUMBNESS: Primary | ICD-10-CM

## 2023-06-21 LAB
ALBUMIN SERPL-MCNC: 3.7 G/DL (ref 3.4–5)
ALBUMIN/GLOB SERPL: 1.1 {RATIO} (ref 1–2)
ALP LIVER SERPL-CCNC: 84 U/L
ALT SERPL-CCNC: 22 U/L
ANION GAP SERPL CALC-SCNC: 6 MMOL/L (ref 0–18)
APTT PPP: 30 SECONDS (ref 23.3–35.6)
AST SERPL-CCNC: 19 U/L (ref 15–37)
ATRIAL RATE: 87 BPM
BASOPHILS # BLD AUTO: 0.04 X10(3) UL (ref 0–0.2)
BASOPHILS NFR BLD AUTO: 0.8 %
BILIRUB SERPL-MCNC: 0.4 MG/DL (ref 0.1–2)
BILIRUB UR QL: NEGATIVE
BUN BLD-MCNC: 8 MG/DL (ref 7–18)
BUN/CREAT SERPL: 14.3 (ref 10–20)
CALCIUM BLD-MCNC: 9 MG/DL (ref 8.5–10.1)
CHLORIDE SERPL-SCNC: 102 MMOL/L (ref 98–112)
CHOLEST SERPL-MCNC: 136 MG/DL (ref ?–200)
CLARITY UR: CLEAR
CO2 SERPL-SCNC: 29 MMOL/L (ref 21–32)
COLOR UR: COLORLESS
CREAT BLD-MCNC: 0.56 MG/DL
DEPRECATED RDW RBC AUTO: 43.1 FL (ref 35.1–46.3)
EOSINOPHIL # BLD AUTO: 0.09 X10(3) UL (ref 0–0.7)
EOSINOPHIL NFR BLD AUTO: 1.8 %
ERYTHROCYTE [DISTWIDTH] IN BLOOD BY AUTOMATED COUNT: 13 % (ref 11–15)
EST. AVERAGE GLUCOSE BLD GHB EST-MCNC: 117 MG/DL (ref 68–126)
GFR SERPLBLD BASED ON 1.73 SQ M-ARVRAT: 98 ML/MIN/1.73M2 (ref 60–?)
GLOBULIN PLAS-MCNC: 3.5 G/DL (ref 2.8–4.4)
GLUCOSE BLD-MCNC: 77 MG/DL (ref 70–99)
GLUCOSE BLDC GLUCOMTR-MCNC: 78 MG/DL (ref 70–99)
GLUCOSE BLDC GLUCOMTR-MCNC: 85 MG/DL (ref 70–99)
GLUCOSE BLDC GLUCOMTR-MCNC: 86 MG/DL (ref 70–99)
GLUCOSE UR-MCNC: NORMAL MG/DL
HBA1C MFR BLD: 5.7 % (ref ?–5.7)
HCT VFR BLD AUTO: 37.3 %
HDLC SERPL-MCNC: 81 MG/DL (ref 40–59)
HGB BLD-MCNC: 12.2 G/DL
HGB UR QL STRIP.AUTO: NEGATIVE
IMM GRANULOCYTES # BLD AUTO: 0.01 X10(3) UL (ref 0–1)
IMM GRANULOCYTES NFR BLD: 0.2 %
INR BLD: 0.97 (ref 0.85–1.16)
KETONES UR-MCNC: NEGATIVE MG/DL
LDLC SERPL CALC-MCNC: 42 MG/DL (ref ?–100)
LEUKOCYTE ESTERASE UR QL STRIP.AUTO: NEGATIVE
LYMPHOCYTES # BLD AUTO: 1.76 X10(3) UL (ref 1–4)
LYMPHOCYTES NFR BLD AUTO: 34.9 %
MCH RBC QN AUTO: 29.5 PG (ref 26–34)
MCHC RBC AUTO-ENTMCNC: 32.7 G/DL (ref 31–37)
MCV RBC AUTO: 90.1 FL
MONOCYTES # BLD AUTO: 0.46 X10(3) UL (ref 0.1–1)
MONOCYTES NFR BLD AUTO: 9.1 %
NEUTROPHILS # BLD AUTO: 2.68 X10 (3) UL (ref 1.5–7.7)
NEUTROPHILS # BLD AUTO: 2.68 X10(3) UL (ref 1.5–7.7)
NEUTROPHILS NFR BLD AUTO: 53.2 %
NITRITE UR QL STRIP.AUTO: NEGATIVE
NONHDLC SERPL-MCNC: 55 MG/DL (ref ?–130)
OSMOLALITY SERPL CALC.SUM OF ELEC: 281 MOSM/KG (ref 275–295)
P AXIS: 50 DEGREES
P-R INTERVAL: 156 MS
PH UR: 7 [PH] (ref 5–8)
PLATELET # BLD AUTO: 255 10(3)UL (ref 150–450)
POTASSIUM SERPL-SCNC: 4 MMOL/L (ref 3.5–5.1)
PROT SERPL-MCNC: 7.2 G/DL (ref 6.4–8.2)
PROT UR-MCNC: NEGATIVE MG/DL
PROTHROMBIN TIME: 12.8 SECONDS (ref 11.6–14.8)
Q-T INTERVAL: 352 MS
QRS DURATION: 74 MS
QTC CALCULATION (BEZET): 423 MS
R AXIS: 7 DEGREES
RBC # BLD AUTO: 4.14 X10(6)UL
SODIUM SERPL-SCNC: 137 MMOL/L (ref 136–145)
SP GR UR STRIP: 1.01 (ref 1–1.03)
T AXIS: 35 DEGREES
TRIGL SERPL-MCNC: 59 MG/DL (ref 30–149)
TROPONIN I HIGH SENSITIVITY: 6 NG/L
UROBILINOGEN UR STRIP-ACNC: NORMAL
VENTRICULAR RATE: 87 BPM
VLDLC SERPL CALC-MCNC: 8 MG/DL (ref 0–30)
WBC # BLD AUTO: 5 X10(3) UL (ref 4–11)

## 2023-06-21 PROCEDURE — 93306 TTE W/DOPPLER COMPLETE: CPT | Performed by: OTHER

## 2023-06-21 PROCEDURE — 70450 CT HEAD/BRAIN W/O DYE: CPT | Performed by: EMERGENCY MEDICINE

## 2023-06-21 PROCEDURE — 99223 1ST HOSP IP/OBS HIGH 75: CPT | Performed by: HOSPITALIST

## 2023-06-21 PROCEDURE — 70496 CT ANGIOGRAPHY HEAD: CPT | Performed by: EMERGENCY MEDICINE

## 2023-06-21 PROCEDURE — 70498 CT ANGIOGRAPHY NECK: CPT | Performed by: EMERGENCY MEDICINE

## 2023-06-21 PROCEDURE — 71045 X-RAY EXAM CHEST 1 VIEW: CPT | Performed by: EMERGENCY MEDICINE

## 2023-06-21 RX ORDER — ONDANSETRON 2 MG/ML
4 INJECTION INTRAMUSCULAR; INTRAVENOUS EVERY 6 HOURS PRN
Status: DISCONTINUED | OUTPATIENT
Start: 2023-06-21 | End: 2023-06-21

## 2023-06-21 RX ORDER — ASPIRIN 81 MG/1
81 TABLET, CHEWABLE ORAL DAILY
Status: DISCONTINUED | OUTPATIENT
Start: 2023-06-21 | End: 2023-06-22

## 2023-06-21 RX ORDER — ACETAMINOPHEN 650 MG/1
650 SUPPOSITORY RECTAL EVERY 4 HOURS PRN
Status: DISCONTINUED | OUTPATIENT
Start: 2023-06-21 | End: 2023-06-22

## 2023-06-21 RX ORDER — METOCLOPRAMIDE HYDROCHLORIDE 5 MG/ML
10 INJECTION INTRAMUSCULAR; INTRAVENOUS EVERY 8 HOURS PRN
Status: DISCONTINUED | OUTPATIENT
Start: 2023-06-21 | End: 2023-06-21

## 2023-06-21 RX ORDER — HEPARIN SODIUM 5000 [USP'U]/ML
5000 INJECTION, SOLUTION INTRAVENOUS; SUBCUTANEOUS EVERY 12 HOURS SCHEDULED
Status: DISCONTINUED | OUTPATIENT
Start: 2023-06-21 | End: 2023-06-22

## 2023-06-21 RX ORDER — ARIPIPRAZOLE 5 MG/1
5 TABLET ORAL NIGHTLY
Status: DISCONTINUED | OUTPATIENT
Start: 2023-06-21 | End: 2023-06-22

## 2023-06-21 RX ORDER — LABETALOL HYDROCHLORIDE 5 MG/ML
10 INJECTION, SOLUTION INTRAVENOUS EVERY 10 MIN PRN
Status: DISCONTINUED | OUTPATIENT
Start: 2023-06-21 | End: 2023-06-22

## 2023-06-21 RX ORDER — ATORVASTATIN CALCIUM 20 MG/1
20 TABLET, FILM COATED ORAL NIGHTLY
Status: DISCONTINUED | OUTPATIENT
Start: 2023-06-21 | End: 2023-06-22

## 2023-06-21 RX ORDER — ACETAMINOPHEN 500 MG
500 TABLET ORAL EVERY 4 HOURS PRN
Status: DISCONTINUED | OUTPATIENT
Start: 2023-06-21 | End: 2023-06-22

## 2023-06-21 RX ORDER — ATORVASTATIN CALCIUM 80 MG/1
80 TABLET, FILM COATED ORAL NIGHTLY
Status: DISCONTINUED | OUTPATIENT
Start: 2023-06-21 | End: 2023-06-22

## 2023-06-21 RX ORDER — HYDRALAZINE HYDROCHLORIDE 20 MG/ML
10 INJECTION INTRAMUSCULAR; INTRAVENOUS EVERY 2 HOUR PRN
Status: DISCONTINUED | OUTPATIENT
Start: 2023-06-21 | End: 2023-06-22

## 2023-06-21 RX ORDER — ALBUTEROL SULFATE 2.5 MG/3ML
2.5 SOLUTION RESPIRATORY (INHALATION) EVERY 6 HOURS PRN
Status: DISCONTINUED | OUTPATIENT
Start: 2023-06-21 | End: 2023-06-22

## 2023-06-21 RX ORDER — ONDANSETRON 2 MG/ML
4 INJECTION INTRAMUSCULAR; INTRAVENOUS EVERY 6 HOURS PRN
Status: DISCONTINUED | OUTPATIENT
Start: 2023-06-21 | End: 2023-06-22

## 2023-06-21 RX ORDER — TEMAZEPAM 15 MG/1
15 CAPSULE ORAL NIGHTLY PRN
Status: DISCONTINUED | OUTPATIENT
Start: 2023-06-21 | End: 2023-06-22

## 2023-06-21 RX ORDER — ACETAMINOPHEN 500 MG
1000 TABLET ORAL ONCE
Status: COMPLETED | OUTPATIENT
Start: 2023-06-21 | End: 2023-06-21

## 2023-06-21 RX ORDER — ACETAMINOPHEN 325 MG/1
650 TABLET ORAL EVERY 4 HOURS PRN
Status: DISCONTINUED | OUTPATIENT
Start: 2023-06-21 | End: 2023-06-22

## 2023-06-21 RX ORDER — DULOXETIN HYDROCHLORIDE 60 MG/1
60 CAPSULE, DELAYED RELEASE ORAL NIGHTLY
Status: DISCONTINUED | OUTPATIENT
Start: 2023-06-21 | End: 2023-06-22

## 2023-06-21 RX ORDER — CLOPIDOGREL BISULFATE 75 MG/1
75 TABLET ORAL DAILY
Status: DISCONTINUED | OUTPATIENT
Start: 2023-06-21 | End: 2023-06-22

## 2023-06-21 NOTE — IMAGING NOTE
Was asked to check on Eric Aviles by Nishi rehman / pt c/o right arm tingling numbness up to rt  mid arm nowc/o nausea and dizzy. Eric Aviles  Is  Alert and oriented  X 3 pt placed on cart. Earlene Nissen was informed  By this Rn she should be evaluated in er Eric Aviles agrees. Hand grasp  equal but weak bilaterally smile equal bilateral speech clear gait was steady to cart . Hx sees a cardiologist but doesn't know why.    915 am 911 called by Marcy Nathan    919 am vs bp 150/92 75 97 pulses ox     [address]  here.     922 am paramedics here transferred to their cart gait was unsteady paramedics assisted to cart to bring to er

## 2023-06-21 NOTE — ED QUICK NOTES
Pt routinely rounded on, given juice/water, tolearting PO, no acute distress noted, resting quietly in cart, even respirations.

## 2023-06-21 NOTE — H&P
[facility]    PATIENT'S NAME: Asher Quiroz   ATTENDING PHYSICIAN: Samantha Castro MD   PATIENT ACCOUNT#:   [de-identified]    LOCATION:  [address]  MEDICAL RECORD #:   [id number]       YOB: 1955  ADMISSION DATE:       06/21/2023    HISTORY AND PHYSICAL EXAMINATION    CHIEF COMPLAINT:  Right upper extremity tingling and numbness. HISTORY OF PRESENT ILLNESS:  The patient is a 73-year-old  female who was at a mammogram screening study today when she told the technician that she had right upper extremity numbness since she woke up this morning. Also, reportedly she was noted to have gait imbalance, but the patient denies that. Anyhow, she was sent to the emergency department for evaluation. EKG showed normal sinus rhythm. CBC and chemistry unremarkable. Chest x-ray, CT scan of the brain, and CT angiogram of the neck and head were negative. The patient will be admitted to the hospital for further observation and further imaging studies. Blood pressure upon arrival 150/92. PAST MEDICAL HISTORY:  Bipolar affective disorder, generalized osteoarthritis, hypertension, and hyperlipidemia. PAST SURGICAL HISTORY:  Right humeral fracture, left shoulder reverse total arthroplasty, bilateral total knee arthroplasty, left breast biopsy, appendectomy, cataract procedure, left inguinal hernia repair, and laparoscopic procedure with lysis of adhesions. MEDICATIONS:  Please see medication reconciliation list.    ALLERGIES:  Sulfa. FAMILY HISTORY:  Mother had diabetes mellitus type 2 and coronary artery disease. Father had hypertension. SOCIAL HISTORY:  Remote tobacco use. No current tobacco, alcohol, or drug use. Lives with her family. Independent for basic activities of daily living. REVIEW OF SYSTEMS:  The patient said she woke up this morning with right forearm and hand tingling and numbness which is unusual for her. She sleeps usually on her left side.   She also reports that she has mild tingling and numbness in her left hand. Reportedly, she had gait imbalance, but the patient denies that. She left her home and drove to the mammogram study without difficulty with her gait. No speech difficulty. No blurred vision or diplopia. No muscle weakness. Other 12-point review of systems negative. PHYSICAL EXAMINATION:    GENERAL:  Alert and oriented to time, place, and person, no acute distress. VITAL SIGNS:  Temperature 98.4, pulse 81, respiratory rate 17, blood pressure 171/92, pulse ox 95% on room air. HEENT:  Atraumatic. Oropharynx clear. Moist mucous membranes. Normal hard and soft palate. Eyes:  Anicteric sclerae. NECK:  Supple. No lymphadenopathy. Trachea midline. Full range of motion. LUNGS:  Clear to auscultation bilaterally. Normal respiratory effort. HEART:  Regular rate and rhythm. S1 and S2 auscultated. No murmur. ABDOMEN:  Soft, nondistended. No tenderness. Positive bowel sounds. EXTREMITIES:  No edema, clubbing, or cyanosis. NEUROLOGIC:  Motor and sensory and coordinative functions are intact. ASSESSMENT AND PLAN:  Right upper extremity numbness and tingling. The patient had multiple risks factors for cerebrovascular accident. Angiogram study of the head and neck today is negative. Will obtain MRI scan of the brain, 2D echocardiogram with Doppler. Start her empirically on aspirin and Plavix. Continue neuro checks. Continue her home medications. Neurology consult was notified. Further recommendations to follow.     Dictated By Lazaro Del Valle MD  d: 06/21/2023 11:35:03  t: 06/21/2023 12:01:42  Job [id number]  /

## 2023-06-21 NOTE — PLAN OF CARE
Problem: Patient Centered Care  Goal: Patient preferences are identified and integrated in the patient's plan of care  Description: Interventions:  - What would you like us to know as we care for you? Home with family  - Provide timely, complete, and accurate information to patient/family  - Incorporate patient and family knowledge, values, beliefs, and cultural backgrounds into the planning and delivery of care  - Encourage patient/family to participate in care and decision-making at the level they choose  - Honor patient and family perspectives and choices  Outcome: Progressing     Problem: Patient/Family Goals  Goal: Patient/Family Long Term Goal  Description: Patient's Long Term Goal: to go home    Interventions:  - Monitor vital signs  -Monitor neuros  - See additional Care Plan goals for specific interventions  Outcome: Progressing  Goal: Patient/Family Short Term Goal  Description: Patient's Short Term Goal: to be relieved of my arm numbness    Interventions:   - Monitor vital signs  -Monitor neuros  - See additional Care Plan goals for specific interventions  Outcome: Progressing     Pt alert and oriented X 4. Pt on room air. Pt had complaints of a headache, PRN medication given. Pt had an echo completed today, results pending. Neuro checks completed every 2 hours, no acute changes. Safety precautions in place, call light within reach.

## 2023-06-21 NOTE — ED INITIAL ASSESSMENT (HPI)
Pt to ED was at mammogram appt, she called ambulance after she went to stand up in room prior to procedure when staff saw she was losing her balance \"wobbly,\" was able to answer questions at that time and did not feel dizzy episode lasted 15mins per patient, pt also recalls event. pt is AXOX4, no slurred speech, couldn't fall asleep last night and noticed this AM at 0630 her R hand and arm numbness/ tingling. No arm or leg drift, no facial droop, equal grasps. BG 86.

## 2023-06-21 NOTE — ED QUICK NOTES
Orders for admission, patient is aware of plan and ready to go upstairs. Any questions, please call ED RN tariq at extension [phone].      Patient Covid vaccination status: Fully vaccinated     COVID Test Ordered in ED: None    COVID Suspicion at Admission: N/A    Running Infusions:  None    Mental Status/LOC at time of transport: AXOX4    Other pertinent information:   CIWA score: N/A   NIH score:  1

## 2023-06-21 NOTE — CM/SW NOTE
MD protocol order received for HH eval.   PT/OT eval pending. SW/CM will follow and make referrals when appropriate. Sedrick Broussard.  Viry Schilling RN, BSN  Nurse   [phone]

## 2023-06-21 NOTE — PROGRESS NOTES
06/21/23 1006   Clinical Encounter Type   Visited With Patient not available   Crisis Visit ED  (Stroke Alert)   Referral To    Taxonomy   Intended Effects Demonstrate caring and concern   Interventions Silent prayer         Discussion:  responded to Stroke Alert. When  arrived, patient and Care Team were not present in the room. No loved ones were present at this time. Marie Loose will attempt to follow-up with patient and/or her loved ones later in the day. Chaplains are available for a follow-up visit PRN and can be reached at [phone]. Bill Yuan (she/nahid)Luiza, Chaplain Resident.

## 2023-06-22 ENCOUNTER — APPOINTMENT (OUTPATIENT)
Dept: CT IMAGING | Facility: HOSPITAL | Age: 68
End: 2023-06-22
Attending: Other
Payer: MEDICARE

## 2023-06-22 ENCOUNTER — APPOINTMENT (OUTPATIENT)
Dept: MRI IMAGING | Facility: HOSPITAL | Age: 68
End: 2023-06-22
Attending: Other
Payer: MEDICARE

## 2023-06-22 VITALS
SYSTOLIC BLOOD PRESSURE: 156 MMHG | HEIGHT: 60 IN | OXYGEN SATURATION: 95 % | TEMPERATURE: 98 F | BODY MASS INDEX: 27.29 KG/M2 | HEART RATE: 91 BPM | RESPIRATION RATE: 20 BRPM | WEIGHT: 139 LBS | DIASTOLIC BLOOD PRESSURE: 79 MMHG

## 2023-06-22 PROBLEM — R20.0 ARM NUMBNESS: Status: RESOLVED | Noted: 2023-06-21 | Resolved: 2023-06-22

## 2023-06-22 PROBLEM — E87.1 HYPONATREMIA: Status: RESOLVED | Noted: 2021-06-10 | Resolved: 2023-06-22

## 2023-06-22 LAB
ANION GAP SERPL CALC-SCNC: 7 MMOL/L (ref 0–18)
BASOPHILS # BLD AUTO: 0.04 X10(3) UL (ref 0–0.2)
BASOPHILS NFR BLD AUTO: 0.9 %
BUN BLD-MCNC: 5 MG/DL (ref 7–18)
BUN/CREAT SERPL: 9.1 (ref 10–20)
CALCIUM BLD-MCNC: 9.7 MG/DL (ref 8.5–10.1)
CHLORIDE SERPL-SCNC: 98 MMOL/L (ref 98–112)
CO2 SERPL-SCNC: 28 MMOL/L (ref 21–32)
CREAT BLD-MCNC: 0.55 MG/DL
DEPRECATED RDW RBC AUTO: 42.3 FL (ref 35.1–46.3)
EOSINOPHIL # BLD AUTO: 0.09 X10(3) UL (ref 0–0.7)
EOSINOPHIL NFR BLD AUTO: 2 %
ERYTHROCYTE [DISTWIDTH] IN BLOOD BY AUTOMATED COUNT: 13 % (ref 11–15)
GFR SERPLBLD BASED ON 1.73 SQ M-ARVRAT: 99 ML/MIN/1.73M2 (ref 60–?)
GLUCOSE BLD-MCNC: 98 MG/DL (ref 70–99)
GLUCOSE BLDC GLUCOMTR-MCNC: 104 MG/DL (ref 70–99)
GLUCOSE BLDC GLUCOMTR-MCNC: 108 MG/DL (ref 70–99)
GLUCOSE BLDC GLUCOMTR-MCNC: 93 MG/DL (ref 70–99)
HCT VFR BLD AUTO: 38.5 %
HGB BLD-MCNC: 12.9 G/DL
IMM GRANULOCYTES # BLD AUTO: 0.01 X10(3) UL (ref 0–1)
IMM GRANULOCYTES NFR BLD: 0.2 %
LYMPHOCYTES # BLD AUTO: 1.68 X10(3) UL (ref 1–4)
LYMPHOCYTES NFR BLD AUTO: 37.3 %
MCH RBC QN AUTO: 29.8 PG (ref 26–34)
MCHC RBC AUTO-ENTMCNC: 33.5 G/DL (ref 31–37)
MCV RBC AUTO: 88.9 FL
MONOCYTES # BLD AUTO: 0.39 X10(3) UL (ref 0.1–1)
MONOCYTES NFR BLD AUTO: 8.6 %
NEUTROPHILS # BLD AUTO: 2.3 X10 (3) UL (ref 1.5–7.7)
NEUTROPHILS # BLD AUTO: 2.3 X10(3) UL (ref 1.5–7.7)
NEUTROPHILS NFR BLD AUTO: 51 %
OSMOLALITY SERPL CALC.SUM OF ELEC: 273 MOSM/KG (ref 275–295)
PLATELET # BLD AUTO: 263 10(3)UL (ref 150–450)
POTASSIUM SERPL-SCNC: 4.1 MMOL/L (ref 3.5–5.1)
RBC # BLD AUTO: 4.33 X10(6)UL
SODIUM SERPL-SCNC: 133 MMOL/L (ref 136–145)
WBC # BLD AUTO: 4.5 X10(3) UL (ref 4–11)

## 2023-06-22 PROCEDURE — 70551 MRI BRAIN STEM W/O DYE: CPT | Performed by: OTHER

## 2023-06-22 PROCEDURE — 70450 CT HEAD/BRAIN W/O DYE: CPT | Performed by: OTHER

## 2023-06-22 PROCEDURE — 99239 HOSP IP/OBS DSCHRG MGMT >30: CPT | Performed by: HOSPITALIST

## 2023-06-22 RX ORDER — QUETIAPINE FUMARATE 25 MG/1
50 TABLET, FILM COATED ORAL NIGHTLY
Status: DISCONTINUED | OUTPATIENT
Start: 2023-06-22 | End: 2023-06-22

## 2023-06-22 RX ORDER — ACETAMINOPHEN AND CODEINE PHOSPHATE 300; 30 MG/1; MG/1
1 TABLET ORAL EVERY 8 HOURS PRN
Status: DISCONTINUED | OUTPATIENT
Start: 2023-06-22 | End: 2023-06-22

## 2023-06-22 RX ORDER — VERAPAMIL HYDROCHLORIDE 240 MG/1
240 TABLET, FILM COATED, EXTENDED RELEASE ORAL EVERY EVENING
Status: DISCONTINUED | OUTPATIENT
Start: 2023-06-22 | End: 2023-06-22

## 2023-06-22 RX ORDER — CODEINE PHOSPHATE AND GUAIFENESIN 10; 100 MG/5ML; MG/5ML
5 SOLUTION ORAL EVERY 4 HOURS PRN
Status: DISCONTINUED | OUTPATIENT
Start: 2023-06-22 | End: 2023-06-22

## 2023-06-22 RX ORDER — ALBUTEROL SULFATE 90 UG/1
2 AEROSOL, METERED RESPIRATORY (INHALATION) EVERY 6 HOURS PRN
Status: DISCONTINUED | OUTPATIENT
Start: 2023-06-22 | End: 2023-06-22

## 2023-06-22 RX ORDER — HYDRALAZINE HYDROCHLORIDE 25 MG/1
25 TABLET, FILM COATED ORAL EVERY 6 HOURS PRN
Status: DISCONTINUED | OUTPATIENT
Start: 2023-06-22 | End: 2023-06-22

## 2023-06-22 NOTE — CM/SW NOTE
CM followed up discharge planning. PT/OT eval completed and recommending home. Discussed with bedside RN, no other needs noted for discharge. Patient is self care and ambulatory.      Plan: Return home    Marii Noyola RN, BSN

## 2023-06-22 NOTE — PLAN OF CARE
A&Ox4, room air. Neuro assessment performed. New numbness and tingling in LLE, neurology notified. STAT CT head/brain completed, see report. PRN medication administered for headache. Echo results pending, plan is for MRI. Problem: Patient Centered Care  Goal: Patient preferences are identified and integrated in the patient's plan of care  Description: Interventions:  - What would you like us to know as we care for you?  Home with family  - Provide timely, complete, and accurate information to patient/family  - Incorporate patient and family knowledge, values, beliefs, and cultural backgrounds into the planning and delivery of care  - Encourage patient/family to participate in care and decision-making at the level they choose  - Honor patient and family perspectives and choices  Outcome: Progressing     Problem: Patient/Family Goals  Goal: Patient/Family Long Term Goal  Description: Patient's Long Term Goal: to go home    Interventions:  - Monitor vital signs  -Monitor neuros  - See additional Care Plan goals for specific interventions  Outcome: Not Progressing  Goal: Patient/Family Short Term Goal  Description: Patient's Short Term Goal: to be relieved of my arm numbness    Interventions:   - Monitor vital signs  -Monitor neuros  - See additional Care Plan goals for specific interventions  Outcome: Not Progressing

## 2023-06-22 NOTE — PLAN OF CARE
Eric NESBITT&Nancy4. Patient being discharge home with family. IV and telebox removed at time of discharge. All discharge paperwork reviewed and all questions and concerned answered and all paper prescription sent with patient if applicable. Patient brought down to exit via wheelchair by medical staff.

## 2023-06-23 ENCOUNTER — TELEPHONE (OUTPATIENT)
Dept: INTERNAL MEDICINE CLINIC | Facility: CLINIC | Age: 68
End: 2023-06-23

## 2023-06-23 ENCOUNTER — PATIENT OUTREACH (OUTPATIENT)
Dept: CASE MANAGEMENT | Age: 68
End: 2023-06-23

## 2023-06-23 DIAGNOSIS — Z02.9 ENCOUNTERS FOR ADMINISTRATIVE PURPOSE: ICD-10-CM

## 2023-06-23 PROCEDURE — 1111F DSCHRG MED/CURRENT MED MERGE: CPT

## 2023-06-25 NOTE — TELEPHONE ENCOUNTER
Katharine spoke with patient regarding need for angiogram due to abnormal stress test. Needs cath next week with Dr. Lázaro Delcid.     Orders entered: CBC, BMP  CXR and EKG recently done 2

## 2023-07-09 RX ORDER — ARIPIPRAZOLE 5 MG/1
5 TABLET ORAL NIGHTLY
Qty: 90 TABLET | Refills: 0 | Status: SHIPPED | OUTPATIENT
Start: 2023-07-09

## 2023-07-09 RX ORDER — CLONAZEPAM 2 MG/1
4 TABLET ORAL NIGHTLY
Qty: 30 TABLET | Refills: 0 | Status: SHIPPED | OUTPATIENT
Start: 2023-07-09

## 2023-07-09 RX ORDER — ATORVASTATIN CALCIUM 20 MG/1
20 TABLET, FILM COATED ORAL NIGHTLY
Qty: 90 TABLET | Refills: 3 | Status: SHIPPED | OUTPATIENT
Start: 2023-07-09

## 2023-07-09 RX ORDER — VERAPAMIL HYDROCHLORIDE 240 MG/1
240 TABLET, FILM COATED, EXTENDED RELEASE ORAL EVERY EVENING
Qty: 90 TABLET | Refills: 0 | Status: SHIPPED | OUTPATIENT
Start: 2023-07-09

## 2023-07-09 NOTE — TELEPHONE ENCOUNTER
Refill Passed per Protocol.      Requested Prescriptions   Pending Prescriptions Disp Refills    CLONAZEPAM 2 MG Oral Tab [Pharmacy Med Name: CLONAZEPAM 2 MG Tablet] 30 tablet 0     Sig: TAKE 2 TABLETS EVERY NIGHT       There is no refill protocol information for this order       VERAPAMIL  MG Oral Tab CR [Pharmacy Med Name: Stanley Colby  MG Tablet Extended Release] 90 tablet 0     Sig: TAKE 1 TABLET EVERY EVENING       Hypertensive Medications Protocol Failed - 7/7/2023  8:06 PM        Failed - Last BP reading less than 140/90     BP Readings from Last 1 Encounters:  06/22/23 : 156/79              Passed - In person appointment in the past 12 or next 3 months     Recent Outpatient Visits              1 month ago [address] [facility] [address], [address] [facility],[address], [address] El Boggs MD    Office Visit    1 month ago [facility] [place], Heather Stapleton MD    Office Visit    2 months ago Generalized OA    Danise Malkin, Chong Angelucci, MD    Office Visit    2 months ago Varicose vein    [facility], [place] Jim Galindo MD    Office Visit    4 months ago Pain of left lower extremity    [facility], [address] [place] [address] Trace, ANSHUL    Office Visit          Future Appointments         Provider Department Appt Notes    In 1 month MD Krzysztof Yao Laved[address] Dr. Joyice Forester issues    In 3 months MD Krzysztof Neal, Diony 84 yearly    In 3 months Jim Galindo MD [facility], [address], [place] medicare super               Passed - CMP or BMP in past 6 months     Recent Results (from the past 4392 hour(s))   Basic Metabolic Panel (8)    Collection Time: 06/22/23  8:34 AM   Result Value Ref Range Glucose 98 70 - 99 mg/dL    Sodium 133 (L) 136 - 145 mmol/L    Potassium       Comment: Test not reported due to hemolysis. Test reordered by laboratory. Chloride 98 98 - 112 mmol/L    CO2 28.0 21.0 - 32.0 mmol/L    Anion Gap 7 0 - 18 mmol/L    BUN 5 (L) 7 - 18 mg/dL    Creatinine 0.55 0.55 - 1.02 mg/dL    BUN/CREA Ratio 9.1 (L) 10.0 - 20.0    Calcium, Total 9.7 8.5 - 10.1 mg/dL    Calculated Osmolality 273 (L) 275 - 295 mOsm/kg    eGFR-Cr 99 >=60 mL/min/1.73m2     *Note: Due to a large number of results and/or encounters for the requested time period, some results have not been displayed. A complete set of results can be found in Results Review.                Passed - In person appointment or virtual visit in the past 6 months     Recent Outpatient Visits              1 month ago [address] [facility], [address] [place] [address],[address], [address] [facility] Greg De Leon MD    Office Visit    1 month ago Marge Dooley, Sathya Suárez MD    Office Visit    2 months ago Generalized OA    Marie Ny, Rina Flores MD    Office Visit    2 months ago Varicose vein    [facility] [place] Marcela Xie MD    Office Visit    4 months ago Pain of left lower extremity    [facility], [address] [place] [address] ANSHUL Ospina    Office Visit          Future Appointments         Provider Department Appt Notes    In 1 month MD [address] issues    In 3 months Lacie Frye MD [address] [facility] yearly    In 3 months Marcela Xie MD [facility], [address], [place] medicare super               Passed - [facility] or GFRNAA > 50     GFR Evaluation  EGFRCR: 99 , resulted on 6/22/2023            ATORVASTATIN 20 MG Oral Tab [Pharmacy Med Name: ATORVASTATIN CALCIUM 20 MG Tablet] 90 tablet 0     Sig: TAKE 1 TABLET EVERY NIGHT       Cholesterol Medication Protocol Passed - 7/7/2023  8:06 PM        Passed - ALT in past 12 months        Passed - LDL in past 12 months        Passed - Last ALT < 80     Lab Results   Component Value Date    ALT 22 06/21/2023             Passed - Last LDL < 130     Lab Results   Component Value Date    LDL 42 06/21/2023             Passed - In person appointment or virtual visit in the past 12 mos or appointment in next 3 mos     Recent Outpatient Visits              1 month ago [address] [facility], [address], [address] [facility] Renae Caballero MD    Office Visit    1 month ago Raynard Felty, [place], Keary Has, MD    Office Visit    2 months ago Generalized OA    Олег Jaime MD    Office Visit    2 months ago [facility]icose vein    [facility]    Office Visit    4 months ago Pain of left lower extremity    [facility], [address] [place] [address] ANSHUL Grier    Office Visit          Future Appointments         Provider Department Appt Notes    In 1 month Kit Alvarado MD [facility] [address] Hansen  issues    In 3 months MD Manuela Palmer, [address] [place] [address], [facility] 143 yearly    In 3 months Blaine Perera MD [facility], [address], [place] medicare super                 ARIPIPRAZOLE 5 MG Oral Tab [place] Med Name: ARIPIPRAZOLE 5 MG Tablet] 90 tablet 0     Sig: TAKE 1 TABLET EVERY NIGHT       There is no refill protocol information for this order           Future Appointments         Provider Department Appt Notes    In 1 month MD Manuela Villalobos, [address] [facility] [address], Ramone Talavera issues    In 3 months Demetrio Howe MD [address], [facility] 84 yearly    In 3 months Cindy Zacarias MD [address], [address] [facility]          Recent Outpatient Visits              1 month ago [address] [facility], [address] [facility],[address], [address] [facility] [address] Saba Eli MD    Office Visit    1 month ago Manisha Rodrigues, Waldo Morejon MD    Office Visit    2 months ago Generalized OA    [address] [facility], Lilly Dougherty MD    Office Visit    2 months ago Varicose vein    Saul Montesinosmhtrev Zacarias MD    Office Visit    4 months ago Pain of left lower extremity    [facility], [address] [facility] ANSHUL Alarcon    Office Visit

## 2023-07-09 NOTE — TELEPHONE ENCOUNTER
Failed Protocol/No Protocol. Requested Prescriptions   Pending Prescriptions Disp Refills    clonazePAM 2 MG Oral Tab [Pharmacy Med Name: CLONAZEPAM 2 MG Tablet] 30 tablet 0     Sig: Take 2 tablets (4 mg total) by mouth nightly. There is no refill protocol information for this order       verapamil  MG Oral Tab CR [Pharmacy Med Name: VERAPAMIL HYDROCHLORIDE  MG Tablet Extended Release] 90 tablet 0     Sig: Take 1 tablet (240 mg total) by mouth every evening.        Hypertensive Medications Protocol Failed - 7/7/2023  8:06 PM        Failed - Last BP reading less than 140/90     BP Readings from Last 1 Encounters:  06/22/23 : 156/79              Passed - In person appointment in the past 12 or next 3 months     Recent Outpatient Visits              1 month ago [facility], [address] [facility],[address], [address] [facility] Maddy Cordero MD    Office Visit    1 month ago Dolores Leger MD    Office Visit    2 months ago Generalized OA    Sergey Sheldon, Renate Martinez MD    Office Visit    2 months ago Varicose vein    Brandi Tai [place] Cavetrev Jernigan MD    Office Visit    4 months ago Pain of left lower extremity    [facility], [address] [place] [address], APRN    Office Visit          Future Appointments         Provider Department Appt Notes    In 1 month MD Baylee Aguilar Ruthine Guppy Dr. Denis Dollar issues    In 3 months MD [facility] Murillo[facility]    In 3 months Sue Jernigan MD [facility], [address], [place] medicare super               Passed - CMP or BMP in past 6 months     Recent Results (from the past 4392 hour(s))   Basic Metabolic Panel (8)    Collection Time: 06/22/23  8:34 AM   Result Value Ref Range    Glucose 98 70 - 99 mg/dL    Sodium 133 (L) 136 - 145 mmol/L    Potassium       Comment: Test not reported due to hemolysis. Test reordered by laboratory. Chloride 98 98 - 112 mmol/L    CO2 28.0 21.0 - 32.0 mmol/L    Anion Gap 7 0 - 18 mmol/L    BUN 5 (L) 7 - 18 mg/dL    Creatinine 0.55 0.55 - 1.02 mg/dL    BUN/CREA Ratio 9.1 (L) 10.0 - 20.0    Calcium, Total 9.7 8.5 - 10.1 mg/dL    Calculated Osmolality 273 (L) 275 - 295 mOsm/kg    eGFR-Cr 99 >=60 mL/min/1.73m2     *Note: Due to a large number of results and/or encounters for the requested time period, some results have not been displayed. A complete set of results can be found in Results Review.                Passed - In person appointment or virtual visit in the past 6 months     Recent Outpatient Visits              1 month ago [facility], [address] [facility],[address], [address] [facility] Andrae Ely MD    Office Visit    1 month ago Aleah Cage MD    Office Visit    2 months ago Generalized OA    [address] [facility], Gavin Moran MD    Office Visit    2 months ago Varicose vein    [address] [facility] Sigifredo Bull MD    Office Visit    4 months ago Pain of left lower extremity    [address] ANSHUL Pacheco    Office Visit          Future Appointments         Provider Department Appt Notes    In 1 month MD Megan Jacob Dr. issues    In 3 months Marcos Beckett MD [address] [facility],[address], [address] [facility] yearly    In 3 months Sigifredo Bull MD [facility], [address], [place] medicare super               Passed - EGFRCR or GFRNAA > 50     GFR Evaluation  EGFRCR: 99 , resulted on 6/22/2023            ARIPiprazole 5 MG Oral Tab [Pharmacy Med Name: ARIPIPRAZOLE 5 MG Tablet] 90 tablet 0     Sig: Take 1 tablet (5 mg total) by mouth nightly. There is no refill protocol information for this order      Signed Prescriptions Disp Refills    atorvastatin 20 MG Oral Tab 90 tablet 3     Sig: Take 1 tablet (20 mg total) by mouth nightly.        Cholesterol Medication Protocol Passed - 7/7/2023  8:06 PM        Passed - ALT in past 12 months        Passed - LDL in past 12 months        Passed - Last ALT < 80     Lab Results   Component Value Date    ALT 22 06/21/2023             Passed - Last LDL < 130     Lab Results   Component Value Date    LDL 42 06/21/2023             Passed - In person appointment or virtual visit in the past 12 mos or appointment in next 3 mos     Recent Outpatient Visits              1 month ago [address] [facility], [address] [facility],[address], [address] [facility] [address] Skylar Luz MD    Office Visit    1 month ago Tabby Dasilva [facility], Sheng Bernardo MD    Office Visit    2 months ago Generalized OA    [address] [facility], Minnie Muir MD    Office Visit    2 months ago Varicose vein    [place] Steve Hassan MD    Office Visit    4 months ago Pain of left lower extremity    [facility], [address] [place] [address] ANSHUL Saavedra    Office Visit          Future Appointments         Provider Department Appt Notes    In 1 month MD Uche Ball Dr[address] issues    In 3 months Tereso Martinez MD [address], [address] [facility] yearly    In 3 months Steve Hassan MD [address], [address] [facility]                    Future Appointments         Provider Department Appt Notes    In 1 month Aditi Way MD [facility] Dr. Tiffany Nevarez issues    In 3 months Juvenal Howe MD [address], [address] [facility] yearly    In 3 months Sigifredo Bull MD [address], [address] [facility]          Recent Outpatient Visits              1 month ago [address] [facility], [address], [address] [facility] [address] Andrae Ely MD    Office Visit    1 month ago Aleah Cage MD    Office Visit    2 months ago Generalized OA    [address] [facility] [address], Gavin Moran MD    Office Visit    2 months ago Varicose vein    Saul Brockmhtrev Bull MD    Office Visit    4 months ago Pain of left lower extremity    [facility], [address] [facility] ANSHUL Pacheco    Office Visit

## 2023-08-07 RX ORDER — ALENDRONATE SODIUM 70 MG/1
70 TABLET ORAL WEEKLY
Qty: 12 TABLET | Refills: 2 | Status: SHIPPED | OUTPATIENT
Start: 2023-08-07

## 2023-08-08 ENCOUNTER — HOSPITAL ENCOUNTER (EMERGENCY)
Facility: HOSPITAL | Age: 68
Discharge: HOME | End: 2023-08-09
Attending: EMERGENCY MEDICINE
Payer: MEDICARE

## 2023-08-08 ENCOUNTER — TELEMEDICINE (OUTPATIENT)
Dept: INTERNAL MEDICINE CLINIC | Facility: CLINIC | Age: 68
End: 2023-08-08

## 2023-08-08 ENCOUNTER — NURSE TRIAGE (OUTPATIENT)
Dept: INTERNAL MEDICINE CLINIC | Facility: CLINIC | Age: 68
End: 2023-08-08

## 2023-08-08 DIAGNOSIS — R11.0 NAUSEA: Primary | ICD-10-CM

## 2023-08-08 DIAGNOSIS — R53.81 MALAISE: Primary | ICD-10-CM

## 2023-08-08 LAB — SARS-COV-2 RNA RESP QL NAA+PROBE: NOT DETECTED

## 2023-08-08 PROCEDURE — 93005 ELECTROCARDIOGRAM TRACING: CPT

## 2023-08-08 PROCEDURE — 99284 EMERGENCY DEPT VISIT MOD MDM: CPT

## 2023-08-08 PROCEDURE — 93010 ELECTROCARDIOGRAM REPORT: CPT

## 2023-08-08 NOTE — TELEPHONE ENCOUNTER
Action Requested: Summary for Provider     []  Critical Lab, Recommendations Needed  [] Need Additional Advice  []   FYI    []   Need Orders  [] Need Medications Sent to Pharmacy  []  Other     SUMMARY: Pt reports nausea started yesterday. No vomiting, diarrhea, no fever. Pt also reports congestion, no sore throat. No dizziness. Pt denies abdominal pain. Pt mentioned bilateral arm numbness. Comes and goes, lasting a few seconds. Pt states occurs in both arms, but not at the same time. Offered appointment today. Pt unable to come in person. Offered video appointment. Pt made aware Dr. Moira Woodruff is fully booked for today. Pt scheduled with Dr. Tai López today. Pt instructed on how to check in. Pt verbalized understanding. Future Appointments   Date Time Provider Cirilo Springer   8/8/2023  1:00 PM Rose Morton MD [facility]   8/29/2023  2:00 PM Joleen Mcgraw MD [facility]       Reason for call: Nausea (Last night.  No fever)  Onset: Data Unavailable                     Reason for Disposition   Patient wants to be seen   Patient wants to be seen    Protocols used: Nausea-A-OH, Neurologic Deficit-A-OH

## 2023-08-08 NOTE — PROGRESS NOTES
Patient scheduled a video visit with me this afternoon to discuss nausea and numbness in both arms. Multiple links were sent to her via text to her cell phone and via email. She never connected. Spoke with her by phone afterwards. She will attempt to reschedule a video visit or an in person visit.

## 2023-08-09 VITALS
OXYGEN SATURATION: 98 % | BODY MASS INDEX: 20.03 KG/M2 | HEIGHT: 60 IN | RESPIRATION RATE: 13 BRPM | SYSTOLIC BLOOD PRESSURE: 167 MMHG | HEART RATE: 76 BPM | TEMPERATURE: 98 F | DIASTOLIC BLOOD PRESSURE: 71 MMHG | WEIGHT: 102 LBS

## 2023-08-09 LAB
ALBUMIN SERPL-MCNC: 4.1 G/DL (ref 3.4–5)
ALP LIVER SERPL-CCNC: 93 U/L
ALT SERPL-CCNC: 21 U/L
ANION GAP SERPL CALC-SCNC: 5 MMOL/L (ref 0–18)
AST SERPL-CCNC: 19 U/L (ref 15–37)
ATRIAL RATE: 72 BPM
BASOPHILS # BLD AUTO: 0.03 X10(3) UL (ref 0–0.2)
BASOPHILS NFR BLD AUTO: 0.5 %
BILIRUB DIRECT SERPL-MCNC: <0.1 MG/DL (ref 0–0.2)
BILIRUB SERPL-MCNC: 0.8 MG/DL (ref 0.1–2)
BUN BLD-MCNC: 6 MG/DL (ref 7–18)
BUN/CREAT SERPL: 10.3 (ref 10–20)
CALCIUM BLD-MCNC: 9.2 MG/DL (ref 8.5–10.1)
CHLORIDE SERPL-SCNC: 101 MMOL/L (ref 98–112)
CO2 SERPL-SCNC: 30 MMOL/L (ref 21–32)
CREAT BLD-MCNC: 0.58 MG/DL
DEPRECATED RDW RBC AUTO: 39.9 FL (ref 35.1–46.3)
EGFRCR SERPLBLD CKD-EPI 2021: 97 ML/MIN/1.73M2 (ref 60–?)
EOSINOPHIL # BLD AUTO: 0.02 X10(3) UL (ref 0–0.7)
EOSINOPHIL NFR BLD AUTO: 0.4 %
ERYTHROCYTE [DISTWIDTH] IN BLOOD BY AUTOMATED COUNT: 12.4 % (ref 11–15)
GLUCOSE BLD-MCNC: 111 MG/DL (ref 70–99)
HCT VFR BLD AUTO: 36.4 %
HGB BLD-MCNC: 12.6 G/DL
IMM GRANULOCYTES # BLD AUTO: 0.01 X10(3) UL (ref 0–1)
IMM GRANULOCYTES NFR BLD: 0.2 %
LIPASE SERPL-CCNC: 19 U/L (ref 13–75)
LYMPHOCYTES # BLD AUTO: 1.39 X10(3) UL (ref 1–4)
LYMPHOCYTES NFR BLD AUTO: 25.4 %
MCH RBC QN AUTO: 30.4 PG (ref 26–34)
MCHC RBC AUTO-ENTMCNC: 34.6 G/DL (ref 31–37)
MCV RBC AUTO: 87.9 FL
MONOCYTES # BLD AUTO: 0.44 X10(3) UL (ref 0.1–1)
MONOCYTES NFR BLD AUTO: 8 %
NEUTROPHILS # BLD AUTO: 3.58 X10 (3) UL (ref 1.5–7.7)
NEUTROPHILS # BLD AUTO: 3.58 X10(3) UL (ref 1.5–7.7)
NEUTROPHILS NFR BLD AUTO: 65.5 %
OSMOLALITY SERPL CALC.SUM OF ELEC: 280 MOSM/KG (ref 275–295)
P AXIS: 74 DEGREES
P-R INTERVAL: 158 MS
PLATELET # BLD AUTO: 239 10(3)UL (ref 150–450)
POTASSIUM SERPL-SCNC: 3.4 MMOL/L (ref 3.5–5.1)
PROT SERPL-MCNC: 7.4 G/DL (ref 6.4–8.2)
Q-T INTERVAL: 392 MS
QRS DURATION: 80 MS
QTC CALCULATION (BEZET): 429 MS
R AXIS: 64 DEGREES
RBC # BLD AUTO: 4.14 X10(6)UL
S PYO AG THROAT QL: NEGATIVE
SODIUM SERPL-SCNC: 136 MMOL/L (ref 136–145)
T AXIS: 71 DEGREES
VENTRICULAR RATE: 72 BPM
WBC # BLD AUTO: 5.5 X10(3) UL (ref 4–11)

## 2023-08-09 PROCEDURE — 96374 THER/PROPH/DIAG INJ IV PUSH: CPT

## 2023-08-09 PROCEDURE — 85025 COMPLETE CBC W/AUTO DIFF WBC: CPT | Performed by: EMERGENCY MEDICINE

## 2023-08-09 PROCEDURE — 80048 BASIC METABOLIC PNL TOTAL CA: CPT | Performed by: EMERGENCY MEDICINE

## 2023-08-09 PROCEDURE — 83690 ASSAY OF LIPASE: CPT | Performed by: EMERGENCY MEDICINE

## 2023-08-09 PROCEDURE — 80076 HEPATIC FUNCTION PANEL: CPT | Performed by: EMERGENCY MEDICINE

## 2023-08-09 PROCEDURE — 87880 STREP A ASSAY W/OPTIC: CPT

## 2023-08-09 RX ORDER — ONDANSETRON 2 MG/ML
4 INJECTION INTRAMUSCULAR; INTRAVENOUS ONCE
Status: COMPLETED | OUTPATIENT
Start: 2023-08-09 | End: 2023-08-09

## 2023-08-09 RX ORDER — ONDANSETRON 4 MG/1
4 TABLET, ORALLY DISINTEGRATING ORAL EVERY 4 HOURS PRN
Qty: 10 TABLET | Refills: 0 | Status: SHIPPED | OUTPATIENT
Start: 2023-08-09 | End: 2023-08-16

## 2023-08-09 NOTE — ED INITIAL ASSESSMENT (HPI)
76 y/o female here for x 2 days of \"burning up. \" Pt reports feeling febrile yesterday with nausea.  Denies CP/SOB, abd pain, v/d.

## 2023-08-09 NOTE — ED QUICK NOTES
Patient tolerating water well with no nausea. MD made aware of patients lab results during Epic downtime. Per Dr. Sean Mosley, patient is okay for discharge at this time. Patient made aware, pt stating she does not have a ride home, no one to pick her up, and no money for a cab ride,  made aware,  to set up ride for patient.

## 2023-08-09 NOTE — ED QUICK NOTES
Discharge instructions including follow-up care, medications, and signs and symptoms to return to ED were reviewed and discussed with patient. Pt verbalized understanding to all information and all questions asked were answered at this time. Pt is AAOx4, calm, respirations noted as even and unlabored, skin warm and dry, and there are no signs or symptoms of distress noted at this time. Pt ambulatory with a steady gait to waiting room to wait for cab ride home.

## 2023-08-09 NOTE — CM/SW NOTE
[facility] ER RN called requesting ERCM to assist with arranging discharge transportation for patient. Per [facility] ER RN patient reports she has no money and no one to come pick her up at this time. Per Louise King RN patient lives with her ex- and he is currently at home caring for their grandson so he is unable to come pick patient up at this time. ERCM informed Louise King RN to attempt to call pt's Daughter Robin Ramirez listed on pt's face sheet to see if she is able to pick patient up and if Daughter Robin Ramirez unable to pick patient up to see if pt has money at home to pay cab and [facility]. could arrange cab for patient. Louise King RN v/u and will call ERCM back with update. Nurys Hernandez RN calls back stating pt's Daughter Robin Ramirez lives in [place] and pt reports her ex- will not give her money to pay for cab. ERCM confirmed with Louise King RN patient able to ambulate independently and that pt's address on face sheet is correct and is the address patient would like to be discharged home to. Louise King RN confirmed patient able to ambulate independently and she confirmed address on face sheet is correct. ERCM informed Socorro Zhong RN to escort patient to [facility] and inform triage staff of whom patient is and that [facility]. will call them with lyft details and to call ERCM once she has done the above. Louise King RN v/u.    0236Mercy Hernandez RN called stating patient in [facility] and ready for lyft to be arranged. [id number]:  Lyft arranged - ETA 7MIN.     0253:  SCHUYLER Vargas in [address] [place] [address] Triage called and informed of Lyft ETA and of  details and to please ensure patient enters correct lyft. SCHUYLER Vargas v/u.

## 2023-08-09 NOTE — DISCHARGE INSTRUCTIONS
Take tylenol as needed for pain. See your primary care doctor if not improving in 3-5 days. Return to the ER if you develop worsening symptoms or any emergent concerns.

## 2023-08-10 ENCOUNTER — TELEPHONE (OUTPATIENT)
Dept: INTERNAL MEDICINE CLINIC | Facility: CLINIC | Age: 68
End: 2023-08-10

## 2023-08-10 ENCOUNTER — OFFICE VISIT (OUTPATIENT)
Dept: INTERNAL MEDICINE CLINIC | Facility: CLINIC | Age: 68
End: 2023-08-10

## 2023-08-10 VITALS
BODY MASS INDEX: 26.9 KG/M2 | WEIGHT: 137 LBS | HEIGHT: 60 IN | SYSTOLIC BLOOD PRESSURE: 127 MMHG | DIASTOLIC BLOOD PRESSURE: 80 MMHG | OXYGEN SATURATION: 94 % | HEART RATE: 90 BPM

## 2023-08-10 DIAGNOSIS — R53.81 MALAISE AND FATIGUE: Primary | ICD-10-CM

## 2023-08-10 DIAGNOSIS — R53.83 MALAISE AND FATIGUE: Primary | ICD-10-CM

## 2023-08-10 PROCEDURE — 99214 OFFICE O/P EST MOD 30 MIN: CPT | Performed by: INTERNAL MEDICINE

## 2023-08-10 NOTE — TELEPHONE ENCOUNTER
Patient calling (identified name and ) calling to make an ED follow up appointment. States she was seen in the ED on  to  but is still feeling weak and unable to keep medications or much fluids down. No fevers. Appointment scheduled. Advised to return to the ED or IC if symptoms worsen, feeling faint or dizziness. Patient verbalized understanding and agrees with plan.       Future Appointments   Date Time Provider Cirilo Springer   8/10/2023 11:15 AM Arnold Rodríguez MD [facility]   2023  2:00 PM Tanvi Walker MD [facility]   10/10/2023 12:30 PM Minh Mederos MD [facility] Aga PINO   10/11/2023 10:15 AM Arnold Rodríguez MD [facility] Félix Perez

## 2023-08-18 ENCOUNTER — HOSPITAL ENCOUNTER (OUTPATIENT)
Age: 68
Discharge: ANOTHER HOSPITAL | End: 2023-08-18
Attending: EMERGENCY MEDICINE
Payer: MEDICARE

## 2023-08-18 ENCOUNTER — HOSPITAL ENCOUNTER (EMERGENCY)
Facility: HOSPITAL | Age: 68
Discharge: HOME | End: 2023-08-18
Attending: EMERGENCY MEDICINE
Payer: MEDICARE

## 2023-08-18 VITALS
HEIGHT: 60 IN | SYSTOLIC BLOOD PRESSURE: 131 MMHG | RESPIRATION RATE: 18 BRPM | TEMPERATURE: 97 F | HEART RATE: 87 BPM | DIASTOLIC BLOOD PRESSURE: 70 MMHG | OXYGEN SATURATION: 98 % | BODY MASS INDEX: 27 KG/M2

## 2023-08-18 VITALS
OXYGEN SATURATION: 96 % | WEIGHT: 100 LBS | HEART RATE: 75 BPM | SYSTOLIC BLOOD PRESSURE: 117 MMHG | TEMPERATURE: 98 F | RESPIRATION RATE: 17 BRPM | DIASTOLIC BLOOD PRESSURE: 68 MMHG | BODY MASS INDEX: 20 KG/M2

## 2023-08-18 DIAGNOSIS — R53.1 WEAKNESS GENERALIZED: ICD-10-CM

## 2023-08-18 DIAGNOSIS — R53.83 OTHER FATIGUE: Primary | ICD-10-CM

## 2023-08-18 DIAGNOSIS — R94.31 ABNORMAL EKG: ICD-10-CM

## 2023-08-18 DIAGNOSIS — E87.6 ACUTE HYPOKALEMIA: Primary | ICD-10-CM

## 2023-08-18 DIAGNOSIS — E87.6 HYPOKALEMIA: ICD-10-CM

## 2023-08-18 LAB
#MXD IC: 0.6 X10ˆ3/UL (ref 0.1–1)
ANION GAP SERPL CALC-SCNC: 8 MMOL/L (ref 0–18)
BASOPHILS # BLD AUTO: 0.03 X10(3) UL (ref 0–0.2)
BASOPHILS NFR BLD AUTO: 0.4 %
BILIRUB UR QL: NEGATIVE
BUN BLD-MCNC: 6 MG/DL (ref 7–18)
BUN BLD-MCNC: <5 MG/DL (ref 7–18)
BUN/CREAT SERPL: 9.7 (ref 10–20)
CALCIUM BLD-MCNC: 9.4 MG/DL (ref 8.5–10.1)
CHLORIDE BLD-SCNC: 96 MMOL/L (ref 98–112)
CHLORIDE SERPL-SCNC: 99 MMOL/L (ref 98–112)
CLARITY UR: CLEAR
CO2 BLD-SCNC: 26 MMOL/L (ref 21–32)
CO2 SERPL-SCNC: 28 MMOL/L (ref 21–32)
COLOR UR: YELLOW
COLOR UR: YELLOW
CREAT BLD-MCNC: 0.6 MG/DL
CREAT BLD-MCNC: 0.62 MG/DL
DEPRECATED RDW RBC AUTO: 40.3 FL (ref 35.1–46.3)
EGFRCR SERPLBLD CKD-EPI 2021: 96 ML/MIN/1.73M2 (ref 60–?)
EGFRCR SERPLBLD CKD-EPI 2021: 97 ML/MIN/1.73M2 (ref 60–?)
EOSINOPHIL # BLD AUTO: 0.04 X10(3) UL (ref 0–0.7)
EOSINOPHIL NFR BLD AUTO: 0.6 %
ERYTHROCYTE [DISTWIDTH] IN BLOOD BY AUTOMATED COUNT: 12.3 % (ref 11–15)
GLUCOSE BLD-MCNC: 107 MG/DL (ref 70–99)
GLUCOSE BLD-MCNC: 90 MG/DL (ref 70–99)
GLUCOSE UR STRIP-MCNC: NEGATIVE MG/DL
GLUCOSE UR-MCNC: NORMAL MG/DL
HCT VFR BLD AUTO: 40.4 %
HCT VFR BLD AUTO: 40.9 %
HCT VFR BLD CALC: 46 %
HGB BLD-MCNC: 13.8 G/DL
HGB BLD-MCNC: 13.9 G/DL
HGB UR QL STRIP.AUTO: NEGATIVE
HYALINE CASTS #/AREA URNS AUTO: PRESENT /LPF
IMM GRANULOCYTES # BLD AUTO: 0.02 X10(3) UL (ref 0–1)
IMM GRANULOCYTES NFR BLD: 0.3 %
ISTAT IONIZED CALCIUM FOR CHEM 8: 1.13 MMOL/L (ref 1.12–1.32)
KETONES UR STRIP-MCNC: 40 MG/DL
KETONES UR-MCNC: 100 MG/DL
LEUKOCYTE ESTERASE UR QL STRIP.AUTO: 250
LEUKOCYTE ESTERASE UR QL STRIP: NEGATIVE
LYMPHOCYTES # BLD AUTO: 1.41 X10(3) UL (ref 1–4)
LYMPHOCYTES # BLD AUTO: 1.6 X10ˆ3/UL (ref 1–4)
LYMPHOCYTES NFR BLD AUTO: 20.5 %
LYMPHOCYTES NFR BLD AUTO: 26.8 %
MCH RBC QN AUTO: 30 PG (ref 26–34)
MCH RBC QN AUTO: 30.8 PG (ref 26–34)
MCHC RBC AUTO-ENTMCNC: 33.7 G/DL (ref 31–37)
MCHC RBC AUTO-ENTMCNC: 34.4 G/DL (ref 31–37)
MCV RBC AUTO: 88.9 FL (ref 80–100)
MCV RBC AUTO: 89.4 FL
MIXED CELL %: 9.9 %
MONOCYTES # BLD AUTO: 0.48 X10(3) UL (ref 0.1–1)
MONOCYTES NFR BLD AUTO: 7 %
NEUTROPHILS # BLD AUTO: 3.7 X10ˆ3/UL (ref 1.5–7.7)
NEUTROPHILS # BLD AUTO: 4.9 X10 (3) UL (ref 1.5–7.7)
NEUTROPHILS # BLD AUTO: 4.9 X10(3) UL (ref 1.5–7.7)
NEUTROPHILS NFR BLD AUTO: 63.3 %
NEUTROPHILS NFR BLD AUTO: 71.2 %
NITRITE UR QL STRIP.AUTO: NEGATIVE
NITRITE UR QL STRIP: NEGATIVE
OSMOLALITY SERPL CALC.SUM OF ELEC: 277 MOSM/KG (ref 275–295)
PH UR STRIP: 5.5 [PH]
PH UR: 6 [PH] (ref 5–8)
PLATELET # BLD AUTO: 275 10(3)UL (ref 150–450)
PLATELET # BLD AUTO: 283 X10ˆ3/UL (ref 150–450)
POCT INFLUENZA A: NEGATIVE
POCT INFLUENZA B: NEGATIVE
POTASSIUM BLD-SCNC: 2.9 MMOL/L (ref 3.6–5.1)
POTASSIUM SERPL-SCNC: 3 MMOL/L (ref 3.5–5.1)
PROT UR STRIP-MCNC: >=300 MG/DL
PROT UR-MCNC: 30 MG/DL
RBC # BLD AUTO: 4.52 X10(6)UL
RBC # BLD AUTO: 4.6 X10ˆ6/UL
SARS-COV-2 RNA RESP QL NAA+PROBE: NOT DETECTED
SODIUM BLD-SCNC: 135 MMOL/L (ref 136–145)
SODIUM SERPL-SCNC: 135 MMOL/L (ref 136–145)
SP GR UR STRIP: 1.02 (ref 1–1.03)
SP GR UR STRIP: >=1.03
TROPONIN I BLD-MCNC: <0.02 NG/ML
TROPONIN I HIGH SENSITIVITY: 12 NG/L
UROBILINOGEN UR STRIP-ACNC: <2 MG/DL
UROBILINOGEN UR STRIP-ACNC: NORMAL
WBC # BLD AUTO: 5.9 X10ˆ3/UL (ref 4–11)
WBC # BLD AUTO: 6.9 X10(3) UL (ref 4–11)

## 2023-08-18 PROCEDURE — 99284 EMERGENCY DEPT VISIT MOD MDM: CPT

## 2023-08-18 PROCEDURE — 81002 URINALYSIS NONAUTO W/O SCOPE: CPT

## 2023-08-18 PROCEDURE — 93005 ELECTROCARDIOGRAM TRACING: CPT

## 2023-08-18 PROCEDURE — 81001 URINALYSIS AUTO W/SCOPE: CPT | Performed by: EMERGENCY MEDICINE

## 2023-08-18 PROCEDURE — 80047 BASIC METABLC PNL IONIZED CA: CPT

## 2023-08-18 PROCEDURE — 84484 ASSAY OF TROPONIN QUANT: CPT | Performed by: EMERGENCY MEDICINE

## 2023-08-18 PROCEDURE — 36415 COLL VENOUS BLD VENIPUNCTURE: CPT

## 2023-08-18 PROCEDURE — 93010 ELECTROCARDIOGRAM REPORT: CPT

## 2023-08-18 PROCEDURE — 84484 ASSAY OF TROPONIN QUANT: CPT

## 2023-08-18 PROCEDURE — 80048 BASIC METABOLIC PNL TOTAL CA: CPT | Performed by: EMERGENCY MEDICINE

## 2023-08-18 PROCEDURE — 85025 COMPLETE CBC W/AUTO DIFF WBC: CPT | Performed by: EMERGENCY MEDICINE

## 2023-08-18 PROCEDURE — 96360 HYDRATION IV INFUSION INIT: CPT

## 2023-08-18 PROCEDURE — 87502 INFLUENZA DNA AMP PROBE: CPT | Performed by: EMERGENCY MEDICINE

## 2023-08-18 PROCEDURE — 99214 OFFICE O/P EST MOD 30 MIN: CPT

## 2023-08-18 RX ORDER — CEPHALEXIN 500 MG/1
500 CAPSULE ORAL 2 TIMES DAILY
Qty: 10 CAPSULE | Refills: 0 | Status: SHIPPED | OUTPATIENT
Start: 2023-08-18 | End: 2023-08-23

## 2023-08-18 RX ORDER — POTASSIUM CHLORIDE 20 MEQ/1
40 TABLET, EXTENDED RELEASE ORAL ONCE
Status: COMPLETED | OUTPATIENT
Start: 2023-08-18 | End: 2023-08-18

## 2023-08-18 RX ORDER — POTASSIUM CHLORIDE 20 MEQ/1
20 TABLET, EXTENDED RELEASE ORAL 2 TIMES DAILY
Qty: 10 TABLET | Refills: 0 | Status: SHIPPED | OUTPATIENT
Start: 2023-08-18 | End: 2023-08-23

## 2023-08-18 NOTE — ED INITIAL ASSESSMENT (HPI)
Patient complains of malaise and generalized weakness since last night, denies chest pain, sent here from immediate care for K of 2.9

## 2023-08-18 NOTE — ED INITIAL ASSESSMENT (HPI)
Onset of weakness and body aches last night. No fever. Denies congestion or cough. Some SOB. No chest pain. Denies urinary symptoms.

## 2023-08-19 LAB
ATRIAL RATE: 77 BPM
P AXIS: 44 DEGREES
P-R INTERVAL: 138 MS
Q-T INTERVAL: 386 MS
QRS DURATION: 84 MS
QTC CALCULATION (BEZET): 436 MS
R AXIS: 15 DEGREES
T AXIS: 53 DEGREES
VENTRICULAR RATE: 77 BPM

## 2023-08-21 NOTE — TELEPHONE ENCOUNTER
Dr. Levon Mays please advise on refill for Dr. Concepcion Cobos patient.     Last refill 9/9/20 #90  LOV: 9/17/20 regular

## 2023-08-29 ENCOUNTER — LAB ENCOUNTER (OUTPATIENT)
Dept: LAB | Facility: HOSPITAL | Age: 68
End: 2023-08-29
Attending: Other
Payer: MEDICARE

## 2023-08-29 DIAGNOSIS — G31.84 MCI (MILD COGNITIVE IMPAIRMENT) WITH MEMORY LOSS: ICD-10-CM

## 2023-08-29 LAB
FOLATE SERPL-MCNC: >20 NG/ML (ref 8.7–?)
TSI SER-ACNC: 0.95 MIU/ML (ref 0.36–3.74)
VIT B12 SERPL-MCNC: 896 PG/ML (ref 193–986)

## 2023-08-29 PROCEDURE — 82746 ASSAY OF FOLIC ACID SERUM: CPT

## 2023-08-29 PROCEDURE — 36415 COLL VENOUS BLD VENIPUNCTURE: CPT

## 2023-08-29 PROCEDURE — 84443 ASSAY THYROID STIM HORMONE: CPT

## 2023-08-29 PROCEDURE — 82607 VITAMIN B-12: CPT

## 2023-08-29 PROCEDURE — 86780 TREPONEMA PALLIDUM: CPT

## 2023-08-30 ENCOUNTER — TELEPHONE (OUTPATIENT)
Dept: NEUROLOGY | Facility: CLINIC | Age: 68
End: 2023-08-30

## 2023-08-30 LAB — T PALLIDUM AB SER QL: NEGATIVE

## 2023-10-12 ENCOUNTER — HOSPITAL ENCOUNTER (OUTPATIENT)
Dept: ULTRASOUND IMAGING | Facility: HOSPITAL | Age: 68
Discharge: HOME | End: 2023-10-12
Attending: OBSTETRICS & GYNECOLOGY
Payer: MEDICARE

## 2023-10-12 ENCOUNTER — HOSPITAL ENCOUNTER (OUTPATIENT)
Dept: MAMMOGRAPHY | Facility: HOSPITAL | Age: 68
Discharge: HOME | End: 2023-10-12
Attending: OBSTETRICS & GYNECOLOGY
Payer: MEDICARE

## 2023-10-12 DIAGNOSIS — N64.4 MASTALGIA: ICD-10-CM

## 2023-10-12 PROCEDURE — 76642 ULTRASOUND BREAST LIMITED: CPT | Performed by: OBSTETRICS & GYNECOLOGY

## 2023-10-12 PROCEDURE — 77065 DX MAMMO INCL CAD UNI: CPT | Performed by: OBSTETRICS & GYNECOLOGY

## 2023-10-12 PROCEDURE — 77061 BREAST TOMOSYNTHESIS UNI: CPT | Performed by: OBSTETRICS & GYNECOLOGY

## 2023-10-13 ENCOUNTER — TELEPHONE (OUTPATIENT)
Dept: OBGYN CLINIC | Facility: CLINIC | Age: 68
End: 2023-10-13

## 2023-10-13 DIAGNOSIS — Z12.31 SCREENING MAMMOGRAM, ENCOUNTER FOR: Primary | ICD-10-CM

## 2023-10-13 NOTE — TELEPHONE ENCOUNTER
----- Message from Tor Contreras MD sent at 10/13/2023  2:19 PM CDT -----  Left mammogram-- benign findings. Monitor breast pain. Bilateral screening mammogram in 12/2023.

## 2023-10-23 RX ORDER — ACETAMINOPHEN AND CODEINE PHOSPHATE 300; 30 MG/1; MG/1
1 TABLET ORAL EVERY 8 HOURS PRN
Qty: 90 TABLET | Refills: 1 | Status: SHIPPED | OUTPATIENT
Start: 2023-10-23

## 2023-10-25 NOTE — TELEPHONE ENCOUNTER
LOV: 4/19/23  Last Refilled:#60, 1rf 1/18/23    Future Appointments   Date Time Provider Cirilo Springer   12/5/2023  2:45 PM Sam Cabrera MD [facility]     1. iburprofen 600mg twice a day as needed - for 2 weeks to help with the back -   2. Tylenol #3 -  3. Cont. hydroxychlrqouine 200mg ad ay   4. Cont. alendroante 70mg a week for preventing fractures -   5. Medrol luis manuel for lower back pain   6. Return to clinic in 6 months. 7. DEXA - any time in 5/1/2023 - or onward , can get with the mammogram -       Electronically signed by Alke Canada MD at 4/19/2023 11:44 AM  Please advise.

## 2023-10-26 RX ORDER — IBUPROFEN 600 MG/1
600 TABLET ORAL EVERY 8 HOURS PRN
Qty: 30 TABLET | Refills: 0 | Status: SHIPPED | OUTPATIENT
Start: 2023-10-26

## 2023-11-10 ENCOUNTER — TELEPHONE (OUTPATIENT)
Dept: INTERNAL MEDICINE CLINIC | Facility: CLINIC | Age: 68
End: 2023-11-10

## 2023-11-10 NOTE — TELEPHONE ENCOUNTER
Wilian Ferrari daughter (name on release of information form, but has no check marks if can release information). Daughter stated that patient has been having memory loss and wants to make sure patient is follow up with her doctor's and doing the testing that are ordered by the doctor's. Advised daughter that unfortunately not checked off on form that can release information, but suggest that talk with dad and sister regarding patient's care and make sure that someone is going with her to appointments to make sure that they are aware of what if being discussed and that patient is getting testing done that is ordered. Also would need to have patient complete form checking off that can release information to Wilian Ferrari as well. Wilian Ferrari verbalized understanding and will talk with family.

## 2023-11-16 ENCOUNTER — TELEMEDICINE (OUTPATIENT)
Dept: INTERNAL MEDICINE CLINIC | Facility: CLINIC | Age: 68
End: 2023-11-16
Payer: MEDICARE

## 2023-11-16 ENCOUNTER — TELEPHONE (OUTPATIENT)
Dept: INTERNAL MEDICINE CLINIC | Facility: CLINIC | Age: 68
End: 2023-11-16

## 2023-11-16 DIAGNOSIS — R05.1 ACUTE COUGH: Primary | ICD-10-CM

## 2023-11-16 DIAGNOSIS — J02.9 SORE THROAT: ICD-10-CM

## 2023-11-16 PROCEDURE — 99213 OFFICE O/P EST LOW 20 MIN: CPT | Performed by: INTERNAL MEDICINE

## 2023-11-16 RX ORDER — BENZONATATE 100 MG/1
100 CAPSULE ORAL 3 TIMES DAILY PRN
Qty: 30 CAPSULE | Refills: 0 | Status: SHIPPED | OUTPATIENT
Start: 2023-11-16

## 2023-11-16 NOTE — TELEPHONE ENCOUNTER
Patient calling ( identified name and  ) states cough began 3 days ago, cough is productive with green sputum at times     Denies fever,no chest pain  ,no SOB,no nasal congestion     Has tried OTC  Robitussin with slight relief , hard to rest with the coughing    Offered appointment, declines as she has no transportation to come in to office    Requesting medication if possible      Allergies reviewed and pharmacy confirmed  Please advise and thank you.

## 2023-11-16 NOTE — TELEPHONE ENCOUNTER
Patient contacted and made aware of Dr. Jessy Silvestre recommendation. Soonest available visit with IM with Dr. Karthikeyan Cheng at 2:15 pm today. I assisted in scheduling visit. Patient instructed any new or worsening symptoms [reviewed] seek immediate medical attention--> IC/ED. Patient verbalized understanding. No further questions or concerns at this time.     Future Appointments   Date Time Provider Cirilo Springer   11/16/2023  2:15 PM Debbie Hernandez MD [facility]   12/5/2023  2:45 PM Gisele Frias MD [facility]

## 2023-11-16 NOTE — PROGRESS NOTES
Patient ID: Antonia Damian is a 77year old female. Chief Complaint   Patient presents with    Cough          HISTORY OF PRESENT ILLNESS:   Patient presents for above. This visit is conducted using Telemedicine with live, interactive video and audio. Patient with a 2-day history of sore throat and cough. No fevers, chills, sick contacts, travel. Has not checked for COVID. Has used over-the-counter cough medication which have not been effective. Review of Systems   Constitutional: Negative. HENT:  Positive for sore throat. Eyes: Negative. Respiratory:  Positive for cough. Cardiovascular: Negative. Endocrine: Negative. Genitourinary: Negative. Musculoskeletal: Negative. Allergic/Immunologic: Negative. Neurological: Negative. Hematological: Negative. 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 68220, L Total Knee, w/, Global Exp.8/30/15 06/08/2015    Sx.6/8/16, CPT. 30386, R Total Knee Replacement, w/, Global Exp.9/6/16 01/14/2016    Visual impairment     WEARS READER       Past Surgical History:   Procedure Laterality Date    APPENDECTOMY      appendicitis    BREAST BIOPSY Left 09/07/2011    fibrocystic changes    CATARACT      COLONOSCOPY  7/2009    incomplete (chronic constipation)    COLONOSCOPY N/A 11/8/2018    Procedure: COLONOSCOPY;  Surgeon: Rome Beltran MD;  Location: [address] [place] [address] ENDOSCOPY    COLONOSCOPY N/A 12/19/2019    Procedure: COLONOSCOPY;  Surgeon: Rome Beltran MD;  Location: [address] [place] [address] ENDOSCOPY    HERNIA SURGERY Left 12/21/2011    neuroma excision, partial mesh removal, primary re-repair    INGUINAL HERNIA REPAIR Left 05/20/2009    KNEE SURGERY Right     osteoarthritis    JESSICA LOCALIZATION WIRE 1 SITE LEFT (CPT=19281)      2011    ORIF HUMERUS FRACTURE Right 04/15/2022    right distal humerus open reduction internal fixation    OTHER Left 03/22/2019    LEFT REVERSE TOTAL SHOULDER ARTHROPLASTY WITH BICEPS TENODESIS    OTHER SURGICAL HISTORY      Laparotomy (adhesions)    OTHER SURGICAL HISTORY      Arthrocentesis of the right knee joint    OTHER SURGICAL HISTORY      Arthrocentesis of the left knee joint    OTHER SURGICAL HISTORY  08/2019    rotator cuff    TOTAL KNEE REPLACEMENT Bilateral 2015 and 2016    TUBAL LIGATION           Current Outpatient Medications:     benzonatate (TESSALON PERLES) 100 MG Oral Cap, Take 1 capsule (100 mg total) by mouth 3 (three) times daily as needed. , Disp: 30 capsule, Rfl: 0    ibuprofen 600 MG Oral Tab, Take 1 tablet (600 mg total) by mouth every 8 (eight) hours as needed for Pain., Disp: 30 tablet, Rfl: 0    acetaminophen-codeine 300-30 MG Oral Tab, Take 1 tablet by mouth every 8 (eight) hours as needed for Pain., Disp: 90 tablet, Rfl: 1    alendronate 70 MG Oral Tab, Take 1 tablet (70 mg total) by mouth once a week., Disp: 12 tablet, Rfl: 2    clonazePAM 2 MG Oral Tab, Take 2 tablets (4 mg total) by mouth nightly., Disp: 30 tablet, Rfl: 0    verapamil  MG Oral Tab CR, Take 1 tablet (240 mg total) by mouth every evening., Disp: 90 tablet, Rfl: 0    atorvastatin 20 MG Oral Tab, Take 1 tablet (20 mg total) by mouth nightly., Disp: 90 tablet, Rfl: 3    ARIPiprazole 5 MG Oral Tab, Take 1 tablet (5 mg total) by mouth nightly., Disp: 90 tablet, Rfl: 0    albuterol 108 (90 Base) MCG/ACT Inhalation Aero Soln, Inhale 2 puffs into the lungs every 6 (six) hours as needed for Wheezing or Shortness of Breath., Disp: 3 each, Rfl: 0    QUEtiapine 50 MG Oral Tab, Take 1 tablet (50 mg total) by mouth nightly., Disp: 90 tablet, Rfl: 0    DULoxetine 60 MG Oral Cap DR Particles, Take 1 capsule (60 mg total) by mouth nightly.  Take 60mg daily., Disp: 90 capsule, Rfl: 0    albuterol sulfate (2.5 MG/3ML) 0.083% Inhalation Nebu Soln, Take 3 mL (2.5 mg total) by nebulization every 6 (six) hours as needed for Wheezing or Shortness of Breath., Disp: 90 each, Rfl: 0    Allergies:   Allergies   Allergen Reactions    Sulfa Antibiotics HIVES       Social History     Socioeconomic History    Marital status:      Spouse name: Not on file    Number of children: Not on file    Years of education: Not on file    Highest education level: Not on file   Occupational History    Not on file   Tobacco Use    Smoking status: Former     Packs/day: 1.00     Years: 30.00     Additional pack years: 0.00     Total pack years: 30.00     Types: Cigarettes     Quit date: 2002     Years since quittin.8     Passive exposure: Never    Smokeless tobacco: Never    Tobacco comments:     1 unit per day   Vaping Use    Vaping Use: Never used   Substance and Sexual Activity    Alcohol use: Not Currently     Alcohol/week: 0.0 standard drinks of alcohol     Comment: socially    Drug use: No    Sexual activity: Not Currently     Birth control/protection: Tubal Ligation   Other Topics Concern     Service Not Asked    Blood Transfusions Not Asked    Caffeine Concern Yes     Comment: tea, soda, 44 oz daily    Occupational Exposure Not Asked    Hobby Hazards Not Asked    Sleep Concern Not Asked    Stress Concern Not Asked    Weight Concern Not Asked    Special Diet Not Asked    Back Care Not Asked    Exercise Not Asked    Bike Helmet Not Asked    Seat Belt Not Asked    Self-Exams Not Asked    Grew up on a farm Not Asked    History of tanning Not Asked    Outdoor occupation Not Asked    Pt has a pacemaker Not Asked    Pt has a defibrillator Not Asked    Breast feeding Not Asked    Reaction to local anesthetic No    Left Handed Not Asked    Right Handed Yes    Currently spends a great deal of time in the sun Not Asked    Past Sunlamp Treatments for Acne Not Asked    Hx of Spending [place] of Time in [place] Not Asked    Bad sunburns in the past Not Asked    Tanning Salons in the Past Not Asked    Hx of Radiation Treatments Not Asked    Regular use of sun block Not Asked   Social History Narrative    Not on file     Social Determinants of Health     Financial Resource Strain: Not on file   Food Insecurity: Not on file   Transportation Needs: Not on file   Physical Activity: Not on file   Stress: Not on file   Social Connections: Not on file   Housing Stability: Not on file       PHYSICAL EXAM:   Unable to perform vitals or do physical exam as this is a virtual video visit. Patient appears alert. No conversational dyspnea or distress. ASSESSMENT/PLAN:   1. Acute cough  benzonatate (TESSALON PERLES) 100 MG Oral Cap; Take 1 capsule (100 mg total) by mouth 3 (three) times daily as needed. Dispense: 30 capsule; Refill: 0  Check COVID test.  Symptomatic treatment. 2. Sore throat  benzonatate (TESSALON PERLES) 100 MG Oral Cap; Take 1 capsule (100 mg total) by mouth 3 (three) times daily as needed. Dispense: 30 capsule; Refill: 0  As per #1. Return if symptoms worsen or fail to improve. Time spent on encounter  11 minutes   Video time 6 minutes   Documentation time 5 minutes     Eric Alamo understands video evaluation is not a substitute for face-to-face examination or emergency care. Patient advised to go to ER or call 911 for worsening symptoms or acute distress. Telehealth outside of [address] Verbal Consent   I conducted a telehealth visit with Eric Aviles today, 11/16/23, which was completed using two-way, real-time interactive audio and video communication. This has been done in good aneta to provide continuity of care in the best interest of the provider-patient relationship, due to the COVID -19 public health crisis/national emergency where restrictions of face-to-face office visits are ongoing.  Every conscious effort was taken to allow for sufficient and adequate time to complete the visit. The patient was made aware of the limitations of the telehealth visit, including treatment limitations as no physical exam could be performed. The patient was advised to call 911 or to go to the ER in case there was an emergency. The patient was also advised of the potential privacy & security concerns related to the telehealth platform. The patient was made aware of where to find [facility] notice of privacy practices, telehealth consent form and other related consent forms and documents. which are located on the [facility] website. The patient verbally agreed to telehealth consent form, related consents and the risks discussed. Lastly, the patient confirmed that they were in [place]. Included in this visit, time may have been spent reviewing labs, medications, radiology tests and decision making. Appropriate medical decision-making and tests are ordered as detailed in the plan of care above. Coding/billing information is submitted for this visit based on complexity of care and/or time spent for the visit. This note was prepared using [employer] [facility] [employer] voice recognition dictation software. As a result errors may occur. When identified these errors have been corrected.  While every attempt is made to correct errors during dictation discrepancies may still exist.    Jennifer Mendoza MD  11/16/2023

## 2023-11-21 RX ORDER — ALBUTEROL SULFATE 90 UG/1
2 AEROSOL, METERED RESPIRATORY (INHALATION) EVERY 6 HOURS PRN
Qty: 18 G | Refills: 5 | Status: SHIPPED | OUTPATIENT
Start: 2023-11-21

## 2023-11-21 NOTE — TELEPHONE ENCOUNTER
Patient is requesting refill and mentions she does not have any remaining; please advise    Albuterol

## 2023-11-21 NOTE — TELEPHONE ENCOUNTER
Refill passed per [facility] protocol. Requested Prescriptions   Pending Prescriptions Disp Refills    albuterol 108 (90 Base) MCG/ACT Inhalation Aero Soln 18 g 5     Sig: Inhale 2 puffs into the lungs every 6 (six) hours as needed for Wheezing or Shortness of Breath.        Asthma & COPD Medication Protocol Passed - 11/21/2023  9:46 AM        Passed - In person appointment or virtual visit in the past 6 mos or appointment in next 3 mos     Recent Outpatient Visits              5 days ago Acute cough    [facility], [address] [facility] [place], [facility], Mike Leon MD    Telemedicine    2 months ago MCI (mild cognitive impairment) with memory loss    [facility], [address], Reyes Bustillo MD    Office Visit    3 months ago LINDER and fatigue    [address] [facility] Arnold Rodríguez MD    Office Visit    3 months ago Nausea    [facility] [address] [facility]    Telemedicine    5 months ago [address] [place] [address], [address], [address] Taniya Pierce MD    Office Visit          Future Appointments         Provider Department Appt Notes    In 2 weeks Tanvi Walker MD [address] [facility] 3 month f/u                     Recent Outpatient Visits              5 days ago Acute cough    [address] [facility],[address], [address] [facility] [place], Mike Leon MD    Telemedicine    2 months ago MCI (mild cognitive impairment) with memory loss    [facility], Reyes Bustillo MD    Office Visit    3 months ago LINDER and fatigue    [address] [facility] Arnold Rodríguez MD    Office Visit    3 months ago Nausea    Loyda Davis MD    Telemedicine    5 months ago [address] [place] [address], [facility] Cristopher Lawson MD    Office Visit            Future Appointments         Provider Department Appt Notes    In 2 weeks Ron Pineda MD [address] [facility] [address], [address] [facility], [address] 3 month f/u

## 2023-11-22 RX ORDER — ALBUTEROL SULFATE 90 UG/1
2 AEROSOL, METERED RESPIRATORY (INHALATION) EVERY 6 HOURS PRN
Qty: 6.7 G | Refills: 0 | OUTPATIENT
Start: 2023-11-22

## 2023-12-22 ENCOUNTER — NURSE TRIAGE (OUTPATIENT)
Dept: INTERNAL MEDICINE CLINIC | Facility: CLINIC | Age: 68
End: 2023-12-22

## 2023-12-22 NOTE — TELEPHONE ENCOUNTER
Action Requested: Summary for Provider     []  Critical Lab, Recommendations Needed  [] Need Additional Advice  []   FYI    []   Need Orders  [] Need Medications Sent to Pharmacy  []  Other     SUMMARY: Patient reports sore throat that started today. States she received her flu vaccine yesterday. Denies: Fever, SOB, chest pain  Home care advised. Patient agreed with home care instructions. Advised to call back if symptoms do not improve, and also advised if symptoms worsen to go to IC.      Reason for call: Sore Throat  Onset: 12/22        Reason for Disposition   Sore throat    Protocols used: Sore Throat-A-OH

## 2023-12-23 ENCOUNTER — HOSPITAL ENCOUNTER (OUTPATIENT)
Age: 68
Discharge: HOME | End: 2023-12-23
Payer: MEDICARE

## 2023-12-23 VITALS
DIASTOLIC BLOOD PRESSURE: 58 MMHG | SYSTOLIC BLOOD PRESSURE: 118 MMHG | OXYGEN SATURATION: 93 % | HEART RATE: 85 BPM | RESPIRATION RATE: 20 BRPM | TEMPERATURE: 98 F

## 2023-12-23 DIAGNOSIS — J06.9 VIRAL URI WITH COUGH: Primary | ICD-10-CM

## 2023-12-23 LAB
POCT INFLUENZA A: NEGATIVE
POCT INFLUENZA B: NEGATIVE
SARS-COV-2 RNA RESP QL NAA+PROBE: NOT DETECTED

## 2023-12-23 PROCEDURE — 99213 OFFICE O/P EST LOW 20 MIN: CPT

## 2023-12-23 PROCEDURE — S0119 ONDANSETRON 4 MG: HCPCS

## 2023-12-23 PROCEDURE — 99214 OFFICE O/P EST MOD 30 MIN: CPT

## 2023-12-23 PROCEDURE — 87502 INFLUENZA DNA AMP PROBE: CPT | Performed by: NURSE PRACTITIONER

## 2023-12-23 RX ORDER — BENZONATATE 100 MG/1
100 CAPSULE ORAL 3 TIMES DAILY PRN
Qty: 30 CAPSULE | Refills: 0 | Status: SHIPPED | OUTPATIENT
Start: 2023-12-23 | End: 2024-01-22

## 2023-12-23 RX ORDER — ONDANSETRON 4 MG/1
4 TABLET, ORALLY DISINTEGRATING ORAL ONCE
Status: COMPLETED | OUTPATIENT
Start: 2023-12-23 | End: 2023-12-23

## 2023-12-23 NOTE — TELEPHONE ENCOUNTER
Action Requested: Summary for Provider     []  Critical Lab, Recommendations Needed  [] Need Additional Advice  [x]   FYI    []   Need Orders  [] Need Medications Sent to Pharmacy  []  Other     SUMMARY: going to [facility]    Reason for call: Sore Throat, runny nose, cough  Following up from yesterday. Patient called office. Date of birth and full name both confirmed. Feels worse today. Triaged per protocol and advised care advice per protocol. More details regarding Symptoms under Additional Documentation > Protocols Used. Evaluation advised Today - website showed that the walk in clinic was closed, and no openings in IM/FM today. Advised [facility] or [facility]. Have someone drive her. She verbalizes understanding of all information, and agreeable to plan. Patient verbalizes understanding and is agreeable to instructions. No future appointments. Care Advice             Patient/Caregiver understands and will follow care advice?: Yes, plans to follow advice                        FOR RELIEF OF SORE THROAT PAIN:  * Sip warm chicken broth or apple juice. * Suck on hard candy or a throat lozenge (over-the-counter). * Gargle warm salt water 3 times daily (1 teaspoon of salt in 8 oz or 240 ml of warm water). * Avoid cigarette smoke. PAIN AND FEVER MEDICINES:  * For pain or fever relief, take either acetaminophen or ibuprofen. * They are over-the-counter (OTC) drugs that help treat both fever and pain. You can buy them at the drugstore. * Treat fevers above 101 F (38.3 C). The goal of fever therapy is to bring the fever down to a comfortable level. Remember that fever medicine usually lowers fever 2 degrees F (1 - 1 1/2 degrees C). * ACETAMINOPHEN REGULAR STRENGTH TYLENOL: Take 650 mg (two 325 mg pills) by mouth every 4 to 6 hours as needed. Each Regular Strength Tylenol pill has 325 mg of acetaminophen.  The most you should take each day is 3,250 mg (10 pills a day). * ACETAMINOPHEN - EXTRA STRENGTH TYLENOL: Take 1,000 mg (two 500 mg pills) every 8 hours as needed. Each Extra Strength Tylenol pill has 500 mg of acetaminophen. The most you should take each day is 3,000 mg (6 pills a day). * IBUPROFEN (E.G., MOTRIN, ADVIL): Take 400 mg (two 200 mg pills) by mouth every 6 hours. The most you should take each day is 1,200 mg (six 200 mg pills), unless your doctor has told you to take more. DRINK PLENTY OF LIQUIDS:  * Drink plenty of liquids. This is important to prevent dehydration. * A healthy adult should drink 6-8 cups (240 ml) or more of liquid each day. * How can you tell if you are drinking enough liquids? The goal is to keep the urine clear or light-yellow in color. If your urine is bright yellow or dark yellow, you are probably not drinking enough liquids. * Caution: Some medical problems require fluid restriction. EXPECTED COURSE:  * Sore throats with viral illnesses usually last 3 or 4 days. CALL BACK IF:  * Sore throat is the main symptom and it lasts longer than 48 hours  * Sore throat is mild but lasts longer than 4 days  * Fever lasts longer than 3 days  * You become worse    Evaluation today     FOR RELIEF OF SORE THROAT PAIN:  * Sip warm chicken broth or apple juice. * Suck on hard candy or a throat lozenge (over-the-counter). * Gargle warm salt water 3 times daily (1 teaspoon of salt in 8 oz or 240 ml of warm water). * Avoid cigarette smoke. DRINK PLENTY OF LIQUIDS:  * Drink plenty of liquids. This is important to prevent dehydration. * A healthy adult should drink 6-8 cups (240 ml) or more of liquid each day. * How can you tell if you are drinking enough liquids? The goal is to keep the urine clear or light-yellow in color. If your urine is bright yellow or dark yellow, you are probably not drinking enough liquids. * Caution: Some medical problems require fluid restriction.     SEE IN OFFICE TODAY OR TOMORROW:   * You need to be examined. Let me give you an appointment. * IF NO AVAILABLE OFFICE APPOINTMENTS: walk in clinic or Immediate Care. PREVENT DEHYDRATION:   * Drink adequate liquids. * This will help soothe an irritated or dry throat and loosen up the phlegm.

## 2024-01-30 NOTE — TELEPHONE ENCOUNTER
According to DR Sakina Bustillo in 10 2015 her next colonoscpy was in 10 years  She  Then had another colonsocpy last year by DR whaley span  Impression:  1. Poor bowel preparation - no large masses, but polyps may have been missed  2. Tortuous colon  3.  A 4 mm descen Report given to BREANNA Pappas

## 2024-02-08 ENCOUNTER — NURSE TRIAGE (OUTPATIENT)
Dept: INTERNAL MEDICINE CLINIC | Facility: CLINIC | Age: 69
End: 2024-02-08

## 2024-02-08 ENCOUNTER — APPOINTMENT (OUTPATIENT)
Dept: MRI IMAGING | Facility: HOSPITAL | Age: 69
End: 2024-02-08
Attending: EMERGENCY MEDICINE
Payer: MEDICARE

## 2024-02-08 ENCOUNTER — HOSPITAL ENCOUNTER (EMERGENCY)
Facility: HOSPITAL | Age: 69
Discharge: HOME | End: 2024-02-08
Attending: EMERGENCY MEDICINE
Payer: MEDICARE

## 2024-02-08 VITALS
BODY MASS INDEX: 21 KG/M2 | TEMPERATURE: 98 F | WEIGHT: 109 LBS | SYSTOLIC BLOOD PRESSURE: 135 MMHG | RESPIRATION RATE: 16 BRPM | HEART RATE: 75 BPM | DIASTOLIC BLOOD PRESSURE: 74 MMHG | OXYGEN SATURATION: 99 %

## 2024-02-08 DIAGNOSIS — M54.12 CERVICAL RADICULOPATHY: Primary | ICD-10-CM

## 2024-02-08 LAB
ALBUMIN SERPL-MCNC: 4.6 G/DL (ref 3.2–4.8)
ALBUMIN/GLOB SERPL: 1.6 {RATIO} (ref 1–2)
ALP LIVER SERPL-CCNC: 81 U/L
ALT SERPL-CCNC: 15 U/L
ANION GAP SERPL CALC-SCNC: 6 MMOL/L (ref 0–18)
AST SERPL-CCNC: 28 U/L (ref ?–34)
BASOPHILS # BLD AUTO: 0.03 X10(3) UL (ref 0–0.2)
BASOPHILS NFR BLD AUTO: 0.6 %
BILIRUB SERPL-MCNC: 0.5 MG/DL (ref 0.2–1.1)
BUN BLD-MCNC: 10 MG/DL (ref 9–23)
BUN/CREAT SERPL: 15.2 (ref 10–20)
CALCIUM BLD-MCNC: 9.7 MG/DL (ref 8.7–10.4)
CHLORIDE SERPL-SCNC: 99 MMOL/L (ref 98–112)
CO2 SERPL-SCNC: 28 MMOL/L (ref 21–32)
CREAT BLD-MCNC: 0.66 MG/DL
DEPRECATED RDW RBC AUTO: 42.3 FL (ref 35.1–46.3)
EGFRCR SERPLBLD CKD-EPI 2021: 94 ML/MIN/1.73M2 (ref 60–?)
EOSINOPHIL # BLD AUTO: 0.11 X10(3) UL (ref 0–0.7)
EOSINOPHIL NFR BLD AUTO: 2.1 %
ERYTHROCYTE [DISTWIDTH] IN BLOOD BY AUTOMATED COUNT: 13.2 % (ref 11–15)
GLOBULIN PLAS-MCNC: 2.9 G/DL (ref 2.8–4.4)
GLUCOSE BLD-MCNC: 93 MG/DL (ref 70–99)
HCT VFR BLD AUTO: 35.5 %
HGB BLD-MCNC: 11.9 G/DL
IMM GRANULOCYTES # BLD AUTO: 0.01 X10(3) UL (ref 0–1)
IMM GRANULOCYTES NFR BLD: 0.2 %
LYMPHOCYTES # BLD AUTO: 1.94 X10(3) UL (ref 1–4)
LYMPHOCYTES NFR BLD AUTO: 37.1 %
MCH RBC QN AUTO: 29.5 PG (ref 26–34)
MCHC RBC AUTO-ENTMCNC: 33.5 G/DL (ref 31–37)
MCV RBC AUTO: 87.9 FL
MONOCYTES # BLD AUTO: 0.32 X10(3) UL (ref 0.1–1)
MONOCYTES NFR BLD AUTO: 6.1 %
NEUTROPHILS # BLD AUTO: 2.82 X10 (3) UL (ref 1.5–7.7)
NEUTROPHILS # BLD AUTO: 2.82 X10(3) UL (ref 1.5–7.7)
NEUTROPHILS NFR BLD AUTO: 53.9 %
OSMOLALITY SERPL CALC.SUM OF ELEC: 275 MOSM/KG (ref 275–295)
PLATELET # BLD AUTO: 237 10(3)UL (ref 150–450)
POTASSIUM SERPL-SCNC: 5 MMOL/L (ref 3.5–5.1)
PROT SERPL-MCNC: 7.5 G/DL (ref 5.7–8.2)
RBC # BLD AUTO: 4.04 X10(6)UL
SODIUM SERPL-SCNC: 133 MMOL/L (ref 136–145)
TROPONIN I SERPL HS-MCNC: 3 NG/L
WBC # BLD AUTO: 5.2 X10(3) UL (ref 4–11)

## 2024-02-08 PROCEDURE — 99285 EMERGENCY DEPT VISIT HI MDM: CPT

## 2024-02-08 PROCEDURE — 93010 ELECTROCARDIOGRAM REPORT: CPT

## 2024-02-08 PROCEDURE — 93005 ELECTROCARDIOGRAM TRACING: CPT

## 2024-02-08 PROCEDURE — 80053 COMPREHEN METABOLIC PANEL: CPT | Performed by: EMERGENCY MEDICINE

## 2024-02-08 PROCEDURE — 70551 MRI BRAIN STEM W/O DYE: CPT | Performed by: EMERGENCY MEDICINE

## 2024-02-08 PROCEDURE — 84484 ASSAY OF TROPONIN QUANT: CPT | Performed by: EMERGENCY MEDICINE

## 2024-02-08 PROCEDURE — 85025 COMPLETE CBC W/AUTO DIFF WBC: CPT | Performed by: EMERGENCY MEDICINE

## 2024-02-08 PROCEDURE — 72141 MRI NECK SPINE W/O DYE: CPT | Performed by: EMERGENCY MEDICINE

## 2024-02-08 PROCEDURE — 36415 COLL VENOUS BLD VENIPUNCTURE: CPT

## 2024-02-08 PROCEDURE — 99284 EMERGENCY DEPT VISIT MOD MDM: CPT

## 2024-02-08 RX ORDER — ACETAMINOPHEN AND CODEINE PHOSPHATE 300; 30 MG/1; MG/1
1 TABLET ORAL EVERY 6 HOURS PRN
Qty: 10 TABLET | Refills: 0 | Status: SHIPPED | OUTPATIENT
Start: 2024-02-08 | End: 2024-02-13

## 2024-02-08 RX ORDER — METHYLPREDNISOLONE 4 MG/1
TABLET ORAL
Qty: 1 EACH | Refills: 0 | Status: SHIPPED | OUTPATIENT
Start: 2024-02-08

## 2024-02-08 NOTE — TELEPHONE ENCOUNTER
Action Requested: Summary for Provider     []  Critical Lab, Recommendations Needed  [] Need Additional Advice  [x]   FATIMAH    []   Need Orders  [] Need Medications Sent to Pharmacy  []  Other     SUMMARY: Per protocol disposition advised Call  now.  Patient states symptoms started 1 week ago, she is able to take herself to ER. Will go now.    FATIMAH Rodriguez    RN Triage, 2/9/24 ER follow up.    Reason for call: Numbness  Onset: 1 week ago    Right hand and arm pins and needles, numbness \"when I touch my right arm, it feels like I'm touching a board, like it's not part of my body\"  Started about a week ago, but was in [place], just recently returned    Reason for Disposition   New neurologic deficit that is present NOW, sudden onset of ANY of the following: * Weakness of the face, arm, or leg on one side of the body* Numbness of the face, arm, or leg on one side of the body* Loss of speech or garbled speech    Protocols used: Neurologic Deficit-A-[place]

## 2024-02-08 NOTE — ED INITIAL ASSESSMENT (HPI)
Patient c/o N/T to the left arm, stated that a couple days ago it started with her hand and today it has traveled up her arm to her shoulder. Denies any other complaints. Denies trauma. Negative FAST exam. Denies headache/dizziness. Denies neck pain. Hx Raynauds disease.

## 2024-02-08 NOTE — ED PROVIDER NOTES
Patient Seen in: [facility] Emergency Department      History     Chief Complaint   Patient presents with    Numbness Weakness     Stated Complaint: right arm and hand numbness, no known trauma    Subjective:   HPI    Patient presents to the emergency department complaining of right hand and arm numbness progressive over the last 3 days.  She also describes some mild right-sided neck discomfort.  She is not dropping things.  Her legs have normal strength and sensation.  She denies visual disturbance, headache.  There is no chest pain or shortness of breath.    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    Pulmonary emphysema (HCC)     Raynaud disease     Rib fracture 2015    left side    Sciatica     Seborrheic keratoses 03/07/2017    Sx.6/1/15, CPT.81479, L Total Knee, w/, Global Exp.8/30/15 06/08/2015    Sx.6/8/16, CPT.25604, R Total Knee Replacement, w/, Global Exp.9/6/16 01/14/2016    Visual impairment     WEARS READER              Past Surgical History:   Procedure Laterality Date    APPENDECTOMY      appendicitis    BREAST BIOPSY Left 09/07/2011    fibrocystic changes    CATARACT      COLONOSCOPY  7/2009    incomplete (chronic constipation)    COLONOSCOPY N/A 11/8/2018    Procedure: COLONOSCOPY;  Surgeon: Micheal Curtis MD;  Location: [facility] ENDOSCOPY    COLONOSCOPY N/A 12/19/2019    Procedure: COLONOSCOPY;  Surgeon: Micheal Curtis MD;  Location: [facility] ENDOSCOPY    HERNIA SURGERY Left 12/21/2011    neuroma excision, partial mesh removal, primary re-repair    INGUINAL HERNIA REPAIR Left 05/20/2009    KNEE SURGERY Right     osteoarthritis    JESSICA LOCALIZATION WIRE 1 SITE LEFT (CPT=19281)      2011    ORIF HUMERUS FRACTURE Right 04/15/2022    right distal humerus open reduction internal fixation    OTHER Left  2019    LEFT REVERSE TOTAL SHOULDER ARTHROPLASTY WITH BICEPS TENODESIS    OTHER SURGICAL HISTORY      Laparotomy (adhesions)    OTHER SURGICAL HISTORY      Arthrocentesis of the right knee joint    OTHER SURGICAL HISTORY      Arthrocentesis of the left knee joint    OTHER SURGICAL HISTORY  2019    rotator cuff    TOTAL KNEE REPLACEMENT Bilateral  and 2016    TUBAL LIGATION                  Social History     Socioeconomic History    Marital status:    Tobacco Use    Smoking status: Former     Packs/day: 1.00     Years: 30.00     Additional pack years: 0.00     Total pack years: 30.00     Types: Cigarettes     Quit date: 2002     Years since quittin.0     Passive exposure: Never    Smokeless tobacco: Never    Tobacco comments:     1 unit per day   Vaping Use    Vaping Use: Never used   Substance and Sexual Activity    Alcohol use: Not Currently     Alcohol/week: 0.0 standard drinks of alcohol     Comment: socially    Drug use: No    Sexual activity: Not Currently     Birth control/protection: Tubal Ligation   Other Topics Concern    Caffeine Concern Yes     Comment: tea, soda, 44 oz daily    Reaction to local anesthetic No    Right Handed Yes              Review of Systems    Positive for stated complaint: right arm and hand numbness, no known trauma  Other systems are as noted in HPI.  Constitutional and vital signs reviewed.      All other systems reviewed and negative except as noted above.    Physical Exam     ED Triage Vitals [24 1604]   /86   Pulse 83   Resp 20   Temp 97.8 °F (36.6 °C)   Temp src Temporal   SpO2 95 %   O2 Device None (Room air)       Current:/74   Pulse 75   Temp 97.8 °F (36.6 °C) (Temporal)   Resp 16   Wt 49.4 kg   SpO2 99%   BMI 21.29 kg/m²         Physical Exam  Vitals and nursing note reviewed.   Constitutional:       General: She is not in acute distress.     Appearance: She is well-developed.   HENT:      Head: Normocephalic.       Nose: Nose normal.      Mouth/Throat:      Mouth: Mucous membranes are moist.   Eyes:      General: No visual field deficit.     Conjunctiva/sclera: Conjunctivae normal.   Neck:      Comments: Right-sided paraspinous muscle tenderness to palpation.  Cardiovascular:      Rate and Rhythm: Normal rate and regular rhythm.      Heart sounds: No murmur heard.  Pulmonary:      Effort: Pulmonary effort is normal. No respiratory distress.      Breath sounds: Normal breath sounds.   Abdominal:      General: There is no distension.      Palpations: Abdomen is soft.      Tenderness: There is no abdominal tenderness.   Musculoskeletal:         General: No tenderness. Normal range of motion.      Cervical back: Full passive range of motion without pain, normal range of motion and neck supple.   Skin:     General: Skin is warm and dry.      Findings: No rash.   Neurological:      Mental Status: She is alert and oriented to person, place, and time.      Cranial Nerves: No cranial nerve deficit, dysarthria or facial asymmetry.      Sensory: Sensory deficit present.      Motor: No weakness.      Coordination: Coordination normal.      Comments: Diffuse paresthesias to the right hand forearm and arm.               ED Course     Labs Reviewed   COMP METABOLIC PANEL (14) - Abnormal; Notable for the following components:       Result Value    Sodium 133 (*)     All other components within normal limits   CBC W/ DIFFERENTIAL - Abnormal; Notable for the following components:    HGB 11.9 (*)     All other components within normal limits   TROPONIN I HIGH SENSITIVITY - Normal   CBC WITH DIFFERENTIAL WITH PLATELET    Narrative:     The following orders were created for panel order CBC With Differential With Platelet.  Procedure                               Abnormality         Status                     ---------                               -----------         ------                     CBC W/ DIFFERENTIAL[[id number]]          Abnormal             Final result                 Please view results for these tests on the individual orders.   RAINBOW DRAW LAVENDER   RAINBOW DRAW LIGHT GREEN   RAINBOW DRAW BLUE     EKG    Rate, intervals and axes as noted on EKG Report.  Rate: 74 bpm  Rhythm: Sinus Rhythm  Reading: Normal EKG                          MDM                           Medical Decision Making  Differential diagnosis considered for radiculopathy, stroke, peripheral neuropathy.    Problems Addressed:  Cervical radiculopathy: acute illness or injury    Amount and/or Complexity of Data Reviewed  Labs: ordered. Decision-making details documented in ED Course.     Details: CBC, chemistry and troponin all negative.  Radiology: ordered and independent interpretation performed. Decision-making details documented in ED Course.     Details: MRI of the brain shows no evidence of acute infarct.  MRI of the cervical spine shows disc disease and spinal stenosis consistent with the patient's right arm tingling.  ECG/medicine tests: ordered and independent interpretation performed. Decision-making details documented in ED Course.  Discussion of management or test interpretation with external provider(s): Will treat with Medrol Dosepak, and pain medication outpatient follow-up for possible physical therapy.    Risk  Prescription drug management.        Disposition and Plan     Clinical Impression:  1. Cervical radiculopathy         Disposition:  Discharge  2/8/2024  8:32 pm    Follow-up:  Casa Van MD  [address] [place] [id number]  [phone]    Schedule an appointment as soon as possible for a visit      We recommend that you schedule follow up care with a primary care provider within the next three months to obtain basic health screening including reassessment of your blood pressure.      Medications Prescribed:  Discharge Medication List as of 2/8/2024  8:35 PM        START taking these medications    Details   methylPREDNISolone (MEDROL) 4 MG Oral  Tablet Therapy Pack Dosepack: take as directed, Normal, Disp-1 each, R-0      !! acetaminophen-codeine 300-30 MG Oral Tab Take 1 tablet by mouth every 6 (six) hours as needed for Pain., Normal, Disp-10 tablet, R-0       !! - Potential duplicate medications found. Please discuss with provider.

## 2024-02-09 LAB
ATRIAL RATE: 74 BPM
P AXIS: 61 DEGREES
P-R INTERVAL: 170 MS
Q-T INTERVAL: 386 MS
QRS DURATION: 74 MS
QTC CALCULATION (BEZET): 428 MS
R AXIS: -8 DEGREES
T AXIS: 37 DEGREES
VENTRICULAR RATE: 74 BPM

## 2024-02-09 NOTE — TELEPHONE ENCOUNTER
Patient was seen at the [facility] on 2/8/2024   Was treated and released

## 2024-02-13 ENCOUNTER — PATIENT OUTREACH (OUTPATIENT)
Dept: CASE MANAGEMENT | Age: 69
End: 2024-02-13

## 2024-02-13 NOTE — PROGRESS NOTES
1st attempt ER f/up apt request    Casa Van  PCP  [address] [facility] [address]  [phone]  Apt: Feb 15 @12:15pm    Confirmed w/ pt  Closing encounter

## 2024-03-07 ENCOUNTER — OFFICE VISIT (OUTPATIENT)
Dept: INTERNAL MEDICINE CLINIC | Facility: CLINIC | Age: 69
End: 2024-03-07

## 2024-03-07 VITALS
SYSTOLIC BLOOD PRESSURE: 116 MMHG | DIASTOLIC BLOOD PRESSURE: 62 MMHG | OXYGEN SATURATION: 98 % | BODY MASS INDEX: 30.63 KG/M2 | TEMPERATURE: 97 F | RESPIRATION RATE: 16 BRPM | WEIGHT: 156 LBS | HEIGHT: 60 IN | HEART RATE: 74 BPM

## 2024-03-07 DIAGNOSIS — R68.89 FORGETFULNESS: Primary | ICD-10-CM

## 2024-03-07 PROCEDURE — 3008F BODY MASS INDEX DOCD: CPT | Performed by: INTERNAL MEDICINE

## 2024-03-07 PROCEDURE — 3074F SYST BP LT 130 MM HG: CPT | Performed by: INTERNAL MEDICINE

## 2024-03-07 PROCEDURE — 1126F AMNT PAIN NOTED NONE PRSNT: CPT | Performed by: INTERNAL MEDICINE

## 2024-03-07 PROCEDURE — 1159F MED LIST DOCD IN RCRD: CPT | Performed by: INTERNAL MEDICINE

## 2024-03-07 PROCEDURE — 99213 OFFICE O/P EST LOW 20 MIN: CPT | Performed by: INTERNAL MEDICINE

## 2024-03-07 PROCEDURE — 3078F DIAST BP <80 MM HG: CPT | Performed by: INTERNAL MEDICINE

## 2024-03-07 PROCEDURE — 1160F RVW MEDS BY RX/DR IN RCRD: CPT | Performed by: INTERNAL MEDICINE

## 2024-03-07 NOTE — PROGRESS NOTES
Patient ID: Eric Aviles is a 68 year old female.  Chief Complaint   Patient presents with    Forgetful        HISTORY OF PRESENT ILLNESS:   HPI  Patient presents for above.  Apparently appointment was made by her children due to her forgetfulness.  This was discussed at a previous visit in 2023 and she was referred to neurology.  Lab work done by neurology was normal.  Neuropsychiatric testing was ordered as well but results are not in chart and patient is unsure if she did them.  See encounter in May 2023 regarding evolution of symptoms.  Patient states she is not having any of those symptoms currently.  Her daughter wants her to see a specific neurologist that is a partner of the neurologist seen in August 2023.    Review of Systems   Constitutional: Negative.    HENT: Negative.     Eyes: Negative.    Respiratory: Negative.     Cardiovascular: Negative.    Endocrine: Negative.    Genitourinary: Negative.    Musculoskeletal: Negative.    Allergic/Immunologic: Negative.    Neurological: Negative.    Hematological: Negative.      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    Pulmonary emphysema (HCC)     Raynaud disease     Rib fracture 2015    left side    Sciatica     Seborrheic keratoses 03/07/2017    Sx.6/1/15, CPT.47465, L Total Knee, w/, Global Exp.8/30/15 06/08/2015    Sx.6/8/16, CPT.01960, R Total Knee Replacement, w/, Global Exp.9/6/16 01/14/2016    Visual impairment     WEARS READER       Past Surgical History:   Procedure Laterality Date    APPENDECTOMY      appendicitis    BREAST BIOPSY Left 09/07/2011    fibrocystic changes    CATARACT      COLONOSCOPY  7/2009    incomplete (chronic constipation)    COLONOSCOPY N/A 11/8/2018    Procedure: COLONOSCOPY;  Surgeon: Micheal Curtis MD;  Location: [facility] ENDOSCOPY     COLONOSCOPY N/A 12/19/2019    Procedure: COLONOSCOPY;  Surgeon: Micheal Curtis MD;  Location: [facility] ENDOSCOPY    HERNIA SURGERY Left 12/21/2011    neuroma excision, partial mesh removal, primary re-repair    INGUINAL HERNIA REPAIR Left 05/20/2009    KNEE SURGERY Right     osteoarthritis    JESSICA LOCALIZATION WIRE 1 SITE LEFT (CPT=19281)      2011    ORIF HUMERUS FRACTURE Right 04/15/2022    right distal humerus open reduction internal fixation    OTHER Left 03/22/2019    LEFT REVERSE TOTAL SHOULDER ARTHROPLASTY WITH BICEPS TENODESIS    OTHER SURGICAL HISTORY      Laparotomy (adhesions)    OTHER SURGICAL HISTORY      Arthrocentesis of the right knee joint    OTHER SURGICAL HISTORY      Arthrocentesis of the left knee joint    OTHER SURGICAL HISTORY  08/2019    rotator cuff    TOTAL KNEE REPLACEMENT Bilateral 2015 and 2016    TUBAL LIGATION           Current Outpatient Medications:     methylPREDNISolone (MEDROL) 4 MG Oral Tablet Therapy Pack, Dosepack: take as directed, Disp: 1 each, Rfl: 0    albuterol 108 (90 Base) MCG/ACT Inhalation Aero Soln, Inhale 2 puffs into the lungs every 6 (six) hours as needed for Wheezing or Shortness of Breath., Disp: 18 g, Rfl: 5    ibuprofen 600 MG Oral Tab, Take 1 tablet (600 mg total) by mouth every 8 (eight) hours as needed for Pain., Disp: 30 tablet, Rfl: 0    acetaminophen-codeine 300-30 MG Oral Tab, Take 1 tablet by mouth every 8 (eight) hours as needed for Pain., Disp: 90 tablet, Rfl: 1    alendronate 70 MG Oral Tab, Take 1 tablet (70 mg total) by mouth once a week., Disp: 12 tablet, Rfl: 2    clonazePAM 2 MG Oral Tab, Take 2 tablets (4 mg total) by mouth nightly., Disp: 30 tablet, Rfl: 0    verapamil  MG Oral Tab CR, Take 1 tablet (240 mg total) by mouth every evening., Disp: 90 tablet, Rfl: 0    atorvastatin 20 MG Oral Tab, Take 1 tablet (20 mg total) by mouth nightly., Disp: 90 tablet, Rfl: 3    ARIPiprazole 5 MG Oral Tab, Take 1 tablet (5 mg total) by mouth nightly.,  Disp: 90 tablet, Rfl: 0    QUEtiapine 50 MG Oral Tab, Take 1 tablet (50 mg total) by mouth nightly., Disp: 90 tablet, Rfl: 0    DULoxetine 60 MG Oral Cap DR Particles, Take 1 capsule (60 mg total) by mouth nightly. Take 60mg daily., Disp: 90 capsule, Rfl: 0    albuterol sulfate (2.5 MG/3ML) 0.083% Inhalation Nebu Soln, Take 3 mL (2.5 mg total) by nebulization every 6 (six) hours as needed for Wheezing or Shortness of Breath., Disp: 90 each, Rfl: 0    Allergies:  Allergies   Allergen Reactions    Sulfa Antibiotics HIVES       Social History     Socioeconomic History    Marital status:      Spouse name: Not on file    Number of children: Not on file    Years of education: Not on file    Highest education level: Not on file   Occupational History    Not on file   Tobacco Use    Smoking status: Former     Packs/day: 1.00     Years: 30.00     Additional pack years: 0.00     Total pack years: 30.00     Types: Cigarettes     Quit date: 2002     Years since quittin.1     Passive exposure: Never    Smokeless tobacco: Never    Tobacco comments:     1 unit per day   Vaping Use    Vaping Use: Never used   Substance and Sexual Activity    Alcohol use: Not Currently     Alcohol/week: 0.0 standard drinks of alcohol     Comment: socially    Drug use: No    Sexual activity: Yes     Partners: Male     Birth control/protection: Tubal Ligation   Other Topics Concern     Service Not Asked    Blood Transfusions Not Asked    Caffeine Concern Yes     Comment: tea, soda, 44 oz daily    Occupational Exposure Not Asked    Hobby Hazards Not Asked    Sleep Concern Not Asked    Stress Concern Not Asked    Weight Concern Not Asked    Special Diet Not Asked    Back Care Not Asked    Exercise Not Asked    Bike Helmet Not Asked    Seat Belt Not Asked    Self-Exams Not Asked    Grew up on a farm Not Asked    History of tanning Not Asked    Outdoor occupation Not Asked    Pt has a pacemaker Not Asked    Pt has a defibrillator  Not Asked    Breast feeding Not Asked    Reaction to local anesthetic No    Left Handed Not Asked    Right Handed Yes    Currently spends a great deal of time in the sun Not Asked    Past Sunlamp Treatments for Acne Not Asked    Hx of Spending Great Deal of Time in Sun Not Asked    Bad sunburns in the past Not Asked    Tanning Salons in the Past Not Asked    Hx of Radiation Treatments Not Asked    Regular use of sun block Not Asked   Social History Narrative    Not on file     Social Determinants of Health     Financial Resource Strain: Not on file   Food Insecurity: Not on file   Transportation Needs: Not on file   Physical Activity: Not on file   Stress: Not on file   Social Connections: Not on file   Housing Stability: Not on file           PHYSICAL EXAM:     Vitals:    03/07/24 1104   BP: 116/62   Pulse: 74   Resp: 16   Temp: 97.4 °F (36.3 °C)   TempSrc: Temporal   SpO2: 98%   Weight: 156 lb (70.8 kg)   Height: 5' (1.524 m)       Body mass index is 30.47 kg/m².    Physical Exam  Constitutional:       Appearance: Normal appearance.   Eyes:      General: No scleral icterus.  Cardiovascular:      Rate and Rhythm: Normal rate and regular rhythm.      Pulses: Normal pulses.      Heart sounds: Normal heart sounds. No murmur heard.  Pulmonary:      Effort: Pulmonary effort is normal. No respiratory distress.      Breath sounds: Normal breath sounds. No stridor. No wheezing or rhonchi.   Neurological:      General: No focal deficit present.      Mental Status: She is alert.   Psychiatric:         Mood and Affect: Mood normal.         Behavior: Behavior normal.           ASSESSMENT/PLAN:   1. Forgetfulness  Patient to contact her neurologist to see if she had neuropsychiatric testing done.  If not then she will need to follow through with this.    Return in about 3 months (around 6/7/2024) for Complete physical.    This note was prepared using Dragon Medical voice recognition dictation software. As a result errors may  occur. When identified these errors have been corrected. While every attempt is made to correct errors during dictation discrepancies may still exist.    Casa Van MD  3/7/2024

## 2024-03-14 NOTE — TELEPHONE ENCOUNTER
Dr. Mendiola Neil the ED department and spoke with [facility], JOSE RAMON DE LA ROSA and pt is currently present. She arrived 7 minutes ago to ED. Paged anesthesia for patient sign out. Patient meets criteria for transfer to the next phase of care.

## 2024-03-19 ENCOUNTER — OFFICE VISIT (OUTPATIENT)
Dept: INTERNAL MEDICINE CLINIC | Facility: CLINIC | Age: 69
End: 2024-03-19

## 2024-03-19 ENCOUNTER — NURSE TRIAGE (OUTPATIENT)
Dept: INTERNAL MEDICINE CLINIC | Facility: CLINIC | Age: 69
End: 2024-03-19

## 2024-03-19 VITALS
SYSTOLIC BLOOD PRESSURE: 128 MMHG | OXYGEN SATURATION: 97 % | RESPIRATION RATE: 18 BRPM | TEMPERATURE: 97 F | HEART RATE: 80 BPM | BODY MASS INDEX: 30.63 KG/M2 | WEIGHT: 156 LBS | DIASTOLIC BLOOD PRESSURE: 76 MMHG | HEIGHT: 60 IN

## 2024-03-19 DIAGNOSIS — G56.01 CARPAL TUNNEL SYNDROME OF RIGHT WRIST: Primary | ICD-10-CM

## 2024-03-19 PROCEDURE — 3078F DIAST BP <80 MM HG: CPT | Performed by: INTERNAL MEDICINE

## 2024-03-19 PROCEDURE — 1125F AMNT PAIN NOTED PAIN PRSNT: CPT | Performed by: INTERNAL MEDICINE

## 2024-03-19 PROCEDURE — 99213 OFFICE O/P EST LOW 20 MIN: CPT | Performed by: INTERNAL MEDICINE

## 2024-03-19 PROCEDURE — 1160F RVW MEDS BY RX/DR IN RCRD: CPT | Performed by: INTERNAL MEDICINE

## 2024-03-19 PROCEDURE — 1159F MED LIST DOCD IN RCRD: CPT | Performed by: INTERNAL MEDICINE

## 2024-03-19 PROCEDURE — 3074F SYST BP LT 130 MM HG: CPT | Performed by: INTERNAL MEDICINE

## 2024-03-19 PROCEDURE — 3008F BODY MASS INDEX DOCD: CPT | Performed by: INTERNAL MEDICINE

## 2024-03-19 NOTE — PROGRESS NOTES
Patient ID: Eric Aviles is a 68 year old female.  Chief Complaint   Patient presents with    Other     Presents with right arm numbness and tingling.        HISTORY OF PRESENT ILLNESS:   HPI  Patient presents for above.  Here with a 1 day history of right arm numbness extending from fingers to her elbow.  Does not think she slept on her hand.  She is right-handed.  Denies any trauma.  No elbow pain.  Numbness is persistent.    Review of Systems   Constitutional: Negative.    HENT: Negative.     Eyes: Negative.    Respiratory: Negative.     Cardiovascular: Negative.    Endocrine: Negative.    Genitourinary: Negative.    Musculoskeletal: Negative.    Allergic/Immunologic: Negative.    Neurological:  Positive for numbness.   Hematological: Negative.      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    Pulmonary emphysema (HCC)     Raynaud disease     Rib fracture 2015    left side    Sciatica     Seborrheic keratoses 03/07/2017    Sx.6/1/15, CPT.31029, L Total Knee, w/, Global Exp.8/30/15 06/08/2015    Sx.6/8/16, CPT.14009, R Total Knee Replacement, w/, Global Exp.9/6/16 01/14/2016    Visual impairment     WEARS READER       Past Surgical History:   Procedure Laterality Date    APPENDECTOMY      appendicitis    BREAST BIOPSY Left 09/07/2011    fibrocystic changes    CATARACT      COLONOSCOPY  7/2009    incomplete (chronic constipation)    COLONOSCOPY N/A 11/8/2018    Procedure: COLONOSCOPY;  Surgeon: Micheal Curtis MD;  Location: [facility] ENDOSCOPY    COLONOSCOPY N/A 12/19/2019    Procedure: COLONOSCOPY;  Surgeon: Micheal Curtis MD;  Location: [facility] ENDOSCOPY    HERNIA SURGERY Left 12/21/2011    neuroma excision, partial mesh removal, primary re-repair    INGUINAL HERNIA REPAIR Left 05/20/2009    KNEE SURGERY Right      osteoarthritis    JESSICA LOCALIZATION WIRE 1 SITE LEFT (CPT=19281)      2011    ORIF HUMERUS FRACTURE Right 04/15/2022    right distal humerus open reduction internal fixation    OTHER Left 03/22/2019    LEFT REVERSE TOTAL SHOULDER ARTHROPLASTY WITH BICEPS TENODESIS    OTHER SURGICAL HISTORY      Laparotomy (adhesions)    OTHER SURGICAL HISTORY      Arthrocentesis of the right knee joint    OTHER SURGICAL HISTORY      Arthrocentesis of the left knee joint    OTHER SURGICAL HISTORY  08/2019    rotator cuff    TOTAL KNEE REPLACEMENT Bilateral 2015 and 2016    TUBAL LIGATION           Current Outpatient Medications:     methylPREDNISolone (MEDROL) 4 MG Oral Tablet Therapy Pack, Dosepack: take as directed, Disp: 1 each, Rfl: 0    albuterol 108 (90 Base) MCG/ACT Inhalation Aero Soln, Inhale 2 puffs into the lungs every 6 (six) hours as needed for Wheezing or Shortness of Breath., Disp: 18 g, Rfl: 5    ibuprofen 600 MG Oral Tab, Take 1 tablet (600 mg total) by mouth every 8 (eight) hours as needed for Pain., Disp: 30 tablet, Rfl: 0    acetaminophen-codeine 300-30 MG Oral Tab, Take 1 tablet by mouth every 8 (eight) hours as needed for Pain., Disp: 90 tablet, Rfl: 1    alendronate 70 MG Oral Tab, Take 1 tablet (70 mg total) by mouth once a week., Disp: 12 tablet, Rfl: 2    clonazePAM 2 MG Oral Tab, Take 2 tablets (4 mg total) by mouth nightly., Disp: 30 tablet, Rfl: 0    verapamil  MG Oral Tab CR, Take 1 tablet (240 mg total) by mouth every evening., Disp: 90 tablet, Rfl: 0    atorvastatin 20 MG Oral Tab, Take 1 tablet (20 mg total) by mouth nightly., Disp: 90 tablet, Rfl: 3    ARIPiprazole 5 MG Oral Tab, Take 1 tablet (5 mg total) by mouth nightly., Disp: 90 tablet, Rfl: 0    QUEtiapine 50 MG Oral Tab, Take 1 tablet (50 mg total) by mouth nightly., Disp: 90 tablet, Rfl: 0    DULoxetine 60 MG Oral Cap DR Particles, Take 1 capsule (60 mg total) by mouth nightly. Take 60mg daily., Disp: 90 capsule, Rfl: 0    albuterol  sulfate (2.5 MG/3ML) 0.083% Inhalation Nebu Soln, Take 3 mL (2.5 mg total) by nebulization every 6 (six) hours as needed for Wheezing or Shortness of Breath., Disp: 90 each, Rfl: 0    Allergies:  Allergies   Allergen Reactions    Sulfa Antibiotics HIVES       Social History     Socioeconomic History    Marital status:      Spouse name: Not on file    Number of children: Not on file    Years of education: Not on file    Highest education level: Not on file   Occupational History    Not on file   Tobacco Use    Smoking status: Former     Packs/day: 1.00     Years: 30.00     Additional pack years: 0.00     Total pack years: 30.00     Types: Cigarettes     Quit date: 2002     Years since quittin.1     Passive exposure: Never    Smokeless tobacco: Never    Tobacco comments:     1 unit per day   Vaping Use    Vaping Use: Never used   Substance and Sexual Activity    Alcohol use: Not Currently     Alcohol/week: 0.0 standard drinks of alcohol     Comment: socially    Drug use: No    Sexual activity: Yes     Partners: Male     Birth control/protection: Tubal Ligation   Other Topics Concern     Service Not Asked    Blood Transfusions Not Asked    Caffeine Concern Yes     Comment: tea, soda, 44 oz daily    Occupational Exposure Not Asked    Hobby Hazards Not Asked    Sleep Concern Not Asked    Stress Concern Not Asked    Weight Concern Not Asked    Special Diet Not Asked    Back Care Not Asked    Exercise Not Asked    Bike Helmet Not Asked    Seat Belt Not Asked    Self-Exams Not Asked    Grew up on a farm Not Asked    History of tanning Not Asked    Outdoor occupation Not Asked    Pt has a pacemaker Not Asked    Pt has a defibrillator Not Asked    Breast feeding Not Asked    Reaction to local anesthetic No    Left Handed Not Asked    Right Handed Yes    Currently spends a great deal of time in the sun Not Asked    Past Sunlamp Treatments for Acne Not Asked    Hx of Spending Great Deal of Time in Sun  Not Asked    Bad sunburns in the past Not Asked    Tanning Salons in the Past Not Asked    Hx of Radiation Treatments Not Asked    Regular use of sun block Not Asked   Social History Narrative    Not on file     Social Determinants of Health     Financial Resource Strain: Not on file   Food Insecurity: Not on file   Transportation Needs: Not on file   Physical Activity: Not on file   Stress: Not on file   Social Connections: Not on file   Housing Stability: Not on file           PHYSICAL EXAM:     Vitals:    03/19/24 1705   BP: 128/76   Pulse: 80   Resp: 18   Temp: 97.2 °F (36.2 °C)   TempSrc: Temporal   SpO2: 97%   Weight: 156 lb (70.8 kg)   Height: 5' (1.524 m)       Body mass index is 30.47 kg/m².    Physical Exam  Constitutional:       Appearance: Normal appearance.   Eyes:      General: No scleral icterus.  Cardiovascular:      Rate and Rhythm: Normal rate and regular rhythm.      Pulses: Normal pulses.      Heart sounds: Normal heart sounds. No murmur heard.  Pulmonary:      Effort: Pulmonary effort is normal.      Breath sounds: Normal breath sounds.   Neurological:      General: No focal deficit present.      Mental Status: She is alert.      Sensory: No sensory deficit.      Motor: No weakness.      Deep Tendon Reflexes: Reflexes normal.           ASSESSMENT/PLAN:   1. Carpal tunnel syndrome of right wrist  Exam is benign.  Wear wrist splint.  NSAIDs as needed.    Return if symptoms worsen or fail to improve.    This note was prepared using Dragon Medical voice recognition dictation software. As a result errors may occur. When identified these errors have been corrected. While every attempt is made to correct errors during dictation discrepancies may still exist.    Casa Van MD  3/19/2024

## 2024-03-19 NOTE — TELEPHONE ENCOUNTER
Please reply to pool: EM RN TRIAGE  Action Requested: Summary for Provider     []  Critical Lab, Recommendations Needed  [] Need Additional Advice  [x]   FYI    []   Need Orders  [] Need Medications Sent to Pharmacy  []  Other     SUMMARY: Patient contacts clinic reporting numbness and tingling to right arm.  Present x 1 week.  Skin is cool to touch, denies skin discoloration.  Denies weakness of extremity.  Denies headache, blurred vision, facial numbness or drooping.  Denies chest pain or shortness of breath.  Acute visit booked today.    Reason for call: Numbness  Onset: Data Unavailable                       Reason for Disposition   Neurologic deficit of gradual onset (e.g., days to weeks), ANY of the following: * Weakness of the face, arm, or leg on one side of the body* Numbness of the face, arm, or leg on one side of the body* Loss of speech or garbled speech    Protocols used: Neurologic Deficit-A-OH

## 2024-03-27 ENCOUNTER — NURSE TRIAGE (OUTPATIENT)
Dept: INTERNAL MEDICINE CLINIC | Facility: CLINIC | Age: 69
End: 2024-03-27

## 2024-03-27 ENCOUNTER — HOSPITAL ENCOUNTER (OUTPATIENT)
Dept: GENERAL RADIOLOGY | Age: 69
Discharge: HOME | End: 2024-03-27
Attending: INTERNAL MEDICINE
Payer: MEDICARE

## 2024-03-27 ENCOUNTER — OFFICE VISIT (OUTPATIENT)
Dept: INTERNAL MEDICINE CLINIC | Facility: CLINIC | Age: 69
End: 2024-03-27
Payer: MEDICARE

## 2024-03-27 VITALS
HEART RATE: 76 BPM | OXYGEN SATURATION: 95 % | SYSTOLIC BLOOD PRESSURE: 129 MMHG | BODY MASS INDEX: 30.82 KG/M2 | HEIGHT: 60 IN | WEIGHT: 157 LBS | DIASTOLIC BLOOD PRESSURE: 74 MMHG | RESPIRATION RATE: 18 BRPM

## 2024-03-27 DIAGNOSIS — R10.11 RIGHT UPPER QUADRANT PAIN: ICD-10-CM

## 2024-03-27 DIAGNOSIS — R07.81 RIB PAIN ON RIGHT SIDE: Primary | ICD-10-CM

## 2024-03-27 DIAGNOSIS — R07.81 RIB PAIN ON RIGHT SIDE: ICD-10-CM

## 2024-03-27 PROCEDURE — 1159F MED LIST DOCD IN RCRD: CPT | Performed by: INTERNAL MEDICINE

## 2024-03-27 PROCEDURE — 3008F BODY MASS INDEX DOCD: CPT | Performed by: INTERNAL MEDICINE

## 2024-03-27 PROCEDURE — 3074F SYST BP LT 130 MM HG: CPT | Performed by: INTERNAL MEDICINE

## 2024-03-27 PROCEDURE — 3078F DIAST BP <80 MM HG: CPT | Performed by: INTERNAL MEDICINE

## 2024-03-27 PROCEDURE — 71101 X-RAY EXAM UNILAT RIBS/CHEST: CPT | Performed by: INTERNAL MEDICINE

## 2024-03-27 PROCEDURE — 1160F RVW MEDS BY RX/DR IN RCRD: CPT | Performed by: INTERNAL MEDICINE

## 2024-03-27 PROCEDURE — 1125F AMNT PAIN NOTED PAIN PRSNT: CPT | Performed by: INTERNAL MEDICINE

## 2024-03-27 PROCEDURE — 99213 OFFICE O/P EST LOW 20 MIN: CPT | Performed by: INTERNAL MEDICINE

## 2024-03-27 NOTE — TELEPHONE ENCOUNTER
Action Requested: Summary for Provider     []  Critical Lab, Recommendations Needed  [] Need Additional Advice  []   FYI    []   Need Orders  [] Need Medications Sent to Pharmacy  []  Other     SUMMARY: Pt reports she felt a bump underneath her right breast, under rib area. Painful and tender to touch. Denies pain when taking deep breath. Not itchy. Pt states feels about a golf ball size, but not round. Appointment scheduled today with Dr. Byrne. Pt states she is able to make it to [place] office by 2pm.      Reason for call: Lump (Rib bump, no t)  Onset: Data Unavailable                     Reason for Disposition   Swelling is painful to touch and no fever    Protocols used: Skin Lump or Localized Swelling-A-[place]    Future Appointments   Date Time Provider Department Center   3/27/2024  2:00 PM Lilly Byrne MD [id number] [place]   5/17/2024 10:30 AM Philip Arroyo MD [facility]   6/7/2024 11:15 AM Casa Van MD [facility]

## 2024-03-27 NOTE — PROGRESS NOTES
Eric Aviles is a 68 year old female.  Chief Complaint   Patient presents with    Breast Lump     felt a bump underneath her right breast, under rib area. Painful and tender to touch.      HPI:    Patient presented today for follow up. She states that she noticed a bump and tenderness underneath the R rib last night. There is no swelling or color change or discharge or warmth of the area but there is tenderness to touch underneath the R ribs. No recent trauma. No other complains.     Current Outpatient Medications   Medication Sig Dispense Refill    methylPREDNISolone (MEDROL) 4 MG Oral Tablet Therapy Pack Dosepack: take as directed 1 each 0    albuterol 108 (90 Base) MCG/ACT Inhalation Aero Soln Inhale 2 puffs into the lungs every 6 (six) hours as needed for Wheezing or Shortness of Breath. 18 g 5    acetaminophen-codeine 300-30 MG Oral Tab Take 1 tablet by mouth every 8 (eight) hours as needed for Pain. 90 tablet 1    alendronate 70 MG Oral Tab Take 1 tablet (70 mg total) by mouth once a week. 12 tablet 2    clonazePAM 2 MG Oral Tab Take 2 tablets (4 mg total) by mouth nightly. 30 tablet 0    verapamil  MG Oral Tab CR Take 1 tablet (240 mg total) by mouth every evening. 90 tablet 0    atorvastatin 20 MG Oral Tab Take 1 tablet (20 mg total) by mouth nightly. 90 tablet 3    ARIPiprazole 5 MG Oral Tab Take 1 tablet (5 mg total) by mouth nightly. 90 tablet 0    QUEtiapine 50 MG Oral Tab Take 1 tablet (50 mg total) by mouth nightly. 90 tablet 0    DULoxetine 60 MG Oral Cap DR Particles Take 1 capsule (60 mg total) by mouth nightly. Take 60mg daily. 90 capsule 0    albuterol sulfate (2.5 MG/3ML) 0.083% Inhalation Nebu Soln Take 3 mL (2.5 mg total) by nebulization every 6 (six) hours as needed for Wheezing or Shortness of Breath. 90 each 0    ibuprofen 600 MG Oral Tab Take 1 tablet (600 mg total) by mouth every 8 (eight) hours as needed for Pain. 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    Pulmonary emphysema (HCC)     Raynaud disease     Rib fracture 2015    left side    Sciatica     Seborrheic keratoses 03/07/2017    Sx.6/1/15, CPT.99072, L Total Knee, w/, Global Exp.8/30/15 06/08/2015    Sx.6/8/16, CPT.61775, R Total Knee Replacement, w/, Global Exp.9/6/16 01/14/2016    Visual impairment     WEARS READER      Past Surgical History:   Procedure Laterality Date    APPENDECTOMY      appendicitis    BREAST BIOPSY Left 09/07/2011    fibrocystic changes    CATARACT      COLONOSCOPY  7/2009    incomplete (chronic constipation)    COLONOSCOPY N/A 11/8/2018    Procedure: COLONOSCOPY;  Surgeon: Micheal Curtis MD;  Location: [facility] ENDOSCOPY    COLONOSCOPY N/A 12/19/2019    Procedure: COLONOSCOPY;  Surgeon: Micheal Curtis MD;  Location: [facility] ENDOSCOPY    HERNIA SURGERY Left 12/21/2011    neuroma excision, partial mesh removal, primary re-repair    INGUINAL HERNIA REPAIR Left 05/20/2009    KNEE SURGERY Right     osteoarthritis    JESSICA LOCALIZATION WIRE 1 SITE LEFT (CPT=19281)      2011    ORIF HUMERUS FRACTURE Right 04/15/2022    right distal humerus open reduction internal fixation    OTHER Left 03/22/2019    LEFT REVERSE TOTAL SHOULDER ARTHROPLASTY WITH BICEPS TENODESIS    OTHER SURGICAL HISTORY      Laparotomy (adhesions)    OTHER SURGICAL HISTORY      Arthrocentesis of the right knee joint    OTHER SURGICAL HISTORY      Arthrocentesis of the left knee joint    OTHER SURGICAL HISTORY  08/2019    rotator cuff    TOTAL KNEE REPLACEMENT Bilateral 2015 and 2016    TUBAL LIGATION        Social History:  Social History     Socioeconomic History    Marital status:    Tobacco Use    Smoking status: Former     Packs/day: 1.00     Years: 30.00     Additional pack years: 0.00     Total pack  years: 30.00     Types: Cigarettes     Quit date: 2002     Years since quittin.1     Passive exposure: Never    Smokeless tobacco: Never    Tobacco comments:     1 unit per day   Vaping Use    Vaping Use: Never used   Substance and Sexual Activity    Alcohol use: Not Currently     Alcohol/week: 0.0 standard drinks of alcohol     Comment: socially    Drug use: No    Sexual activity: Yes     Partners: Male     Birth control/protection: Tubal Ligation   Other Topics Concern    Caffeine Concern Yes     Comment: tea, soda, 44 oz daily    Reaction to local anesthetic No    Right Handed Yes      Family History   Problem Relation Age of Onset    Diabetes Mother     Heart Disease Mother         CAD    Stroke Mother         CVA    Heart Attack Mother         quadruple bypass surgery/MI    Arthritis Mother         possible Rheumatoid Arthritis    Dementia Father         Alzheimer's    Lipids Father         hyperlipidemia    Hypertension Father     Musculo-skelatal Disorder Father         B/L knees replaced    Breast Cancer Sister 40    Other (Other) Sister         flu H1N1    Cancer Brother         liver    Asthma Daughter     Colon Cancer Maternal Grandmother     Breast Cancer Paternal Aunt 52    Glaucoma Neg         multiple    Macular degeneration Neg         multiple      Allergies   Allergen Reactions    Sulfa Antibiotics HIVES        REVIEW OF SYSTEMS:   Review of Systems   Review of Systems   Constitutional: Negative for activity change, appetite change and fever.   HENT: Negative for congestion and voice change.    Respiratory: Negative for cough and shortness of breath.    Cardiovascular: Negative for chest pain.   Gastrointestinal: Negative for abdominal distention, abdominal pain and vomiting.   Genitourinary: Negative for hematuria.   Skin: Negative for wound.   Psychiatric/Behavioral: Negative for behavioral problems.   Wt Readings from Last 5 Encounters:   24 157 lb (71.2 kg)   24 156 lb  (70.8 kg)   03/07/24 156 lb (70.8 kg)   02/08/24 109 lb (49.4 kg)   08/29/23 105 lb (47.6 kg)     Body mass index is 30.66 kg/m².      EXAM:   /74 (BP Location: Left arm, Patient Position: Sitting, Cuff Size: adult)   Pulse 76   Resp 18   Ht 5' (1.524 m)   Wt 157 lb (71.2 kg)   SpO2 95%   BMI 30.66 kg/m²   Physical Exam   Constitutional:       Appearance: Normal appearance.   HENT:      Head: Normocephalic.   Eyes:      Conjunctiva/sclera: Conjunctivae normal.   Chest    Mild tenderness at the lower border of R rib, no swelling, warmth, erythema, fluctuation.  Cardiovascular:      Rate and Rhythm: Normal rate and regular rhythm.      Heart sounds: Normal heart sounds. No murmur heard.  Pulmonary:      Effort: Pulmonary effort is normal.      Breath sounds: Normal breath sounds. No rhonchi or rales.   Abdominal:      General: Bowel sounds are normal.      Palpations: Abdomen is soft.      Tenderness: There is no abdominal tenderness.   Musculoskeletal:      Cervical back: Neck supple.      Right lower leg: No edema.      Left lower leg: No edema.   Skin:     General: Skin is warm and dry.   Neurological:      General: No focal deficit present.      Mental Status: He is alert and oriented to person, place, and time. Mental status is at baseline.   Psychiatric:         Mood and Affect: Mood normal.         Behavior: Behavior normal.       ASSESSMENT AND PLAN:   1. Rib pain on right side  2. Right upper quadrant pain  - XR RIBS WITH CHEST (3 VIEWS), RIGHT  (CPT=71101); Future  - US ABDOMEN LIMITED (CPT=76705); Future  - tylenol as needed      The patient indicates understanding of these issues and agrees to the plan.      Lilly Byrne MD

## 2024-04-02 ENCOUNTER — NURSE TRIAGE (OUTPATIENT)
Dept: INTERNAL MEDICINE CLINIC | Facility: CLINIC | Age: 69
End: 2024-04-02

## 2024-04-02 NOTE — TELEPHONE ENCOUNTER
Action Requested: Summary for Provider     []  Critical Lab, Recommendations Needed  [] Need Additional Advice  []   FYI    []   Need Orders  [] Need Medications Sent to Pharmacy  []  Other     SUMMARY: Per protocol advised : Office visit   Future Appointments   Date Time Provider Department Center   2024 11:15 AM Casa Van MD [facility] [place]   2024  9:15 AM [facility] US [facility] [place]   2024 10:30 AM Philip Arroyo MD [facility]   2024 11:15 AM Casa Van MD [facility] [place]       Reason for call: Toenail Care  Onset: Data Unavailable    Patient calling ( identified name and  ) thinks she has a fungal infection to right great toe , nail is yellow and thick , hard to trim/ cut the nail     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

## 2024-04-04 ENCOUNTER — OFFICE VISIT (OUTPATIENT)
Dept: INTERNAL MEDICINE CLINIC | Facility: CLINIC | Age: 69
End: 2024-04-04

## 2024-04-04 VITALS
BODY MASS INDEX: 30.7 KG/M2 | RESPIRATION RATE: 16 BRPM | HEART RATE: 78 BPM | WEIGHT: 156.38 LBS | SYSTOLIC BLOOD PRESSURE: 129 MMHG | DIASTOLIC BLOOD PRESSURE: 73 MMHG | OXYGEN SATURATION: 96 % | HEIGHT: 60 IN

## 2024-04-04 DIAGNOSIS — B35.1 ONYCHOMYCOSIS: Primary | ICD-10-CM

## 2024-04-04 PROCEDURE — 1160F RVW MEDS BY RX/DR IN RCRD: CPT | Performed by: INTERNAL MEDICINE

## 2024-04-04 PROCEDURE — G2211 COMPLEX E/M VISIT ADD ON: HCPCS | Performed by: INTERNAL MEDICINE

## 2024-04-04 PROCEDURE — 3078F DIAST BP <80 MM HG: CPT | Performed by: INTERNAL MEDICINE

## 2024-04-04 PROCEDURE — 99213 OFFICE O/P EST LOW 20 MIN: CPT | Performed by: INTERNAL MEDICINE

## 2024-04-04 PROCEDURE — 1159F MED LIST DOCD IN RCRD: CPT | Performed by: INTERNAL MEDICINE

## 2024-04-04 PROCEDURE — 3008F BODY MASS INDEX DOCD: CPT | Performed by: INTERNAL MEDICINE

## 2024-04-04 PROCEDURE — 3074F SYST BP LT 130 MM HG: CPT | Performed by: INTERNAL MEDICINE

## 2024-04-04 RX ORDER — TERBINAFINE HYDROCHLORIDE 250 MG/1
250 TABLET ORAL DAILY
Qty: 90 TABLET | Refills: 0 | Status: SHIPPED | OUTPATIENT
Start: 2024-04-04

## 2024-04-04 NOTE — PROGRESS NOTES
Patient ID: Eric Aviles is a 69 year old female.  Chief Complaint   Patient presents with    Fungus Nails     RT, great toe.         HISTORY OF PRESENT ILLNESS:   HPI  Patient presents for above.  Has rash on her right big toe.  Causing nail curvature.  No pain.  Is not been on any over-the-counter treatments.    Review of Systems   Constitutional: Negative.    HENT: Negative.     Eyes: Negative.    Respiratory: Negative.     Cardiovascular: Negative.    Endocrine: Negative.    Genitourinary: Negative.    Musculoskeletal: Negative.    Skin:  Positive for rash.   Allergic/Immunologic: Negative.    Neurological: Negative.    Hematological: Negative.      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    Pulmonary emphysema (HCC)     Raynaud disease     Rib fracture 2015    left side    Sciatica     Seborrheic keratoses 03/07/2017    Sx.6/1/15, CPT.46379, L Total Knee, w/, Global Exp.8/30/15 06/08/2015    Sx.6/8/16, CPT.75394, R Total Knee Replacement, w/, Global Exp.9/6/16 01/14/2016    Visual impairment     WEARS READER       Past Surgical History:   Procedure Laterality Date    APPENDECTOMY      appendicitis    BREAST BIOPSY Left 09/07/2011    fibrocystic changes    CATARACT      COLONOSCOPY  7/2009    incomplete (chronic constipation)    COLONOSCOPY N/A 11/8/2018    Procedure: COLONOSCOPY;  Surgeon: Micheal Curtis MD;  Location: [facility] ENDOSCOPY    COLONOSCOPY N/A 12/19/2019    Procedure: COLONOSCOPY;  Surgeon: Micheal Curtis MD;  Location: [facility] ENDOSCOPY    HERNIA SURGERY Left 12/21/2011    neuroma excision, partial mesh removal, primary re-repair    INGUINAL HERNIA REPAIR Left 05/20/2009    KNEE SURGERY Right     osteoarthritis    JESSICA LOCALIZATION WIRE 1 SITE LEFT (CPT=19281)      2011    ORIF HUMERUS FRACTURE Right  04/15/2022    right distal humerus open reduction internal fixation    OTHER Left 03/22/2019    LEFT REVERSE TOTAL SHOULDER ARTHROPLASTY WITH BICEPS TENODESIS    OTHER SURGICAL HISTORY      Laparotomy (adhesions)    OTHER SURGICAL HISTORY      Arthrocentesis of the right knee joint    OTHER SURGICAL HISTORY      Arthrocentesis of the left knee joint    OTHER SURGICAL HISTORY  08/2019    rotator cuff    TOTAL KNEE REPLACEMENT Bilateral 2015 and 2016    TUBAL LIGATION           Current Outpatient Medications:     terbinafine 250 MG Oral Tab, Take 1 tablet (250 mg total) by mouth daily., Disp: 90 tablet, Rfl: 0    albuterol 108 (90 Base) MCG/ACT Inhalation Aero Soln, Inhale 2 puffs into the lungs every 6 (six) hours as needed for Wheezing or Shortness of Breath., Disp: 18 g, Rfl: 5    ibuprofen 600 MG Oral Tab, Take 1 tablet (600 mg total) by mouth every 8 (eight) hours as needed for Pain., Disp: 30 tablet, Rfl: 0    acetaminophen-codeine 300-30 MG Oral Tab, Take 1 tablet by mouth every 8 (eight) hours as needed for Pain., Disp: 90 tablet, Rfl: 1    alendronate 70 MG Oral Tab, Take 1 tablet (70 mg total) by mouth once a week., Disp: 12 tablet, Rfl: 2    clonazePAM 2 MG Oral Tab, Take 2 tablets (4 mg total) by mouth nightly., Disp: 30 tablet, Rfl: 0    verapamil  MG Oral Tab CR, Take 1 tablet (240 mg total) by mouth every evening., Disp: 90 tablet, Rfl: 0    atorvastatin 20 MG Oral Tab, Take 1 tablet (20 mg total) by mouth nightly., Disp: 90 tablet, Rfl: 3    ARIPiprazole 5 MG Oral Tab, Take 1 tablet (5 mg total) by mouth nightly., Disp: 90 tablet, Rfl: 0    QUEtiapine 50 MG Oral Tab, Take 1 tablet (50 mg total) by mouth nightly., Disp: 90 tablet, Rfl: 0    DULoxetine 60 MG Oral Cap DR Particles, Take 1 capsule (60 mg total) by mouth nightly. Take 60mg daily., Disp: 90 capsule, Rfl: 0    albuterol sulfate (2.5 MG/3ML) 0.083% Inhalation Nebu Soln, Take 3 mL (2.5 mg total) by nebulization every 6 (six) hours as  needed for Wheezing or Shortness of Breath., Disp: 90 each, Rfl: 0    Allergies:  Allergies   Allergen Reactions    Sulfa Antibiotics HIVES       Social History     Socioeconomic History    Marital status:      Spouse name: Not on file    Number of children: Not on file    Years of education: Not on file    Highest education level: Not on file   Occupational History    Not on file   Tobacco Use    Smoking status: Former     Packs/day: 1.00     Years: 30.00     Additional pack years: 0.00     Total pack years: 30.00     Types: Cigarettes     Quit date: 2002     Years since quittin.1     Passive exposure: Never    Smokeless tobacco: Never    Tobacco comments:     1 unit per day   Vaping Use    Vaping Use: Never used   Substance and Sexual Activity    Alcohol use: Not Currently     Alcohol/week: 0.0 standard drinks of alcohol     Comment: socially    Drug use: No    Sexual activity: Yes     Partners: Male     Birth control/protection: Tubal Ligation   Other Topics Concern     Service Not Asked    Blood Transfusions Not Asked    Caffeine Concern Yes     Comment: tea, soda, 44 oz daily    Occupational Exposure Not Asked    Hobby Hazards Not Asked    Sleep Concern Not Asked    Stress Concern Not Asked    Weight Concern Not Asked    Special Diet Not Asked    Back Care Not Asked    Exercise Not Asked    Bike Helmet Not Asked    Seat Belt Not Asked    Self-Exams Not Asked    Grew up on a farm Not Asked    History of tanning Not Asked    Outdoor occupation Not Asked    Pt has a pacemaker Not Asked    Pt has a defibrillator Not Asked    Breast feeding Not Asked    Reaction to local anesthetic No    Left Handed Not Asked    Right Handed Yes    Currently spends a great deal of time in the sun Not Asked    Past Sunlamp Treatments for Acne Not Asked    Hx of Spending Great Deal of Time in Sun Not Asked    Bad sunburns in the past Not Asked    Tanning Salons in the Past Not Asked    Hx of Radiation  Treatments Not Asked    Regular use of sun block Not Asked   Social History Narrative    Not on file     Social Determinants of Health     Financial Resource Strain: Not on file   Food Insecurity: Not on file   Transportation Needs: Not on file   Physical Activity: Not on file   Stress: Not on file   Social Connections: Not on file   Housing Stability: Not on file           PHYSICAL EXAM:     Vitals:    04/04/24 1114   BP: 129/73   Pulse: 78   Resp: 16   SpO2: 96%   Weight: 156 lb 6 oz (70.9 kg)   Height: 5' (1.524 m)       Body mass index is 30.54 kg/m².    Physical Exam  Constitutional:       Appearance: Normal appearance.   Eyes:      General: No scleral icterus.  Musculoskeletal:        Feet:    Neurological:      Mental Status: She is alert.                 ASSESSMENT/PLAN:   1. Onychomycosis  terbinafine 250 MG Oral Tab; Take 1 tablet (250 mg total) by mouth daily.  Dispense: 90 tablet; Refill: 0    Return if symptoms worsen or fail to improve.    This note was prepared using Dragon Medical voice recognition dictation software. As a result errors may occur. When identified these errors have been corrected. While every attempt is made to correct errors during dictation discrepancies may still exist.    Casa Van MD  4/4/2024

## 2024-05-06 ENCOUNTER — APPOINTMENT (OUTPATIENT)
Dept: GENERAL RADIOLOGY | Facility: HOSPITAL | Age: 69
End: 2024-05-06
Attending: EMERGENCY MEDICINE
Payer: MEDICARE

## 2024-05-06 ENCOUNTER — TELEPHONE (OUTPATIENT)
Dept: INTERNAL MEDICINE CLINIC | Facility: CLINIC | Age: 69
End: 2024-05-06

## 2024-05-06 ENCOUNTER — HOSPITAL ENCOUNTER (EMERGENCY)
Facility: HOSPITAL | Age: 69
Discharge: HOME | End: 2024-05-06
Attending: EMERGENCY MEDICINE
Payer: MEDICARE

## 2024-05-06 VITALS
BODY MASS INDEX: 25.52 KG/M2 | HEART RATE: 70 BPM | OXYGEN SATURATION: 96 % | WEIGHT: 130 LBS | TEMPERATURE: 97 F | HEIGHT: 60 IN | DIASTOLIC BLOOD PRESSURE: 72 MMHG | SYSTOLIC BLOOD PRESSURE: 165 MMHG | RESPIRATION RATE: 22 BRPM

## 2024-05-06 DIAGNOSIS — R07.89 CHEST WALL PAIN: Primary | ICD-10-CM

## 2024-05-06 LAB
ALBUMIN SERPL-MCNC: 4.8 G/DL (ref 3.2–4.8)
ALBUMIN/GLOB SERPL: 1.8 {RATIO} (ref 1–2)
ALP LIVER SERPL-CCNC: 83 U/L
ALT SERPL-CCNC: 15 U/L
ANION GAP SERPL CALC-SCNC: 7 MMOL/L (ref 0–18)
AST SERPL-CCNC: 25 U/L (ref ?–34)
BASOPHILS # BLD AUTO: 0.03 X10(3) UL (ref 0–0.2)
BASOPHILS NFR BLD AUTO: 0.6 %
BILIRUB SERPL-MCNC: 0.4 MG/DL (ref 0.2–1.1)
BUN BLD-MCNC: 8 MG/DL (ref 9–23)
BUN/CREAT SERPL: 12.7 (ref 10–20)
CALCIUM BLD-MCNC: 9.3 MG/DL (ref 8.7–10.4)
CHLORIDE SERPL-SCNC: 99 MMOL/L (ref 98–112)
CO2 SERPL-SCNC: 26 MMOL/L (ref 21–32)
CREAT BLD-MCNC: 0.63 MG/DL
DEPRECATED RDW RBC AUTO: 40.7 FL (ref 35.1–46.3)
EGFRCR SERPLBLD CKD-EPI 2021: 95 ML/MIN/1.73M2 (ref 60–?)
EOSINOPHIL # BLD AUTO: 0.08 X10(3) UL (ref 0–0.7)
EOSINOPHIL NFR BLD AUTO: 1.6 %
ERYTHROCYTE [DISTWIDTH] IN BLOOD BY AUTOMATED COUNT: 12.5 % (ref 11–15)
GLOBULIN PLAS-MCNC: 2.7 G/DL (ref 2–3.5)
GLUCOSE BLD-MCNC: 98 MG/DL (ref 70–99)
HCT VFR BLD AUTO: 36.3 %
HGB BLD-MCNC: 12.2 G/DL
IMM GRANULOCYTES # BLD AUTO: 0.02 X10(3) UL (ref 0–1)
IMM GRANULOCYTES NFR BLD: 0.4 %
LIPASE SERPL-CCNC: 32 U/L (ref 13–75)
LYMPHOCYTES # BLD AUTO: 1.76 X10(3) UL (ref 1–4)
LYMPHOCYTES NFR BLD AUTO: 34.8 %
MCH RBC QN AUTO: 30 PG (ref 26–34)
MCHC RBC AUTO-ENTMCNC: 33.6 G/DL (ref 31–37)
MCV RBC AUTO: 89.2 FL
MONOCYTES # BLD AUTO: 0.42 X10(3) UL (ref 0.1–1)
MONOCYTES NFR BLD AUTO: 8.3 %
NEUTROPHILS # BLD AUTO: 2.75 X10 (3) UL (ref 1.5–7.7)
NEUTROPHILS # BLD AUTO: 2.75 X10(3) UL (ref 1.5–7.7)
NEUTROPHILS NFR BLD AUTO: 54.3 %
OSMOLALITY SERPL CALC.SUM OF ELEC: 272 MOSM/KG (ref 275–295)
PLATELET # BLD AUTO: 213 10(3)UL (ref 150–450)
POTASSIUM SERPL-SCNC: 4.1 MMOL/L (ref 3.5–5.1)
PROT SERPL-MCNC: 7.5 G/DL (ref 5.7–8.2)
RBC # BLD AUTO: 4.07 X10(6)UL
SODIUM SERPL-SCNC: 132 MMOL/L (ref 136–145)
WBC # BLD AUTO: 5.1 X10(3) UL (ref 4–11)

## 2024-05-06 PROCEDURE — 93010 ELECTROCARDIOGRAM REPORT: CPT

## 2024-05-06 PROCEDURE — 85025 COMPLETE CBC W/AUTO DIFF WBC: CPT

## 2024-05-06 PROCEDURE — 85025 COMPLETE CBC W/AUTO DIFF WBC: CPT | Performed by: EMERGENCY MEDICINE

## 2024-05-06 PROCEDURE — 96374 THER/PROPH/DIAG INJ IV PUSH: CPT

## 2024-05-06 PROCEDURE — 80053 COMPREHEN METABOLIC PANEL: CPT

## 2024-05-06 PROCEDURE — 83690 ASSAY OF LIPASE: CPT | Performed by: EMERGENCY MEDICINE

## 2024-05-06 PROCEDURE — 93005 ELECTROCARDIOGRAM TRACING: CPT

## 2024-05-06 PROCEDURE — 71045 X-RAY EXAM CHEST 1 VIEW: CPT | Performed by: EMERGENCY MEDICINE

## 2024-05-06 PROCEDURE — 99285 EMERGENCY DEPT VISIT HI MDM: CPT

## 2024-05-06 PROCEDURE — 80053 COMPREHEN METABOLIC PANEL: CPT | Performed by: EMERGENCY MEDICINE

## 2024-05-06 PROCEDURE — 99284 EMERGENCY DEPT VISIT MOD MDM: CPT

## 2024-05-06 RX ORDER — KETOROLAC TROMETHAMINE 15 MG/ML
15 INJECTION, SOLUTION INTRAMUSCULAR; INTRAVENOUS ONCE
Status: COMPLETED | OUTPATIENT
Start: 2024-05-06 | End: 2024-05-06

## 2024-05-06 RX ORDER — KETOROLAC TROMETHAMINE 10 MG/1
10 TABLET, FILM COATED ORAL EVERY 6 HOURS PRN
Qty: 20 TABLET | Refills: 0 | Status: SHIPPED | OUTPATIENT
Start: 2024-05-06 | End: 2024-05-13

## 2024-05-07 LAB
ATRIAL RATE: 73 BPM
P AXIS: 49 DEGREES
P-R INTERVAL: 172 MS
Q-T INTERVAL: 384 MS
QRS DURATION: 78 MS
QTC CALCULATION (BEZET): 423 MS
R AXIS: -8 DEGREES
T AXIS: 33 DEGREES
VENTRICULAR RATE: 73 BPM

## 2024-05-07 NOTE — TELEPHONE ENCOUNTER
Returned page on-call  Pain under her right ribcage x3 days and worsening   Tender to touch but also painful when she breathes in  When she takes a deep breath feels like there is a block in her chest   Noted XR ribs 3/27 with no fracture identified     Advised ER for r/o pulmonary embolism or other serious cause of symptoms  Agrees and will go now

## 2024-05-07 NOTE — ED INITIAL ASSESSMENT (HPI)
Patient c/o left rib pain and SOB. Denies injury/trauma. Denies CP. Denies N/V. Patient c/o left arm numbness/tingling intermittent since the rib pain. Denies blood thinners. States that she also has lower back pain. BEFAST negative

## 2024-05-07 NOTE — ED PROVIDER NOTES
Patient Seen in: [facility] Emergency Department    History     Chief Complaint   Patient presents with    Difficulty Breathing       HPI    History is provided by patient/independent historian: Patient  69 year old female with bipolar disorder, hypertension, pulmonary emphysema, rib fracture, here with complaints of right-sided rib pain and shortness of breath intermittently for the past 3 to 4 days.  When the pain comes, she gets short of breath.  Only worse with palpation.  No known trauma.  No recent cough or vomiting.  No abdominal pain, nausea vomiting or diarrhea.  She told her doctor who referred her to the emergency department for further evaluation.    History reviewed.   Past Medical History:    Bipolar disorder (HCC)    Cataract    Depression    Essential hypertension    Osteoarthritis    R knee surgery    Psychogenic polydipsia    Pulmonary emphysema (HCC)    Raynaud disease    Rib fracture    left side    Sciatica    Seborrheic keratoses    Sx.6/1/15, CPT.26176, L Total Knee, w/, Global Exp.8/30/15    Sx.6/8/16, CPT.98834, R Total Knee Replacement, w/, Global Exp.9/6/16    Visual impairment    WEARS READER         History reviewed.   Past Surgical History:   Procedure Laterality Date    Appendectomy      appendicitis    Breast biopsy Left 09/07/2011    fibrocystic changes    Cataract      Colonoscopy  7/2009    incomplete (chronic constipation)    Colonoscopy N/A 11/8/2018    Procedure: COLONOSCOPY;  Surgeon: Micheal Curtis MD;  Location: [facility] ENDOSCOPY    Colonoscopy N/A 12/19/2019    Procedure: COLONOSCOPY;  Surgeon: Micheal Curtis MD;  Location: [facility] ENDOSCOPY    Hernia surgery Left 12/21/2011    neuroma excision, partial mesh removal, primary re-repair    Inguinal hernia repair Left 05/20/2009    Knee surgery Right     osteoarthritis    Angel localization wire 1 site left (cpt=19281)      2011    Orif humerus fracture Right 04/15/2022    right distal humerus open  reduction internal fixation    Other Left 2019    LEFT REVERSE TOTAL SHOULDER ARTHROPLASTY WITH BICEPS TENODESIS    Other surgical history      Laparotomy (adhesions)    Other surgical history      Arthrocentesis of the right knee joint    Other surgical history      Arthrocentesis of the left knee joint    Other surgical history  2019    rotator cuff    Total knee replacement Bilateral 2015 and 2016    Tubal ligation           Home Medications reviewed :  (Not in a hospital admission)        History reviewed.   Social History     Socioeconomic History    Marital status:    Tobacco Use    Smoking status: Former     Current packs/day: 0.00     Average packs/day: 1 pack/day for 30.0 years (30.0 ttl pk-yrs)     Types: Cigarettes     Start date: 1972     Quit date: 2002     Years since quittin.2     Passive exposure: Never    Smokeless tobacco: Never    Tobacco comments:     1 unit per day   Vaping Use    Vaping status: Never Used   Substance and Sexual Activity    Alcohol use: Not Currently     Alcohol/week: 0.0 standard drinks of alcohol     Comment: socially    Drug use: No    Sexual activity: Yes     Partners: Male     Birth control/protection: Tubal Ligation   Other Topics Concern    Caffeine Concern Yes     Comment: tea, soda, 44 oz daily    Reaction to local anesthetic No    Right Handed Yes         ROS  Review of Systems   Respiratory:  Positive for shortness of breath.    Cardiovascular:  Positive for chest pain.   All other systems reviewed and are negative.     All other pertinent organ systems are reviewed and are negative.      Physical Exam     ED Triage Vitals [24 1940]   /81   Pulse 75   Resp (!) 31   Temp 97.4 °F (36.3 °C)   Temp src Temporal   SpO2 100 %   O2 Device None (Room air)     Vital signs reviewed.      Physical Exam  Vitals and nursing note reviewed.   Cardiovascular:      Pulses: Normal pulses.   Pulmonary:      Effort: No respiratory distress.    Chest:       Abdominal:      General: There is no distension.   Neurological:      Mental Status: She is alert.         ED Course       Labs:     Labs Reviewed   COMP METABOLIC PANEL (14) - Abnormal; Notable for the following components:       Result Value    Sodium 132 (*)     BUN 8 (*)     Calculated Osmolality 272 (*)     All other components within normal limits   LIPASE - Normal   CBC WITH DIFFERENTIAL WITH PLATELET    Narrative:     The following orders were created for panel order CBC With Differential With Platelet.                  Procedure                               Abnormality         Status                                     ---------                               -----------         ------                                     CBC W/ DIFFERENTIAL[[id number]]                              Final result                                                 Please view results for these tests on the individual orders.   RAINBOW DRAW LAVENDER   RAINBOW DRAW LIGHT GREEN   RAINBOW DRAW BLUE   RAINBOW DRAW GOLD   CBC W/ DIFFERENTIAL         My EKG Interpretation: EKG    Rate, intervals and axes as noted on EKG Report.  Rate: 73  Rhythm: Sinus Rhythm  Reading: normal for rate, normal for rhythm, no acute ST changes           As reviewed and Interpreted by me      Imaging Results Available and Reviewed while in ED:   XR CHEST AP PORTABLE  (CPT=71045)    Result Date: 5/6/2024  CONCLUSION:   No radiographic evidence of acute cardiopulmonary abnormality.  Within the limits of this exam, no acute displaced rib fracture is seen.    elm-remote    Dictated by (CST): Sunny Nieto MD on 5/06/2024 at 9:32 PM     Finalized by (CST): Sunny Nieto MD on 5/06/2024 at 9:33 PM         My review and independent interpretation of XR images: no infiltrate. Radiology report corroborates this in addition to other details as reported by them.      Decision rules/scores evaluated: none      Diagnostic labs/tests considered but not  ordered: none    ED Medications Administered:   Medications   ketorolac (Toradol) 15 MG/ML injection 15 mg (15 mg Intravenous Given 5/6/24 2026)            - pain improved    MDM       Medical Decision Making      Differential Diagnosis: After obtaining the patient's history, performing the physical exam and reviewing the diagnostics, multiple initial diagnoses were considered based on the presenting problem including cholelithiasis, costochondritis, pneumonia, shingles    External document review: I personally reviewed available external medical records for any recent pertinent discharge summaries, testing, and procedures - the findings are as follows: 4/4/24 visit with Dr. Van for big toe rash    Complicating Factors: The patient already  has a past medical history of Bipolar disorder ([facility]), Cataract, Depression, Essential hypertension, Osteoarthritis, Psychogenic polydipsia (01/14/2015), Pulmonary emphysema (HCC), Raynaud disease, Rib fracture (2015), Sciatica, Seborrheic keratoses (03/07/2017), Sx.6/1/15, CPT.72783, L Total Knee, w/, Global Exp.8/30/15 (06/08/2015), Sx.6/8/16, CPT.94439, R Total Knee Replacement, w/, Global Exp.9/6/16 (01/14/2016), and Visual impairment. to contribute to the complexity of this ED evaluation.    Procedures performed: none    Discussed management with physician/appropriate source: none    Considered admission/deescalation of care for: none    Social determinants of health affecting patient care: none    Prescription medications considered: toradol, discussed continuing current medication regimen    The patient requires continuous monitoring for: rib pain    Shared decision making: discussed possible admission      Disposition and Plan     Clinical Impression:  1. Chest wall pain        Disposition:  Discharge    Follow-up:  Casa Van MD  [address] [place] [id number]  [phone]    Follow up        Medications Prescribed:  Current Discharge  Medication List        START taking these medications    Details   Ketorolac Tromethamine 10 MG Oral Tab Take 1 tablet (10 mg total) by mouth every 6 (six) hours as needed.  Qty: 20 tablet, Refills: 0

## 2024-05-09 ENCOUNTER — PATIENT OUTREACH (OUTPATIENT)
Dept: CASE MANAGEMENT | Age: 69
End: 2024-05-09

## 2024-05-09 NOTE — PROGRESS NOTES
1st attempt ER f/up apt request  No answer, LVMTCB to schedule apt  PCP -existing apt (6/7) for MA supervisit, unable to contact  Closing encounter

## 2024-07-15 NOTE — PLAN OF CARE
Problem: Patient Centered Care  Goal: Patient preferences are identified and integrated in the patient's plan of care  Description  Interventions:  - What would you like us to know as we care for you?  I need my sleeping medication at night  - Provide roscoe Spoke with patient regarding her DEXA results and Dr Acevedo recommendations for vit D, calcium and weight bearing exercises. Also her recommendation to switch from Depo to something different. Pt reports she has started iron and calcium supplements. She is not ready to come off the Depo just yet.      -ALFRED Barbour   retention  Outcome: Progressing   Persistent cough, robitussin and tussionex prn. Using 2L O2 intermittently.      Problem: PAIN - ADULT  Goal: Verbalizes/displays adequate comfort level or patient's stated pain goal  Description  INTERVENTIONS:  - Encourag resources  Description  INTERVENTIONS:  - Identify barriers to discharge w/pt and caregiver  - Include patient/family/discharge partner in discharge planning  - Arrange for needed discharge resources and transportation as appropriate  - Identify discharge

## 2024-07-26 ENCOUNTER — MED REC SCAN ONLY (OUTPATIENT)
Dept: NEUROLOGY | Facility: CLINIC | Age: 69
End: 2024-07-26

## 2024-07-29 ENCOUNTER — TELEPHONE (OUTPATIENT)
Age: 69
End: 2024-07-29

## 2024-09-30 ENCOUNTER — NURSE TRIAGE (OUTPATIENT)
Dept: INTERNAL MEDICINE CLINIC | Facility: CLINIC | Age: 69
End: 2024-09-30

## 2024-09-30 NOTE — TELEPHONE ENCOUNTER
Action Requested: Summary for Provider     []  Critical Lab, Recommendations Needed  [] Need Additional Advice  []   FYI    []   Need Orders  [] Need Medications Sent to Pharmacy  []  Other     SUMMARY: Recommended visit today or tomrorow     Future Appointments   Date Time Provider Department Center   10/1/2024 12:00 PM Kavita Lao APRN [facility]     Reason for call: Leg Pain  Onset: 1 day     Bilateral leg pain, starting in the hips and radiating down. Symptoms started yesterday morning. Rating pain as moderate. Patient has not taken any medications for pain relief. Able to walk but causes increased pain with weight bearing.   Reason for Disposition   MODERATE pain (e.g., interferes with normal activities, limping) and high-risk adult (e.g., age > 60 years, osteoporosis, chronic steroid use)    Protocols used: Knee Injury-A-OH

## 2024-10-02 ENCOUNTER — OFFICE VISIT (OUTPATIENT)
Dept: INTERNAL MEDICINE CLINIC | Facility: CLINIC | Age: 69
End: 2024-10-02

## 2024-10-02 VITALS
BODY MASS INDEX: 32.79 KG/M2 | OXYGEN SATURATION: 96 % | TEMPERATURE: 97 F | SYSTOLIC BLOOD PRESSURE: 138 MMHG | HEART RATE: 88 BPM | DIASTOLIC BLOOD PRESSURE: 56 MMHG | RESPIRATION RATE: 18 BRPM | WEIGHT: 167 LBS | HEIGHT: 60 IN

## 2024-10-02 DIAGNOSIS — M25.552 BILATERAL HIP PAIN: Primary | ICD-10-CM

## 2024-10-02 DIAGNOSIS — M25.551 BILATERAL HIP PAIN: Primary | ICD-10-CM

## 2024-10-02 DIAGNOSIS — Z23 NEED FOR VACCINATION: ICD-10-CM

## 2024-10-02 PROCEDURE — 3075F SYST BP GE 130 - 139MM HG: CPT | Performed by: INTERNAL MEDICINE

## 2024-10-02 PROCEDURE — 99214 OFFICE O/P EST MOD 30 MIN: CPT | Performed by: INTERNAL MEDICINE

## 2024-10-02 PROCEDURE — 1159F MED LIST DOCD IN RCRD: CPT | Performed by: INTERNAL MEDICINE

## 2024-10-02 PROCEDURE — 3008F BODY MASS INDEX DOCD: CPT | Performed by: INTERNAL MEDICINE

## 2024-10-02 PROCEDURE — G0008 ADMIN INFLUENZA VIRUS VAC: HCPCS | Performed by: INTERNAL MEDICINE

## 2024-10-02 PROCEDURE — 3078F DIAST BP <80 MM HG: CPT | Performed by: INTERNAL MEDICINE

## 2024-10-02 PROCEDURE — 90662 IIV NO PRSV INCREASED AG IM: CPT | Performed by: INTERNAL MEDICINE

## 2024-10-02 PROCEDURE — 1125F AMNT PAIN NOTED PAIN PRSNT: CPT | Performed by: INTERNAL MEDICINE

## 2024-10-02 PROCEDURE — 1160F RVW MEDS BY RX/DR IN RCRD: CPT | Performed by: INTERNAL MEDICINE

## 2024-10-02 PROCEDURE — G2211 COMPLEX E/M VISIT ADD ON: HCPCS | Performed by: INTERNAL MEDICINE

## 2024-10-02 RX ORDER — ACETAMINOPHEN AND CODEINE PHOSPHATE 300; 30 MG/1; MG/1
1 TABLET ORAL EVERY 8 HOURS PRN
Qty: 90 TABLET | Refills: 1 | Status: SHIPPED | OUTPATIENT
Start: 2024-10-02

## 2024-10-02 NOTE — PROGRESS NOTES
Patient ID: Eric Aviles is a 69 year old female.  Chief Complaint   Patient presents with    Hip Pain        HISTORY OF PRESENT ILLNESS:   HPI  Patient presents for above.  Here with a longstanding history of bilateral hip pain.  No radiculopathy.  She is not sure why she came in today specifically but states she cannot tolerate symptoms any longer.  No trauma.  She is able to do all her activities despite the pain.  Never had this investigated in the past.    She would like her influenza vaccination.    Review of Systems  Ten point review of systems otherwise negative with the exception of HPI and assessment and plan.    MEDICAL HISTORY:     Past Medical History:    Bipolar disorder (HCC)    Cataract    Depression    Essential hypertension    Osteoarthritis    R knee surgery    Psychogenic polydipsia    Pulmonary emphysema (HCC)    Raynaud disease    Rib fracture    left side    Sciatica    Seborrheic keratoses    Sx.6/1/15, CPT.79784, L Total Knee, w/, Global Exp.8/30/15    Sx.6/8/16, CPT.67798, R Total Knee Replacement, w/, Global Exp.9/6/16    Visual impairment    WEARS READER       Past Surgical History:   Procedure Laterality Date    Appendectomy      appendicitis    Breast biopsy Left 09/07/2011    fibrocystic changes    Cataract      Colonoscopy  7/2009    incomplete (chronic constipation)    Colonoscopy N/A 11/8/2018    Procedure: COLONOSCOPY;  Surgeon: Micheal Curtis MD;  Location: [facility] ENDOSCOPY    Colonoscopy N/A 12/19/2019    Procedure: COLONOSCOPY;  Surgeon: Micheal Curtis MD;  Location: [facility] ENDOSCOPY    Hernia surgery Left 12/21/2011    neuroma excision, partial mesh removal, primary re-repair    Inguinal hernia repair Left 05/20/2009    Knee surgery Right     osteoarthritis    Angel localization wire 1 site left (cpt=19281)      2011    Orif humerus fracture Right 04/15/2022    right distal humerus open reduction internal fixation    Other Left 03/22/2019    LEFT REVERSE  TOTAL SHOULDER ARTHROPLASTY WITH BICEPS TENODESIS    Other surgical history      Laparotomy (adhesions)    Other surgical history      Arthrocentesis of the right knee joint    Other surgical history      Arthrocentesis of the left knee joint    Other surgical history  08/2019    rotator cuff    Total knee replacement Bilateral 2015 and 2016    Tubal ligation           Current Outpatient Medications:     acetaminophen-codeine 300-30 MG Oral Tab, Take 1 tablet by mouth every 8 (eight) hours as needed for Pain., Disp: 90 tablet, Rfl: 1    donepezil 5 MG Oral Tab, Take 1 tablet (5 mg total) by mouth nightly., Disp: 90 tablet, Rfl: 3    terbinafine 250 MG Oral Tab, Take 1 tablet (250 mg total) by mouth daily., Disp: 90 tablet, Rfl: 0    albuterol 108 (90 Base) MCG/ACT Inhalation Aero Soln, Inhale 2 puffs into the lungs every 6 (six) hours as needed for Wheezing or Shortness of Breath., Disp: 18 g, Rfl: 5    ibuprofen 600 MG Oral Tab, Take 1 tablet (600 mg total) by mouth every 8 (eight) hours as needed for Pain., Disp: 30 tablet, Rfl: 0    alendronate 70 MG Oral Tab, Take 1 tablet (70 mg total) by mouth once a week., Disp: 12 tablet, Rfl: 2    clonazePAM 2 MG Oral Tab, Take 2 tablets (4 mg total) by mouth nightly., Disp: 30 tablet, Rfl: 0    verapamil  MG Oral Tab CR, Take 1 tablet (240 mg total) by mouth every evening., Disp: 90 tablet, Rfl: 0    atorvastatin 20 MG Oral Tab, Take 1 tablet (20 mg total) by mouth nightly., Disp: 90 tablet, Rfl: 3    ARIPiprazole 5 MG Oral Tab, Take 1 tablet (5 mg total) by mouth nightly., Disp: 90 tablet, Rfl: 0    QUEtiapine 50 MG Oral Tab, Take 1 tablet (50 mg total) by mouth nightly., Disp: 90 tablet, Rfl: 0    DULoxetine 60 MG Oral Cap DR Particles, Take 1 capsule (60 mg total) by mouth nightly. Take 60mg daily., Disp: 90 capsule, Rfl: 0    albuterol sulfate (2.5 MG/3ML) 0.083% Inhalation Nebu Soln, Take 3 mL (2.5 mg total) by nebulization every 6 (six) hours as needed for  Wheezing or Shortness of Breath., Disp: 90 each, Rfl: 0    Allergies:  Allergies   Allergen Reactions    Sulfa Antibiotics HIVES       Social History     Socioeconomic History    Marital status:      Spouse name: Not on file    Number of children: Not on file    Years of education: Not on file    Highest education level: Not on file   Occupational History    Not on file   Tobacco Use    Smoking status: Former     Current packs/day: 0.00     Average packs/day: 1 pack/day for 30.0 years (30.0 ttl pk-yrs)     Types: Cigarettes     Start date: 1972     Quit date: 2002     Years since quittin.6     Passive exposure: Never    Smokeless tobacco: Never    Tobacco comments:     1 unit per day   Vaping Use    Vaping status: Never Used   Substance and Sexual Activity    Alcohol use: Not Currently     Alcohol/week: 0.0 standard drinks of alcohol     Comment: socially    Drug use: No    Sexual activity: Yes     Partners: Male     Birth control/protection: Tubal Ligation   Other Topics Concern     Service Not Asked    Blood Transfusions Not Asked    Caffeine Concern Yes     Comment: tea, soda, 44 oz daily    Occupational Exposure Not Asked    Hobby Hazards Not Asked    Sleep Concern Not Asked    Stress Concern Not Asked    Weight Concern Not Asked    Special Diet Not Asked    Back Care Not Asked    Exercise Not Asked    Bike Helmet Not Asked    Seat Belt Not Asked    Self-Exams Not Asked    Grew up on a farm Not Asked    History of tanning Not Asked    Outdoor occupation Not Asked    Pt has a pacemaker Not Asked    Pt has a defibrillator Not Asked    Breast feeding Not Asked    Reaction to local anesthetic No    Left Handed Not Asked    Right Handed Yes    Currently spends a great deal of time in the sun Not Asked    Past Sunlamp Treatments for Acne Not Asked    Hx of Spending Great Deal of Time in Sun Not Asked    Bad sunburns in the past Not Asked    Tanning Salons in the Past Not Asked    Hx of  Radiation Treatments Not Asked    Regular use of sun block Not Asked   Social History Narrative    Not on file     Social Determinants of Health     Financial Resource Strain: Not on file   Food Insecurity: Not on file   Transportation Needs: Not on file   Physical Activity: Not on file   Stress: Not on file   Social Connections: Not on file   Housing Stability: Not on file           PHYSICAL EXAM:     Vitals:    10/02/24 0947   BP: 138/56   Pulse: 88   Resp: 18   Temp: 97.2 °F (36.2 °C)   TempSrc: Temporal   SpO2: 96%   Weight: 167 lb (75.8 kg)   Height: 5' (1.524 m)       Body mass index is 32.61 kg/m².    Physical Exam  Eyes:      General: No scleral icterus.  Musculoskeletal:      Lumbar back: Normal.      Right hip: Normal.      Left hip: Normal.   Neurological:      General: No focal deficit present.      Mental Status: She is alert.      Sensory: No sensory deficit.      Motor: No weakness.      Gait: Gait normal.           ASSESSMENT/PLAN:   1. Bilateral hip pain  acetaminophen-codeine 300-30 MG Oral Tab; Take 1 tablet by mouth every 8 (eight) hours as needed for Pain.  Dispense: 90 tablet; Refill: 1  XR HIP W OR WO PELVIS(MIN 5 VIEWS),BILAT(CPT=73523); Future  Physical Therapy Referral - [place] Locations    2. Need for vaccination  High Dose Fluzone trivalent influenza, 65yrs+ PFS (77991)    Return if symptoms worsen or fail to improve.    This note was prepared using Dragon Medical voice recognition dictation software. As a result errors may occur. When identified these errors have been corrected. While every attempt is made to correct errors during dictation discrepancies may still exist.    Casa Van MD  10/2/2024

## 2024-10-08 ENCOUNTER — HOSPITAL ENCOUNTER (OUTPATIENT)
Age: 69
Discharge: HOME | End: 2024-10-08
Payer: MEDICARE

## 2024-10-08 VITALS
TEMPERATURE: 98 F | SYSTOLIC BLOOD PRESSURE: 150 MMHG | RESPIRATION RATE: 18 BRPM | HEART RATE: 71 BPM | DIASTOLIC BLOOD PRESSURE: 80 MMHG | OXYGEN SATURATION: 98 %

## 2024-10-08 DIAGNOSIS — R21 RASH: Primary | ICD-10-CM

## 2024-10-08 PROCEDURE — 99213 OFFICE O/P EST LOW 20 MIN: CPT

## 2024-10-08 RX ORDER — NYSTATIN 100000 U/G
1 CREAM TOPICAL 2 TIMES DAILY
Qty: 30 G | Refills: 0 | Status: SHIPPED | OUTPATIENT
Start: 2024-10-08 | End: 2024-10-18

## 2024-10-08 NOTE — ED INITIAL ASSESSMENT (HPI)
Patient presents with a  rash under right breast, states she just noticed it two hours ago. No pain  
(1) Female

## 2024-10-15 NOTE — ED PROVIDER NOTES
Patient Seen in: [facility]      History     Chief Complaint   Patient presents with    Rash Skin Problem     Stated Complaint: rash    Subjective:   HPI      69-year-old female presents with a rash under her right breast that she noticed 1 hour ago.    Objective:     Past Medical History:    Bipolar disorder (HCC)    Cataract    Depression    Essential hypertension    Osteoarthritis    R knee surgery    Psychogenic polydipsia    Pulmonary emphysema (HCC)    Raynaud disease    Rib fracture    left side    Sciatica    Seborrheic keratoses    Sx.6/1/15, CPT.92212, L Total Knee, w/, Global Exp.8/30/15    Sx.6/8/16, CPT.57459, R Total Knee Replacement, w/, Global Exp.9/6/16    Visual impairment    WEARS READER              No pertinent past surgical history.              No pertinent social history.            Review of Systems    Positive for stated complaint: rash  Other systems are as noted in HPI.  Constitutional and vital signs reviewed.      All other systems reviewed and negative except as noted above.    Physical Exam     ED Triage Vitals [10/08/24 1744]   /80   Pulse 71   Resp 18   Temp 98 °F (36.7 °C)   Temp src Temporal   SpO2 98 %   O2 Device None (Room air)       Current Vitals:   No data recorded    Physical Exam  Vitals reviewed.   Constitutional:       General: She is not in acute distress.  Cardiovascular:      Rate and Rhythm: Normal rate and regular rhythm.   Pulmonary:      Effort: Pulmonary effort is normal.      Breath sounds: Normal breath sounds.   Skin:     Findings: Rash present.             Comments: Positive red scaly moist appearing rash under right breast.  There is no breast tenderness or masses.  There is no nipple drainage there is no lymphadenopathy.   Neurological:      General: No focal deficit present.      Mental Status: She is alert and oriented to person, place, and time.   Psychiatric:         Mood and Affect: Mood normal.         Behavior:  Behavior normal.             ED Course   Labs Reviewed - No data to display                MDM              Medical Decision Making  69-year-old female with rash.  Differential diagnosis includes contact dermatitis, cellulitis, fungal rash.  On exam there is a red scaly moist rash under her right breast.  There are no masses, lymphadenopathy, or nipple drainage.  This rash is fungal in nature.  This was discussed with patient.  She was given precautions and directions to follow-up with her primary care doctor or a GYN doctor if she develops additional symptoms in her breast.  She was given a prescription for nystatin and printed instructions for care of fungal rashes.    Risk  OTC drugs.  Prescription drug management.        Disposition and Plan     Clinical Impression:  1. Rash         Disposition:  Discharge  10/8/2024  5:53 pm    Follow-up:  No follow-up provider specified.        Medications Prescribed:  Discharge Medication List as of 10/8/2024  5:54 PM        START taking these medications    Details   nystatin 100,000 Units/g External Cream Apply 1 Application topically 2 (two) times daily for 10 days., Normal, Disp-30 g, R-0                 Supplementary Documentation:

## 2024-10-28 ENCOUNTER — NURSE TRIAGE (OUTPATIENT)
Dept: INTERNAL MEDICINE CLINIC | Facility: CLINIC | Age: 69
End: 2024-10-28

## 2024-10-28 NOTE — TELEPHONE ENCOUNTER
Action Requested: Summary for Provider     []  Critical Lab, Recommendations Needed  [] Need Additional Advice  []   FYI    []   Need Orders  [] Need Medications Sent to Pharmacy  []  Other     SUMMARY: Appointment scheduled for 10/31/24 at 11:45 am, per protocol.     Reason for call: Numbness  Onset: Data Unavailable    Spoke to patient, full name and date of birth verified.   Patient reports she has been having numbness/tingling in her arms.   At first, left arm was affected, not it is the right arm.     Fingers of right hand are tingling, arm from hand to above the elbow is numb.     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     Patient stated she needs appointment later in the week, accepted appointment on 10/31/24 at 11:45 am which is within the recommended protocol time frame.     Appointment scheduled.    Reason for Disposition   Numbness or tingling in one or both hands is a chronic symptom (recurrent or ongoing problem lasting > 4 weeks)    Protocols used: Neurologic Deficit-A-OH

## 2024-11-07 ENCOUNTER — NURSE TRIAGE (OUTPATIENT)
Dept: INTERNAL MEDICINE CLINIC | Facility: CLINIC | Age: 69
End: 2024-11-07

## 2024-11-07 NOTE — TELEPHONE ENCOUNTER
Action Requested: Summary for Provider     []  Critical Lab, Recommendations Needed  [] Need Additional Advice  []   FYI    []   Need Orders  [] Need Medications Sent to Pharmacy  []  Other     SUMMARY: Disposition per protocol  is home care    Reason for call: Cold  Onset: Today    Patient calling,verified name and date of birth.  Dry cough, sore throat, nasal congestion started today. Denies fever, chest pain, shortness of breath. Reviewed care advice including humidifier, over the counter cough syrup such as delsym, cough drop, push fluids,call back for worsening cough, fever, sore throat or if chest pain or shortness or breath develops or persistent cough. Patient verbalizes understanding and agrees to plan of care.    Reason for Disposition   Cough with cold symptoms (e.g., runny nose, postnasal drip, throat clearing)    Protocols used: Cough-A-OH

## 2024-11-08 ENCOUNTER — TELEPHONE (OUTPATIENT)
Dept: INTERNAL MEDICINE CLINIC | Facility: CLINIC | Age: 69
End: 2024-11-08

## 2024-11-08 NOTE — TELEPHONE ENCOUNTER
Patient called, verified Name and . States she spoke with a nurses yesterday regarding her symptoms. Reports sore throat, coughing and sneezing are getting worse. Denies fever. Advised to schedule an appointment for evaluation. Unable to make it to available appointment time and prefers to be seen later in the day. Advised that she can go to the nearest [facility] or [facility] for evaluation and treatment. Patient verbalized understanding. Also advised to do warm salt water gargles, drink warm liquids, take cough drops or lozenges. Patient verbalized understanding and agreed with plan of care.

## 2024-11-18 ENCOUNTER — TELEPHONE (OUTPATIENT)
Dept: NEUROLOGY | Facility: CLINIC | Age: 69
End: 2024-11-18

## 2024-11-18 NOTE — TELEPHONE ENCOUNTER
[facility]  called in advised need neuropsych referral to be faxed over to them. Printed and sent to fax [phone]

## 2024-12-27 ENCOUNTER — NURSE TRIAGE (OUTPATIENT)
Dept: INTERNAL MEDICINE CLINIC | Facility: CLINIC | Age: 69
End: 2024-12-27

## 2024-12-27 NOTE — TELEPHONE ENCOUNTER
Patient calling back and requests pain medication for her hip.  Advised she needs evaluation and could go to IC and patient states \"nope, I am not going there\"   patient will wait until office visit.   Advised ER if symptoms worsen.

## 2024-12-27 NOTE — TELEPHONE ENCOUNTER
Action Requested: Summary for Provider     []  Critical Lab, Recommendations Needed  [] Need Additional Advice  []   FYI    []   Need Orders  [] Need Medications Sent to Pharmacy  []  Other     SUMMARY:appt made, bilateral hip pain, taking tylenol, biofreeze,topical creams , no rekief    Reason for call: Hip Pain  Onset: this week                   Reason for Disposition   MODERATE pain (e.g., interferes with normal activities, limping) and present > 3 days    Protocols used: Hip Pain-A-OH

## 2025-01-04 ENCOUNTER — TELEPHONE (OUTPATIENT)
Dept: INTERNAL MEDICINE CLINIC | Facility: CLINIC | Age: 70
End: 2025-01-04

## 2025-01-04 NOTE — H&P
Patient calling today with complaint of having a lot of pain from the waist down complaining pain of the hips and the legs seen her PCP before x-ray was ordered but she says she never did it because she was not sure there was order there she had taken Tylenol with codeine before for the pain patient has not taken anything right now told her to take Tylenol 500 mg up to 2 tablets as needed once or twice a day she will try that she has an appointment with PCP on Tuesday

## 2025-01-06 NOTE — TELEPHONE ENCOUNTER
(Message Delivered)   D E L I V E R I E S :  2025 12:35p           LYNETTEH      Delivered  ======================================================================  Paging    Message # [phone]         2025 12:47p   [[email or web address]]  To:  From:  [facility]LE  Primary MD:  Phone#:  ----------------------------------------------------------------------  KIM COELHO [phone]  51 IS HAVING SEVERE PAIN FROM THE WAIST DOWN Paged at number :  PAGE: [phone] at :   12:47

## 2025-01-07 ENCOUNTER — TELEPHONE (OUTPATIENT)
Facility: CLINIC | Age: 70
End: 2025-01-07

## 2025-01-07 ENCOUNTER — OFFICE VISIT (OUTPATIENT)
Dept: INTERNAL MEDICINE CLINIC | Facility: CLINIC | Age: 70
End: 2025-01-07

## 2025-01-07 VITALS
BODY MASS INDEX: 32.12 KG/M2 | HEART RATE: 65 BPM | OXYGEN SATURATION: 96 % | SYSTOLIC BLOOD PRESSURE: 132 MMHG | HEIGHT: 60 IN | WEIGHT: 163.63 LBS | DIASTOLIC BLOOD PRESSURE: 64 MMHG

## 2025-01-07 DIAGNOSIS — M25.552 BILATERAL HIP PAIN: Primary | ICD-10-CM

## 2025-01-07 DIAGNOSIS — R52 PAIN: Primary | ICD-10-CM

## 2025-01-07 DIAGNOSIS — M25.551 BILATERAL HIP PAIN: Primary | ICD-10-CM

## 2025-01-07 DIAGNOSIS — L30.4 INTERTRIGO: ICD-10-CM

## 2025-01-07 PROCEDURE — 3078F DIAST BP <80 MM HG: CPT | Performed by: INTERNAL MEDICINE

## 2025-01-07 PROCEDURE — 99214 OFFICE O/P EST MOD 30 MIN: CPT | Performed by: INTERNAL MEDICINE

## 2025-01-07 PROCEDURE — 1159F MED LIST DOCD IN RCRD: CPT | Performed by: INTERNAL MEDICINE

## 2025-01-07 PROCEDURE — 3075F SYST BP GE 130 - 139MM HG: CPT | Performed by: INTERNAL MEDICINE

## 2025-01-07 PROCEDURE — 3008F BODY MASS INDEX DOCD: CPT | Performed by: INTERNAL MEDICINE

## 2025-01-07 PROCEDURE — 1160F RVW MEDS BY RX/DR IN RCRD: CPT | Performed by: INTERNAL MEDICINE

## 2025-01-07 PROCEDURE — 1125F AMNT PAIN NOTED PAIN PRSNT: CPT | Performed by: INTERNAL MEDICINE

## 2025-01-07 RX ORDER — ACETAMINOPHEN AND CODEINE PHOSPHATE 300; 30 MG/1; MG/1
1 TABLET ORAL EVERY 8 HOURS PRN
Qty: 30 TABLET | Refills: 0 | Status: SHIPPED | OUTPATIENT
Start: 2025-01-07

## 2025-01-07 RX ORDER — KETOCONAZOLE 20 MG/G
1 CREAM TOPICAL 2 TIMES DAILY
Qty: 60 G | Refills: 0 | Status: SHIPPED | OUTPATIENT
Start: 2025-01-07 | End: 2025-01-21

## 2025-01-07 NOTE — PROGRESS NOTES
Eric Aviles is a 69 year old female with complaints of:  Chief Complaint: Knee Pain and Hip Pain    HPI     Erci Aviles is a(n) 69 year old female with a history of bipolar disorder, depression, hypertension, pulmonary emphysema, osteoarthritis status post bilateral knee replacements, who presents for hip and knee pain.    Initially patient had pain in the posterior of her left hip, which subsided. However, two days ago she started to have pain in her left thigh and left knee. NO falls or trauma. Walking on it makes it worse.     Today, patient noted that her vaginal/vulvar area was malodorous. She noted some yellow discharge in the creases of her thighs. She has no vaginal or vulvar itching or pain. No LUTS. No fever or chills.     Past Medical History     Past Medical History:    Bipolar disorder (HCC)    Cataract    Depression    Essential hypertension    Osteoarthritis    R knee surgery    Psychogenic polydipsia    Pulmonary emphysema (HCC)    Raynaud disease    Rib fracture    left side    Sciatica    Seborrheic keratoses    Sx.6/1/15, CPT.53306, L Total Knee, w/, Global Exp.8/30/15    Sx.6/8/16, CPT.43382, R Total Knee Replacement, w/, Global Exp.9/6/16    Visual impairment    WEARS READER        Past Surgical History     Past Surgical History:   Procedure Laterality Date    Appendectomy      appendicitis    Breast biopsy Left 09/07/2011    fibrocystic changes    Cataract      Colonoscopy  7/2009    incomplete (chronic constipation)    Colonoscopy N/A 11/8/2018    Procedure: COLONOSCOPY;  Surgeon: Micheal Curtis MD;  Location: [facility] ENDOSCOPY    Colonoscopy N/A 12/19/2019    Procedure: COLONOSCOPY;  Surgeon: Micheal Curtis MD;  Location: [facility] ENDOSCOPY    Hernia surgery Left 12/21/2011    neuroma excision, partial mesh removal, primary re-repair    Inguinal hernia repair Left 05/20/2009    Knee surgery Right     osteoarthritis    Angel localization wire 1 site left (cpt=19281)           Orif humerus fracture Right 04/15/2022    right distal humerus open reduction internal fixation    Other Left 2019    LEFT REVERSE TOTAL SHOULDER ARTHROPLASTY WITH BICEPS TENODESIS    Other surgical history      Laparotomy (adhesions)    Other surgical history      Arthrocentesis of the right knee joint    Other surgical history      Arthrocentesis of the left knee joint    Other surgical history  2019    rotator cuff    Total knee replacement Bilateral  and     Tubal ligation          Family History     Family History   Problem Relation Age of Onset    Diabetes Mother     Heart Disease Mother         CAD    Stroke Mother         CVA    Heart Attack Mother         quadruple bypass surgery/MI    Arthritis Mother         possible Rheumatoid Arthritis    Dementia Father         Alzheimer's    Lipids Father         hyperlipidemia    Hypertension Father     Musculo-skelatal Disorder Father         B/L knees replaced    Breast Cancer Sister 40    Other (Other) Sister         flu H1N1    Cancer Brother         liver    Asthma Daughter     Colon Cancer Maternal Grandmother     Breast Cancer Paternal Aunt 52    Glaucoma Neg         multiple    Macular degeneration Neg         multiple       Social History     Social History     Socioeconomic History    Marital status:    Tobacco Use    Smoking status: Former     Current packs/day: 0.00     Average packs/day: 1 pack/day for 30.0 years (30.0 ttl pk-yrs)     Types: Cigarettes     Start date: 1972     Quit date: 2002     Years since quittin.9     Passive exposure: Never    Smokeless tobacco: Never    Tobacco comments:     1 unit per day   Vaping Use    Vaping status: Never Used   Substance and Sexual Activity    Alcohol use: Not Currently     Alcohol/week: 0.0 standard drinks of alcohol     Comment: socially    Drug use: No    Sexual activity: Yes     Partners: Male     Birth control/protection: Tubal Ligation   Other Topics  Concern    Caffeine Concern Yes     Comment: tea, soda, 44 oz daily    Reaction to local anesthetic No    Right Handed Yes       Allergies     Allergies[1]    Current Medications     Current Outpatient Medications   Medication Sig Dispense Refill    acetaminophen-codeine 300-30 MG Oral Tab Take 1 tablet by mouth every 8 (eight) hours as needed for Pain. 90 tablet 1    donepezil 5 MG Oral Tab Take 1 tablet (5 mg total) by mouth nightly. 90 tablet 3    terbinafine 250 MG Oral Tab Take 1 tablet (250 mg total) by mouth daily. 90 tablet 0    albuterol 108 (90 Base) MCG/ACT Inhalation Aero Soln Inhale 2 puffs into the lungs every 6 (six) hours as needed for Wheezing or Shortness of Breath. 18 g 5    ibuprofen 600 MG Oral Tab Take 1 tablet (600 mg total) by mouth every 8 (eight) hours as needed for Pain. 30 tablet 0    alendronate 70 MG Oral Tab Take 1 tablet (70 mg total) by mouth once a week. 12 tablet 2    clonazePAM 2 MG Oral Tab Take 2 tablets (4 mg total) by mouth nightly. 30 tablet 0    verapamil  MG Oral Tab CR Take 1 tablet (240 mg total) by mouth every evening. 90 tablet 0    atorvastatin 20 MG Oral Tab Take 1 tablet (20 mg total) by mouth nightly. 90 tablet 3    ARIPiprazole 5 MG Oral Tab Take 1 tablet (5 mg total) by mouth nightly. 90 tablet 0    QUEtiapine 50 MG Oral Tab Take 1 tablet (50 mg total) by mouth nightly. 90 tablet 0    DULoxetine 60 MG Oral Cap DR Particles Take 1 capsule (60 mg total) by mouth nightly. Take 60mg daily. 90 capsule 0    albuterol sulfate (2.5 MG/3ML) 0.083% Inhalation Nebu Soln Take 3 mL (2.5 mg total) by nebulization every 6 (six) hours as needed for Wheezing or Shortness of Breath. 90 each 0     No current facility-administered medications for this visit.       Review of Systems     GENERAL HEALTH: feels well otherwise  SKIN: denies any unusual skin lesions or rashes  RESPIRATORY: denies shortness of breath with exertion  CARDIOVASCULAR: denies chest pain on exertion  GI:  denies abdominal pain and denies heartburn  : denies any burning with urination, urinary frequency or urgency  NEURO: denies headaches, numbness or tingling, mental status changes  PSYCH: denies depressed mood, anxiety  MUSC: denies muscle aches, joint pain    Physical Exam     /64 (BP Location: Right arm, Patient Position: Sitting, Cuff Size: adult)   Pulse 65   Ht 5' (1.524 m)   Wt 163 lb 9.6 oz (74.2 kg)   SpO2 96%   BMI 31.95 kg/m²     GENERAL: well developed, well nourished,in no apparent distress  SKIN: erythematous and purulent rash with well demarcated borders seen in bilateral intertriginous regions of the groin   HEENT: atraumatic, normocephalic,ears and throat are clear  NECK: supple,no adenopathy,no bruits  LUNGS: clear to auscultation  CARDIO: RRR without murmur  GI: good BS's,no masses, HSM or tenderness  EXTREMITIES: no cyanosis, clubbing or edema    Assessment and Plan     Diagnoses and all orders for this visit:    Bilateral hip pain. Cont pain management. Follow with orthopedics.   -     Ortho Referral - In Network  -     acetaminophen-codeine 300-30 MG Oral Tab; Take 1 tablet by mouth every 8 (eight) hours as needed for Pain.    Intertrigo. Start ketoconazole cream. Patient recommended to keep area clean and dry as possible.   -     Derm Referral - In Network  -     ketoconazole 2 % External Cream; Apply 1 Application topically 2 (two) times daily for 14 days.          ketoconazole 2 % External Cream Apply 1 Application topically 2 (two) times daily for 14 days. 60 g 0    acetaminophen-codeine 300-30 MG Oral Tab Take 1 tablet by mouth every 8 (eight) hours as needed for Pain. 30 tablet 0    donepezil 5 MG Oral Tab Take 1 tablet (5 mg total) by mouth nightly. 90 tablet 3    terbinafine 250 MG Oral Tab Take 1 tablet (250 mg total) by mouth daily. 90 tablet 0    albuterol 108 (90 Base) MCG/ACT Inhalation Aero Soln Inhale 2 puffs into the lungs every 6 (six) hours as needed for Wheezing  or Shortness of Breath. 18 g 5    ibuprofen 600 MG Oral Tab Take 1 tablet (600 mg total) by mouth every 8 (eight) hours as needed for Pain. 30 tablet 0    alendronate 70 MG Oral Tab Take 1 tablet (70 mg total) by mouth once a week. 12 tablet 2    clonazePAM 2 MG Oral Tab Take 2 tablets (4 mg total) by mouth nightly. 30 tablet 0    verapamil  MG Oral Tab CR Take 1 tablet (240 mg total) by mouth every evening. 90 tablet 0    atorvastatin 20 MG Oral Tab Take 1 tablet (20 mg total) by mouth nightly. 90 tablet 3    ARIPiprazole 5 MG Oral Tab Take 1 tablet (5 mg total) by mouth nightly. 90 tablet 0    QUEtiapine 50 MG Oral Tab Take 1 tablet (50 mg total) by mouth nightly. 90 tablet 0    DULoxetine 60 MG Oral Cap DR Particles Take 1 capsule (60 mg total) by mouth nightly. Take 60mg daily. 90 capsule 0    albuterol sulfate (2.5 MG/3ML) 0.083% Inhalation Nebu Soln Take 3 mL (2.5 mg total) by nebulization every 6 (six) hours as needed for Wheezing or Shortness of Breath. 90 each 0       Requested Prescriptions     Signed Prescriptions Disp Refills    ketoconazole 2 % External Cream 60 g 0     Sig: Apply 1 Application topically 2 (two) times daily for 14 days.    acetaminophen-codeine 300-30 MG Oral Tab 30 tablet 0     Sig: Take 1 tablet by mouth every 8 (eight) hours as needed for Pain.       No orders of the defined types were placed in this encounter.      No follow-ups on file.    The patient indicates understanding of these issues and agrees to the plan.    Electronically signed by Vicki Keating MD 01/07/25             [1]   Allergies  Allergen Reactions    Sulfa Antibiotics HIVES

## 2025-01-07 NOTE — PATIENT INSTRUCTIONS
Please start a fungal cream on the rash, twice a day x 14 days.     Follow up with dermatology in 1-2 weeks if the rash is not better.     Follow with orthopedics for the hip complaints.

## 2025-01-07 NOTE — TELEPHONE ENCOUNTER
Patient called to schedule for bilateral hip pain. Please advise if imaging is needed.   Future Appointments   Date Time Provider Department Center   1/10/2025 10:00 AM Chase Duke MD [facility]  [facility]

## 2025-01-08 NOTE — TELEPHONE ENCOUNTER
Xray order placed    S/w patient and informed her to come to appointment about 30 minutes before to get xrays

## 2025-01-10 ENCOUNTER — HOSPITAL ENCOUNTER (OUTPATIENT)
Dept: GENERAL RADIOLOGY | Age: 70
Discharge: HOME | End: 2025-01-10
Attending: ORTHOPAEDIC SURGERY
Payer: MEDICARE

## 2025-01-10 ENCOUNTER — TELEPHONE (OUTPATIENT)
Facility: CLINIC | Age: 70
End: 2025-01-10

## 2025-01-10 ENCOUNTER — OFFICE VISIT (OUTPATIENT)
Dept: ORTHOPEDICS CLINIC | Facility: CLINIC | Age: 70
End: 2025-01-10
Payer: MEDICARE

## 2025-01-10 VITALS — BODY MASS INDEX: 29.84 KG/M2 | HEIGHT: 60 IN | WEIGHT: 152 LBS

## 2025-01-10 DIAGNOSIS — R52 PAIN: ICD-10-CM

## 2025-01-10 DIAGNOSIS — M16.0 PRIMARY OSTEOARTHRITIS OF BOTH HIPS: Primary | ICD-10-CM

## 2025-01-10 PROCEDURE — 1160F RVW MEDS BY RX/DR IN RCRD: CPT | Performed by: ORTHOPAEDIC SURGERY

## 2025-01-10 PROCEDURE — 1159F MED LIST DOCD IN RCRD: CPT | Performed by: ORTHOPAEDIC SURGERY

## 2025-01-10 PROCEDURE — 99204 OFFICE O/P NEW MOD 45 MIN: CPT | Performed by: ORTHOPAEDIC SURGERY

## 2025-01-10 PROCEDURE — 3008F BODY MASS INDEX DOCD: CPT | Performed by: ORTHOPAEDIC SURGERY

## 2025-01-10 PROCEDURE — 73523 X-RAY EXAM HIPS BI 5/> VIEWS: CPT | Performed by: ORTHOPAEDIC SURGERY

## 2025-01-10 NOTE — PROGRESS NOTES
Chief Complaint: Hip Pain (B/l hip - Left hip is worse. Pain onset 2 days ago, no injury. Pain on sides of hip down to knee. )      HPI  Ms. Aviles is referred by Vicki Keating. Eric Aviles is a 69 year old female being seen in the office today for Hip Pain (B/l hip - Left hip is worse. Pain onset 2 days ago, no injury. Pain on sides of hip down to knee. )    The patient is a 69-year-old female who presents today with complaints of bilateral hip pain.  Left is worse than right.  She states it started about a week ago.  Prior to that she had no symptoms with it.  Denies any trauma or inciting med.  She notes pain on the lateral aspect of the hip and radiates into the groin.  She also notes some loss of motion.  She denies mechanical symptoms or catching.  She denies any numbness or tingling down the leg.  She has not had any formal treatment.  She recently saw her PCP who recommended orthopedic evaluation.  He was put on a short course of Tylenol No. 3.  She does not work.  She lives with her ex- and her daughter in a single floor home.    Associated Symptoms:  Mechanical Symptoms?  No  Back pain?  No  Night pain?  No  Treatment (s):  Medications?  Tylenol with codeine  Injections?  None  Physical therapy or home exercise program?  None   Assistive device?  None  Weight loss ( if applicable)?  N/A  Surgery at affected hip?  No  Functional Activities:  Distance able to walk?  Unlimited  Trouble with Stairs?  Normally  Trouble with Shoes/socks?  No  Pain going from sitting to standing?  Yes  Transportation?  Able to use      Perceived leg length discrepancy?  No    Patient reports no chronic narcotic use    History:  Past Medical History:    Bipolar disorder (HCC)    Cataract    Depression    Essential hypertension    Osteoarthritis    R knee surgery    Psychogenic polydipsia    Pulmonary emphysema (HCC)    Raynaud disease    Rib fracture    left side    Sciatica    Seborrheic keratoses    Sx.6/1/15, CPT.42300, L  Total Knee, w/, Global Exp.8/30/15    Sx.6/8/16, CPT.13540, R Total Knee Replacement, w/, Global Exp.9/6/16    Visual impairment    WEARS READER     Past Surgical History:   Procedure Laterality Date    Appendectomy      appendicitis    Breast biopsy Left 09/07/2011    fibrocystic changes    Cataract      Colonoscopy  7/2009    incomplete (chronic constipation)    Colonoscopy N/A 11/8/2018    Procedure: COLONOSCOPY;  Surgeon: Micheal Curtis MD;  Location: [facility] ENDOSCOPY    Colonoscopy N/A 12/19/2019    Procedure: COLONOSCOPY;  Surgeon: Micheal Curtis MD;  Location: [facility] ENDOSCOPY    Hernia surgery Left 12/21/2011    neuroma excision, partial mesh removal, primary re-repair    Inguinal hernia repair Left 05/20/2009    Knee surgery Right     osteoarthritis    Angel localization wire 1 site left (cpt=19281)      2011    Orif humerus fracture Right 04/15/2022    right distal humerus open reduction internal fixation    Other Left 03/22/2019    LEFT REVERSE TOTAL SHOULDER ARTHROPLASTY WITH BICEPS TENODESIS    Other surgical history      Laparotomy (adhesions)    Other surgical history      Arthrocentesis of the right knee joint    Other surgical history      Arthrocentesis of the left knee joint    Other surgical history  08/2019    rotator cuff    Total knee replacement Bilateral 2015 and 2016    Tubal ligation       Family History   Problem Relation Age of Onset    Diabetes Mother     Heart Disease Mother         CAD    Stroke Mother         CVA    Heart Attack Mother         quadruple bypass surgery/MI    Arthritis Mother         possible Rheumatoid Arthritis    Dementia Father         Alzheimer's    Lipids Father         hyperlipidemia    Hypertension Father     Musculo-skelatal Disorder Father         B/L knees replaced    Breast Cancer Sister 40    Other (Other) Sister         flu H1N1    Cancer Brother         liver    Asthma Daughter     Colon Cancer Maternal Grandmother     Breast Cancer  Paternal Aunt 52    Glaucoma Neg         multiple    Macular degeneration Neg         multiple     Family Status   Relation Status    Mo (Not Specified)    Fa         Alzheimer's    Sis (Not Specified)    Sis (Not Specified)    Sis (Not Specified)    Bro  at age 53        liver cancer    Adrianna (Not Specified)    MGMA (Not Specified)    Pat Aunt (Not Specified)    NEG (Not Specified)     Social History     Occupational History    Not on file   Tobacco Use    Smoking status: Former     Current packs/day: 0.00     Average packs/day: 1 pack/day for 30.0 years (30.0 ttl pk-yrs)     Types: Cigarettes     Start date: 1972     Quit date: 2002     Years since quittin.9     Passive exposure: Never    Smokeless tobacco: Never    Tobacco comments:     1 unit per day   Vaping Use    Vaping status: Never Used   Substance and Sexual Activity    Alcohol use: Not Currently     Alcohol/week: 0.0 standard drinks of alcohol     Comment: socially    Drug use: No    Sexual activity: Yes     Partners: Male     Birth control/protection: Tubal Ligation       Medications:    Current Outpatient Medications:     ketoconazole 2 % External Cream, Apply 1 Application topically 2 (two) times daily for 14 days., Disp: 60 g, Rfl: 0    acetaminophen-codeine 300-30 MG Oral Tab, Take 1 tablet by mouth every 8 (eight) hours as needed for Pain., Disp: 30 tablet, Rfl: 0    donepezil 5 MG Oral Tab, Take 1 tablet (5 mg total) by mouth nightly., Disp: 90 tablet, Rfl: 3    terbinafine 250 MG Oral Tab, Take 1 tablet (250 mg total) by mouth daily., Disp: 90 tablet, Rfl: 0    albuterol 108 (90 Base) MCG/ACT Inhalation Aero Soln, Inhale 2 puffs into the lungs every 6 (six) hours as needed for Wheezing or Shortness of Breath., Disp: 18 g, Rfl: 5    ibuprofen 600 MG Oral Tab, Take 1 tablet (600 mg total) by mouth every 8 (eight) hours as needed for Pain., Disp: 30 tablet, Rfl: 0    alendronate 70 MG Oral Tab, Take 1 tablet (70 mg total)  by mouth once a week., Disp: 12 tablet, Rfl: 2    clonazePAM 2 MG Oral Tab, Take 2 tablets (4 mg total) by mouth nightly., Disp: 30 tablet, Rfl: 0    verapamil  MG Oral Tab CR, Take 1 tablet (240 mg total) by mouth every evening., Disp: 90 tablet, Rfl: 0    atorvastatin 20 MG Oral Tab, Take 1 tablet (20 mg total) by mouth nightly., Disp: 90 tablet, Rfl: 3    ARIPiprazole 5 MG Oral Tab, Take 1 tablet (5 mg total) by mouth nightly., Disp: 90 tablet, Rfl: 0    QUEtiapine 50 MG Oral Tab, Take 1 tablet (50 mg total) by mouth nightly., Disp: 90 tablet, Rfl: 0    DULoxetine 60 MG Oral Cap DR Particles, Take 1 capsule (60 mg total) by mouth nightly. Take 60mg daily., Disp: 90 capsule, Rfl: 0    albuterol sulfate (2.5 MG/3ML) 0.083% Inhalation Nebu Soln, Take 3 mL (2.5 mg total) by nebulization every 6 (six) hours as needed for Wheezing or Shortness of Breath., Disp: 90 each, Rfl: 0    Allergies:  Allergies[1]    Review of Systems  A comprehensive review of systems was completed and is negative unless noted above or in the HPI.    Physical Exam  Ht 5' (1.524 m)   Wt 152 lb (68.9 kg)   BMI 29.69 kg/m²   Body mass index is 29.69 kg/m².    Constitutional: The patient appears well-developed and well-nourished, in no apparent distress.  Psychiatric: The patient demonstrates good comprehension, judgment and decision making. Normal mood and affect.  Eyes: PER and EOM are normal.  ENT: Hearing appropriate for normal conversation.   Cardiovascular: The patient has symmetric pulses, 2+.  Distal extremity is warm and well perfused with good capillary refill.  Respiratory:  The patient is breathing comfortably without increased respiratory effort or use of accessory muscles.  Hematologic/Lymphatic: No lymphangitis. There is no appreciable enlargement of lymph nodes. No calf swelling, calf non-tender, negative Lopez's sign. No edema.  Skin: No wounds or ulcers. No hypertrophic scarring.  Neck: FROM without  pain.  MSK:    Bilateral hip        Skin: Intact, no obvious intertrigo or fungal rashes are noted in the groin crease today.       Deformity: None       Tenderness: Anterior       Range of Motion: R/L     Extension: 0     Flexion Contracture: 0     Flexion: 70/60     Internal Rotation: 10/5     External Rotation: 20/10     Abduction: 10/10     Adduction: 10/10       Muscle Strength      Abduction: 5/5     Adduction: 5/5     Flexion: 5/5         Gait: Normal and Antalgic   Trendelenburg Lurch Negative   Trendelenburg Sign Negative   Stinchfield: Positive   Irma Test: Negative   Ximena Test: Negative   Anterior Impingement: Positive   Posterior Impingement: Negative   Leg Length Discrepancy Negative       Motor: intact   Sensory: intact   Vascular: intact      Labs:  Lab Results   Component Value Date    WBC 5.1 05/06/2024    HGB 12.2 05/06/2024    HCT 36.3 05/06/2024    .0 05/06/2024     Lab Results   Component Value Date    PTT 30.0 06/21/2023    ALB 4.8 05/06/2024    A1C 5.7 (H) 06/21/2023     Lab Results   Component Value Date    GLU 98 05/06/2024    GLU 93 02/08/2024    GLU 90 08/18/2023         Imaging:  I independently reviewed the patient's relevant imaging studies today.    A standing low-set AP pelvis, and 2 views of the affected hip were obtained today.  There is no evidence of acute fracture, dislocation, or bony lesions.  There is evidence of joint space narrowing with osteophyte formation, and subchondral sclerosis. There is not a leg length discrepancy.    Assessment and Plan  Assessment & Plan  Primary osteoarthritis of both hips           I had a lengthy discussion with the patient today about the patient's hip OA. Non operative and operative treatment options were discussed. In general, non operative management would include activity modification, rest, anti-inflammatories, physical therapy, weight loss if applicable, intraarticular cortisone injections and the use of assistive devices.  If  they fail these modalities and continue to have severe life altering pain and functional limitations negatively impacting their quality of life, a total hip arthroplasty may be warranted.  We discussed briefly joint replacement surgery including preoperative clearances, the actual procedure and postoperative course.  At this time, the patient would like to start with conservative treatment approach as she has not had any treatment in the past for her hips.  She would like to start with anti-inflammatories and exercise program.    Today I recommend the following:    Recommend activity modification/rest, anti-inflammatory medication, Tylenol, use of an ambulatory aid such as a cane, and a low impact aerobic exercise program.  She was given a hip conditioning program  She is given education materials regards total hip arthroplasty.  They are no structural abnormalities to their hips, so the patient can be WBAT and activities as tolerated.  Follow up: As needed.  If she decides to pursue surgery, she will give us a call.  The patient understands and agrees with this plan. All of the patient's questions were answered today.    BMI is is above average; BMI management plan is completed    Chase Duke MD         [1]   Allergies  Allergen Reactions    Sulfa Antibiotics HIVES

## 2025-01-10 NOTE — TELEPHONE ENCOUNTER
Pt wants to proceed with hip surgery please advise, pt contact [phone]  No future appointments.

## 2025-01-13 NOTE — TELEPHONE ENCOUNTER
Spoke with patient. She stated that she is in a lot of pain and she would like to schedule surgery as soon as possible. Dr. Duke, Surgical request is needed.

## 2025-01-14 ENCOUNTER — TELEPHONE (OUTPATIENT)
Dept: INTERNAL MEDICINE CLINIC | Facility: CLINIC | Age: 70
End: 2025-01-14

## 2025-01-14 ENCOUNTER — TELEPHONE (OUTPATIENT)
Age: 70
End: 2025-01-14

## 2025-01-14 DIAGNOSIS — M16.0 PRIMARY OSTEOARTHRITIS OF BOTH HIPS: Primary | ICD-10-CM

## 2025-01-14 DIAGNOSIS — M16.12 PRIMARY OSTEOARTHRITIS OF LEFT HIP: ICD-10-CM

## 2025-01-14 NOTE — TELEPHONE ENCOUNTER
Spoke with patient and her  to offer surgery dates. She chose 1/27. Patient was reminded that Medical and/or Dental clearance is needed within 30 days of surgical date. Failure to complete all testing in a timely manner will cause surgery to be delayed and/or rescheduled. Patient understood and had no further questions at this time.

## 2025-01-14 NOTE — TELEPHONE ENCOUNTER
Type of surgery:  Left Anterior Total Hip Arthroplasty   Date: 1/27/25  Location: [facility]  Medical Clearance:      *Medical: Yes      *Dental: No (Dentures)      *Other:  Prior Authorization Status: Pending   Workers Comp:  Medacta/David:  Kipling: Yes   Pre-surgical visit: 1/17/25  POV: 2/18/25

## 2025-01-14 NOTE — TELEPHONE ENCOUNTER
Patient has surgery 1-27th.    She is asking for a Pre-Op visit with Dr Van this week. His first availability was in February.    Call her at:  [phone]   Please reply to pool: [facility] FM ALG RHE [[id number]]

## 2025-01-14 NOTE — TELEPHONE ENCOUNTER
Ortho Surgery Request  Surgery: Left Anterior Total Hip Arthroplasty      Surgical Assistant: Yes  Surgery Day Request:   Estimated Procedure Length: 90 mins  Anesthesia type:  Spinal   Position: Supine   Special Needs:[]Mini C-Arm  [x]Large C-arm - Large GE 2D  [x]Nurse Assist [x]SCD's [x]Surgical Assistant []Ultrasound []Ultrasound Krish  [x] [place] Table  Equipment: [employer]    Location: Main OR  Post Op Visit Date: 3 weeks    CPT Code: 51512     ICD Code: M16.12  Medical Clearance Needed: Medical, Dental  Preadmission Testing Orders:  Use surgeon's preferences card  Additional PAT orders:  Pre OP Orders:  Use [facility] surgeon's personalized order set  Additional Pre Op Orders:  PCN Allergy:  No  If Yes:   __X__  Admit:  Hospital outpatient surgery  Home Health  Yes

## 2025-01-15 ENCOUNTER — EKG ENCOUNTER (OUTPATIENT)
Dept: LAB | Age: 70
End: 2025-01-15
Attending: Nurse Practitioner
Payer: MEDICARE

## 2025-01-15 ENCOUNTER — OFFICE VISIT (OUTPATIENT)
Dept: INTERNAL MEDICINE CLINIC | Facility: CLINIC | Age: 70
End: 2025-01-15

## 2025-01-15 ENCOUNTER — LAB ENCOUNTER (OUTPATIENT)
Dept: LAB | Age: 70
End: 2025-01-15
Attending: Nurse Practitioner
Payer: MEDICARE

## 2025-01-15 VITALS
WEIGHT: 163 LBS | BODY MASS INDEX: 32 KG/M2 | SYSTOLIC BLOOD PRESSURE: 136 MMHG | HEIGHT: 60 IN | HEART RATE: 76 BPM | DIASTOLIC BLOOD PRESSURE: 84 MMHG

## 2025-01-15 DIAGNOSIS — Z01.818 PRE-OP EXAMINATION: Primary | ICD-10-CM

## 2025-01-15 DIAGNOSIS — Z01.818 PRE-OP EXAMINATION: ICD-10-CM

## 2025-01-15 LAB
ALBUMIN SERPL-MCNC: 5 G/DL (ref 3.2–4.8)
ALBUMIN/GLOB SERPL: 2 {RATIO} (ref 1–2)
ALP LIVER SERPL-CCNC: 94 U/L
ALT SERPL-CCNC: 11 U/L
ANION GAP SERPL CALC-SCNC: 10 MMOL/L (ref 0–18)
AST SERPL-CCNC: 19 U/L (ref ?–34)
BASOPHILS # BLD AUTO: 0.08 X10(3) UL (ref 0–0.2)
BASOPHILS NFR BLD AUTO: 1.2 %
BILIRUB SERPL-MCNC: 0.4 MG/DL (ref 0.2–1.1)
BUN BLD-MCNC: 7 MG/DL (ref 9–23)
BUN/CREAT SERPL: 10.1 (ref 10–20)
CALCIUM BLD-MCNC: 10.1 MG/DL (ref 8.7–10.4)
CHLORIDE SERPL-SCNC: 98 MMOL/L (ref 98–112)
CO2 SERPL-SCNC: 27 MMOL/L (ref 21–32)
CREAT BLD-MCNC: 0.69 MG/DL
DEPRECATED RDW RBC AUTO: 41.9 FL (ref 35.1–46.3)
EGFRCR SERPLBLD CKD-EPI 2021: 93 ML/MIN/1.73M2 (ref 60–?)
EOSINOPHIL # BLD AUTO: 0.27 X10(3) UL (ref 0–0.7)
EOSINOPHIL NFR BLD AUTO: 4.2 %
ERYTHROCYTE [DISTWIDTH] IN BLOOD BY AUTOMATED COUNT: 12.8 % (ref 11–15)
FASTING STATUS PATIENT QL REPORTED: NO
GLOBULIN PLAS-MCNC: 2.5 G/DL (ref 2–3.5)
GLUCOSE BLD-MCNC: 100 MG/DL (ref 70–99)
HCT VFR BLD AUTO: 37.6 %
HGB BLD-MCNC: 13.1 G/DL
IMM GRANULOCYTES # BLD AUTO: 0.01 X10(3) UL (ref 0–1)
IMM GRANULOCYTES NFR BLD: 0.2 %
LYMPHOCYTES # BLD AUTO: 2.6 X10(3) UL (ref 1–4)
LYMPHOCYTES NFR BLD AUTO: 40.4 %
MCH RBC QN AUTO: 31.1 PG (ref 26–34)
MCHC RBC AUTO-ENTMCNC: 34.8 G/DL (ref 31–37)
MCV RBC AUTO: 89.3 FL
MONOCYTES # BLD AUTO: 0.53 X10(3) UL (ref 0.1–1)
MONOCYTES NFR BLD AUTO: 8.2 %
NEUTROPHILS # BLD AUTO: 2.94 X10 (3) UL (ref 1.5–7.7)
NEUTROPHILS # BLD AUTO: 2.94 X10(3) UL (ref 1.5–7.7)
NEUTROPHILS NFR BLD AUTO: 45.8 %
OSMOLALITY SERPL CALC.SUM OF ELEC: 278 MOSM/KG (ref 275–295)
PLATELET # BLD AUTO: 291 10(3)UL (ref 150–450)
POTASSIUM SERPL-SCNC: 3.8 MMOL/L (ref 3.5–5.1)
PROT SERPL-MCNC: 7.5 G/DL (ref 5.7–8.2)
RBC # BLD AUTO: 4.21 X10(6)UL
SODIUM SERPL-SCNC: 135 MMOL/L (ref 136–145)
WBC # BLD AUTO: 6.4 X10(3) UL (ref 4–11)

## 2025-01-15 PROCEDURE — 1160F RVW MEDS BY RX/DR IN RCRD: CPT | Performed by: NURSE PRACTITIONER

## 2025-01-15 PROCEDURE — 83036 HEMOGLOBIN GLYCOSYLATED A1C: CPT

## 2025-01-15 PROCEDURE — 3075F SYST BP GE 130 - 139MM HG: CPT | Performed by: NURSE PRACTITIONER

## 2025-01-15 PROCEDURE — 85025 COMPLETE CBC W/AUTO DIFF WBC: CPT

## 2025-01-15 PROCEDURE — 93005 ELECTROCARDIOGRAM TRACING: CPT

## 2025-01-15 PROCEDURE — 36415 COLL VENOUS BLD VENIPUNCTURE: CPT

## 2025-01-15 PROCEDURE — 99213 OFFICE O/P EST LOW 20 MIN: CPT | Performed by: NURSE PRACTITIONER

## 2025-01-15 PROCEDURE — 3079F DIAST BP 80-89 MM HG: CPT | Performed by: NURSE PRACTITIONER

## 2025-01-15 PROCEDURE — 3008F BODY MASS INDEX DOCD: CPT | Performed by: NURSE PRACTITIONER

## 2025-01-15 PROCEDURE — 93010 ELECTROCARDIOGRAM REPORT: CPT | Performed by: INTERNAL MEDICINE

## 2025-01-15 PROCEDURE — 1159F MED LIST DOCD IN RCRD: CPT | Performed by: NURSE PRACTITIONER

## 2025-01-15 PROCEDURE — 80053 COMPREHEN METABOLIC PANEL: CPT

## 2025-01-15 NOTE — PROGRESS NOTES
Subjective:   Eric Aviles is a 69 year old female who presents for Pre-Op Exam ( Left Anterior Total Hip Arthroplasty on 1/27/25 with dr. duke)     70yo F with h/o HTN, osteoarthritis, raynaud's, s/p multiple orthopedic surgeries (bilat knees, 1 shoulder) presents for preoperative clearance for below:    Planned surgery - L anterior total hip arthoplasty  Type of anesthesia - General  Surgeon - Dr. Chase Duke  Date of surgery - 1/27/25  Cardiac history - HTN on verapamil  Myocardial infarction in past 6 months - No  Bleeding disorders - No  Taking blood thinners - No  Chest pain or shortness of breath with ADLs - No  Number of alcoholic beverages consumed weekly - none  Tobacco consumption - No quit >20 years ago.    She reports she has never had a bad reaction to anesthesia and has not been told she has a difficult airway. Regarding listed history of emphysema - she states she has never seen a pulmonologist, all recent chest xrays have been normal, and she rarely uses her inhaler - last used approximately 1.5 years ago during a period of cold weather. Dr. Nunn is the psychiatrist who manages her medications for bipolar disorder, which have been stable for years per patient.    Functional Capacity/METs:   Perform ADLs- eating, dress, toilet? Y  Walk up flight of steps, hill, walk ground level 3-4mph? Y  Perform heavy housework- scrubbing floors, move heavy furniture, climb 2 flights of stairs? Y  Participate in strenuous sports- swimming, singles tennis, football, basketball, ski? N    Revised Cardiac Risk Index: 0    Patient has no major clinical predictors going for the above surgery.  Patient has reasonable functional capacity as described above. I have ordered tests as requested by surgeon. If they are acceptable, I will consider patient medically cleared for surgery as low risk for perioperative cardiac events with routine perioperative care.  DVT prophylaxis as protocol  The above risk assessment  was discussed with the patient and would like to proceed with the surgery.      History/Other:    Chief Complaint Reviewed and Verified  Nursing Notes Reviewed and   Verified  Tobacco Reviewed  Allergies Reviewed  Medications Reviewed    Problem List Reviewed  Medical History Reviewed  Surgical History   Reviewed  OB Status Reviewed  Family History Reviewed         Tobacco:  She smoked tobacco in the past but quit greater than 12 months ago.  Social History     Tobacco Use   Smoking Status Former    Current packs/day: 0.00    Average packs/day: 1 pack/day for 30.0 years (30.0 ttl pk-yrs)    Types: Cigarettes    Start date: 1972    Quit date: 2002    Years since quittin.9    Passive exposure: Never   Smokeless Tobacco Never   Tobacco Comments    1 unit per day        Current Outpatient Medications   Medication Sig Dispense Refill    ketoconazole 2 % External Cream Apply 1 Application topically 2 (two) times daily for 14 days. 60 g 0    donepezil 5 MG Oral Tab Take 1 tablet (5 mg total) by mouth nightly. 90 tablet 3    albuterol 108 (90 Base) MCG/ACT Inhalation Aero Soln Inhale 2 puffs into the lungs every 6 (six) hours as needed for Wheezing or Shortness of Breath. 18 g 5    alendronate 70 MG Oral Tab Take 1 tablet (70 mg total) by mouth once a week. 12 tablet 2    clonazePAM 2 MG Oral Tab Take 2 tablets (4 mg total) by mouth nightly. 30 tablet 0    verapamil  MG Oral Tab CR Take 1 tablet (240 mg total) by mouth every evening. 90 tablet 0    atorvastatin 20 MG Oral Tab Take 1 tablet (20 mg total) by mouth nightly. 90 tablet 3    ARIPiprazole 5 MG Oral Tab Take 1 tablet (5 mg total) by mouth nightly. 90 tablet 0    QUEtiapine 50 MG Oral Tab Take 1 tablet (50 mg total) by mouth nightly. 90 tablet 0    DULoxetine 60 MG Oral Cap DR Particles Take 1 capsule (60 mg total) by mouth nightly. Take 60mg daily. 90 capsule 0    albuterol sulfate (2.5 MG/3ML) 0.083% Inhalation Nebu Soln Take 3 mL  (2.5 mg total) by nebulization every 6 (six) hours as needed for Wheezing or Shortness of Breath. 90 each 0         Review of Systems:  Review of Systems  10 point review of systems otherwise negative with the exception of HPI and assessment and plan.    Objective:   /84 (BP Location: Left arm, Patient Position: Sitting, Cuff Size: adult)   Pulse 76   Ht 5' (1.524 m)   Wt 163 lb (73.9 kg)   BMI 31.83 kg/m²  Estimated body mass index is 31.83 kg/m² as calculated from the following:    Height as of this encounter: 5' (1.524 m).    Weight as of this encounter: 163 lb (73.9 kg).      Physical Exam  Vitals reviewed.   Constitutional:       Appearance: Normal appearance.   HENT:      Head: Normocephalic.      Right Ear: Tympanic membrane normal.      Left Ear: Tympanic membrane normal.      Nose: Nose normal.      Mouth/Throat:      Mouth: Mucous membranes are moist.      Dentition: Has dentures.      Pharynx: No posterior oropharyngeal erythema.      Comments: Upper and lower dentures  Eyes:      Extraocular Movements: Extraocular movements intact.      Conjunctiva/sclera: Conjunctivae normal.      Pupils: Pupils are equal, round, and reactive to light.   Neck:      Thyroid: No thyroid mass, thyromegaly or thyroid tenderness.   Cardiovascular:      Rate and Rhythm: Normal rate and regular rhythm.      Heart sounds: Normal heart sounds, S1 normal and S2 normal. No murmur heard.  Pulmonary:      Effort: Pulmonary effort is normal.      Breath sounds: Normal breath sounds.   Abdominal:      General: Bowel sounds are normal.      Palpations: Abdomen is soft.      Tenderness: There is no abdominal tenderness.   Musculoskeletal:      Cervical back: Normal range of motion.      Right lower leg: No edema.      Left lower leg: No edema.      Comments: Arthritic changes to fingers of bilat hands   Lymphadenopathy:      Cervical: No cervical adenopathy.      Upper Body:      Right upper body: No supraclavicular  adenopathy.      Left upper body: No supraclavicular adenopathy.   Skin:     General: Skin is warm and dry.      Capillary Refill: Capillary refill takes less than 2 seconds.   Neurological:      General: No focal deficit present.      Mental Status: She is alert and oriented to person, place, and time.   Psychiatric:         Mood and Affect: Mood and affect normal.         Speech: Speech normal.         Assessment & Plan:   1. Pre-op examination (Primary)  -     CBC With Differential With Platelet; Future; Expected date: 01/15/2025  -     Comp Metabolic Panel (14); Future; Expected date: 01/15/2025  -     EKG 12 Lead; Future; Expected date: 01/15/2025  - No specific preop clearance lab or testing paperwork was received from surgeons office, will proceed with the above.  She will have this testing done today.  - Will clear her for surgery pending results of the above.    ADDENDUM:  Rec'd message from Dr. Duke staff yesterday afternoon indicating needing the following:   __X___ History & Physical (should be no older that 30 days)    __X___ MRSA (Valid for 30 Days)   __X__ Type & Screen (Only to be done at an [facility])   __X___ EKG (Valid for 1 year)   __X___ CBC & BMP (Valid for 3 months)   __X__ Urinalysis Reflex to culture (Valid for 30 days)   __X___ Hemoglobin A1c         A1C added to previous labs. New orders placed for UA/reflex, T&S, MRSA screen and pt informed she needs to have these additional tests completed.    Return for annual physical.    ANSHUL Dubois, 1/15/2025, 9:55 AM     This note was prepared using Dragon Medical voice recognition dictation software. As a result errors may occur. When identified, these errors have been corrected. While every attempt is made to correct errors during dictation discrepancies may still exist.

## 2025-01-16 DIAGNOSIS — Z01.818 PRE-OP TESTING: ICD-10-CM

## 2025-01-16 DIAGNOSIS — R94.31 ABNORMAL EKG: Primary | ICD-10-CM

## 2025-01-16 LAB
ATRIAL RATE: 62 BPM
P AXIS: 58 DEGREES
P-R INTERVAL: 194 MS
Q-T INTERVAL: 428 MS
QRS DURATION: 86 MS
QTC CALCULATION (BEZET): 434 MS
R AXIS: 11 DEGREES
T AXIS: 40 DEGREES
VENTRICULAR RATE: 62 BPM

## 2025-01-17 ENCOUNTER — TELEPHONE (OUTPATIENT)
Dept: INTERNAL MEDICINE CLINIC | Facility: CLINIC | Age: 70
End: 2025-01-17

## 2025-01-17 ENCOUNTER — OFFICE VISIT (OUTPATIENT)
Dept: ORTHOPEDICS CLINIC | Facility: CLINIC | Age: 70
End: 2025-01-17
Payer: MEDICARE

## 2025-01-17 DIAGNOSIS — M16.12 PRIMARY OSTEOARTHRITIS OF LEFT HIP: Primary | ICD-10-CM

## 2025-01-17 DIAGNOSIS — Z01.818 PREOP EXAMINATION: ICD-10-CM

## 2025-01-17 DIAGNOSIS — L30.4 INTERTRIGO: ICD-10-CM

## 2025-01-17 LAB
EST. AVERAGE GLUCOSE BLD GHB EST-MCNC: 117 MG/DL (ref 68–126)
HBA1C MFR BLD: 5.7 % (ref ?–5.7)

## 2025-01-17 PROCEDURE — 1160F RVW MEDS BY RX/DR IN RCRD: CPT | Performed by: ORTHOPAEDIC SURGERY

## 2025-01-17 PROCEDURE — 1159F MED LIST DOCD IN RCRD: CPT | Performed by: ORTHOPAEDIC SURGERY

## 2025-01-17 PROCEDURE — 99214 OFFICE O/P EST MOD 30 MIN: CPT | Performed by: ORTHOPAEDIC SURGERY

## 2025-01-17 RX ORDER — OXYCODONE HYDROCHLORIDE 5 MG/1
5 TABLET ORAL EVERY 6 HOURS PRN
Qty: 28 TABLET | Refills: 0 | Status: SHIPPED | OUTPATIENT
Start: 2025-01-17

## 2025-01-17 RX ORDER — ASPIRIN 81 MG/1
81 TABLET ORAL 2 TIMES DAILY
Qty: 30 TABLET | Refills: 0 | Status: SHIPPED | OUTPATIENT
Start: 2025-01-17

## 2025-01-17 RX ORDER — KETOCONAZOLE 20 MG/G
CREAM TOPICAL
Qty: 15 G | Refills: 0 | Status: CANCELLED | OUTPATIENT
Start: 2025-01-17

## 2025-01-17 RX ORDER — FERROUS SULFATE 325(65) MG
325 TABLET ORAL
Qty: 30 TABLET | Refills: 0 | Status: SHIPPED | OUTPATIENT
Start: 2025-01-17

## 2025-01-17 RX ORDER — NYSTATIN 10B UNIT
POWDER (EA) MISCELLANEOUS
Qty: 1 EACH | Refills: 0 | Status: SHIPPED | OUTPATIENT
Start: 2025-01-17

## 2025-01-17 RX ORDER — MELOXICAM 15 MG/1
15 TABLET ORAL DAILY
Qty: 30 TABLET | Refills: 0 | Status: SHIPPED | OUTPATIENT
Start: 2025-01-17

## 2025-01-17 RX ORDER — AMOXICILLIN 250 MG
1 CAPSULE ORAL DAILY
Qty: 30 TABLET | Refills: 0 | Status: SHIPPED | OUTPATIENT
Start: 2025-01-17

## 2025-01-17 NOTE — ASSESSMENT & PLAN NOTE
Orders:    oxyCODONE 5 MG Oral Tab; Take 1 tablet (5 mg total) by mouth every 6 (six) hours as needed for Pain.

## 2025-01-17 NOTE — PROGRESS NOTES
Eric Aviles is a 69 year old female being seen in the office today for No chief complaint on file.  .    HPI  Ms. Aviles is here for her preop visit. She has obtained medical evaluation and awaiting EKG and labs for clearance. She has obtained all appropriate labs.   From a hip standpoint,she continues to have severe pain in the left hip. It is activity limiting and negatively affects her quality of life. She has failed conservative measures.    History:  Past Medical History:    Bipolar disorder (HCC)    Cataract    Depression    Essential hypertension    Osteoarthritis    R knee surgery    Psychogenic polydipsia    Pulmonary emphysema (HCC)    Raynaud disease    Rib fracture    left side    Sciatica    Seborrheic keratoses    Sx.6/1/15, CPT.50950, L Total Knee, w/, Global Exp.8/30/15    Sx.6/8/16, CPT.41588, R Total Knee Replacement, w/, Global Exp.9/6/16    Visual impairment    WEARS READER     Past Surgical History:   Procedure Laterality Date    Appendectomy      appendicitis    Breast biopsy Left 09/07/2011    fibrocystic changes    Cataract      Colonoscopy  7/2009    incomplete (chronic constipation)    Colonoscopy N/A 11/8/2018    Procedure: COLONOSCOPY;  Surgeon: Micheal Curtis MD;  Location: [facility] ENDOSCOPY    Colonoscopy N/A 12/19/2019    Procedure: COLONOSCOPY;  Surgeon: Micheal Curtis MD;  Location: [facility] ENDOSCOPY    Hernia surgery Left 12/21/2011    neuroma excision, partial mesh removal, primary re-repair    Inguinal hernia repair Left 05/20/2009    Knee surgery Right     osteoarthritis    Angel localization wire 1 site left (cpt=19281)      2011    Orif humerus fracture Right 04/15/2022    right distal humerus open reduction internal fixation    Other Left 03/22/2019    LEFT REVERSE TOTAL SHOULDER ARTHROPLASTY WITH BICEPS TENODESIS    Other surgical history      Laparotomy (adhesions)    Other surgical history      Arthrocentesis of the right knee joint    Other surgical  history      Arthrocentesis of the left knee joint    Other surgical history  2019    rotator cuff    Total knee replacement Bilateral  and 2016    Tubal ligation       Family History   Problem Relation Age of Onset    Diabetes Mother     Heart Disease Mother         CAD    Stroke Mother         CVA    Heart Attack Mother         quadruple bypass surgery/MI    Arthritis Mother         possible Rheumatoid Arthritis    Dementia Father         Alzheimer's    Lipids Father         hyperlipidemia    Hypertension Father     Musculo-skelatal Disorder Father         B/L knees replaced    Breast Cancer Sister 40    Other (Other) Sister         flu H1N1    Cancer Brother         liver    Asthma Daughter     Colon Cancer Maternal Grandmother     Breast Cancer Paternal Aunt 52    Glaucoma Neg         multiple    Macular degeneration Neg         multiple     Family Status   Relation Status    Mo (Not Specified)    Fa         Alzheimer's    Sis (Not Specified)    Sis (Not Specified)    Sis (Not Specified)    Bro  at age 53        liver cancer    Adrianna (Not Specified)    MGMA (Not Specified)    Pat Aunt (Not Specified)    NEG (Not Specified)     Social History     Occupational History    Not on file   Tobacco Use    Smoking status: Former     Current packs/day: 0.00     Average packs/day: 1 pack/day for 30.0 years (30.0 ttl pk-yrs)     Types: Cigarettes     Start date: 1972     Quit date: 2002     Years since quittin.9     Passive exposure: Never    Smokeless tobacco: Never    Tobacco comments:     1 unit per day   Vaping Use    Vaping status: Never Used   Substance and Sexual Activity    Alcohol use: Not Currently     Alcohol/week: 0.0 standard drinks of alcohol     Comment: socially    Drug use: No    Sexual activity: Yes     Partners: Male     Birth control/protection: Tubal Ligation       Medications:  Current Outpatient Medications   Medication Sig Dispense Refill    ketoconazole 2 %  External Cream Apply 1 Application topically 2 (two) times daily for 14 days. 60 g 0    donepezil 5 MG Oral Tab Take 1 tablet (5 mg total) by mouth nightly. 90 tablet 3    albuterol 108 (90 Base) MCG/ACT Inhalation Aero Soln Inhale 2 puffs into the lungs every 6 (six) hours as needed for Wheezing or Shortness of Breath. 18 g 5    alendronate 70 MG Oral Tab Take 1 tablet (70 mg total) by mouth once a week. 12 tablet 2    clonazePAM 2 MG Oral Tab Take 2 tablets (4 mg total) by mouth nightly. 30 tablet 0    verapamil  MG Oral Tab CR Take 1 tablet (240 mg total) by mouth every evening. 90 tablet 0    atorvastatin 20 MG Oral Tab Take 1 tablet (20 mg total) by mouth nightly. 90 tablet 3    ARIPiprazole 5 MG Oral Tab Take 1 tablet (5 mg total) by mouth nightly. 90 tablet 0    QUEtiapine 50 MG Oral Tab Take 1 tablet (50 mg total) by mouth nightly. 90 tablet 0    DULoxetine 60 MG Oral Cap DR Particles Take 1 capsule (60 mg total) by mouth nightly. Take 60mg daily. 90 capsule 0    albuterol sulfate (2.5 MG/3ML) 0.083% Inhalation Nebu Soln Take 3 mL (2.5 mg total) by nebulization every 6 (six) hours as needed for Wheezing or Shortness of Breath. 90 each 0       Allergies:  Allergies[1]    Review of Systems  A comprehensive review of systems was completed and is negative unless noted above or in the HPI.    Physical Exam  Physical Exam  There were no vitals taken for this visit.  There is no height or weight on file to calculate BMI.    Constitutional: The patient appears well-developed and well-nourished, in no apparent distress.   Psychiatric: The patient demonstrates good comprehension, judgment and decision making. Normal mood and affect.  Eyes: PER and EOM are normal.  ENT: Hearing appropriate for normal conversation.   Cardiovascular: The patient has symmetric pulses, 2+.  Distal extremity is warm and well perfused with good capillary refill.  Respiratory:  The patient is breathing comfortably without increased  respiratory effort or use of accessory muscles.  Hematologic/Lymphatic: No lymphangitis. There is no appreciable enlargement of lymph nodes. No calf swelling, calf non-tender, negative Lopez's sign. No edema.  Skin: No wounds or ulcers. No hypertrophic scarring.  Neck: FROM without pain.  MSK:    Left  hip           Skin: Intact, no obvious intertrigo or fungal rashes are noted in the groin crease today. She does an area of intertrigo in the centrally under the umbilicus.           Deformity: None         Tenderness: Anterior         Range of Motion: R/L     Extension: 0     Flexion Contracture: 0     Flexion: 70/60     Internal Rotation: 10/5     External Rotation: 20/10     Abduction: 10/10     Adduction: 10/10         Muscle Strength       Abduction: 5/5     Adduction: 5/5     Flexion: 5/5          Gait: Normal and Antalgic   Trendelenburg Lurch Negative   Trendelenburg Sign Negative   Stinchfield: Positive   Irma Test: Negative   Ximena Test: Negative   Anterior Impingement: Positive   Posterior Impingement: Negative   Leg Length Discrepancy Negative         Motor: intact   Sensory: intact   Vascular: intact      Labs:  Lab Results   Component Value Date    WBC 6.4 01/15/2025    HGB 13.1 01/15/2025    HCT 37.6 01/15/2025    .0 01/15/2025     Lab Results   Component Value Date    PTT 30.0 06/21/2023    ALB 5.0 (H) 01/15/2025    A1C 5.7 (H) 01/15/2025     Lab Results   Component Value Date     (H) 01/15/2025    GLU 98 05/06/2024    GLU 93 02/08/2024       Imaging:  None new    Assessment and Plan  Assessment & Plan  Primary osteoarthritis of left hip    Orders:    oxyCODONE 5 MG Oral Tab; Take 1 tablet (5 mg total) by mouth every 6 (six) hours as needed for Pain.    Preop examination    Orders:    oxyCODONE 5 MG Oral Tab; Take 1 tablet (5 mg total) by mouth every 6 (six) hours as needed for Pain.    Intertrigo    Orders:    Nystatin Does not apply Powder; Apply to rash 3 times daily      The  patient's history, physical examination and imaging studies are consistent with the diagnosis above. Options were discussed with the patient today, including continued conservative management vs surgical intervention. Given that the patient has attempted and failed nonoperative modalities and continue to have functionally limiting pain that is negatively affecting quality of life, I recommend surgical intervention in the form of a total hip arthroplasty. They have been scheduled for this and are here today for a preoperative visit.    Surgery scheduled for 1/27/2025.  They have obtained medical clearance for this surgery, just awaiting final confirmation based on labs/EKG.   Labs were reviewed.  For her intertrigo, this appears to be well away from her potential skin incision but I will have her start Nystatin. If this looks larger or concerning, may delay her surgery. Will reassess day of surgery.   In the interim, they will continue activity modification, anti-inflammatory medication, and assistive device usage for symptomatic relief and work on home exercise program in anticipation for surgery.  They have stopped all appropriate medications and supplements.  Activities: As tolerated  Follow-up: 3 weeks post-surgery with xrays.    I had a lengthy discussion about total hip replacement.  The risks, benefits and alternatives of the surgery were discussed.  Risks include but are not limited to infection, DVT/PE, damage to surrounding nerves and vessels, bleeding, need for blood transfusion, injury to surrounding structures including nerves, vessels, ligaments, tendons, or bone, iatrogenic fractures, dislocation/instability, leg length inequality, loosening, polyethylene wear, osteolysis, continued pain, need for future surgeries, risks for anesthesia including MI, stroke, loss of limb, or loss of life.  Discussed postoperative rehabilitation as well as restrictions if any.  We discussed safe and responsible use of  pain medications postoperatively.  Understanding these topics, the patient elected to proceed with surgical planning.    No follow-ups on file.    is above average; BMI management plan is completed    Chase Duke MD         [1]   Allergies  Allergen Reactions    Sulfa Antibiotics HIVES

## 2025-01-22 ENCOUNTER — HOSPITAL ENCOUNTER (OUTPATIENT)
Dept: NUCLEAR MEDICINE | Facility: HOSPITAL | Age: 70
Discharge: HOME | End: 2025-01-22
Attending: Nurse Practitioner
Payer: MEDICARE

## 2025-01-22 ENCOUNTER — LAB ENCOUNTER (OUTPATIENT)
Dept: LAB | Facility: HOSPITAL | Age: 70
End: 2025-01-22
Attending: Nurse Practitioner
Payer: MEDICARE

## 2025-01-22 ENCOUNTER — HOSPITAL ENCOUNTER (OUTPATIENT)
Dept: CV DIAGNOSTICS | Facility: HOSPITAL | Age: 70
Discharge: HOME | End: 2025-01-22
Attending: Nurse Practitioner
Payer: MEDICARE

## 2025-01-22 DIAGNOSIS — Z01.818 PRE-OP TESTING: ICD-10-CM

## 2025-01-22 DIAGNOSIS — R94.31 ABNORMAL EKG: ICD-10-CM

## 2025-01-22 DIAGNOSIS — Z01.818 PRE-OP EXAMINATION: ICD-10-CM

## 2025-01-22 LAB
% OF MAX PREDICTED HR: 100 %
ANTIBODY SCREEN: NEGATIVE
BILIRUB UR QL: NEGATIVE
CLARITY UR: CLEAR
GLUCOSE UR-MCNC: NORMAL MG/DL
HGB UR QL STRIP.AUTO: NEGATIVE
KETONES UR-MCNC: NEGATIVE MG/DL
LEUKOCYTE ESTERASE UR QL STRIP.AUTO: 250
MAX DIASTOLIC BP: 68 MMHG
MAX HEART RATE: 82 BPM
MAX PREDICTED HEART RATE: 149 BPM
MAX SYSTOLIC BP: 139 MMHG
MAX WORK LOAD: 10
MRSA DNA SPEC QL NAA+PROBE: NEGATIVE
NITRITE UR QL STRIP.AUTO: NEGATIVE
PH UR: 6 [PH] (ref 5–8)
PROT UR-MCNC: NEGATIVE MG/DL
RH BLOOD TYPE: POSITIVE
SP GR UR STRIP: 1.01 (ref 1–1.03)
UROBILINOGEN UR STRIP-ACNC: NORMAL

## 2025-01-22 PROCEDURE — 81001 URINALYSIS AUTO W/SCOPE: CPT

## 2025-01-22 PROCEDURE — 87086 URINE CULTURE/COLONY COUNT: CPT

## 2025-01-22 PROCEDURE — 86901 BLOOD TYPING SEROLOGIC RH(D): CPT | Performed by: NURSE PRACTITIONER

## 2025-01-22 PROCEDURE — 93018 CV STRESS TEST I&R ONLY: CPT | Performed by: INTERNAL MEDICINE

## 2025-01-22 PROCEDURE — 93017 CV STRESS TEST TRACING ONLY: CPT | Performed by: NURSE PRACTITIONER

## 2025-01-22 PROCEDURE — 86850 RBC ANTIBODY SCREEN: CPT | Performed by: NURSE PRACTITIONER

## 2025-01-22 PROCEDURE — 87641 MR-STAPH DNA AMP PROBE: CPT

## 2025-01-22 PROCEDURE — 36415 COLL VENOUS BLD VENIPUNCTURE: CPT | Performed by: NURSE PRACTITIONER

## 2025-01-22 PROCEDURE — 93016 CV STRESS TEST SUPVJ ONLY: CPT | Performed by: INTERNAL MEDICINE

## 2025-01-22 PROCEDURE — 78452 HT MUSCLE IMAGE SPECT MULT: CPT | Performed by: NURSE PRACTITIONER

## 2025-01-22 PROCEDURE — 86900 BLOOD TYPING SEROLOGIC ABO: CPT | Performed by: NURSE PRACTITIONER

## 2025-01-22 RX ORDER — REGADENOSON 0.08 MG/ML
INJECTION, SOLUTION INTRAVENOUS
Status: COMPLETED
Start: 2025-01-22 | End: 2025-01-22

## 2025-01-22 RX ADMIN — REGADENOSON 0.4 MG: 0.08 INJECTION, SOLUTION INTRAVENOUS at 11:35:00

## 2025-01-24 DIAGNOSIS — R94.39 ABNORMAL NUCLEAR STRESS TEST: Primary | ICD-10-CM

## 2025-02-06 NOTE — TELEPHONE ENCOUNTER
Meloxicam 15 mg  DOS: canceled on 1/27/25 due to need for further medical Clearance  Last OV: 01/17/25  Last refill date: 01/17/25     #/refills: 30/0  Upcoming appt: No future appointments.    1/15/25  BUN  9 - 23 mg/dL 7 Low    Creatinine  0.55 - 1.02 mg/dL 0.69   BUN/CREA Ratio  10.0 - 20.0 10.1     eGFR-Cr  >=60 mL/min/1.73m2 93

## 2025-02-07 ENCOUNTER — MED REC SCAN ONLY (OUTPATIENT)
Dept: INTERNAL MEDICINE CLINIC | Facility: CLINIC | Age: 70
End: 2025-02-07

## 2025-02-12 NOTE — TELEPHONE ENCOUNTER
DOS: cancelled  Last OV: 1/17/25  Last refill date: 1/17/25     #/refills: 30/0  Upcoming appt: No future appointments.

## 2025-02-17 RX ORDER — LIDOCAINE HYDROCHLORIDE 20 MG/ML
1 SOLUTION ORAL; TOPICAL
Qty: 30 TABLET | Refills: 0 | OUTPATIENT
Start: 2025-02-17

## 2025-02-17 RX ORDER — MELOXICAM 15 MG/1
15 TABLET ORAL DAILY
Qty: 30 TABLET | Refills: 0 | Status: SHIPPED | OUTPATIENT
Start: 2025-02-17

## 2025-02-17 NOTE — TELEPHONE ENCOUNTER
I need her to see her gastroenterologist. Physical Therapy                 Therapy Communication Note    Patient Name: Aline Chi  MRN: [id number]  Department:   Room: Room/bed info not found  Today's Date: 2/17/2025     Discipline: Physical Therapy          Missed Visit Reason: patient called after visit and stated that she came down with bronchitis, which forced her to miss visit. She is doing well with exercises for knees and does not need further PT follow ups at this time.     Missed Time: No Show    Comment: last schedule visit

## 2025-02-21 ENCOUNTER — TELEPHONE (OUTPATIENT)
Facility: CLINIC | Age: 70
End: 2025-02-21

## 2025-02-21 DIAGNOSIS — M16.12 PRIMARY OSTEOARTHRITIS OF LEFT HIP: Primary | ICD-10-CM

## 2025-02-21 NOTE — TELEPHONE ENCOUNTER
Pt is calling to inquire about her upcoming surgery date.please advise and f/u [phone]  No future appointments.

## 2025-02-24 NOTE — TELEPHONE ENCOUNTER
Spoke with Eric who is ready to proceed with scheduling surgery with Dr. Duke. I informed her that I would send a message out to his scheduling team and they will be in touch with her to get her scheduled for surgery.

## 2025-02-25 ENCOUNTER — TELEPHONE (OUTPATIENT)
Dept: ORTHOPEDICS CLINIC | Facility: CLINIC | Age: 70
End: 2025-02-25

## 2025-02-25 NOTE — TELEPHONE ENCOUNTER
Patient reached out to follow up with this request to speak with someone in regards to surgery.    Please advise.

## 2025-02-25 NOTE — TELEPHONE ENCOUNTER
Patient called back to reschedule her surgery. She chose 3/24. She stated that she was cleared by cardiology. I will reach out to cardiologist for official clearance.

## 2025-02-27 ENCOUNTER — TELEPHONE (OUTPATIENT)
Dept: INTERNAL MEDICINE CLINIC | Facility: CLINIC | Age: 70
End: 2025-02-27

## 2025-02-27 DIAGNOSIS — M16.12 PRIMARY OSTEOARTHRITIS OF LEFT HIP: Primary | ICD-10-CM

## 2025-02-27 NOTE — TELEPHONE ENCOUNTER
Patient has an upcoming appointment with Dr. MAGO Duke in the Orthopedics Department.  They are currently in need of a referral, please send including multiple visits.    Future Appointments   Date Time Provider Department Center   3/21/2025 10:00 AM Chase Duke MD [facility]   4/18/2025 10:00 AM Chase Duke MD [facility]       Thank you

## 2025-03-10 ENCOUNTER — TELEPHONE (OUTPATIENT)
Dept: INTERNAL MEDICINE CLINIC | Facility: CLINIC | Age: 70
End: 2025-03-10

## 2025-03-10 PROBLEM — I25.10 NON-OCCLUSIVE CORONARY ARTERY DISEASE REQUIRING DRUG THERAPY: Status: ACTIVE | Noted: 2025-01-29

## 2025-03-10 PROBLEM — F31.9 BIPOLAR DISORDER (HCC): Status: ACTIVE | Noted: 2018-02-27

## 2025-03-10 RX ORDER — NYSTATIN 100000 [USP'U]/G
1 POWDER TOPICAL 3 TIMES DAILY
COMMUNITY
Start: 2025-01-17

## 2025-03-10 NOTE — TELEPHONE ENCOUNTER
Patient calling to schedule pre-operative clearance, surgery is on 03/24/25, no appointment available until April, please advise.     Please reply to pool: [facility] [[phone]]

## 2025-03-11 ENCOUNTER — OFFICE VISIT (OUTPATIENT)
Dept: INTERNAL MEDICINE CLINIC | Facility: CLINIC | Age: 70
End: 2025-03-11

## 2025-03-11 VITALS
TEMPERATURE: 98 F | HEIGHT: 60 IN | BODY MASS INDEX: 33.22 KG/M2 | SYSTOLIC BLOOD PRESSURE: 126 MMHG | OXYGEN SATURATION: 96 % | DIASTOLIC BLOOD PRESSURE: 80 MMHG | HEART RATE: 75 BPM | WEIGHT: 169.19 LBS

## 2025-03-11 DIAGNOSIS — Z01.818 PRE-OP EXAMINATION: Primary | ICD-10-CM

## 2025-03-11 PROCEDURE — 3079F DIAST BP 80-89 MM HG: CPT | Performed by: NURSE PRACTITIONER

## 2025-03-11 PROCEDURE — 3008F BODY MASS INDEX DOCD: CPT | Performed by: NURSE PRACTITIONER

## 2025-03-11 PROCEDURE — 99213 OFFICE O/P EST LOW 20 MIN: CPT | Performed by: NURSE PRACTITIONER

## 2025-03-11 PROCEDURE — 1126F AMNT PAIN NOTED NONE PRSNT: CPT | Performed by: NURSE PRACTITIONER

## 2025-03-11 PROCEDURE — 1160F RVW MEDS BY RX/DR IN RCRD: CPT | Performed by: NURSE PRACTITIONER

## 2025-03-11 PROCEDURE — 3074F SYST BP LT 130 MM HG: CPT | Performed by: NURSE PRACTITIONER

## 2025-03-11 PROCEDURE — 1159F MED LIST DOCD IN RCRD: CPT | Performed by: NURSE PRACTITIONER

## 2025-03-11 RX ORDER — MELOXICAM 15 MG/1
15 TABLET ORAL DAILY
Qty: 30 TABLET | Refills: 0 | Status: SHIPPED | OUTPATIENT
Start: 2025-03-11

## 2025-03-11 NOTE — PROGRESS NOTES
Subjective:   Eric Aviles is a 69 year old female with PMH HTN, osteoarthritis, raynaud's, s/p multiple orthopedic surgeries (bilat knees, 1 shoulder) who presents for Pre-Op Exam     Presents for preoperative clearance for below. Was seen by me on 1/15/25 for same - surgery was postponed due to stress test showing area of inducible ischemia to LAD area. Was subsequently referred to cardiology for their recommendations and clearance. Saw [facility] on 1/29.     Planned surgery - L anterior total hip arthoplasty  Type of anesthesia - General  Surgeon - Dr. Chase Duke  Date of surgery - 3/24/25  Cardiac history - HTN on verapamil  Myocardial infarction in past 6 months - No  Bleeding disorders - No  Taking blood thinners - No  Chest pain or shortness of breath with ADLs - No  Number of alcoholic beverages consumed weekly - none  Tobacco consumption - No quit >20 years ago.     She reports she has never had a bad reaction to anesthesia and has not been told she has a difficult airway.    Regarding listed history of emphysema - she states she has never seen a pulmonologist, all recent chest xrays have been normal, and she rarely uses her inhaler - occasional use during stressful situations.    Dr. Nunn is the psychiatrist who manages her medications for bipolar disorder, which have been stable for years per patient.     Functional Capacity/METs:   Perform ADLs- eating, dress, toilet? Y  Walk up flight of steps, hill, walk ground level 3-4mph? Y  Perform heavy housework- scrubbing floors, move heavy furniture, climb 2 flights of stairs? Y  Participate in strenuous sports- swimming, singles tennis, football, basketball, ski? N     Revised Cardiac Risk Index: 0     Patient has no major clinical predictors going for the above surgery.  Patient has reasonable functional capacity as described above. I have ordered tests as requested by surgeon. If they are acceptable, I will consider patient medically cleared for surgery  as low risk for perioperative cardiac events with routine perioperative care.  DVT prophylaxis as protocol  The above risk assessment was discussed with the patient and would like to proceed with the surgery.       History/Other:    Chief Complaint Reviewed and Verified  No Further Nursing Notes to   Review  Tobacco Reviewed  Allergies Reviewed  Medications Reviewed    Medical History Reviewed  Surgical History Reviewed  OB Status Reviewed    Family History Reviewed  Social History Reviewed         Tobacco:  She smoked tobacco in the past but quit greater than 12 months ago.  Social History     Tobacco Use   Smoking Status Former    Current packs/day: 0.00    Average packs/day: 1 pack/day for 30.0 years (30.0 ttl pk-yrs)    Types: Cigarettes    Start date: 1972    Quit date: 2002    Years since quittin.1    Passive exposure: Never   Smokeless Tobacco Never   Tobacco Comments    1 unit per day        Current Outpatient Medications   Medication Sig Dispense Refill    MELOXICAM 15 MG Oral Tab Take 1 tablet (15 mg total) by mouth daily. 30 tablet 0    nystatin 431804 UNIT/GM External Powder Apply 1 Application topically 3 (three) times daily.      donepezil 5 MG Oral Tab Take 1 tablet (5 mg total) by mouth nightly. 90 tablet 3    albuterol 108 (90 Base) MCG/ACT Inhalation Aero Soln Inhale 2 puffs into the lungs every 6 (six) hours as needed for Wheezing or Shortness of Breath. 18 g 5    clonazePAM 2 MG Oral Tab Take 2 tablets (4 mg total) by mouth nightly. 30 tablet 0    verapamil  MG Oral Tab CR Take 1 tablet (240 mg total) by mouth every evening. 90 tablet 0    atorvastatin 20 MG Oral Tab Take 1 tablet (20 mg total) by mouth nightly. 90 tablet 3    ARIPiprazole 5 MG Oral Tab Take 1 tablet (5 mg total) by mouth nightly. 90 tablet 0    QUEtiapine 50 MG Oral Tab Take 1 tablet (50 mg total) by mouth nightly. 90 tablet 0    DULoxetine 60 MG Oral Cap DR Particles Take 1 capsule (60 mg total)  by mouth nightly. Take 60mg daily. 90 capsule 0    aspirin (ECOTRIN LOW STRENGTH) 81 MG Oral Tab EC Take 1 tablet (81 mg total) by mouth in the morning and 1 tablet (81 mg total) before bedtime. (Patient not taking: Reported on 3/11/2025) 30 tablet 0         Review of Systems:  Review of Systems  10 point review of systems otherwise negative with the exception of HPI and assessment and plan.    Objective:   /80   Pulse 75   Temp 97.7 °F (36.5 °C) (Temporal)   Ht 5' (1.524 m)   Wt 169 lb 3.2 oz (76.7 kg)   SpO2 96%   BMI 33.04 kg/m²  Estimated body mass index is 33.04 kg/m² as calculated from the following:    Height as of this encounter: 5' (1.524 m).    Weight as of this encounter: 169 lb 3.2 oz (76.7 kg).      Physical Exam  Vitals reviewed.   Constitutional:       General: She is not in acute distress.  HENT:      Head: Normocephalic.      Right Ear: Tympanic membrane normal.      Left Ear: Tympanic membrane normal.      Nose: Nose normal.      Mouth/Throat:      Mouth: Mucous membranes are moist.      Pharynx: Oropharynx is clear. No posterior oropharyngeal erythema.   Eyes:      Conjunctiva/sclera: Conjunctivae normal.   Cardiovascular:      Rate and Rhythm: Normal rate and regular rhythm.      Pulses: Normal pulses.      Heart sounds: Normal heart sounds. No murmur heard.  Pulmonary:      Effort: Pulmonary effort is normal. No respiratory distress.      Breath sounds: Normal breath sounds.   Skin:     General: Skin is warm and dry.   Neurological:      General: No focal deficit present.      Mental Status: She is alert.         Assessment & Plan:   1. Pre-op examination (Primary)  - pt did not have paperwork with her today, unable to locate in office if faxed - reached out to Dr. Duke's office staff to inquire if all previously ordered pre-op testing in January needs to be repeated - waiting for response. Eric is concerned that insurance won't cover testing so close together. She is willing to come  back for required testing - I will call her to let her know what needs to be done.  - unable to locate evidence of cardiac clearance in [facility] note from encounter in Feb - will reach out to [facility] office for official clearance.    ADDENDUM 3/12: surgical office requesting repeat UA with micro and reflex cx, MRSA screen, type and screen. Patient notified.    ADDENDUM 3/14:  Preoperative testing reviewed.  Patient is cleared for upcoming surgery without further testing *provided she has obtained cardiac clearance*.  Clearance is effective for 30 days from the original encounter date.    ANSHUL Dubois  3/14/2025       No follow-ups on file.    ANSHUL Dubois, 3/11/2025, 5:51 PM     This note was prepared using Dragon Medical voice recognition dictation software. As a result errors may occur. When identified, these errors have been corrected. While every attempt is made to correct errors during dictation discrepancies may still exist.

## 2025-03-11 NOTE — TELEPHONE ENCOUNTER
Meloxicam 15 mg  DOS: scheduled 03/24/25  Last OV: 01/17/25  Last refill date: 02/17/25     #/refills: 30/0  Upcoming appt:   Future Appointments   Date Time Provider Department Center   3/11/2025  6:00 PM Corrine Lai APRN [facility]   3/21/2025 10:00 AM Chase Duke MD [facility]   4/18/2025 10:00 AM Chase Duke MD [facility]     01/15/25  BUN  9 - 23 mg/dL 7 Low    Creatinine  0.55 - 1.02 mg/dL 0.69     eGFR-Cr  >=60 mL/min/1.73m2 93     Does this patient need to stop NSAIDs 2 weeks prior to surgery?

## 2025-03-12 ENCOUNTER — LAB ENCOUNTER (OUTPATIENT)
Dept: LAB | Age: 70
End: 2025-03-12
Attending: Nurse Practitioner
Payer: MEDICARE

## 2025-03-12 DIAGNOSIS — Z01.818 PRE-OP EXAMINATION: ICD-10-CM

## 2025-03-12 LAB
ANTIBODY SCREEN: NEGATIVE
BILIRUB UR QL: NEGATIVE
CLARITY UR: CLEAR
COLOR UR: YELLOW
GLUCOSE UR-MCNC: NORMAL MG/DL
HGB UR QL STRIP.AUTO: NEGATIVE
KETONES UR-MCNC: NEGATIVE MG/DL
LEUKOCYTE ESTERASE UR QL STRIP.AUTO: 25
NITRITE UR QL STRIP.AUTO: NEGATIVE
PH UR: 5.5 [PH] (ref 5–8)
PROT UR-MCNC: NEGATIVE MG/DL
RH BLOOD TYPE: POSITIVE
SP GR UR STRIP: 1.02 (ref 1–1.03)
UROBILINOGEN UR STRIP-ACNC: NORMAL

## 2025-03-12 PROCEDURE — 36415 COLL VENOUS BLD VENIPUNCTURE: CPT | Performed by: NURSE PRACTITIONER

## 2025-03-12 PROCEDURE — 86901 BLOOD TYPING SEROLOGIC RH(D): CPT | Performed by: NURSE PRACTITIONER

## 2025-03-12 PROCEDURE — 87641 MR-STAPH DNA AMP PROBE: CPT

## 2025-03-12 PROCEDURE — 86850 RBC ANTIBODY SCREEN: CPT | Performed by: NURSE PRACTITIONER

## 2025-03-12 PROCEDURE — 87086 URINE CULTURE/COLONY COUNT: CPT

## 2025-03-12 PROCEDURE — 81001 URINALYSIS AUTO W/SCOPE: CPT

## 2025-03-12 PROCEDURE — 86900 BLOOD TYPING SEROLOGIC ABO: CPT | Performed by: NURSE PRACTITIONER

## 2025-03-13 LAB — MRSA DNA SPEC QL NAA+PROBE: NEGATIVE

## 2025-03-20 RX ORDER — ASCORBIC ACID 500 MG
500 TABLET ORAL DAILY
COMMUNITY

## 2025-03-20 RX ORDER — CALCIUM CARBONATE/VITAMIN D3 600 MG-10
250 TABLET ORAL DAILY
COMMUNITY

## 2025-03-20 RX ORDER — MAGNESIUM HYDROXIDE 400 MG/5ML
1 SUSPENSION, ORAL (FINAL DOSE FORM) ORAL DAILY
COMMUNITY

## 2025-03-20 RX ORDER — FERROUS SULFATE 325(65) MG
325 TABLET ORAL
COMMUNITY

## 2025-03-20 RX ORDER — IBUPROFEN 200 MG
1 CAPSULE ORAL DAILY
COMMUNITY

## 2025-03-21 ENCOUNTER — OFFICE VISIT (OUTPATIENT)
Dept: ORTHOPEDICS CLINIC | Facility: CLINIC | Age: 70
End: 2025-03-21
Payer: MEDICARE

## 2025-03-21 DIAGNOSIS — M16.12 PRIMARY OSTEOARTHRITIS OF LEFT HIP: Primary | ICD-10-CM

## 2025-03-21 DIAGNOSIS — Z01.818 PREOP EXAMINATION: ICD-10-CM

## 2025-03-21 PROCEDURE — 1160F RVW MEDS BY RX/DR IN RCRD: CPT | Performed by: ORTHOPAEDIC SURGERY

## 2025-03-21 PROCEDURE — 1159F MED LIST DOCD IN RCRD: CPT | Performed by: ORTHOPAEDIC SURGERY

## 2025-03-21 PROCEDURE — 99214 OFFICE O/P EST MOD 30 MIN: CPT | Performed by: ORTHOPAEDIC SURGERY

## 2025-03-21 RX ORDER — CELECOXIB 200 MG/1
200 CAPSULE ORAL 2 TIMES DAILY
Qty: 60 CAPSULE | Refills: 0 | Status: SHIPPED | OUTPATIENT
Start: 2025-03-21

## 2025-03-21 RX ORDER — CEFADROXIL 500 MG/1
500 CAPSULE ORAL 2 TIMES DAILY
Qty: 14 CAPSULE | Refills: 0 | Status: SHIPPED | OUTPATIENT
Start: 2025-03-21

## 2025-03-21 NOTE — DISCHARGE INSTRUCTIONS
Chase Duke MD  [facility]  [address]  [place] [address]  [phone]    Sometimes managing your health at home requires assistance.  The [facility] team has recognized your preference to use [facility].  They can be reached by phone at [phone].  The fax number for your reference is [phone].  A representative from the home health agency will contact you or your family to schedule your first visit.      DISCHARGE INSTRUCTIONS FOR SAME DAY TOTAL JOINT REPLACEMENTS  You have undergone hip or knee replacement surgery. Your doctor replaced your painful joint with an artificial joint to relieve the pain and restore movement. Here are some instructions for you to follow once you have been discharged from the hospital.  Home Medications  You should have received your pain medications prescriptions during your pre-op visit. If not, make sure you have them before leaving the hospital.   A known side effect of narcotic medications is constipation. We advise that you purchase a stool softener (such as Colace) and take it twice daily while taking pain medication. Drink plenty of fluids while taking a stool softener.   You should also purchase Aspirin 81 mg (over-the-counter). Please take this, as directed, once you are home. You will take this medication for 4 weeks.   Do not abruptly stop taking your pain medication. If you find that you are having unfavorable side effects from your pain medication, please call our office and speak to our Nursing staff. We will work together to prescribe something that works for you and doesn't impede your recovery.   Incision Care  You will be going home with a dressing over your incision. This dressing is most likely, an Aquacel. There is skin glue under this bandage, there are no staples that need to be removed. The skin glue will protect your incision while it heals and will  come off on its own.     Aquacel bandage- you may keep this bandage in place until seven days post-operative. Your physical therapist will remove the bandage and replace with a dry island dressing. This dressing must be kept dry for 48 hours, if after 48 hours there is no drainage from your incision you may remove the dry dressing and leave the incision exposed. If there is continued drainage the dressing must continue to be replaced until drainage has stopped. Your physical therapist will help monitor this daily until drainage has stopped.    If you have an Aquacel in place, it is important to note the drainage coming from your incision. As with a band aid, you can see the drainage in the center of the Aquacel. If you notice that the drainage has largely increased in size, you should call our office and speak with our Nursing staff.           Minimal Drainage                   Increased - please call our office                       Check the skin surrounding your incision daily for redness, swelling, tenderness or increased drainage; if these occur, call our office.   You may shower after surgery if you have an Aquacel in place. Do NOT soak in a bathtub, hot tub or swimming pool until your doctor has approved.                                 Home Care  If you have a total knee replacement you will be starting outpatient physical therapy 24-48 hours after you are discharged from the hospital. You should have had a prescription already provided to you during your preop visit.   If you have a total hip replacement you will have home health care. This should be set up during your preop visit and if not, then should be set up before leaving the hospital. A home health care nurse will come to your house 2-3 times a week for two weeks to work with you.  You will be sent home with jimmy hose (support stockings). You are to wear these at all times, including sleeping. You can remove for hygiene purposed during the day. They  help to increase circulation and prevent blood clots. You will be expected to wear your jimmy hose for 4 weeks post-operatively, unless otherwise instructed by your doctor.   Ice your affected joint 3-4 times a day for 15-20 minutes at a time, avoid getting your bandage wet.   Elevate your affected leg on 2-3 pillows when sitting or lying down. Place 2-3 pillows under your ankles, not under your knee. If you had a hip replacement, lay flat a few times a day to prevent blood from pooling at your hip.   Make sure to eat a nutritious diet containing extra protein. Protein will promote joint healing and immune system health.    Pain:   If you have a regional pain block in place (only for total knees), you may not be experiencing any pain upon discharge. However, within 24 hrs. of your surgery, this block will wear off. You may feel a tingling sensation in the operative limb when the block is wearing off.   Please make sure to take your narcotic pain medication as scheduled, regardless of whether you are in pain or not. You will want narcotic pain medication on board when the block wears off. Otherwise, the pain will get away from you and it is very hard to catch up to it.   Remember to ice and elevate, as much as possible, as this will help with decrease the pain, as well.   Follow-up Care  You should have a post-operative visit scheduled for 3 weeks after surgery with Dr. Duke. Please make sure this appointment is scheduled prior to surgery, or before leaving the hospital.   Please call your doctor's office right away if you have any of the following:   Shortness of breath, chest pain, anxiety  Increased calf pain, often accompanied by tenderness to touch  Increased drainage when performing dressing changes  Fever of 100.4 or higher, or shaking chills  Increased redness, tenderness, or swelling in or around your incision  Increased stiffness or inability to move the affected limb        [facility] HOME CARE INSTRUCTIONS:  POST-OP ANESTHESIA  The medication that you received for sedation or general anesthesia can last up to 24 hours. Your judgment and reflexes may be altered, even if you feel like your normal self.      We Recommend:   Do not drive any motor vehicle or bicycle   Avoid mowing the lawn, playing sports, or working with power tools/applicances (power saws, electric knives or mixers)   That you have someone stay with you on your first night home   Do not drink alcohol or take sleeping pills or tranquilizers   Do not sign legal documents within 24 hours of your procedure   If you had a nerve block for your surgery, take extra care not to put any pressure on your arm or hand for 24 hours    It is normal:  For you to have a sore throat if you had a breathing tube during surgery (while you were asleep!). The sore throat should get better within 48 hours. You can gargle with warm salt water (1/2 tsp in 4 oz warm water) or use a throat lozenge for comfort  To feel muscle aches or soreness especially in the abdomen, chest or neck. The achy feeling should go away in the next 24 hours  To feel weak, sleepy or \"wiped out\". Your should start feeling better in the next 24 hours.   To experience mild discomforts such as sore lip or tongue, headache, cramps, gas pains or a bloated feeling in your abdomen.   To experience mild back pain or soreness for a day or two if you had spinal or epidural anesthesia.   If you had laparoscopic surgery, to feel shoulder pain or discomfort on the day of surgery.   For some patients to have nausea after surgery/anesthesia    If you feel nausea or experience vomiting:   Try to move around less.   Eat less than usual or drink only liquids until the next morning   Nausea should resolve in about 24 hours    If you have a problem when you are at home:    Call your surgeons office   Discharge Instructions: After Your Surgery  You’ve just had surgery. During surgery, you were given medicine called anesthesia  to keep you relaxed and free of pain. After surgery, you may have some pain or nausea. This is common. Here are some tips for feeling better and getting well after surgery.   Going home  Your healthcare provider will show you how to take care of yourself when you go home. They'll also answer your questions. Have an adult family member or friend drive you home. For the first 24 hours after your surgery:   Don't drive or use heavy equipment.  Don't make important decisions or sign legal papers.  Take medicines as directed.  Don't drink alcohol.  Have someone stay with you, if needed. They can watch for problems and help keep you safe.  Be sure to go to all follow-up visits with your healthcare provider. And rest after your surgery for as long as your provider tells you to.   Coping with pain  If you have pain after surgery, pain medicine will help you feel better. Take it as directed, before pain becomes severe. Also, ask your healthcare provider or pharmacist about other ways to control pain. This might be with heat, ice, or relaxation. And follow any other instructions your surgeon or nurse gives you.      Stay on schedule with your medicine.     Tips for taking pain medicine  To get the best relief possible, remember these points:   Pain medicines can upset your stomach. Taking them with a little food may help.  Most pain relievers taken by mouth need at least 20 to 30 minutes to start to work.  Don't wait till your pain becomes severe before you take your medicine. Try to time your medicine so that you can take it before starting an activity. This might be before you get dressed, go for a walk, or sit down for dinner.  Constipation is a common side effect of some pain medicines. Call your healthcare provider before taking any medicines such as laxatives or stool softeners to help ease constipation. Also ask if you should skip any foods. Drinking lots of fluids and eating foods such as fruits and vegetables that  are high in fiber can also help. Remember, don't take laxatives unless your surgeon has prescribed them.  Drinking alcohol and taking pain medicine can cause dizziness and slow your breathing. It can even be deadly. Don't drink alcohol while taking pain medicine.  Pain medicine can make you react more slowly to things. Don't drive or run machinery while taking pain medicine.  Your healthcare provider may tell you to take acetaminophen to help ease your pain. Ask them how much you're supposed to take each day. Acetaminophen or other pain relievers may interact with your prescription medicines or other over-the-counter (OTC) medicines. Some prescription medicines have acetaminophen and other ingredients in them. Using both prescription and OTC acetaminophen for pain can cause you to accidentally overdose. Read the labels on your OTC medicines with care. This will help you to clearly know the list of ingredients, how much to take, and any warnings. It may also help you not take too much acetaminophen. If you have questions or don't understand the information, ask your pharmacist or healthcare provider to explain it to you before you take the OTC medicine.   Managing nausea  Some people have an upset stomach (nausea) after surgery. This is often because of anesthesia, pain, or pain medicine, less movement of food in the stomach, or the stress of surgery. These tips will help you handle nausea and eat healthy foods as you get better. If you were on a special food plan before surgery, ask your healthcare provider if you should follow it while you get better. Check with your provider on how your eating should progress. It may depend on the surgery you had. These general tips may help:   Don't push yourself to eat. Your body will tell you when to eat and how much.  Start off with clear liquids and soup. They're easier to digest.  Next try semi-solid foods as you feel ready. These include mashed potatoes, applesauce, and  gelatin.  Slowly move to solid foods. Don’t eat fatty, rich, or spicy foods at first.  Don't force yourself to have 3 large meals a day. Instead eat smaller amounts more often.  Take pain medicines with a small amount of solid food, such as crackers or toast. This helps prevent nausea.  When to call your healthcare provider  Call your healthcare provider right away if you have any of these:   You still have too much pain, or the pain gets worse, after taking the medicine. The medicine may not be strong enough. Or there may be a complication from the surgery.  You feel too sleepy, dizzy, or groggy. The medicine may be too strong.  Side effects such as nausea or vomiting. Your healthcare provider may advise taking other medicines to .  Skin changes such as rash, itching, or hives. This may mean you have an allergic reaction. Your provider may advise taking other medicines.  The incision looks different (for instance, part of it opens up).  Bleeding or fluid leaking from the incision site, and weren't told to expect that.  Fever of 100.4°F (38°C) or higher, or as directed by your provider.  Call 911  Call 911 right away if you have:   Trouble breathing  Facial swelling    If you have obstructive sleep apnea   You were given anesthesia medicine during surgery to keep you comfortable and free of pain. After surgery, you may have more apnea spells because of this medicine and other medicines you were given. The spells may last longer than normal.    At home:  Keep using the continuous positive airway pressure (CPAP) device when you sleep. Unless your healthcare provider tells you not to, use it when you sleep, day or night. CPAP is a common device used to treat obstructive sleep apnea.  Talk with your provider before taking any pain medicine, muscle relaxants, or sedatives. Your provider will tell you about the possible dangers of taking these medicines.  Contact your provider if your sleeping changes a lot even when  taking medicines as directed.  [employer] last reviewed this educational content on 10/1/2021  © 0238-9754 [employer]. All rights reserved. This information is not intended as a substitute for professional medical care. Always follow your healthcare professional's instructions.

## 2025-03-21 NOTE — H&P (VIEW-ONLY)
Eric Aviles is a 69 year old female being seen in the office today for a preoperative visit for their left total hip arthroplasty  on 3/24/2025.    HPI  Ms. Aviles has obtained:  Medical clearance: Yes  Dental clearance: N/A - full dentures  Cardiac clearance: Pending. She is set to see them today.   Preop labs: Yes  Presurgical Imaging/Templating: Yes  Joint class: Yes  Support at home: Yes -  and daughter    Any changes to knee symptoms: No  Any changes constitutional symptoms:  No  Her anterior groin rash has resolved.          History:  Past Medical History:    Arrhythmia    Bipolar disorder (HCC)    Cataract    Depression    Essential hypertension    High blood pressure    High cholesterol    Osteoarthritis    R knee surgery    Psychogenic polydipsia    Pulmonary emphysema (HCC)    Raynaud disease    Rib fracture    left side    Sciatica    Seborrheic keratoses    Sx.6/1/15, CPT.17897, L Total Knee, w/, Global Exp.8/30/15    Sx.6/8/16, CPT.73934, R Total Knee Replacement, w/, Global Exp.9/6/16    Visual impairment    readers     Past Surgical History:   Procedure Laterality Date    Appendectomy      appendicitis    Appendectomy      Breast biopsy Left 09/07/2011    fibrocystic changes    Cataract Bilateral     Colonoscopy  07/2009    incomplete (chronic constipation)    Colonoscopy N/A 11/08/2018    Procedure: COLONOSCOPY;  Surgeon: Micheal Curtis MD;  Location: [facility] ENDOSCOPY    Colonoscopy N/A 12/19/2019    Procedure: COLONOSCOPY;  Surgeon: Micheal Curtis MD;  Location: [facility] ENDOSCOPY    Hernia surgery Left 12/21/2011    neuroma excision, partial mesh removal, primary re-repair    Inguinal hernia repair Left 05/20/2009    Knee surgery Right     osteoarthritis    Angel localization wire 1 site left (cpt=19281)      2011    Orif humerus fracture Right 04/15/2022    right distal humerus open reduction internal fixation    Other Left 03/22/2019    LEFT REVERSE TOTAL SHOULDER  ARTHROPLASTY WITH BICEPS TENODESIS    Other surgical history      Laparotomy (adhesions)    Other surgical history      Arthrocentesis of the right knee joint    Other surgical history      Arthrocentesis of the left knee joint    Other surgical history Right 2019    rotator cuff    Total knee replacement Bilateral  and     Tubal ligation       Family History   Problem Relation Age of Onset    Dementia Father         Alzheimer's    Lipids Father         hyperlipidemia    Hypertension Father     Musculo-skelatal Disorder Father         B/L knees replaced    Diabetes Mother     Heart Disease Mother         CAD    Stroke Mother         CVA    Heart Attack Mother         quadruple bypass surgery/MI    Arthritis Mother         possible Rheumatoid Arthritis    Asthma Daughter     Colon Cancer Maternal Grandmother     Breast Cancer Sister 40    Other (Other) Sister         flu H1N1    Cancer Brother         liver    Breast Cancer Paternal Aunt 52    Glaucoma Neg         multiple    Macular degeneration Neg         multiple     Family Status   Relation Status    Fa         Alzheimer's    Mo     Adrianna (Not Specified)    MGMA (Not Specified)    Sis (Not Specified)    Sis (Not Specified)    Sis (Not Specified)    Bro  at age 53        liver cancer    Pat Aunt (Not Specified)    NEG (Not Specified)     Social History     Occupational History    Not on file   Tobacco Use    Smoking status: Former     Current packs/day: 0.00     Average packs/day: 1 pack/day for 30.0 years (30.0 ttl pk-yrs)     Types: Cigarettes     Start date: 1972     Quit date: 2002     Years since quittin.1     Passive exposure: Never    Smokeless tobacco: Never    Tobacco comments:     1 pack per day   Vaping Use    Vaping status: Never Used   Substance and Sexual Activity    Alcohol use: Not Currently    Drug use: No    Sexual activity: Yes     Partners: Male     Birth control/protection: Tubal Ligation        Medications:  Current Outpatient Medications   Medication Sig Dispense Refill    celecoxib (CELEBREX) 200 MG Oral Cap Take 1 capsule (200 mg total) by mouth 2 (two) times daily. 60 capsule 0    cefadroxil 500 MG Oral Cap Take 1 capsule (500 mg total) by mouth 2 (two) times daily. 14 capsule 0    Multiple Vitamins-Minerals (MULTIVITAMIN WOMENS 50+ ADV OR) Take 1 tablet by mouth daily.      Vitamin C 500 MG Oral Tab Take 1 tablet (500 mg total) by mouth daily.      Glucosamine-Chondroit-Vit C-Mn (GLUCOSAMINE CHONDR 500 COMPLEX) Oral Cap Take 1 capsule by mouth daily.      cyanocobalamin 250 MCG Oral Tab Take 1 tablet (250 mcg total) by mouth daily.      Calcium Citrate 950 (200 Ca) MG Oral Tab Take 1 tablet (950 mg total) by mouth daily.      Ferrous Sulfate (FEROSUL) 325 (65 Fe) MG Oral Tab Take 1 tablet (325 mg total) by mouth daily with breakfast.      MELOXICAM 15 MG Oral Tab Take 1 tablet (15 mg total) by mouth daily. 30 tablet 0    nystatin 035168 UNIT/GM External Powder Apply 1 Application topically 3 (three) times daily.      donepezil 5 MG Oral Tab Take 1 tablet (5 mg total) by mouth nightly. 90 tablet 3    albuterol 108 (90 Base) MCG/ACT Inhalation Aero Soln Inhale 2 puffs into the lungs every 6 (six) hours as needed for Wheezing or Shortness of Breath. 18 g 5    clonazePAM 2 MG Oral Tab Take 2 tablets (4 mg total) by mouth nightly. 30 tablet 0    verapamil  MG Oral Tab CR Take 1 tablet (240 mg total) by mouth every evening. 90 tablet 0    atorvastatin 20 MG Oral Tab Take 1 tablet (20 mg total) by mouth nightly. 90 tablet 3    ARIPiprazole 5 MG Oral Tab Take 1 tablet (5 mg total) by mouth nightly. 90 tablet 0    QUEtiapine 50 MG Oral Tab Take 1 tablet (50 mg total) by mouth nightly. (Patient taking differently: Take 2 tablets (100 mg total) by mouth nightly.) 90 tablet 0    DULoxetine 60 MG Oral Cap DR Particles Take 1 capsule (60 mg total) by mouth nightly. Take 60mg daily. 90 capsule 0        Allergies:  Allergies[1]    Review of Systems  A comprehensive review of systems was completed and is negative unless noted above or in the HPI.    Physical Exam  There were no vitals taken for this visit.  There is no height or weight on file to calculate BMI.    Constitutional: The patient appears well-developed and well-nourished, in no apparent distress.   Psychiatric: The patient demonstrates good comprehension, judgment and decision making. Normal mood and affect.  Eyes: PER and EOM are normal.  ENT: Hearing appropriate for normal conversation.  dentition.  Cardiovascular: The patient has symmetric pulses, 2+.  Distal extremity is warm and well perfused with good capillary refill.  Respiratory:  The patient is breathing comfortably without increased respiratory effort or use of accessory muscles.  Hematologic/Lymphatic: No lymphangitis. There is no appreciable enlargement of lymph nodes. No calf swelling, calf non-tender, negative Lopez's sign. No edema.  Skin: No wounds or ulcers. No hypertrophic scarring.  Neck: FROM without pain.  MSK:  Gait: antalgic to left    Left hip           Skin: Intact, no obvious intertrigo or fungal rashes are noted in the groin crease today. Groin rash in the lower abdomen/umbilicus has resolved.         Deformity: None         Tenderness: Anterior         Range of Motion: R/L     Extension: 0     Flexion Contracture: 0     Flexion: 70/60     Internal Rotation: 10/5     External Rotation: 20/10     Abduction: 10/10     Adduction: 10/10         Muscle Strength       Abduction: 5/5     Adduction: 5/5     Flexion: 5/5          Gait: Normal and Antalgic   Trendelenburg Lurch Negative   Trendelenburg Sign Negative   Stinchfield: Positive   Irma Test: Negative   Ximena Test: Negative   Anterior Impingement: Positive   Posterior Impingement: Negative   Leg Length Discrepancy Negative         Motor: intact   Sensory: intact   Vascular: intact      Labs:  Lab Results   Component  Value Date    WBC 6.4 01/15/2025    HGB 13.1 01/15/2025    HCT 37.6 01/15/2025    .0 01/15/2025     Lab Results   Component Value Date    PTT 30.0 06/21/2023    ALB 5.0 (H) 01/15/2025    A1C 5.7 (H) 01/15/2025     Lab Results   Component Value Date     (H) 01/15/2025    GLU 98 05/06/2024    GLU 93 02/08/2024       Imaging:  None new    Assessment and Plan  Assessment & Plan  Primary osteoarthritis of left hip    Orders:    celecoxib (CELEBREX) 200 MG Oral Cap; Take 1 capsule (200 mg total) by mouth 2 (two) times daily.    cefadroxil 500 MG Oral Cap; Take 1 capsule (500 mg total) by mouth 2 (two) times daily.    [facility]    Preop examination    Orders:    celecoxib (CELEBREX) 200 MG Oral Cap; Take 1 capsule (200 mg total) by mouth 2 (two) times daily.    cefadroxil 500 MG Oral Cap; Take 1 capsule (500 mg total) by mouth 2 (two) times daily.    [facility]      The patient's history, physical examination and imaging studies are consistent with the diagnosis above. Options were discussed with the patient today, including continued conservative management vs surgical intervention. Given that the patient has attempted and failed nonoperative modalities, and continue to have functionally limiting pain that is negatively affecting quality of life, I recommend surgical intervention in the form of a total hip arthroplasty. They have been scheduled for this and are here today for a preoperative visit.    I had a lengthy discussion about total hip replacement.  The risks, benefits and alternatives of the surgery were discussed.  Risks include but are not limited to infection, DVT/PE, damage to surrounding nerves and vessels, bleeding, need for blood transfusion, injury to surrounding structures including nerves, vessels, ligaments, tendons, or bone, iatrogenic fractures, dislocation/instability, leg length inequality, loosening, polyethylene wear, osteolysis, continued  pain, need for future surgeries, risks for anesthesia including MI, stroke, loss of limb, or loss of life.  Discussed postoperative rehabilitation as well as restrictions if any.  We discussed safe and responsible use of pain medications postoperatively.  Understanding these topics, the patient elected to proceed.    Surgery scheduled for 3/24/2025.  They have obtained medical clearance. We are still awaiting cardiac clearance after her visit with cardiology today. I explained the importance of this as if she does not obtain clearance, surgery will need to be cancelled once again.   Labs were reviewed.  Post op physical therapy ordered: Home PT  Post op medications ordered and medication guide provided for patient. Counseling on Hibiclens soap provided.   They can continue activity modification, ice, tylenol, and assistive device usage for symptomatic relief and work on home exercise program in anticipation for surgery.  They have stopped all appropriate medications and supplements.  Activities: As tolerated  Follow-up: 3 weeks post surgery with xrays. Post op appointment confirmed.       No follow-ups on file.    Chase Duke MD        [1]   Allergies  Allergen Reactions    Sulfa Antibiotics HIVES

## 2025-03-21 NOTE — PROGRESS NOTES
Eric Aviles is a 69 year old female being seen in the office today for a preoperative visit for their left total hip arthroplasty  on 3/24/2025.    HPI  Ms. Aviles has obtained:  Medical clearance: Yes  Dental clearance: N/A - full dentures  Cardiac clearance: Pending. She is set to see them today.   Preop labs: Yes  Presurgical Imaging/Templating: Yes  Joint class: Yes  Support at home: Yes -  and daughter    Any changes to knee symptoms: No  Any changes constitutional symptoms:  No  Her anterior groin rash has resolved.          History:  Past Medical History:    Arrhythmia    Bipolar disorder (HCC)    Cataract    Depression    Essential hypertension    High blood pressure    High cholesterol    Osteoarthritis    R knee surgery    Psychogenic polydipsia    Pulmonary emphysema (HCC)    Raynaud disease    Rib fracture    left side    Sciatica    Seborrheic keratoses    Sx.6/1/15, CPT.80928, L Total Knee, w/, Global Exp.8/30/15    Sx.6/8/16, CPT.47551, R Total Knee Replacement, w/, Global Exp.9/6/16    Visual impairment    readers     Past Surgical History:   Procedure Laterality Date    Appendectomy      appendicitis    Appendectomy      Breast biopsy Left 09/07/2011    fibrocystic changes    Cataract Bilateral     Colonoscopy  07/2009    incomplete (chronic constipation)    Colonoscopy N/A 11/08/2018    Procedure: COLONOSCOPY;  Surgeon: Micheal Curtis MD;  Location: [facility] ENDOSCOPY    Colonoscopy N/A 12/19/2019    Procedure: COLONOSCOPY;  Surgeon: Micheal Curtis MD;  Location: [facility] ENDOSCOPY    Hernia surgery Left 12/21/2011    neuroma excision, partial mesh removal, primary re-repair    Inguinal hernia repair Left 05/20/2009    Knee surgery Right     osteoarthritis    Angel localization wire 1 site left (cpt=19281)      2011    Orif humerus fracture Right 04/15/2022    right distal humerus open reduction internal fixation    Other Left 03/22/2019    LEFT REVERSE TOTAL SHOULDER  ARTHROPLASTY WITH BICEPS TENODESIS    Other surgical history      Laparotomy (adhesions)    Other surgical history      Arthrocentesis of the right knee joint    Other surgical history      Arthrocentesis of the left knee joint    Other surgical history Right 2019    rotator cuff    Total knee replacement Bilateral  and     Tubal ligation       Family History   Problem Relation Age of Onset    Dementia Father         Alzheimer's    Lipids Father         hyperlipidemia    Hypertension Father     Musculo-skelatal Disorder Father         B/L knees replaced    Diabetes Mother     Heart Disease Mother         CAD    Stroke Mother         CVA    Heart Attack Mother         quadruple bypass surgery/MI    Arthritis Mother         possible Rheumatoid Arthritis    Asthma Daughter     Colon Cancer Maternal Grandmother     Breast Cancer Sister 40    Other (Other) Sister         flu H1N1    Cancer Brother         liver    Breast Cancer Paternal Aunt 52    Glaucoma Neg         multiple    Macular degeneration Neg         multiple     Family Status   Relation Status    Fa         Alzheimer's    Mo     Adrianna (Not Specified)    MGMA (Not Specified)    Sis (Not Specified)    Sis (Not Specified)    Sis (Not Specified)    Bro  at age 53        liver cancer    Pat Aunt (Not Specified)    NEG (Not Specified)     Social History     Occupational History    Not on file   Tobacco Use    Smoking status: Former     Current packs/day: 0.00     Average packs/day: 1 pack/day for 30.0 years (30.0 ttl pk-yrs)     Types: Cigarettes     Start date: 1972     Quit date: 2002     Years since quittin.1     Passive exposure: Never    Smokeless tobacco: Never    Tobacco comments:     1 pack per day   Vaping Use    Vaping status: Never Used   Substance and Sexual Activity    Alcohol use: Not Currently    Drug use: No    Sexual activity: Yes     Partners: Male     Birth control/protection: Tubal Ligation        Medications:  Current Outpatient Medications   Medication Sig Dispense Refill    celecoxib (CELEBREX) 200 MG Oral Cap Take 1 capsule (200 mg total) by mouth 2 (two) times daily. 60 capsule 0    cefadroxil 500 MG Oral Cap Take 1 capsule (500 mg total) by mouth 2 (two) times daily. 14 capsule 0    Multiple Vitamins-Minerals (MULTIVITAMIN WOMENS 50+ ADV OR) Take 1 tablet by mouth daily.      Vitamin C 500 MG Oral Tab Take 1 tablet (500 mg total) by mouth daily.      Glucosamine-Chondroit-Vit C-Mn (GLUCOSAMINE CHONDR 500 COMPLEX) Oral Cap Take 1 capsule by mouth daily.      cyanocobalamin 250 MCG Oral Tab Take 1 tablet (250 mcg total) by mouth daily.      Calcium Citrate 950 (200 Ca) MG Oral Tab Take 1 tablet (950 mg total) by mouth daily.      Ferrous Sulfate (FEROSUL) 325 (65 Fe) MG Oral Tab Take 1 tablet (325 mg total) by mouth daily with breakfast.      MELOXICAM 15 MG Oral Tab Take 1 tablet (15 mg total) by mouth daily. 30 tablet 0    nystatin [id number] UNIT/GM External Powder Apply 1 Application topically 3 (three) times daily.      donepezil 5 MG Oral Tab Take 1 tablet (5 mg total) by mouth nightly. 90 tablet 3    albuterol 108 (90 Base) MCG/ACT Inhalation Aero Soln Inhale 2 puffs into the lungs every 6 (six) hours as needed for Wheezing or Shortness of Breath. 18 g 5    clonazePAM 2 MG Oral Tab Take 2 tablets (4 mg total) by mouth nightly. 30 tablet 0    verapamil  MG Oral Tab CR Take 1 tablet (240 mg total) by mouth every evening. 90 tablet 0    atorvastatin 20 MG Oral Tab Take 1 tablet (20 mg total) by mouth nightly. 90 tablet 3    ARIPiprazole 5 MG Oral Tab Take 1 tablet (5 mg total) by mouth nightly. 90 tablet 0    QUEtiapine 50 MG Oral Tab Take 1 tablet (50 mg total) by mouth nightly. (Patient taking differently: Take 2 tablets (100 mg total) by mouth nightly.) 90 tablet 0    DULoxetine 60 MG Oral Cap DR Particles Take 1 capsule (60 mg total) by mouth nightly. Take 60mg daily. 90 capsule 0        Allergies:  Allergies[1]    Review of Systems  A comprehensive review of systems was completed and is negative unless noted above or in the HPI.    Physical Exam  There were no vitals taken for this visit.  There is no height or weight on file to calculate BMI.    Constitutional: The patient appears well-developed and well-nourished, in no apparent distress.   Psychiatric: The patient demonstrates good comprehension, judgment and decision making. Normal mood and affect.  Eyes: PER and EOM are normal.  ENT: Hearing appropriate for normal conversation.  dentition.  Cardiovascular: The patient has symmetric pulses, 2+.  Distal extremity is warm and well perfused with good capillary refill.  Respiratory:  The patient is breathing comfortably without increased respiratory effort or use of accessory muscles.  Hematologic/Lymphatic: No lymphangitis. There is no appreciable enlargement of lymph nodes. No calf swelling, calf non-tender, negative Lopez's sign. No edema.  Skin: No wounds or ulcers. No hypertrophic scarring.  Neck: FROM without pain.  MSK:  Gait: antalgic to left    Left hip           Skin: Intact, no obvious intertrigo or fungal rashes are noted in the groin crease today. Groin rash in the lower abdomen/umbilicus has resolved.         Deformity: None         Tenderness: Anterior         Range of Motion: R/L     Extension: 0     Flexion Contracture: 0     Flexion: 70/60     Internal Rotation: 10/5     External Rotation: 20/10     Abduction: 10/10     Adduction: 10/10         Muscle Strength       Abduction: 5/5     Adduction: 5/5     Flexion: 5/5          Gait: Normal and Antalgic   Trendelenburg Lurch Negative   Trendelenburg Sign Negative   Stinchfield: Positive   Irma Test: Negative   Ximena Test: Negative   Anterior Impingement: Positive   Posterior Impingement: Negative   Leg Length Discrepancy Negative         Motor: intact   Sensory: intact   Vascular: intact      Labs:  Lab Results   Component  Value Date    WBC 6.4 01/15/2025    HGB 13.1 01/15/2025    HCT 37.6 01/15/2025    .0 01/15/2025     Lab Results   Component Value Date    PTT 30.0 06/21/2023    ALB 5.0 (H) 01/15/2025    A1C 5.7 (H) 01/15/2025     Lab Results   Component Value Date     (H) 01/15/2025    GLU 98 05/06/2024    GLU 93 02/08/2024       Imaging:  None new    Assessment and Plan  Assessment & Plan  Primary osteoarthritis of left hip    Orders:    celecoxib (CELEBREX) 200 MG Oral Cap; Take 1 capsule (200 mg total) by mouth 2 (two) times daily.    cefadroxil 500 MG Oral Cap; Take 1 capsule (500 mg total) by mouth 2 (two) times daily.    [facility]    Preop examination    Orders:    celecoxib (CELEBREX) 200 MG Oral Cap; Take 1 capsule (200 mg total) by mouth 2 (two) times daily.    cefadroxil 500 MG Oral Cap; Take 1 capsule (500 mg total) by mouth 2 (two) times daily.    [facility]      The patient's history, physical examination and imaging studies are consistent with the diagnosis above. Options were discussed with the patient today, including continued conservative management vs surgical intervention. Given that the patient has attempted and failed nonoperative modalities, and continue to have functionally limiting pain that is negatively affecting quality of life, I recommend surgical intervention in the form of a total hip arthroplasty. They have been scheduled for this and are here today for a preoperative visit.    I had a lengthy discussion about total hip replacement.  The risks, benefits and alternatives of the surgery were discussed.  Risks include but are not limited to infection, DVT/PE, damage to surrounding nerves and vessels, bleeding, need for blood transfusion, injury to surrounding structures including nerves, vessels, ligaments, tendons, or bone, iatrogenic fractures, dislocation/instability, leg length inequality, loosening, polyethylene wear, osteolysis, continued  pain, need for future surgeries, risks for anesthesia including MI, stroke, loss of limb, or loss of life.  Discussed postoperative rehabilitation as well as restrictions if any.  We discussed safe and responsible use of pain medications postoperatively.  Understanding these topics, the patient elected to proceed.    Surgery scheduled for 3/24/2025.  They have obtained medical clearance. We are still awaiting cardiac clearance after her visit with cardiology today. I explained the importance of this as if she does not obtain clearance, surgery will need to be cancelled once again.   Labs were reviewed.  Post op physical therapy ordered: Home PT  Post op medications ordered and medication guide provided for patient. Counseling on Hibiclens soap provided.   They can continue activity modification, ice, tylenol, and assistive device usage for symptomatic relief and work on home exercise program in anticipation for surgery.  They have stopped all appropriate medications and supplements.  Activities: As tolerated  Follow-up: 3 weeks post surgery with xrays. Post op appointment confirmed.       No follow-ups on file.    Chase Duke MD        [1]   Allergies  Allergen Reactions    Sulfa Antibiotics HIVES

## 2025-03-21 NOTE — ASSESSMENT & PLAN NOTE
Orders:    celecoxib (CELEBREX) 200 MG Oral Cap; Take 1 capsule (200 mg total) by mouth 2 (two) times daily.    cefadroxil 500 MG Oral Cap; Take 1 capsule (500 mg total) by mouth 2 (two) times daily.    [facility]

## 2025-03-24 ENCOUNTER — HOSPITAL ENCOUNTER (OUTPATIENT)
Facility: HOSPITAL | Age: 70
Setting detail: HOSPITAL OUTPATIENT SURGERY
Discharge: HOME | End: 2025-03-24
Attending: ORTHOPAEDIC SURGERY | Admitting: ORTHOPAEDIC SURGERY
Payer: MEDICARE

## 2025-03-24 ENCOUNTER — APPOINTMENT (OUTPATIENT)
Dept: GENERAL RADIOLOGY | Facility: HOSPITAL | Age: 70
End: 2025-03-24
Attending: ORTHOPAEDIC SURGERY
Payer: MEDICARE

## 2025-03-24 ENCOUNTER — ANESTHESIA EVENT (OUTPATIENT)
Dept: SURGERY | Facility: HOSPITAL | Age: 70
End: 2025-03-24
Payer: MEDICARE

## 2025-03-24 ENCOUNTER — ANESTHESIA (OUTPATIENT)
Dept: SURGERY | Facility: HOSPITAL | Age: 70
End: 2025-03-24
Payer: MEDICARE

## 2025-03-24 VITALS
HEART RATE: 72 BPM | TEMPERATURE: 98 F | DIASTOLIC BLOOD PRESSURE: 72 MMHG | WEIGHT: 162.5 LBS | HEIGHT: 60 IN | SYSTOLIC BLOOD PRESSURE: 137 MMHG | RESPIRATION RATE: 18 BRPM | BODY MASS INDEX: 31.9 KG/M2 | OXYGEN SATURATION: 95 %

## 2025-03-24 DIAGNOSIS — M16.12 PRIMARY OSTEOARTHRITIS OF LEFT HIP: ICD-10-CM

## 2025-03-24 PROCEDURE — 97116 GAIT TRAINING THERAPY: CPT

## 2025-03-24 PROCEDURE — 0SRB03A REPLACEMENT OF LEFT HIP JOINT WITH CERAMIC SYNTHETIC SUBSTITUTE, UNCEMENTED, OPEN APPROACH: ICD-10-PCS | Performed by: ORTHOPAEDIC SURGERY

## 2025-03-24 PROCEDURE — 88305 TISSUE EXAM BY PATHOLOGIST: CPT | Performed by: ORTHOPAEDIC SURGERY

## 2025-03-24 PROCEDURE — 76000 FLUOROSCOPY <1 HR PHYS/QHP: CPT | Performed by: ORTHOPAEDIC SURGERY

## 2025-03-24 PROCEDURE — 97530 THERAPEUTIC ACTIVITIES: CPT

## 2025-03-24 PROCEDURE — 72170 X-RAY EXAM OF PELVIS: CPT | Performed by: ORTHOPAEDIC SURGERY

## 2025-03-24 PROCEDURE — 97161 PT EVAL LOW COMPLEX 20 MIN: CPT

## 2025-03-24 PROCEDURE — 88311 DECALCIFY TISSUE: CPT | Performed by: ORTHOPAEDIC SURGERY

## 2025-03-24 DEVICE — EMPHASYS POLYETHYLENE LINER AOX NEUTRAL 52MM 36MM
Type: IMPLANTABLE DEVICE | Site: HIP | Status: FUNCTIONAL
Brand: EMPHASYS

## 2025-03-24 DEVICE — ACTIS DUOFIX HIP PROSTHESIS (FEMORAL STEM 12/14 TAPER CEMENTLESS SIZE 5 STD COLLAR)  CE
Type: IMPLANTABLE DEVICE | Site: HIP | Status: FUNCTIONAL
Brand: ACTIS

## 2025-03-24 DEVICE — ARTICUL/EZE FEMORAL HEAD CERAMIC 12/14 TAPER 36MM PLUS 1.5
Type: IMPLANTABLE DEVICE | Site: HIP | Status: FUNCTIONAL
Brand: ARTICUL/EZE

## 2025-03-24 DEVICE — EMPHASYS ACETABULAR SHELL THREE-HOLE 52MM CEMENTLESS
Type: IMPLANTABLE DEVICE | Site: HIP | Status: FUNCTIONAL
Brand: EMPHASYS

## 2025-03-24 RX ORDER — MEPERIDINE HYDROCHLORIDE 25 MG/ML
6.25 INJECTION INTRAMUSCULAR; INTRAVENOUS; SUBCUTANEOUS ONCE AS NEEDED
Status: COMPLETED | OUTPATIENT
Start: 2025-03-24 | End: 2025-03-24

## 2025-03-24 RX ORDER — EPHEDRINE SULFATE 50 MG/ML
INJECTION INTRAVENOUS AS NEEDED
Status: DISCONTINUED | OUTPATIENT
Start: 2025-03-24 | End: 2025-03-24 | Stop reason: SURG

## 2025-03-24 RX ORDER — LIDOCAINE HYDROCHLORIDE 10 MG/ML
INJECTION, SOLUTION EPIDURAL; INFILTRATION; INTRACAUDAL; PERINEURAL AS NEEDED
Status: DISCONTINUED | OUTPATIENT
Start: 2025-03-24 | End: 2025-03-24 | Stop reason: SURG

## 2025-03-24 RX ORDER — BUPIVACAINE HYDROCHLORIDE 7.5 MG/ML
INJECTION, SOLUTION INTRASPINAL AS NEEDED
Status: DISCONTINUED | OUTPATIENT
Start: 2025-03-24 | End: 2025-03-24 | Stop reason: SURG

## 2025-03-24 RX ORDER — HYDROMORPHONE HYDROCHLORIDE 1 MG/ML
0.6 INJECTION, SOLUTION INTRAMUSCULAR; INTRAVENOUS; SUBCUTANEOUS EVERY 5 MIN PRN
Status: DISCONTINUED | OUTPATIENT
Start: 2025-03-24 | End: 2025-03-24

## 2025-03-24 RX ORDER — HYDROMORPHONE HYDROCHLORIDE 1 MG/ML
0.2 INJECTION, SOLUTION INTRAMUSCULAR; INTRAVENOUS; SUBCUTANEOUS EVERY 5 MIN PRN
Status: DISCONTINUED | OUTPATIENT
Start: 2025-03-24 | End: 2025-03-24

## 2025-03-24 RX ORDER — FAMOTIDINE 20 MG/1
20 TABLET, FILM COATED ORAL ONCE
Status: COMPLETED | OUTPATIENT
Start: 2025-03-24 | End: 2025-03-24

## 2025-03-24 RX ORDER — SODIUM CHLORIDE, SODIUM LACTATE, POTASSIUM CHLORIDE, CALCIUM CHLORIDE 600; 310; 30; 20 MG/100ML; MG/100ML; MG/100ML; MG/100ML
INJECTION, SOLUTION INTRAVENOUS CONTINUOUS
Status: DISCONTINUED | OUTPATIENT
Start: 2025-03-24 | End: 2025-03-24

## 2025-03-24 RX ORDER — FAMOTIDINE 10 MG/ML
20 INJECTION, SOLUTION INTRAVENOUS ONCE
Status: COMPLETED | OUTPATIENT
Start: 2025-03-24 | End: 2025-03-24

## 2025-03-24 RX ORDER — MORPHINE SULFATE 4 MG/ML
4 INJECTION, SOLUTION INTRAMUSCULAR; INTRAVENOUS EVERY 10 MIN PRN
Status: DISCONTINUED | OUTPATIENT
Start: 2025-03-24 | End: 2025-03-24

## 2025-03-24 RX ORDER — HYDROMORPHONE HYDROCHLORIDE 1 MG/ML
0.4 INJECTION, SOLUTION INTRAMUSCULAR; INTRAVENOUS; SUBCUTANEOUS EVERY 5 MIN PRN
Status: DISCONTINUED | OUTPATIENT
Start: 2025-03-24 | End: 2025-03-24

## 2025-03-24 RX ORDER — PHENYLEPHRINE HCL 10 MG/ML
VIAL (ML) INJECTION AS NEEDED
Status: DISCONTINUED | OUTPATIENT
Start: 2025-03-24 | End: 2025-03-24 | Stop reason: SURG

## 2025-03-24 RX ORDER — NALOXONE HYDROCHLORIDE 0.4 MG/ML
80 INJECTION, SOLUTION INTRAMUSCULAR; INTRAVENOUS; SUBCUTANEOUS AS NEEDED
Status: DISCONTINUED | OUTPATIENT
Start: 2025-03-24 | End: 2025-03-24

## 2025-03-24 RX ORDER — MORPHINE SULFATE 10 MG/ML
6 INJECTION, SOLUTION INTRAMUSCULAR; INTRAVENOUS EVERY 10 MIN PRN
Status: DISCONTINUED | OUTPATIENT
Start: 2025-03-24 | End: 2025-03-24

## 2025-03-24 RX ORDER — MIDAZOLAM HYDROCHLORIDE 1 MG/ML
INJECTION INTRAMUSCULAR; INTRAVENOUS AS NEEDED
Status: DISCONTINUED | OUTPATIENT
Start: 2025-03-24 | End: 2025-03-24 | Stop reason: SURG

## 2025-03-24 RX ORDER — ONDANSETRON 2 MG/ML
INJECTION INTRAMUSCULAR; INTRAVENOUS AS NEEDED
Status: DISCONTINUED | OUTPATIENT
Start: 2025-03-24 | End: 2025-03-24 | Stop reason: SURG

## 2025-03-24 RX ORDER — ACETAMINOPHEN 500 MG
1000 TABLET ORAL ONCE
Status: COMPLETED | OUTPATIENT
Start: 2025-03-24 | End: 2025-03-24

## 2025-03-24 RX ORDER — MORPHINE SULFATE 4 MG/ML
2 INJECTION, SOLUTION INTRAMUSCULAR; INTRAVENOUS EVERY 10 MIN PRN
Status: DISCONTINUED | OUTPATIENT
Start: 2025-03-24 | End: 2025-03-24

## 2025-03-24 RX ORDER — ONDANSETRON 2 MG/ML
4 INJECTION INTRAMUSCULAR; INTRAVENOUS EVERY 6 HOURS PRN
Status: DISCONTINUED | OUTPATIENT
Start: 2025-03-24 | End: 2025-03-24

## 2025-03-24 RX ADMIN — EPHEDRINE SULFATE 10 MG: 50 INJECTION INTRAVENOUS at 11:23:00

## 2025-03-24 RX ADMIN — BUPIVACAINE HYDROCHLORIDE 1.6 ML: 7.5 INJECTION, SOLUTION INTRASPINAL at 10:37:00

## 2025-03-24 RX ADMIN — EPHEDRINE SULFATE 5 MG: 50 INJECTION INTRAVENOUS at 11:33:00

## 2025-03-24 RX ADMIN — PHENYLEPHRINE HCL 50 MCG: 10 MG/ML VIAL (ML) INJECTION at 11:38:00

## 2025-03-24 RX ADMIN — PHENYLEPHRINE HCL 100 MCG: 10 MG/ML VIAL (ML) INJECTION at 11:52:00

## 2025-03-24 RX ADMIN — EPHEDRINE SULFATE 10 MG: 50 INJECTION INTRAVENOUS at 11:21:00

## 2025-03-24 RX ADMIN — MIDAZOLAM HYDROCHLORIDE 2 MG: 1 INJECTION INTRAMUSCULAR; INTRAVENOUS at 10:32:00

## 2025-03-24 RX ADMIN — ONDANSETRON 4 MG: 2 INJECTION INTRAMUSCULAR; INTRAVENOUS at 10:43:00

## 2025-03-24 RX ADMIN — LIDOCAINE HYDROCHLORIDE 50 MG: 10 INJECTION, SOLUTION EPIDURAL; INFILTRATION; INTRACAUDAL; PERINEURAL at 10:39:00

## 2025-03-24 RX ADMIN — EPHEDRINE SULFATE 10 MG: 50 INJECTION INTRAVENOUS at 11:18:00

## 2025-03-24 RX ADMIN — EPHEDRINE SULFATE 5 MG: 50 INJECTION INTRAVENOUS at 11:38:00

## 2025-03-24 NOTE — ANESTHESIA POSTPROCEDURE EVALUATION
Patient: Eric Aviles    Procedure Summary       Date: 03/24/25 Room / Location: [facility] MAIN OR 04 / [facility] MAIN OR    Anesthesia Start: 1032 Anesthesia Stop: 1226    Procedure: Left anterior total hip arthroplasty (Left: Hip) Diagnosis:       Primary osteoarthritis of left hip      (Primary osteoarthritis of left hip [M16.12])    Surgeons: Chase Duke MD Anesthesiologist: Jennifer Squires MD    Anesthesia Type: spinal, MAC ASA Status: 3            Anesthesia Type: spinal, MAC    Vitals Value Taken Time    68 03/24/25 1226   Temp 97    Pulse 58 03/24/25 1225   Resp 17 03/24/25 1225   SpO2 96 % 03/24/25 1225   Vitals shown include unfiled device data.    [facility] AN Post Evaluation:   Patient Evaluated in PACU  Patient Participation: complete - patient participated  Level of Consciousness: awake and alert  Pain Score: 0  Pain Management: adequate  Airway Patency:patent  Yes    Nausea/Vomiting: none  Cardiovascular Status: acceptable  Respiratory Status: acceptable  Postoperative Hydration acceptable      Yvonne Shetty CRNA  3/24/2025 12:26 PM

## 2025-03-24 NOTE — ANESTHESIA PREPROCEDURE EVALUATION
Anesthesia PreOp Note    HPI:     Eric Aviles is a 69 year old female who presents for preoperative consultation requested by: Chase Duke MD    Date of Surgery: 3/24/2025    Procedure(s):  Left anterior total hip arthroplasty  Indication: Primary osteoarthritis of left hip [M16.12]    Relevant Problems   No relevant active problems       NPO:                         History Review:  Patient Active Problem List    Diagnosis Date Noted    Preop examination 03/21/2025    Non-occlusive coronary artery disease requiring drug therapy 01/29/2025    Pain of left lower extremity 03/02/2023    Open supracondylar fracture of right elbow 04/15/2022    Supracondylar fracture of right humerus 04/15/2022    Depression with anxiety 10/26/2021    Other hyperlipidemia 02/16/2021    Breast lump on left side at 3 o'clock position 06/16/2020    Centrilobular emphysema (HCC) 10/11/2019    S/P reverse total shoulder arthroplasty, left 05/03/2019    Essential hypertension 03/22/2019    Hx of total knee arthroplasty, bilateral 07/12/2018    Bipolar disorder (HCC) 02/27/2018    Seborrheic keratoses 03/07/2017    Sx.6/8/16, CPT.52117, R Total Knee Replacement, w/, Global Exp.9/6/16 01/14/2016    Raynaud's disease 08/29/2014    Osteoarthritis 08/28/2014    Noninflammatory disorder of vagina 03/01/2014    Altered mental status 12/19/2013    Symptomatic menopausal or female climacteric states 09/23/2013    Chondrocalcinosis 09/09/2013    Rash and nonspecific skin eruption 01/04/2013    Anemia 11/20/2012    Abnormal blood chemistry 09/20/2012    Urinary frequency 09/20/2012    Malaise and fatigue 07/10/2012    Dermatographic urticaria 06/24/2012    Pruritic disorder 05/03/2012    Generalized osteoarthrosis, involving multiple sites 04/30/2012    Strain of rotator cuff 04/10/2012    Benign neoplasm of connective and soft tissue 01/05/2012    Solitary cyst of breast 08/18/2011    Dermatitis 10/19/2010    Neuralgia and neuritis  05/06/2010    Hearing loss 11/20/2009    After-cataract with vision obscured 11/03/2009    Constipation 07/22/2009    Dermatomycosis 06/22/2009    Inguinal hernia 05/04/2009    Diarrhea 04/13/2006       Past Medical History:    Arrhythmia    Bipolar disorder (HCC)    Cataract    Depression    Essential hypertension    High blood pressure    High cholesterol    Osteoarthritis    R knee surgery    Psychogenic polydipsia    Pulmonary emphysema (HCC)    Raynaud disease    Rib fracture    left side    Sciatica    Seborrheic keratoses    Sx.6/1/15, CPT.49216, L Total Knee, w/, Global Exp.8/30/15    Sx.6/8/16, CPT.61270, R Total Knee Replacement, w/, Global Exp.9/6/16    Visual impairment    readers       Past Surgical History:   Procedure Laterality Date    Appendectomy      appendicitis    Appendectomy      Breast biopsy Left 09/07/2011    fibrocystic changes    Cataract Bilateral     Colonoscopy  07/2009    incomplete (chronic constipation)    Colonoscopy N/A 11/08/2018    Procedure: COLONOSCOPY;  Surgeon: Micheal Curtis MD;  Location: [facility] ENDOSCOPY    Colonoscopy N/A 12/19/2019    Procedure: COLONOSCOPY;  Surgeon: Micheal Curtis MD;  Location: [facility] ENDOSCOPY    Hernia surgery Left 12/21/2011    neuroma excision, partial mesh removal, primary re-repair    Inguinal hernia repair Left 05/20/2009    Knee surgery Right     osteoarthritis    Angel localization wire 1 site left (cpt=19281)      2011    Orif humerus fracture Right 04/15/2022    right distal humerus open reduction internal fixation    Other Left 03/22/2019    LEFT REVERSE TOTAL SHOULDER ARTHROPLASTY WITH BICEPS TENODESIS    Other surgical history      Laparotomy (adhesions)    Other surgical history      Arthrocentesis of the right knee joint    Other surgical history      Arthrocentesis of the left knee joint    Other surgical history Right 08/2019    rotator cuff    Total knee replacement Bilateral 2015 and 2016    Tubal ligation          Prescriptions Prior to Admission[1]  Current Medications and Prescriptions Ordered in Epic[2]    Allergies[3]    Family History   Problem Relation Age of Onset    Dementia Father         Alzheimer's    Lipids Father         hyperlipidemia    Hypertension Father     Musculo-skelatal Disorder Father         B/L knees replaced    Diabetes Mother     Heart Disease Mother         CAD    Stroke Mother         CVA    Heart Attack Mother         quadruple bypass surgery/MI    Arthritis Mother         possible Rheumatoid Arthritis    Asthma Daughter     Colon Cancer Maternal Grandmother     Breast Cancer Sister 40    Other (Other) Sister         flu H1N1    Cancer Brother         liver    Breast Cancer Paternal Aunt 52    Glaucoma Neg         multiple    Macular degeneration Neg         multiple     Social History     Socioeconomic History    Marital status:    Tobacco Use    Smoking status: Former     Current packs/day: 0.00     Average packs/day: 1 pack/day for 30.0 years (30.0 ttl pk-yrs)     Types: Cigarettes     Start date: 1972     Quit date: 2002     Years since quittin.1     Passive exposure: Never    Smokeless tobacco: Never    Tobacco comments:     1 pack per day   Vaping Use    Vaping status: Never Used   Substance and Sexual Activity    Alcohol use: Not Currently    Drug use: No    Sexual activity: Yes     Partners: Male     Birth control/protection: Tubal Ligation   Other Topics Concern    Caffeine Concern Yes     Comment: tea, soda, 44 oz daily    Reaction to local anesthetic No    Right Handed Yes       Available pre-op labs reviewed.  Lab Results   Component Value Date    WBC 6.4 01/15/2025    RBC 4.21 01/15/2025    HGB 13.1 01/15/2025    HCT 37.6 01/15/2025    MCV 89.3 01/15/2025    MCH 31.1 01/15/2025    MCHC 34.8 01/15/2025    RDW 12.8 01/15/2025    .0 01/15/2025     Lab Results   Component Value Date     (L) 01/15/2025    K 3.8 01/15/2025    CL 98 01/15/2025     CO2 27.0 01/15/2025    BUN 7 (L) 01/15/2025    CREATSERUM 0.69 01/15/2025     (H) 01/15/2025    CA 10.1 01/15/2025          Vital Signs:  Body mass index is 33.01 kg/m².   height is 1.524 m (5') and weight is 76.7 kg (169 lb).   Vitals:    03/20/25 1025   Weight: 76.7 kg (169 lb)   Height: 1.524 m (5')        Anesthesia Evaluation     Patient summary reviewed and Nursing notes reviewed    Airway   Mallampati: I  TM distance: >3 FB  Neck ROM: full  Dental    (+) lower dentures and upper dentures    Pulmonary - normal exam   (+) COPD  Cardiovascular - normal exam  Exercise tolerance: good  (+) hypertension well controlled, CAD  (-) past MI, CABG/stent    NYHA Classification: II  ECG reviewed  ROS comment: Eric Aviles is a 69 year old female with PMH HTN, osteoarthritis, raynaud's, s/p multiple orthopedic surgeries (bilat knees, 1 shoulder) who presents for Pre-Op Exam      Presents for preoperative clearance for below. Was seen by me on 1/15/25 for same - surgery was postponed due to stress test showing area of inducible ischemia to LAD area. Was subsequently referred to cardiology for their recommendations and clearance. Saw [facility] on 1/29.     Planned surgery - L anterior total hip arthoplasty  Type of anesthesia - General  Surgeon - Dr. Chase Duke  Date of surgery - 3/24/25  Cardiac history - HTN on verapamil  Myocardial infarction in past 6 months - No  Bleeding disorders - No  Taking blood thinners - No  Chest pain or shortness of breath with ADLs - No  Number of alcoholic beverages consumed weekly - none  Tobacco consumption - No quit >20 years ago.     She reports she has never had a bad reaction to anesthesia and has not been told she has a difficult airway.     Regarding listed history of emphysema - she states she has never seen a pulmonologist, all recent chest xrays have been normal, and she rarely uses her inhaler - occasional use during stressful situations.     Dr. Nunn is the psychiatrist  who manages her medications for bipolar disorder, which have been stable for years per patient.     Functional Capacity/METs:   1. Perform ADLs- eating, dress, toilet? Y  2. Walk up flight of steps, hill, walk ground level 3-4mph? Y  3. Perform heavy housework- scrubbing floors, move heavy furniture, climb 2 flights of stairs? Y  4. Participate in strenuous sports- swimming, singles tennis, football, basketball, ski? N     Revised Cardiac Risk Index: 0     Patient has no major clinical predictors going for the above surgery.  Patient has reasonable functional capacity as described above. I have ordered tests as requested by surgeon. If they are acceptable, I will consider patient medically cleared for surgery as low risk for perioperative cardiac events with routine perioperative care.  DVT prophylaxis as protocol  The above risk assessment was discussed with the patient and would like to proceed with the surgery.            History/Other:     Chief Complaint Reviewed and Verified  No Further Nursing Notes to     Review  Tobacco Reviewed  Allergies Reviewed  Medications Reviewed    Medical History Reviewed  Surgical History Reviewed  OB Status Reviewed    Family History Reviewed  Social History Reviewed          · verapamil  MG Oral Tab CR Take 1 tablet (240 mg total) by mouth every evening. 90 tablet 0  · atorvastatin 20 MG Oral Tab Take 1 tablet (20 mg total) by mouth nightly. 90 tablet 3  · ARIPiprazole 5 MG Oral Tab Take 1 tablet (5 mg total) by mouth nightly. 90 tablet 0  · QUEtiapine 50 MG Oral Tab Take 1 tablet (50 mg total) by mouth nightly. 90 tablet 0  · DULoxetine 60 MG Oral Cap DR Particles Take 1 capsule (60 mg total) by mouth nightly. Take 60mg daily. 90 capsule 0  · aspirin (ECOTRIN LOW STRENGTH) 81 MG Oral Tab EC Take 1 tablet (81 mg total) by mouth in the morning and 1 tablet (81 mg total) before bedtime. (Patient not taking: Reported on 3/11/2025) 30 tablet 0             Review of  Systems:  Review of Systems  10 point review of systems otherwise negative with the exception of HPI and assessment and plan.        Objective:  /80   Pulse 75   Temp 97.7 °F (36.5 °C) (Temporal)   Ht 5' (1.524 m)   Wt 169 lb 3.2 oz (76.7 kg)   SpO2 96%   BMI 33.04 kg/m²  Estimated body mass index is 33.04 kg/m² as calculated from the following:    Height as of this encounter: 5' (1.524 m).    Weight as of this encounter: 169 lb 3.2 oz (76.7 kg).           Assessment & Plan:  1. Pre-op examination (Primary)  - pt did not have paperwork with her today, unable to locate in office if faxed - reached out to Dr. Duke's office staff to inquire if all previously ordered pre-op testing in January needs to be repeated - waiting for response. Eric is concerned that insurance won't cover testing so close together. She is willing to come back for required testing - I will call her to let her know what needs to be done.  - unable to locate evidence of cardiac clearance in [facility] note from encounter in Feb - will reach out to [facility] office for official clearance.     ADDENDUM 3/12: surgical office requesting repeat UA with micro and reflex cx, MRSA screen, type and screen. Patient notified.     ADDENDUM 3/14:  Preoperative testing reviewed.  Patient is cleared for upcoming surgery without further testing *provided she has obtained cardiac clearance*.  Clearance is effective for 30 days from the original encounter date.     Corrine Lai, APRN  3/14/2025    Cardiac clearance is in media chapter of the chart      Neuro/Psych    (+)  neuromuscular disease,  bipolar disorder, depression      GI/Hepatic/Renal    (-) liver disease, renal disease    Endo/Other    (-) diabetes mellitus, hypothyroidism    Comments: Narrative  PROCEDURE: CARD NUC STRESS TEST LEXISCAN     COMPARISON: [facility], [place] PF MYOCARD PERFUSION GATE, 12/20/2013, 12:12 PM.  [facility], [place] PF SPECT HAMMAD PERFUS NONGAT, 5/04/2010,  2:19 PM.  [facility], [place] CARD NUC STRESS TEST LEXISCAN, 4/24/2017, 7:54 AM.     INDICATIONS: Z01.818 Pre-op testing R94.31 Abnormal EKG     TECHNIQUE: Single isotope myocardial perfusion study after Lexiscan (regadenoson) stress done with 8.8 mCi Technetium 99m Sestamibi injected into the left antecubital vein at rest.  After a 2 hour delay, pharmacologic stress with 0.4 mg regadenoson was  administered intravenously as a slow bolus over 10-20 seconds.  This was immediately followed by 26. 4 mCi of technetium 99m Sestamibi injected into the left antecubital vein.  Gated SPECT imaging was performed shortly afterwards.  FINDINGS:  RAW PATIENT DATA: A review of the raw data showed it was adequate for interpretation.       WALL MOTION ANALYSIS: The gated SPECT images showed concentric contractility, without focal akinesia or dyskinesia.  An end diastolic volume of 69 mL was seen.       LV EJECTION FRACTION: 75 %, previously 76 %.       PERFUSION IMAGING: The myocardial perfusion images post pharmacologic stress demonstrates apical thinning , similar to prior.    There is a small area of decreased radiotracer activity along the anterior lateral aspect of the left mid chamber on stress  imaging, which mostly resolves on rest imaging.  Otherwise, the resting images demonstrate normal radiotracer distribution.                 Impression  CONCLUSION:     Small area decreased radiotracer activity along the anterolateral aspect of the mid chamber of the left ventricle, suggestive of a small area of inducible ischemia, suggested within the left anterior descending artery territory.  Correlate with EKG  findings.  Secure message sent to Corrine Lai on 01/23/2025 at 12:37 p.m.  Normal left ventricular function with left ventricular ejection fraction of 75 %.  No significant change from 04/24/2017.       Abdominal  - normal exam                 Anesthesia Plan:   ASA:  3  Plan:   Spinal and MAC  Informed  Consent Plan and Risks Discussed With:  Patient and spouse  Discussed plan with:  CRNA, attending and surgeon  Provider Attestation (if preop done by other):  SA/MAC poss GA      I have informed Eric Aviles and/or legal guardian or family member of the nature of the anesthetic plan, benefits, risks including possible dental damage if relevant, major complications, and any alternative forms of anesthetic management.   All of the patient's questions were answered to the best of my ability. The patient desires the anesthetic management as planned.  I have informed this Eric Aviles [relative] of the risks of neuraxial anesthesia including, but not limited to: failure,headache, backache, spinal, unilateral/patchy block, difficulty breathing, infection, bleeding, nerve damage, paralysis, death. The patient desires the proposed neuraxial anesthetic as planned.    IMELDA CUEVA MD  3/24/2025 9:40 AM  Present on Admission:  **None**           [1]   Medications Prior to Admission   Medication Sig Dispense Refill Last Dose/Taking    Multiple Vitamins-Minerals (MULTIVITAMIN WOMENS 50+ ADV OR) Take 1 tablet by mouth daily.   Taking    Vitamin C 500 MG Oral Tab Take 1 tablet (500 mg total) by mouth daily.   Taking    Glucosamine-Chondroit-Vit C-Mn (GLUCOSAMINE CHONDR 500 COMPLEX) Oral Cap Take 1 capsule by mouth daily.   3/20/2025    cyanocobalamin 250 MCG Oral Tab Take 1 tablet (250 mcg total) by mouth daily.   Taking    Calcium Citrate 950 (200 Ca) MG Oral Tab Take 1 tablet (950 mg total) by mouth daily.   Taking    Ferrous Sulfate (FEROSUL) 325 (65 Fe) MG Oral Tab Take 1 tablet (325 mg total) by mouth daily with breakfast.   Taking    MELOXICAM 15 MG Oral Tab Take 1 tablet (15 mg total) by mouth daily. 30 tablet 0 Taking    donepezil 5 MG Oral Tab Take 1 tablet (5 mg total) by mouth nightly. 90 tablet 3 Taking    clonazePAM 2 MG Oral Tab Take 2 tablets (4 mg total) by mouth nightly. 30 tablet 0 Taking    verapamil  MG  Oral Tab CR Take 1 tablet (240 mg total) by mouth every evening. 90 tablet 0 Taking    atorvastatin 20 MG Oral Tab Take 1 tablet (20 mg total) by mouth nightly. 90 tablet 3 Taking    ARIPiprazole 5 MG Oral Tab Take 1 tablet (5 mg total) by mouth nightly. 90 tablet 0 Taking    QUEtiapine 50 MG Oral Tab Take 1 tablet (50 mg total) by mouth nightly. (Patient taking differently: Take 2 tablets (100 mg total) by mouth nightly.) 90 tablet 0 Taking Differently    DULoxetine 60 MG Oral Cap DR Particles Take 1 capsule (60 mg total) by mouth nightly. Take 60mg daily. 90 capsule 0 Taking    celecoxib (CELEBREX) 200 MG Oral Cap Take 1 capsule (200 mg total) by mouth 2 (two) times daily. 60 capsule 0     cefadroxil 500 MG Oral Cap Take 1 capsule (500 mg total) by mouth 2 (two) times daily. 14 capsule 0     nystatin [id number] UNIT/GM External Powder Apply 1 Application topically 3 (three) times daily.       [] ketoconazole 2 % External Cream Apply 1 Application topically 2 (two) times daily for 14 days. 60 g 0     albuterol 108 (90 Base) MCG/ACT Inhalation Aero Soln Inhale 2 puffs into the lungs every 6 (six) hours as needed for Wheezing or Shortness of Breath. 18 g 5    [2]   Current Facility-Administered Medications Ordered in Epic   Medication Dose Route Frequency Provider Last Rate Last Admin    lactated ringers infusion   Intravenous Continuous Chase Duke MD        acetaminophen (Tylenol Extra Strength) tab 1,000 mg  1,000 mg Oral Once Chase Duke MD        famotidine (Pepcid) tab 20 mg  20 mg Oral Once Chase Duke MD        Or    famotidine (Pepcid) 20 mg/2mL injection 20 mg  20 mg Intravenous Once Chase Duke MD        clonidine-EPINEPHrine-ropivacaine-ketorolac (CERTS) (Duraclon-Adrenalin-Naropin-Toradol) pain cocktail irrigation   Intra-articular Once (Intra-Op) Chase Duke MD         No current [facility]-ordered outpatient medications on file.   [3]   Allergies  Allergen Reactions     Sulfa Antibiotics HIVES

## 2025-03-24 NOTE — CM/SW NOTE
SW was notified the pt. Has been pre-operatively arranged with [facility].    [facility] will continue to follow.      HENRY Stoll ext [phone]

## 2025-03-24 NOTE — OPERATIVE REPORT
Left anterior total hip arthroplasty Procedure Note    Eric Aviles  3/24/2025    Pre-op Diagnosis:  Primary osteoarthritis of left hip [M16.12]    Post-op Diagnosis:  Primary osteoarthritis of left hip [M16.12]    Procedure:  Left anterior total hip arthroplasty    Assistant(s):  Relief Scrub Person: Bernadine Barnett  Circulating Nurse.: Sonia Rosales RN; Amanda Causey RN  Scrub Person.: Krystina Gomez; Juan Luis Nicholas  Surgical Assistant.: Deon Maier; Marixa Caputo  Xray Tech: Christy Hamm    Please note that the use of a certified assistant was necessary for this case.  Marixa assisted in patient positioning, retracting soft tissue and other vital structures for bony preparation and implantation, and closure.  Without their assistance, the case would have taken significantly longer and stability for more morbidity to the patient.  There were no qualified residents available for assistance.    Anesthesia:  Spinal    Estimated Blood Loss:  200 cc    Specimens:  ID Type Source Tests Collected by Time Destination   1 : 1. Left Hip Bone and Tissue Tissue Hip, left SURGICAL PATHOLOGY TISSUE Chase Duke MD 3/24/2025 11:20 AM          Drains:  * No LDAs found *    Implants:  Implant Name Type Inv. Item Serial No.  Lot No. LRB No. Used Action   SHELL ACET 52MM 3 H CEMLSS EMPHASYS - SN/A  SHELL ACET 52MM 3 H CEMLSS EMPHASYS N/A [employer]  [id number] Left 1 Implanted   LINER ACET OD52MM ID36MM AOX ELEV RIM - SN/A  LINER ACET OD52MM ID36MM AOX ELEV RIM N/A [employer]  [id number] Left 1 Implanted   ARTICUL/HONEY FEM HD CERAMIC 12/14 TAPR 36MM + 1.5 - SN/A  ARTICUL/HONEY FEM HD CERAMIC 12/14 TAPR 36MM + 1.5 N/A [employer]  [id number] Left 1 Implanted   STEM FEM SZ 5 STD OFFSET 12/14 TAPR CEMLSS - SN/A  STEM FEM SZ 5 STD OFFSET 12/14 TAPR CEMLSS N/A [employer]  [id number] Left 1 Implanted       FINDINGS:  Assessment of the femoral head revealed severe eburnation of  articular cartilage with complete loss of weight bearing chondral surface and formation of cysts in the head and neck. Large marginal osteophytes were present surrounding the femoral neck.    Procedure Narrative:  The patient is 70 yo female with a significant history of progressive left hip pain and arthritis. Their hip pain is severe with activity and has progressed significantly.  X-rays reveal moderate-to-severe loss of articular cartilage on the superior weight bearing surface of the hip with circumferential acetabular and femoral neck osteophytes consistent with advanced hip osteoarthritis. Non-operative treatment has been attempted, but has not improved or controlled symptoms during normal daily activities. Motion has become limited and rotation severely restricted. They have attempted but failed the use of anti-inflammatories, activity modification, weight loss, ambulatory aids, physical therapy and cortisone injection. A left total hip arthroplasty was recommended to relieve pain and improve function. The risks, benefits and potential complications of the arthroplasty surgery were discussed with the patient in detail. Risks include but are not limited to infection, DVT/PE, damage to surrounding neurovascular structures, dislocation/instability, leg length inequality, loosening, wear, continued pain, need for future sutures, blood loss/need for transfusion, MI, stroke, loss of limb or even life. Specific details of the procedure, hospitalization, recovery, rehabilitation, and long-term precautions were also provided. Pre-operative teaching was provided. Implant/prosthesis selection was outlined, and the many options available were explained; the final choice will be made at the time of the procedure to match the anatomy and condition of the bone, ligaments, tendons, and muscles. Appropriate preoperative clearances were obtained. Understanding of all topics was conveyed to me by the patient, and consent was  given to proceed with a left total hip arthroplasty.    The patient was brought into the operating room after informed consent.  A spinal anesthetic was administered and the patient was placed on the [place] table with a perineal post. The image intensifier was brought in to visualize the hips. The left hip was prepped and draped in usual sterile fashion for a total hip arthroplasty. The patient was given appropriate antibiotics, and other appropriate medications per VANE protocol. A surgical time-out was performed immediately preceding the incision with all personnel in the operating room; the patient identity was again confirmed, the surgical site and extremity were identified and confirmed, X-rays were reviewed, and availability of the appropriate surgical equipment was established.    The patient had an 8 cm bikini incision placed on the right hip 4 finger breadths distal to the anterior superior iliac spine in the direction of the inguinal crease. This was taken down to the subcutaneous tissue making sure to stay lateral to the ASIS to avoid iatrogenic injury to the LFCN. The tensor fascia was identified and  sharply. The sartorius was bluntly taken anteriorly, the tensor fascia muscle was taken posteriorly. The ascending branch of lateral circumflex vessels were identified, tied and cauterized. Cobra retractors were placed around the surgical neck. A T surgical capsulotomy was performed. The cobra retractors were then place intracapsular and around the neck. Light traction was applied.   Osteotomy was performed in a napkin-ring fashion based on the preoperative template neck cut length using fluoroscopy. Femoral head and neck were removed. The operative leg was externally rotated to 60 degrees.    Attention was then turned to the acetabulum.  Retractors were carefully placed circumferentially for wide acetabular exposure making sure to protect all the vital structures. The labrum and osteophytes were  debrided from the rim, and the medial wall was identified and the depth of the socket assessed by excising the pulvinar. Bleeders were controlled, especially the area of the obturator artery with the electrocautery. Acetabular reaming was then started with the hemispherical instrument matching the size of the excised femoral head. Sequential reaming of the acetabulum was then performed by increasing size in 1-2 mm increments, to a final ream that was 1 under for a press fit. The reamers created an excellent hemispherical bed of bleeding cancellous bone.  A 52 mm acetabular shell was then placed in 40 degrees of abduction and 20 degrees of anteversion and fully impacted. Care was taken to make sure no rim of the cup was protruding beyond the anterior wall of the acetabulum to prevent psoas tendon impingement.  The cup press-fit was firm, stable, and apically seated. The acetabular shell was pulled on with a Kocher and did not move and was found to be very stable. Further osteophyte debridement was done around the socket. All impinging soft tissue was removed from the edges of the socket.  A standard 36 mm polyethylene liner was then placed. The liner was impacted and felt to be perfectly seated and checked for stability.    Attention was then turned to the femur. The elevating femoral hook was placed around the femur proximally. Superior and inferior capsular releases were performed.  A two prong Garber retractor was then placed medially, an angled Allison retractor was placed between the superior capsule and gluteus minimus. The capsule was released. The operative leg was then rotated to 90 degrees of external rotation slowly, extended and placed in adduction to expose the proximal femur. Further releases were then performed as needed to safely elevate the proximal femur into the wound with the elevating hook  attached to the the [place] table.  A trochanteric retractor was placed lateral to the troch to assist in  elevation of the femur. The leg and retractors were positioned so access did not result in soft-tissue injury.  A curved handle box osteotome was used to remove the lateral neck aiming to stay along the posterior contex of the proximal femur.  A curved starter awl was used to find the femoral canal. Lateral ridge of bone was rongeured and rasped away to prevent varus malpositioning.  Actis broaches were then employed in an incremental fashion up to the final size of 5. A trial standard offset neck with a 1.5 mm head based on templating was then placed and the elevating hook released and retractors removed. The leg was brought back up and the hip was reduced. A fluoroscopic image was then taken to assess fit and position of the femoral component as well as to assess our metrics in terms of leg lengths and offset. Next, anterior stability was checked with full extension and maximum external rotation with no dislocations anteriorly.  Satisfied with this, I elected to move on with the final implants. The hip was re-dislocated, and the leg externally rotated, lowered and adducted. The proximal femur was elevated once again and the final broach was reassessed for stability. Once this was confirmed, the broach was removed and the actual implants were called for. The wound was copiously irrigated, and the permanent femoral stem was then impacted down in approximately 10-15 degrees of anteversion in line with the posterior cortex of the femoral neck. The press-fit was firm, and stable to axial and rotational force in all planes.  The final ceramic head was impacted onto a the clean trunnion and checked to be fully engaged on the Meléndez taper. The socket and wound were irrigated, suctioned, and inspected for debris. The final reduction was performed, and again the hip was stable in all planes without impingement when stressed to the extremes. The wound was copiously irrigated using antibiotic irrigation after a dilute betadine  soak. Then the wound was injected with local periarticular cocktail.    The tensor fascia  was closed using a #1 Vicryl and #2 Quill. The subcutaneous tissue was closed using a #1 Vicryl, 2-0 running Quill suture, and 2-0 Vicryl interrupted sutures. The skin was closed using a running 3-0 monocryl suture and Dermabond adhesive.  At this point a sterile dressing was applied. The patient tolerated the procedure well and was awaken from anesthesia and brought to the recovery room in stable condition.    COMPLICATIONS: None apparent    POSTOPERATIVE PLAN:    WBAT  DVT prophylaxis: after risk assessment, IPCDs and ASA 81 mg twice daily will be used.  Home with home PT    Chase Duke MD

## 2025-03-24 NOTE — ANESTHESIA PROCEDURE NOTES
Spinal Block    Performed by: Yvonne Shetty CRNA  Authorized by: Jennifer Squires MD      General Information and Staff    Start Time:   CRNA:  Yvonne Shetty CRNA  Performed by:  CRNA  Preanesthetic Checklist: patient identified, IV checked, risks and benefits discussed, monitors and equipment checked, pre-op evaluation, timeout performed, anesthesia consent and sterile technique used      Procedure Details    Patient Position:  Sitting  Prep: ChloraPrep    Monitoring:  Cardiac monitor  Approach:  Midline  Location:  L3-4  Injection Technique:  Single-shot    Needle    Needle Type:  Pencil-tip  Needle Gauge:  24 G  Needle Length:  3.5 in    Assessment    Sensory Level:  T10  Events: clear CSF, CSF aspirated, well tolerated and blood negative      Additional Comments

## 2025-03-24 NOTE — PHYSICAL THERAPY NOTE
PHYSICAL THERAPY EVALUATION - INPATIENT     [facility]  Evaluation Date: 3/24/2025  Type of Evaluation: Initial   Physician Order: PT Eval and Treat    Presenting Problem: primary osteoarthritis of left hip, s/p left anterior VANE     Reason for Therapy: Mobility Dysfunction and Discharge Planning    PHYSICAL THERAPY ASSESSMENT   Patient is a 69 year old female admitted 3/24/2025 for primary osteoarthritis of left hip, s/p left anterior VANE. Prior to admission, patient's baseline is Independent with ADLs/iADLs/functional mobility. Patient is currently functioning below baseline with bed mobility, transfers, gait, stair negotiation, standing prolonged periods, and performing household tasks. Patient is requiring stand-by assist and contact guard assist as a result of the following impairments: pain, impaired standing balance, decreased muscular endurance, cognitive deficits (forgetful, slightly lethargic), medical status, and limited L hip ROM. Physical Therapy will continue to follow for duration of hospitalization.    Patient will benefit from continued skilled PT Services at discharge to promote prior level of function and safety with additional support and return home with home health PT.    PLAN DURING HOSPITALIZATION  Nursing Mobility Recommendation : 1 Assist  PT Device Recommendation: Rolling walker  PT Treatment Plan: Bed mobility, Body mechanics, Endurance, Energy conservation, Patient education, Gait training, Strengthening, Range of motion, Stair training, Transfer training, Balance training  Rehab Potential : Good  Frequency (Obs): BID     PHYSICAL THERAPY MEDICAL/SOCIAL HISTORY     Problem List  Active Problems:    * No active hospital problems. *    HOME SITUATION  Type of Home: House  Home Layout: One level  Stairs to Enter : 2   Railing: No    Lives With: Spouse    Drives: Yes   Patient Regularly Uses: None     SUBJECTIVE  \"I'm just really tired.\"    PHYSICAL THERAPY EXAMINATION    OBJECTIVE  Precautions: Limb alert - left, VANE - anterior  Fall Risk:  (Mdoerate fall risk)    WEIGHT BEARING RESTRICTION  L Lower Extremity: Weight Bearing as Tolerated    PAIN ASSESSMENT  Rating: Unable to rate  Location: left hip  Management Techniques: Activity promotion, Body mechanics, Repositioning    COGNITION  Arousal/Alertness:  slightly lethargic  Following Commands:  follows one step commands with increased time and follows one step commands with repetition    RANGE OF MOTION AND STRENGTH ASSESSMENT  Upper extremity ROM and strength are within functional limits   Lower extremity ROM is within functional limits; L hip limited due to pain  Lower extremity strength is within functional limits; LLE not formally assessed due to pain and recent procedure, approx 3/5    BALANCE  Static Sitting: Good  Dynamic Sitting: Fair +  Static Standing: Fair  Dynamic Standing: Fair -    ACTIVITY TOLERANCE  Pulse: 72  Heart Rate Source: Monitor     BP: 137/72  BP Location: Right arm  BP Method: Automatic  Patient Position: Sitting    O2 WALK  Oxygen Therapy  SPO2% on Room Air at Rest: 92    AM-PAC '6-Clicks' INPATIENT SHORT FORM - BASIC MOBILITY  How much difficulty does the patient currently have...  Patient Difficulty: Turning over in bed (including adjusting bedclothes, sheets and blankets)?: A Little   Patient Difficulty: Sitting down on and standing up from a chair with arms (e.g., wheelchair, bedside commode, etc.): A Little   Patient Difficulty: Moving from lying on back to sitting on the side of the bed?: A Little   How much help from another person does the patient currently need...   Help from Another: Moving to and from a bed to a chair (including a wheelchair)?: A Little   Help from Another: Need to walk in hospital room?: A Little   Help from Another: Climbing 3-5 steps with a railing?: A Little     AM-PAC Score:  Raw Score: 18   Approx Degree of Impairment: 46.58%   Standardized Score (AM-PAC Scale): 43.63    CMS Modifier (G-Code): CK    FUNCTIONAL ABILITY STATUS  Functional Mobility/Gait Assessment  Gait Assistance:  (CGA>SBA)  Distance (ft): 250 ft  Assistive Device: Rolling walker  Pattern: L Decreased stance time, Shuffle (decreased chato speed, decreased step length, slightly flexed posture - improved with cues. Cues provided to stand within frame of rolling walker at all times.)  Stairs: Stairs  How Many Stairs: 4  Device: 2 Rails  Assist: Contact guard assist  Pattern: Ascend and Descend  Ascend and Descend : Step to  Supine to Sit: contact guard assist  Sit to Supine: contact guard assist  Sit to Stand: contact guard assist progressing to stand-by assist with increased trials from EOB and toilet    Exercise/Education Provided:  Bed mobility  Body mechanics  Energy conservation  Functional activity tolerated  Gait training  Posture  Lower therapeutic exercise:  Alternating marching  LAQ  Transfer training  Stair negotiation    Skilled Therapy Provided: Patient is LLE WBAT with anterior hip precautions. RN approved activity. Patient received supine in bed; agreeable to participate in therapy on this date. Patient educated on POC. Education on physiological benefits of mobility post operatively. Discussed anterior VANE protocol, weight bearing status, precautions and education on therapeutic exercises - patient provided with handout to summarize. Patient reporting left hip pain, not quantified per the pain scale. Patient slightly lethargic and forgetful throughout session - per , patient forgetful at baseline. Patient benefits from increased cues to keep RW with her at all times. Patient reporting fear regarding sit<>stand transfers, however, unable to elaborate on what is causing her fear.  reports that between him and patient's daughter, someone will home at all times to assist patient as needed.  reports that he will be standing by patient during all functional mobility tasks upon  discharge.     The patient's Approx Degree of Impairment: 46.58% has been calculated based on documentation in the [facility] '6 clicks' Inpatient Basic Mobility Short Form.  Research supports that patients with this level of impairment may benefit from HHPT.  Final disposition will be made by interdisciplinary medical team.    Patient End of Session: In bed, Needs met, Call light within reach, RN aware of session/findings, All patient questions and concerns addressed, Hospital anti-slip socks, Family present    CURRENT GOALS  Goals to be met by: 3/31/25  Patient Goal Patient's self-stated goal is: go home   Goal #1 Patient is able to demonstrate supine - sit EOB @ level: supervision     Goal #1   Current Status    Goal #2 Patient is able to demonstrate transfers Sit to/from Stand at assistance level: supervision with walker - rolling     Goal #2  Current Status    Goal #3 Patient is able to ambulate 300 feet with assist device: walker - rolling at assistance level: supervision   Goal #3   Current Status    Goal #4 Patient will negotiate 2 stairs/one curb w/ assistive device and SBA   Goal #4   Current Status    Goal #5 Patient to demonstrate independence with home activity/exercise instructions provided to patient in preparation for discharge.   Goal #5   Current Status      Patient Evaluation Complexity Level:  History Low - no personal factors and/or co-morbidities   Examination of body systems Low -  addressing 1-2 elements   Clinical Presentation  Moderate - Evolving   Clinical Decision Making  Low Complexity     Gait Training: 15 minutes  Therapeutic Activity:  10 minutes

## 2025-03-24 NOTE — INTERVAL H&P NOTE
Pre-op Diagnosis: Primary osteoarthritis of left hip [M16.12]    The above referenced H&P was reviewed by Chase Duke MD on 3/24/2025, the patient was examined and no significant changes have occurred in the patient's condition since the H&P was performed.  I discussed with the patient and/or legal representative the potential benefits, risks and side effects of this procedure; the likelihood of the patient achieving goals; and potential problems that might occur during recuperation.  I discussed reasonable alternatives to the procedure, including risks, benefits and side effects related to the alternatives and risks related to not receiving this procedure.  We will proceed with procedure as planned.

## 2025-03-25 ENCOUNTER — TELEPHONE (OUTPATIENT)
Dept: ORTHOPEDICS CLINIC | Facility: CLINIC | Age: 70
End: 2025-03-25

## 2025-03-25 DIAGNOSIS — Z96.642 S/P HIP REPLACEMENT, LEFT: Primary | ICD-10-CM

## 2025-03-25 RX ORDER — OXYCODONE HYDROCHLORIDE 5 MG/1
5 TABLET ORAL EVERY 6 HOURS PRN
Qty: 28 TABLET | Refills: 0 | Status: SHIPPED | OUTPATIENT
Start: 2025-03-25

## 2025-03-25 NOTE — HOME CARE LIAISON
This patient was set up pre-operatively with [facility]. Discussed with spouse Stefan, all questions answered. [facility] will follow at [place] for RN/PT. (Late entry)

## 2025-03-25 NOTE — TELEPHONE ENCOUNTER
Patient was seen by home health today and her pain was 8/10, there is also a medication interaction. Please advise

## 2025-03-25 NOTE — TELEPHONE ENCOUNTER
Post-op call for Dr. Duke    Date: 3/25/2025      Time: 3:58 PM   Patient: Eric PECK number: [id number]   Surgery and surgery date:3-  Procedure Laterality Anesthesia   Left anterior total hip arthroplasty     The Home Health nurse and myself instructed her to take 2-500mg of tylenol until she hears from DR. Duke about a pain pill. I gave them the parameters of tylenol. No more than 3000mg in 24 hours  Patient unavailable.  Left message on voicemail to call clinic with questions or concerns.  Number was provided./ SPOKE WITH PATIENT AND   Patient's general feeling since discharge:I feel horrible. My ankle and foot is sore. Her rt. Hand is swollen from the IV  Pain control (0-10):/10  Pain Medication:  dose/strength/  Medication Quantity Refills Start End   celecoxib (CELEBREX) 200 MG Oral Cap         Medication Quantity Refills Start End   cefadroxil 500 MG Oral Cap         Medication Quantity Refills Start End   MELOXICAM 15 MG Oral Tab 30 tablet 0 3/11/2025 --   Sig:   Take 1 tablet (15 mg to       Fever:  no  Chills:  no  SOB:no  Incision site appearance:  Redness no  Drainage  no  Clean/dry/Intact yes  Calf pain, redness or warmth:    no   Bowel Regimen: Yes  1-2 days ago  If not, what are you taking stool softener/laxative?  Confirmed appointment date for post op: 4-  Other concerns patients may ask: /Home Health came for their assessment. She told them to start taking 81 mg of ASA daily. The patient was not ordered a post op narcotic and they told me she would contact DR. Duke to order something. We went over extensively about elevation and ice use  Bathing and bandages   Patient needs to keep incision clean and dry for the first week./DONE  Enforce rest, ice, compression elevation and use their pain medication accordingly./ We discussed application of I've 20 min on 20 min of multiple times a day  If the patient needs more pain medication, please put in a communication.

## 2025-03-25 NOTE — TELEPHONE ENCOUNTER
S/w [facility] Nurse- She states that patient was not prescribe an oral narcotic pain medication for post-op care and was also wondering how often she should be taking aspirin 81mg.    S/w Dr Duke- He stated that he will send medication for patient. He states that patient should take asprin 81mg BID for 4 weeks.     S/w Stefan (on HIPAA)- Informed him of the narcotic Rx and the instructions for baby Asprin

## 2025-03-25 NOTE — TELEPHONE ENCOUNTER
Dr. Duke please order something for pain for Mrs. Aviles. She has no narcotic ordered to take and is in  terrible  pain

## 2025-03-26 RX ORDER — ALBUTEROL SULFATE 90 UG/1
2 INHALANT RESPIRATORY (INHALATION) EVERY 6 HOURS PRN
Qty: 18 G | Refills: 0 | Status: SHIPPED | OUTPATIENT
Start: 2025-03-26

## 2025-03-26 NOTE — TELEPHONE ENCOUNTER
Patients spouse called on the patients behalf to request this refill. He stated she is completely out of her inhaler and she was having difficulty breathing earlier. Call transferred to RN triage for further triaging.

## 2025-03-28 ENCOUNTER — HOSPITAL ENCOUNTER (INPATIENT)
Facility: HOSPITAL | Age: 70
LOS: 2 days | Discharge: HOME | End: 2025-03-30
Attending: EMERGENCY MEDICINE | Admitting: HOSPITALIST
Payer: MEDICARE

## 2025-03-28 ENCOUNTER — APPOINTMENT (OUTPATIENT)
Dept: CT IMAGING | Facility: HOSPITAL | Age: 70
End: 2025-03-28
Attending: EMERGENCY MEDICINE
Payer: MEDICARE

## 2025-03-28 ENCOUNTER — APPOINTMENT (OUTPATIENT)
Dept: GENERAL RADIOLOGY | Facility: HOSPITAL | Age: 70
End: 2025-03-28
Attending: EMERGENCY MEDICINE
Payer: MEDICARE

## 2025-03-28 ENCOUNTER — TELEPHONE (OUTPATIENT)
Dept: ORTHOPEDICS CLINIC | Facility: CLINIC | Age: 70
End: 2025-03-28

## 2025-03-28 DIAGNOSIS — J44.1 COPD EXACERBATION (HCC): Primary | ICD-10-CM

## 2025-03-28 PROBLEM — J44.9 COPD (CHRONIC OBSTRUCTIVE PULMONARY DISEASE) (HCC): Status: ACTIVE | Noted: 2025-03-28

## 2025-03-28 LAB
ANION GAP SERPL CALC-SCNC: 6 MMOL/L (ref 0–18)
ATRIAL RATE: 71 BPM
BASOPHILS # BLD AUTO: 0.03 X10(3) UL (ref 0–0.2)
BASOPHILS NFR BLD AUTO: 0.4 %
BUN BLD-MCNC: 10 MG/DL (ref 9–23)
BUN/CREAT SERPL: 14.5 (ref 10–20)
CALCIUM BLD-MCNC: 9.1 MG/DL (ref 8.7–10.4)
CHLORIDE SERPL-SCNC: 98 MMOL/L (ref 98–112)
CO2 SERPL-SCNC: 28 MMOL/L (ref 21–32)
CREAT BLD-MCNC: 0.69 MG/DL
DEPRECATED RDW RBC AUTO: 42.5 FL (ref 35.1–46.3)
EGFRCR SERPLBLD CKD-EPI 2021: 93 ML/MIN/1.73M2 (ref 60–?)
EOSINOPHIL # BLD AUTO: 0.3 X10(3) UL (ref 0–0.7)
EOSINOPHIL NFR BLD AUTO: 3.6 %
ERYTHROCYTE [DISTWIDTH] IN BLOOD BY AUTOMATED COUNT: 13 % (ref 11–15)
GLUCOSE BLD-MCNC: 133 MG/DL (ref 70–99)
HCT VFR BLD AUTO: 31.6 %
HGB BLD-MCNC: 10.7 G/DL
IMM GRANULOCYTES # BLD AUTO: 0.02 X10(3) UL (ref 0–1)
IMM GRANULOCYTES NFR BLD: 0.2 %
LYMPHOCYTES # BLD AUTO: 1.57 X10(3) UL (ref 1–4)
LYMPHOCYTES NFR BLD AUTO: 19 %
MCH RBC QN AUTO: 30 PG (ref 26–34)
MCHC RBC AUTO-ENTMCNC: 33.9 G/DL (ref 31–37)
MCV RBC AUTO: 88.5 FL
MONOCYTES # BLD AUTO: 0.47 X10(3) UL (ref 0.1–1)
MONOCYTES NFR BLD AUTO: 5.7 %
NEUTROPHILS # BLD AUTO: 5.88 X10 (3) UL (ref 1.5–7.7)
NEUTROPHILS # BLD AUTO: 5.88 X10(3) UL (ref 1.5–7.7)
NEUTROPHILS NFR BLD AUTO: 71.1 %
NT-PROBNP SERPL-MCNC: 214 PG/ML (ref ?–125)
OSMOLALITY SERPL CALC.SUM OF ELEC: 275 MOSM/KG (ref 275–295)
P AXIS: 49 DEGREES
P-R INTERVAL: 178 MS
PLATELET # BLD AUTO: 240 10(3)UL (ref 150–450)
POTASSIUM SERPL-SCNC: 4.3 MMOL/L (ref 3.5–5.1)
Q-T INTERVAL: 378 MS
QRS DURATION: 76 MS
QTC CALCULATION (BEZET): 410 MS
R AXIS: 2 DEGREES
RBC # BLD AUTO: 3.57 X10(6)UL
SODIUM SERPL-SCNC: 132 MMOL/L (ref 136–145)
T AXIS: 23 DEGREES
TROPONIN I SERPL HS-MCNC: <3 NG/L
VENTRICULAR RATE: 71 BPM
WBC # BLD AUTO: 8.3 X10(3) UL (ref 4–11)

## 2025-03-28 PROCEDURE — 99223 1ST HOSP IP/OBS HIGH 75: CPT | Performed by: HOSPITALIST

## 2025-03-28 PROCEDURE — 71045 X-RAY EXAM CHEST 1 VIEW: CPT | Performed by: EMERGENCY MEDICINE

## 2025-03-28 PROCEDURE — 71260 CT THORAX DX C+: CPT | Performed by: EMERGENCY MEDICINE

## 2025-03-28 RX ORDER — HYDROCODONE BITARTRATE AND ACETAMINOPHEN 5; 325 MG/1; MG/1
1 TABLET ORAL EVERY 4 HOURS PRN
Status: DISCONTINUED | OUTPATIENT
Start: 2025-03-28 | End: 2025-03-30

## 2025-03-28 RX ORDER — VERAPAMIL HYDROCHLORIDE 180 MG/1
180 CAPSULE, DELAYED RELEASE ORAL NIGHTLY
COMMUNITY

## 2025-03-28 RX ORDER — ACETAMINOPHEN 325 MG/1
650 TABLET ORAL EVERY 4 HOURS PRN
Status: DISCONTINUED | OUTPATIENT
Start: 2025-03-28 | End: 2025-03-30

## 2025-03-28 RX ORDER — METHYLPREDNISOLONE SODIUM SUCCINATE 40 MG/ML
40 INJECTION INTRAMUSCULAR; INTRAVENOUS EVERY 12 HOURS
Status: DISCONTINUED | OUTPATIENT
Start: 2025-03-28 | End: 2025-03-29

## 2025-03-28 RX ORDER — IPRATROPIUM BROMIDE AND ALBUTEROL SULFATE 2.5; .5 MG/3ML; MG/3ML
3 SOLUTION RESPIRATORY (INHALATION) EVERY 6 HOURS PRN
Status: DISCONTINUED | OUTPATIENT
Start: 2025-03-28 | End: 2025-03-30

## 2025-03-28 RX ORDER — HYDROCODONE BITARTRATE AND ACETAMINOPHEN 5; 325 MG/1; MG/1
2 TABLET ORAL EVERY 4 HOURS PRN
Status: DISCONTINUED | OUTPATIENT
Start: 2025-03-28 | End: 2025-03-30

## 2025-03-28 RX ORDER — KETOCONAZOLE 20 MG/G
1 CREAM TOPICAL 2 TIMES DAILY
COMMUNITY

## 2025-03-28 RX ORDER — QUETIAPINE FUMARATE 50 MG/1
125 TABLET, FILM COATED ORAL NIGHTLY
COMMUNITY

## 2025-03-28 RX ORDER — CLONAZEPAM 2 MG/1
2 TABLET ORAL 2 TIMES DAILY
COMMUNITY

## 2025-03-28 RX ORDER — METHYLPREDNISOLONE SODIUM SUCCINATE 40 MG/ML
40 INJECTION INTRAMUSCULAR; INTRAVENOUS ONCE
Status: COMPLETED | OUTPATIENT
Start: 2025-03-28 | End: 2025-03-28

## 2025-03-28 RX ORDER — ACETAMINOPHEN 500 MG
500 TABLET ORAL EVERY 4 HOURS PRN
Status: DISCONTINUED | OUTPATIENT
Start: 2025-03-28 | End: 2025-03-30

## 2025-03-28 RX ORDER — BENZONATATE 200 MG/1
200 CAPSULE ORAL 3 TIMES DAILY PRN
Status: DISCONTINUED | OUTPATIENT
Start: 2025-03-28 | End: 2025-03-30

## 2025-03-28 RX ORDER — TEMAZEPAM 15 MG/1
15 CAPSULE ORAL NIGHTLY PRN
Status: DISCONTINUED | OUTPATIENT
Start: 2025-03-28 | End: 2025-03-30

## 2025-03-28 RX ORDER — METOCLOPRAMIDE HYDROCHLORIDE 5 MG/ML
5 INJECTION INTRAMUSCULAR; INTRAVENOUS EVERY 8 HOURS PRN
Status: DISCONTINUED | OUTPATIENT
Start: 2025-03-28 | End: 2025-03-29

## 2025-03-28 RX ORDER — ONDANSETRON 2 MG/ML
4 INJECTION INTRAMUSCULAR; INTRAVENOUS EVERY 6 HOURS PRN
Status: DISCONTINUED | OUTPATIENT
Start: 2025-03-28 | End: 2025-03-29

## 2025-03-28 NOTE — PLAN OF CARE
Problem: Patient Centered Care  Goal: Patient preferences are identified and integrated in the patient's plan of care  Description: Interventions:- What would you like us to know as we care for you? - Provide timely, complete, and accurate information to patient/family- Incorporate patient and family knowledge, values, beliefs, and cultural backgrounds into the planning and delivery of care- Encourage patient/family to participate in care and decision-making at the level they choose- Honor patient and family perspectives and choices  Outcome: Progressing     Problem: Patient/Family Goals  Goal: Patient/Family Long Term Goal  Description: Patient's Long Term Goal: Interventions:- - See additional Care Plan goals for specific interventions  Outcome: Progressing  Goal: Patient/Family Short Term Goal  Description: Patient's Short Term Goal: Interventions: - - See additional Care Plan goals for specific interventions  Outcome: Progressing     Problem: SKIN/TISSUE INTEGRITY - ADULT  Goal: Incision(s), wounds(s) or drain site(s) healing without S/S of infection  Description: INTERVENTIONS:- Assess and document risk factors for pressure ulcer development- Assess and document skin integrity- Assess and document dressing/incision, wound bed, drain sites and surrounding tissue- Implement wound care per orders- Initiate isolation precautions as appropriate- Initiate Pressure Ulcer prevention bundle as indicated  Outcome: Progressing

## 2025-03-28 NOTE — TELEPHONE ENCOUNTER
Received call from HH RN- Patient is s/p left VANE on 3/24    S/w HH PT [place]- She states that she arrived at patient's house for home health PT. She states that she last saw patient on Tuesday. Vitals on Tuesday were 130/68, 96% 02 and pulse: 80. She states that she saw patient today and patient had low oxygen of 84. She states that through deep breathing and incentive spirometer, the oxygen was able to rise to 92. She states that oxygen did continue to dip down into the high 80s. When I was on the phone, I had the HH PT place the O2 monitor on the patient and it was down to 82. Patient denied chest pain and endorsed mild shortness of breath. Partner of patient states that she has been slightly more lethargic over the last few days. I stated that with these symptoms, I would want to call 911. HH PT and family was agreeable to plan.     I called EMS at 911- Gave clinical details and provided address. They stated that they were on the way.    I stayed on the call with HH PT, patient and family until EMS arrived at address.

## 2025-03-28 NOTE — ED QUICK NOTES
Patient to ED for ARON.   Patient is post op left hip replacement. Per EMS patient sating at 89%  NEB treatment given en route    Hx. , emphysema

## 2025-03-28 NOTE — H&P
[facility]    PATIENT'S NAME: EMILE COELHO   ATTENDING PHYSICIAN: Padmini Mcgrath MD   PATIENT ACCOUNT#:   [id number]    LOCATION:  [place] [address]  MEDICAL RECORD #:   [id number]       YOB: 1955  ADMISSION DATE:       03/28/2025    HISTORY AND PHYSICAL EXAMINATION    CHIEF COMPLAINT:  COPD exacerbation, musculoskeletal deconditioning.     HISTORY OF PRESENT ILLNESS:  Patient is a 69-year-old  female who had left hip anterior approach arthroplasty on March 24, discharged home same day.  Today, she was seen by visiting home nurse and was noted to be dyspneic.  Sent to the emergency department for evaluation.  CBC and chemistry were unremarkable.  ProBNP 200.  Troponin was negative.  Chest x-ray showed no acute findings.  CT scan of the chest, rule out pulmonary embolism, was negative.  She had small pleural effusion and associated basilar atelectasis, moderate centrilobular emphysema.  Patient also noted to have wheezing and cough.  She was started on Solu-Medrol and nebulizer treatments, and she will be admitted to the hospital for further management.  Upon presentation to the emergency room, noted to have a pulse ox of 87% on room air, corrected to 95% on nasal cannula oxygen.      PAST MEDICAL HISTORY:  Essential hypertension, bipolar affective disorder, chronic obstructive pulmonary disease, degenerative joint disease of lumbar spine, generalized osteoarthritis, hyperlipidemia.    PAST SURGICAL HISTORY:  Appendectomy, left breast biopsy, cataract procedure, left total hip arthroplasty on March 24, left inguinal hernia repair, right distal humerus fracture open reduction and internal fixation, bilateral total knee arthroplasty, tubal ligation.    MEDICATIONS:  Please see medication reconciliation list.     ALLERGIES:  Sulfa.    FAMILY HISTORY:  Mother had diabetes mellitus type 2, coronary artery disease, and cerebrovascular accident.  Father had dementia.    SOCIAL HISTORY:   Ex-tobacco user.  No current tobacco, alcohol, or drug use.  Independent for basic activities of daily living.     REVIEW OF SYSTEMS:  Patient said she has been having hip pain but able to participate with physical therapy, progressive shortness of breath, wheezing, and cough for the last few days.  No fever or chills.  No sick contacts.  Other 12-point review of systems is negative.       PHYSICAL EXAMINATION:    GENERAL:  Alert and oriented to time, place and person.  Mild distress.   VITAL SIGNS:  Temperature 97.5; pulse 69; respiratory rate 19; blood pressure 124/63; pulse ox 95% on nasal cannula oxygen, 87% on room air.  HEENT:  Atraumatic.  Oropharynx clear.  Moist mucous membranes.  Ears and nose normal.  Eyes:  Anicteric sclerae.   NECK:  Supple.  No lymphadenopathy.  Trachea midline.  Full range of motion.   LUNGS:  Bilateral expiratory wheezing and rhonchi.  Increased respiratory effort.    HEART:  Regular rate and rhythm.  S1 and S2 auscultated.  No murmur.    ABDOMEN:  Soft, nondistended.  No tenderness.    EXTREMITIES:  Left anterior hip dressing without complications.  No hematoma or ecchymosis.  No dehiscence.  No leg edema, clubbing or cyanosis.   NEUROLOGIC:  Motor and sensory intact.    ASSESSMENT:    1.   Acute  chronic obstructive pulmonary disease exacerbation.  2.   Acute hypoxia.  3.   Recent left total hip arthroplasty, anterior approach.  4.   Obesity.    PLAN:  Patient will be admitted to general medical floor.  IV Solu-Medrol.  Nebulizer treatments.  Cough suppressants.  Physical and occupational therapy.  Continue low-dose aspirin b.i.d. for DVT prophylaxis.  Fall precautions.  Further recommendations to follow.     Dictated By Dang Osorio MD  d: 03/28/2025 17:53:21  t: 03/28/2025 18:10:53  Job [id number]  /

## 2025-03-28 NOTE — ED QUICK NOTES
RN rounded on patient  Patient on caridac,spo2,Bp monitoring  Patient resting on stretcher, call light within reach  No additional needs at this time.

## 2025-03-28 NOTE — ED QUICK NOTES
RN rounded on patient  Patient on caridac,spo2,Bp monitoring  Patient resting on stretcher, call light within reach  No additional needs at this time.    Awaiting xray results

## 2025-03-28 NOTE — ED QUICK NOTES
Orders for admission, patient is aware of plan and ready to go upstairs. Any questions, please call ED RN Consuelo/[facility] at extension [phone].     Patient Covid vaccination status: Fully vaccinated     COVID Test Ordered in ED: None    COVID Suspicion at Admission: N/A    Running Infusions:  None    Mental Status/LOC at time of transport: AOX3    Other pertinent information:   CIWA score: N/A   NIH score:  N/A

## 2025-03-28 NOTE — ED PROVIDER NOTES
Patient Seen in: [facility] Emergency Department      History     Chief Complaint   Patient presents with    Difficulty Breathing     Stated Complaint: SOB    Subjective:   HPI      69-year-old female presents for evaluation of hypoxemia.  Patient arrives via EMS, had hip replacement Monday, while doing PT physical therapist note room air oxygen slowly decreasing, today was in the mid 80s.  Patient has history of COPD but does not wear oxygen.  She does report feeling short of breath this morning, no chest pain.  She is not on anticoagulation.    Objective:     Past Medical History:    Arrhythmia    Bipolar disorder (HCC)    Cataract    Depression    Essential hypertension    High blood pressure    High cholesterol    Osteoarthritis    R knee surgery    Psychogenic polydipsia    Pulmonary emphysema (HCC)    Raynaud disease    Rib fracture    left side    Sciatica    Seborrheic keratoses    Sx.6/1/15, CPT.43859, L Total Knee, w/, Global Exp.8/30/15    Sx.6/8/16, CPT.05270, R Total Knee Replacement, w/, Global Exp.9/6/16    Visual impairment    readers              Past Surgical History:   Procedure Laterality Date    Appendectomy      appendicitis    Appendectomy      Breast biopsy Left 09/07/2011    fibrocystic changes    Cataract Bilateral     Colonoscopy  07/2009    incomplete (chronic constipation)    Colonoscopy N/A 11/08/2018    Procedure: COLONOSCOPY;  Surgeon: Micheal Curtis MD;  Location: [facility] ENDOSCOPY    Colonoscopy N/A 12/19/2019    Procedure: COLONOSCOPY;  Surgeon: Micheal Curtis MD;  Location: [facility] ENDOSCOPY    Hernia surgery Left 12/21/2011    neuroma excision, partial mesh removal, primary re-repair    Inguinal hernia repair Left 05/20/2009    Knee surgery Right     osteoarthritis    Angel localization wire 1 site left (cpt=19281)      2011    Orif humerus fracture Right 04/15/2022    right distal humerus open reduction internal fixation    Other Left 03/22/2019     LEFT REVERSE TOTAL SHOULDER ARTHROPLASTY WITH BICEPS TENODESIS    Other surgical history      Laparotomy (adhesions)    Other surgical history      Arthrocentesis of the right knee joint    Other surgical history      Arthrocentesis of the left knee joint    Other surgical history Right 2019    rotator cuff    Total knee replacement Bilateral  and     Tubal ligation                  Social History     Socioeconomic History    Marital status:    Tobacco Use    Smoking status: Former     Current packs/day: 0.00     Average packs/day: 1 pack/day for 30.0 years (30.0 ttl pk-yrs)     Types: Cigarettes     Start date: 1972     Quit date: 2002     Years since quittin.1     Passive exposure: Never    Smokeless tobacco: Never    Tobacco comments:     1 pack per day   Vaping Use    Vaping status: Never Used   Substance and Sexual Activity    Alcohol use: Not Currently    Drug use: No    Sexual activity: Yes     Partners: Male     Birth control/protection: Tubal Ligation   Other Topics Concern    Caffeine Concern Yes     Comment: tea, soda, 44 oz daily    Reaction to local anesthetic No    Right Handed Yes                  Physical Exam     ED Triage Vitals   BP 25 1429 125/57   Pulse 25 1426 73   Resp 25 1426 17   Temp 25 1426 97.5 °F (36.4 °C)   Temp src 25 1426 Temporal   SpO2 25 1426 (!) 87 %   O2 Device 25 1426 None (Room air)       Current Vitals:   Vital Signs  BP: 105/57  Pulse: 71  Resp: 15  Temp: 97.5 °F (36.4 °C)  Temp src: Temporal  MAP (mmHg): 72    Oxygen Therapy  SpO2: 92 %  O2 Device: Nasal cannula  O2 Flow Rate (L/min): 1 L/min        Physical Exam  Vitals and nursing note reviewed.   Constitutional:       General: She is not in acute distress.     Appearance: She is well-developed.   HENT:      Head: Normocephalic and atraumatic.   Eyes:      Conjunctiva/sclera: Conjunctivae normal.   Cardiovascular:      Rate and Rhythm: Normal rate  and regular rhythm.      Heart sounds: Normal heart sounds.   Pulmonary:      Effort: Pulmonary effort is normal. No respiratory distress.      Breath sounds: Normal breath sounds.      Comments: Occasional scattered wheeze to the right lung, good air movement bilaterally  Abdominal:      General: Bowel sounds are normal. There is no distension.      Palpations: Abdomen is soft.      Tenderness: There is no abdominal tenderness. There is no guarding or rebound.   Musculoskeletal:         General: Normal range of motion.      Cervical back: Normal range of motion and neck supple.   Skin:     General: Skin is warm and dry.      Findings: No rash.   Neurological:      General: No focal deficit present.      Mental Status: She is alert and oriented to person, place, and time.             ED Course     Labs Reviewed   CBC WITH DIFFERENTIAL WITH PLATELET - Abnormal; Notable for the following components:       Result Value    RBC 3.57 (*)     HGB 10.7 (*)     HCT 31.6 (*)     All other components within normal limits   BASIC METABOLIC PANEL (8) - Abnormal; Notable for the following components:    Glucose 133 (*)     Sodium 132 (*)     All other components within normal limits   PRO BETA NATRIURETIC PEPTIDE - Abnormal; Notable for the following components:    Pro-Beta Natriuretic Peptide 214 (*)     All other components within normal limits   TROPONIN I HIGH SENSITIVITY - Normal     EKG    Rate, intervals and axes as noted on EKG Report.  Rate: 71  Rhythm: Sinus Rhythm  Reading: Sinus rhythm, no STEMI                Imaging Results Available and Reviewed while in ED:   CT CHEST PE AORTA (IV ONLY) (CPT=71260)   Final Result   PROCEDURE: CT CHEST PE AORTA (IV ONLY) (CPT=71260)       COMPARISON: [facility], CT CHEST PAIN PE W CONTRAST,    3/19/2016, 3:29 AM.  [facility], CT CHEST    W CONTRAST, 5/27/2016, 8:59 AM.  CT CHEST W CONTRAST, 10/22/2015, 12:47    PM.  [facility]  [facility], CT    CHEST W CONTRAST, 5/03/2010, 3:23 PM.  [facility], CT    CHEST(CONTRAST ONLY) (CPT=71260), 1/21/2021, 9:26 AM.  [facility], CT CHEST(CONTRAST ONLY) (CPT=71260), 1/09/2020,    2:03 PM.  [facility], CT CHEST PAIN/PE (IV ONLY) (CPT=71260), 9/30/2019, 3:42 PM.     [facility], CT CHEST(CONTRAST ONLY)    (CPT=71260), 11/01/2016, 1:23 PM.       INDICATIONS: Shortness of breath.       TECHNIQUE: Multidetector CT images of the chest were obtained with    non-ionic intravenous contrast material. Automated exposure control for    dose reduction was used. Adjustment of the mA and/or kV was done based on    the patient's size. Iterative    reconstruction technique for dose reduction was employed. Dose information    was transmitted to the ACR (American College of Radiology) NRDR (National    Radiology Data Registry), which includes the Dose Index Registry.    Multiplanar reformats and maximum    intensity projection images were created.         FINDINGS:   VASCULATURE: There is adequate opacification of the pulmonary arterial    tree. No suspicious filling defects are identified in the main, lobar,    segmental, or proximal subsegmental pulmonary artery branches to suggest    acute pulmonary embolism. The distal    subsegmental branches are less well assessed. The main pulmonary artery    trunk is normal in caliber, measuring 2.8 cm.   CARDIAC: The heart is not enlarged. There is no bowing of the    interventricular septum to suggest right ventricular strain.   THORACIC AORTA: Unremarkable configuration without aneurysm or dissection.        LUNGS/PLEURA: Background parenchymal findings of mild upper lobe    predominant centrilobular emphysema are noted. Small bilateral pleural    effusions are seen with associated compressive atelectasis, with or    without superimposed airspace consolidation.    Additional  scattered ground-glass and reticular opacities are present and    may be atelectatic in origin. No pneumothorax is detected.     AIRWAYS: The tracheobronchial tree is without central mass or obstructing    lesion.   MEDIASTINUM/SAVANAH: No mass or lymphadenopathy. A borderline-sized right    perihilar lymph node has decreased in conspicuity from more remote    studies.     CHEST WALL: No axillary mass or lymphadenopathy.     LIMITED ABDOMEN: Within the parameters of the arterial phase of    contrast-enhancement, the included upper abdomen is unremarkable.     BONES: Mild left convex curvature of the midthoracic spine is present.    Multilevel degenerative changes are seen throughout the thoracic spine.    Bilateral reverse shoulder arthroplasties are demonstrated with resultant    artifactual degradation.   OTHER: A small hiatal hernia is evident.                       =====   CONCLUSION:    1. No evidence of acute pulmonary embolism to the level of the first order    subsegmental pulmonary artery branches.       2. Small pleural effusions and associated basilar atelectasis, with or    without superimposed pneumonia.       3. Moderate centrilobular emphysema.       4. Small hiatal hernia.         5. Lesser incidental findings as above.               Dictated by (CST): Fazal Hamilton MD on 3/28/2025 at 4:51 PM        Finalized by (CST): Fazal Hamilton MD on 3/28/2025 at 4:56 PM               XR CHEST AP PORTABLE  (CPT=71045)   Final Result   PROCEDURE: XR CHEST AP PORTABLE  (CPT=71045)   TIME: 3:03 p.m.         COMPARISON: [facility], XR CHEST AP PORTABLE (CPT=71045),    5/06/2024, 8:34 PM.       INDICATIONS: Shortness of breath today.       TECHNIQUE:   Single view.         FINDINGS:    CARDIAC/VASC: No cardiac silhouette abnormality or cardiomegaly.     Unremarkable pulmonary vasculature.    MEDIAST/SAVANAH:   Atherosclerotic aorta with no visible aneurysm.     LUNGS/PLEURA: Linear bibasilar pulmonary  opacities.  Small left pleural    effusion.   BONES: Scattered mild degenerative endplate changes in the visualized    thoracolumbar spine.  Bilateral shoulder arthroplasties are present.   OTHER: Negative.                     =====   CONCLUSION: Linear bibasilar pulmonary opacities most compatible with    atelectasis/scar.           Dictated by (CST): Tommie Sam MD on 3/28/2025 at 3:18 PM        Finalized by (CST): Tommie Sam MD on 3/28/2025 at 3:18 PM                   ED Medications Administered:   Medications   methylPREDNISolone sodium succinate (Solu-MEDROL) injection 40 mg (has no administration in time range)   iopamidol 76% (ISOVUE-370) injection for power injector (80 mL Intravenous Given 3/28/25 1604)           Vitals:    03/28/25 1630 03/28/25 1645 03/28/25 1701 03/28/25 1715   BP: 120/56 116/59 105/55 105/57   Pulse: 68 71 70 71   Resp: 15 14 14 15   Temp:       TempSrc:       SpO2:    92%     *I personally reviewed and interpreted all ED vitals.         MDM          Admission disposition: 3/28/2025  5:21 PM           Medical Decision Making  Differential diagnosis includes but is not limited to COPD exacerbation, pneumonia, PE, ACS    Well-appearing patient,PE on CT, suspect COPD exacerbation.  Given 40 mg Solu-Medrol, discussed with and admitted to hospitalist.  Discussed with pulmonology, note no further recommendations for the emergency department.      Problems Addressed:  COPD exacerbation (HCC): acute illness or injury    Amount and/or Complexity of Data Reviewed  Independent Historian: EMS  External Data Reviewed: labs.     Details: Drop in hemoglobin compared to CBC from 1/15/2025, BMP stable compared to 1/15/2025  Labs: ordered.  Radiology: ordered.  ECG/medicine tests: ordered and independent interpretation performed. Decision-making details documented in ED Course.  Discussion of management or test interpretation with external provider(s): Discussed with hospitalist and  pulmonology          Disposition and Plan     Clinical Impression:  1. COPD exacerbation (HCC)         Disposition:  Admit  3/28/2025  5:21 pm    Follow-up:  No follow-up provider specified.  We recommend that you schedule follow up care with a primary care provider within the next three months to obtain basic health screening including reassessment of your blood pressure.      Medications Prescribed:  Current Discharge Medication List              Supplementary Documentation:         Hospital Problems       Present on Admission  Date Reviewed: 3/21/2025            ICD-10-CM Noted POA    * (Principal) COPD exacerbation (HCC) J44.1 3/28/2025 Unknown

## 2025-03-29 LAB
ANION GAP SERPL CALC-SCNC: 7 MMOL/L (ref 0–18)
BASOPHILS # BLD AUTO: 0.01 X10(3) UL (ref 0–0.2)
BASOPHILS NFR BLD AUTO: 0.1 %
BUN BLD-MCNC: 10 MG/DL (ref 9–23)
BUN/CREAT SERPL: 17.9 (ref 10–20)
CALCIUM BLD-MCNC: 9.4 MG/DL (ref 8.7–10.4)
CHLORIDE SERPL-SCNC: 98 MMOL/L (ref 98–112)
CO2 SERPL-SCNC: 28 MMOL/L (ref 21–32)
CREAT BLD-MCNC: 0.56 MG/DL
DEPRECATED RDW RBC AUTO: 40.8 FL (ref 35.1–46.3)
EGFRCR SERPLBLD CKD-EPI 2021: 98 ML/MIN/1.73M2 (ref 60–?)
EOSINOPHIL # BLD AUTO: 0 X10(3) UL (ref 0–0.7)
EOSINOPHIL NFR BLD AUTO: 0 %
ERYTHROCYTE [DISTWIDTH] IN BLOOD BY AUTOMATED COUNT: 13 % (ref 11–15)
GLUCOSE BLD-MCNC: 143 MG/DL (ref 70–99)
HCT VFR BLD AUTO: 33.6 %
HGB BLD-MCNC: 11.7 G/DL
IMM GRANULOCYTES # BLD AUTO: 0.04 X10(3) UL (ref 0–1)
IMM GRANULOCYTES NFR BLD: 0.4 %
LYMPHOCYTES # BLD AUTO: 0.64 X10(3) UL (ref 1–4)
LYMPHOCYTES NFR BLD AUTO: 6 %
MCH RBC QN AUTO: 30.4 PG (ref 26–34)
MCHC RBC AUTO-ENTMCNC: 34.8 G/DL (ref 31–37)
MCV RBC AUTO: 87.3 FL
MONOCYTES # BLD AUTO: 0.17 X10(3) UL (ref 0.1–1)
MONOCYTES NFR BLD AUTO: 1.6 %
NEUTROPHILS # BLD AUTO: 9.73 X10 (3) UL (ref 1.5–7.7)
NEUTROPHILS # BLD AUTO: 9.73 X10(3) UL (ref 1.5–7.7)
NEUTROPHILS NFR BLD AUTO: 91.9 %
OSMOLALITY SERPL CALC.SUM OF ELEC: 278 MOSM/KG (ref 275–295)
PLATELET # BLD AUTO: 287 10(3)UL (ref 150–450)
POTASSIUM SERPL-SCNC: 4.5 MMOL/L (ref 3.5–5.1)
RBC # BLD AUTO: 3.85 X10(6)UL
SODIUM SERPL-SCNC: 133 MMOL/L (ref 136–145)
WBC # BLD AUTO: 10.6 X10(3) UL (ref 4–11)

## 2025-03-29 PROCEDURE — 99223 1ST HOSP IP/OBS HIGH 75: CPT | Performed by: INTERNAL MEDICINE

## 2025-03-29 PROCEDURE — 99233 SBSQ HOSP IP/OBS HIGH 50: CPT | Performed by: HOSPITALIST

## 2025-03-29 RX ORDER — ARIPIPRAZOLE 5 MG/1
5 TABLET ORAL NIGHTLY
Status: DISCONTINUED | OUTPATIENT
Start: 2025-03-29 | End: 2025-03-30

## 2025-03-29 RX ORDER — CALCIUM CARBONATE 500 MG/1
500 TABLET, CHEWABLE ORAL DAILY
Status: DISCONTINUED | OUTPATIENT
Start: 2025-03-29 | End: 2025-03-30

## 2025-03-29 RX ORDER — CLONAZEPAM 0.5 MG/1
2 TABLET ORAL 2 TIMES DAILY
Status: DISCONTINUED | OUTPATIENT
Start: 2025-03-29 | End: 2025-03-30

## 2025-03-29 RX ORDER — FERROUS SULFATE 325(65) MG
325 TABLET, DELAYED RELEASE (ENTERIC COATED) ORAL
Status: DISCONTINUED | OUTPATIENT
Start: 2025-03-29 | End: 2025-03-30

## 2025-03-29 RX ORDER — BUDESONIDE 0.5 MG/2ML
0.5 INHALANT ORAL 2 TIMES DAILY
Status: DISCONTINUED | OUTPATIENT
Start: 2025-03-29 | End: 2025-03-30

## 2025-03-29 RX ORDER — ARFORMOTEROL TARTRATE 15 UG/2ML
15 SOLUTION RESPIRATORY (INHALATION)
Status: DISCONTINUED | OUTPATIENT
Start: 2025-03-29 | End: 2025-03-30

## 2025-03-29 RX ORDER — QUETIAPINE FUMARATE 100 MG/1
100 TABLET, FILM COATED ORAL NIGHTLY
Status: DISCONTINUED | OUTPATIENT
Start: 2025-03-29 | End: 2025-03-29

## 2025-03-29 RX ORDER — DONEPEZIL HYDROCHLORIDE 5 MG/1
5 TABLET, FILM COATED ORAL NIGHTLY
Status: DISCONTINUED | OUTPATIENT
Start: 2025-03-29 | End: 2025-03-30

## 2025-03-29 RX ORDER — ATORVASTATIN CALCIUM 20 MG/1
20 TABLET, FILM COATED ORAL NIGHTLY
Status: DISCONTINUED | OUTPATIENT
Start: 2025-03-29 | End: 2025-03-30

## 2025-03-29 RX ORDER — DULOXETIN HYDROCHLORIDE 60 MG/1
60 CAPSULE, DELAYED RELEASE ORAL NIGHTLY
Status: DISCONTINUED | OUTPATIENT
Start: 2025-03-29 | End: 2025-03-30

## 2025-03-29 RX ORDER — FLUTICASONE PROPIONATE AND SALMETEROL 100; 50 UG/1; UG/1
1 POWDER RESPIRATORY (INHALATION) 2 TIMES DAILY
Status: DISCONTINUED | OUTPATIENT
Start: 2025-03-29 | End: 2025-03-29

## 2025-03-29 RX ORDER — IPRATROPIUM BROMIDE AND ALBUTEROL SULFATE 2.5; .5 MG/3ML; MG/3ML
3 SOLUTION RESPIRATORY (INHALATION) 2 TIMES DAILY
Status: DISCONTINUED | OUTPATIENT
Start: 2025-03-29 | End: 2025-03-30

## 2025-03-29 RX ORDER — HEPARIN SODIUM 5000 [USP'U]/ML
5000 INJECTION, SOLUTION INTRAVENOUS; SUBCUTANEOUS EVERY 8 HOURS SCHEDULED
Status: DISCONTINUED | OUTPATIENT
Start: 2025-03-29 | End: 2025-03-30

## 2025-03-29 RX ORDER — HALOPERIDOL 5 MG/ML
0.5 INJECTION INTRAMUSCULAR EVERY 4 HOURS PRN
Status: DISCONTINUED | OUTPATIENT
Start: 2025-03-29 | End: 2025-03-30

## 2025-03-29 RX ORDER — OXYCODONE HYDROCHLORIDE 5 MG/1
5 TABLET ORAL EVERY 6 HOURS PRN
Status: DISCONTINUED | OUTPATIENT
Start: 2025-03-29 | End: 2025-03-30

## 2025-03-29 RX ORDER — CELECOXIB 100 MG/1
200 CAPSULE ORAL 2 TIMES DAILY
Status: DISCONTINUED | OUTPATIENT
Start: 2025-03-29 | End: 2025-03-30

## 2025-03-29 NOTE — CONSULTS
[facility]  part of [facility]    Report of Consultation    Eric Aviles Patient Status:  Inpatient    1955 MRN [id number]   Location [facility] 5SW/SE Attending Shaista Washburn MD   Hosp Day # 1 PCP Casa Van MD     Date of Admission:  3/28/2025  Date of Consult: 3/29/2025    Reason for Consultation:   Consults  Dyspnea  COPD    History provided by:patient  HPI:     Chief Complaint   Patient presents with    Difficulty Breathing     HPI    69-year-old female with history of COPD, HTN, HL, depression  Patient underwent left hip arthroplasty surgery on 3/24/2025 and was discharged same day  Reported with dyspnea at home , with mild hypoxia  Brought to ER and chest CT showed no PE with mild basilar atelectasis and very small effusion with underlying COPD  Patient denied any cough or sputum or wheezes  No fever or chills  Admitted for COPD exacerbation and started on steroid  Almost normal BNP but reported more edema  No abdominal pain      History     Past Medical History:    Arrhythmia    Bipolar disorder (HCC)    Cataract    Depression    Essential hypertension    High blood pressure    High cholesterol    Osteoarthritis    R knee surgery    Psychogenic polydipsia    Pulmonary emphysema (HCC)    Raynaud disease    Rib fracture    left side    Sciatica    Seborrheic keratoses    Sx.6/1/15, CPT.83752, L Total Knee, w/, Global Exp.8/30/15    Sx.16, CPT.78340, R Total Knee Replacement, w/, Global Exp.16    Visual impairment    readers     Past Surgical History:   Procedure Laterality Date    Appendectomy      appendicitis    Appendectomy      Breast biopsy Left 2011    fibrocystic changes    Cataract Bilateral     Colonoscopy  2009    incomplete (chronic constipation)    Colonoscopy N/A 2018    Procedure: COLONOSCOPY;  Surgeon: Micheal Curtis MD;  [facility] ENDOSCOPY    Colonoscopy N/A 2019    Procedure: COLONOSCOPY;  Surgeon:  Micheal Curtis MD;  Location: [facility] ENDOSCOPY    Hernia surgery Left 2011    neuroma excision, partial mesh removal, primary re-repair    Inguinal hernia repair Left 2009    Knee surgery Right     osteoarthritis    Angel localization wire 1 site left (cpt=19281)          Orif humerus fracture Right 04/15/2022    right distal humerus open reduction internal fixation    Other Left 2019    LEFT REVERSE TOTAL SHOULDER ARTHROPLASTY WITH BICEPS TENODESIS    Other surgical history      Laparotomy (adhesions)    Other surgical history      Arthrocentesis of the right knee joint    Other surgical history      Arthrocentesis of the left knee joint    Other surgical history Right 2019    rotator cuff    Total knee replacement Bilateral  and     Tubal ligation       Family History   Problem Relation Age of Onset    Dementia Father         Alzheimer's    Lipids Father         hyperlipidemia    Hypertension Father     Musculo-skelatal Disorder Father         B/L knees replaced    Diabetes Mother     Heart Disease Mother         CAD    Stroke Mother         CVA    Heart Attack Mother         quadruple bypass surgery/MI    Arthritis Mother         possible Rheumatoid Arthritis    Asthma Daughter     Colon Cancer Maternal Grandmother     Breast Cancer Sister 40    Other (Other) Sister         flu H1N1    Cancer Brother         liver    Breast Cancer Paternal Aunt 52    Glaucoma Neg         multiple    Macular degeneration Neg         multiple     Social History:  Social History     Socioeconomic History    Marital status:    Tobacco Use    Smoking status: Former     Current packs/day: 0.00     Average packs/day: 1 pack/day for 30.0 years (30.0 ttl pk-yrs)     Types: Cigarettes     Start date: 1972     Quit date: 2002     Years since quittin.1     Passive exposure: Never    Smokeless tobacco: Never    Tobacco comments:     1 pack per day   Vaping Use    Vaping status: Never  Used   Substance and Sexual Activity    Alcohol use: Not Currently    Drug use: No    Sexual activity: Yes     Partners: Male     Birth control/protection: Tubal Ligation   Other Topics Concern    Caffeine Concern Yes     Comment: tea, soda, 44 oz daily    Reaction to local anesthetic No    Right Handed Yes     Social Drivers of Health     Food Insecurity: No Food Insecurity (3/28/2025)    NCSS - Food Insecurity     Worried About Running Out of Food in the Last Year: No     Ran Out of Food in the Last Year: No   Transportation Needs: No Transportation Needs (3/28/2025)    NCSS - Transportation     Lack of Transportation: No   Housing Stability: Not At Risk (3/28/2025)    NCSS - Housing/Utilities     Has Housing: Yes     Worried About Losing Housing: No     Unable to Get Utilities: No     Allergies/Medications:   Allergies: Allergies[1]  Medications Prior to Admission   Medication Sig    clonazePAM 2 MG Oral Tab Take 1 tablet (2 mg total) by mouth in the morning and 1 tablet (2 mg total) before bedtime.    ketoconazole 2 % External Cream Apply 1 Application topically 2 (two) times daily. (Patient taking differently: Apply 1 Application topically as needed.)    QUEtiapine 50 MG Oral Tab Take 2.5 tablets (125 mg total) by mouth nightly. (Patient taking differently: Take 2 tablets (100 mg total) by mouth nightly.)    Verapamil HCl  MG Oral Capsule SR 24 Hr Take 1 capsule (180 mg total) by mouth nightly.    albuterol 108 (90 Base) MCG/ACT Inhalation Aero Soln Inhale 2 puffs into the lungs every 6 (six) hours as needed for Wheezing or Shortness of Breath.    oxyCODONE 5 MG Oral Tab Take 1 tablet (5 mg total) by mouth every 6 (six) hours as needed for Pain.    celecoxib (CELEBREX) 200 MG Oral Cap Take 1 capsule (200 mg total) by mouth 2 (two) times daily.    cefadroxil 500 MG Oral Cap Take 1 capsule (500 mg total) by mouth 2 (two) times daily.    Multiple Vitamins-Minerals (MULTIVITAMIN WOMENS 50+ ADV OR) Take 1  tablet by mouth daily.    cyanocobalamin 250 MCG Oral Tab Take 1 tablet (250 mcg total) by mouth daily.    Calcium Citrate 950 (200 Ca) MG Oral Tab Take 1 tablet (950 mg total) by mouth daily.    Ferrous Sulfate (FEROSUL) 325 (65 Fe) MG Oral Tab Take 1 tablet (325 mg total) by mouth daily with breakfast.    MELOXICAM 15 MG Oral Tab Take 1 tablet (15 mg total) by mouth daily.    nystatin 629447 UNIT/GM External Powder Apply 1 Application topically 3 (three) times daily. (Patient taking differently: Apply 1 Application topically daily as needed.)    donepezil 5 MG Oral Tab Take 1 tablet (5 mg total) by mouth nightly.    atorvastatin 20 MG Oral Tab Take 1 tablet (20 mg total) by mouth nightly.    ARIPiprazole 5 MG Oral Tab Take 1 tablet (5 mg total) by mouth nightly.    DULoxetine 60 MG Oral Cap DR Particles Take 1 capsule (60 mg total) by mouth nightly. Take 60mg daily.    Vitamin C 500 MG Oral Tab Take 1 tablet (500 mg total) by mouth daily.    Glucosamine-Chondroit-Vit C-Mn (GLUCOSAMINE CHONDR 500 COMPLEX) Oral Cap Take 1 capsule by mouth daily.       Review of Systems:     Constitutional:  Negative for fever.   HENT:  Negative for congestion.    Respiratory:  Negative for cough, chest tightness and wheezing.    Cardiovascular:  Negative for chest pain and leg swelling.   Gastrointestinal:  Negative for abdominal distention.   Neurological:  Negative for seizures.   Psychiatric/Behavioral:  Negative for agitation.        Physical Exam:   Vital Signs:   weight is 160 lb (72.6 kg). Her oral temperature is 98.1 °F (36.7 °C). Her blood pressure is 133/68 and her pulse is 96. Her respiration is 16 and oxygen saturation is 91%.   Physical Exam  Constitutional:       General: She is not in acute distress.     Appearance: She is not ill-appearing.   HENT:      Head: Atraumatic.      Nose: Nose normal.      Mouth/Throat:      Mouth: Mucous membranes are moist.   Eyes:      General: No scleral icterus.  Cardiovascular:       Rate and Rhythm: Normal rate.      Heart sounds:      No gallop.   Pulmonary:      Effort: No respiratory distress.      Breath sounds: No stridor. No wheezing, rhonchi or rales.   Abdominal:      General: Abdomen is flat. Bowel sounds are normal. There is no distension.      Palpations: Abdomen is soft.   Musculoskeletal:      Cervical back: No rigidity.      Comments: Mild edema lower extremities   Skin:     General: Skin is dry.   Neurological:      General: No focal deficit present.      Mental Status: She is oriented to person, place, and time.         Results:     Lab Results   Component Value Date    WBC 10.6 03/29/2025    HGB 11.7 (L) 03/29/2025    HCT 33.6 (L) 03/29/2025    .0 03/29/2025    CREATSERUM 0.56 03/29/2025    BUN 10 03/29/2025     (L) 03/29/2025    K 4.5 03/29/2025    CL 98 03/29/2025    CO2 28.0 03/29/2025     (H) 03/29/2025    CA 9.4 03/29/2025    ALB 5.0 (H) 01/15/2025    ALKPHO 94 01/15/2025    BILT 0.4 01/15/2025    TP 7.5 01/15/2025    AST 19 01/15/2025    ALT 11 01/15/2025    PTT 30.0 06/21/2023    INR 0.97 06/21/2023    TSH 0.953 08/29/2023    LIP 32 05/06/2024    DDIMER 1.02 (H) 09/30/2019    ESRML 11 07/12/2021    CRP <0.29 02/28/2020    BNP 31 07/12/2012    MG 2.0 09/30/2019    PHOS 2.5 03/24/2019    TROP <0.045 11/25/2020    TROPHS <3 03/28/2025    B12 896 08/29/2023     CT CHEST PE AORTA (IV ONLY) (CPT=71260)    Result Date: 3/28/2025  CONCLUSION:  1. No evidence of acute pulmonary embolism to the level of the first order subsegmental pulmonary artery branches.  2. Small pleural effusions and associated basilar atelectasis, with or without superimposed pneumonia.  3. Moderate centrilobular emphysema.  4. Small hiatal hernia.   5. Lesser incidental findings as above.    Dictated by (CST): Fazal Hamilton MD on 3/28/2025 at 4:51 PM     Finalized by (CST): Fazal Hamilton MD on 3/28/2025 at 4:56 PM          XR CHEST AP PORTABLE  (CPT=71045)    Result Date:  3/28/2025  CONCLUSION: Linear bibasilar pulmonary opacities most compatible with atelectasis/scar.   Dictated by (CST): Tommie Sam MD on 3/28/2025 at 3:18 PM     Finalized by (CST): Tommie Sam MD on 3/28/2025 at 3:18 PM         EKG 12 Lead    Result Date: 3/28/2025  Normal sinus rhythm Minimal voltage criteria for LVH, may be normal variant ( R in aVL ) Nonspecific T wave abnormality Abnormal ECG When compared with ECG of 15-EMILE-2025 10:32, Nonspecific T wave abnormality has replaced inverted T waves in Anterior leads Confirmed by JAYNE RAMIREZ, LEDY ([id number]) on 3/28/2025 3:33:08 PM     Impression:      1-COPD  Quit smoking 2010  Possible mild exacerbation  No leukocytosis with no large infiltrate or effusion  Chest CT no PE with mild basilar atelectasis    Plan ;  Will discontinue steroid  Advair and nebulizers  Home O2 eval  Incentive spirometry    2-recent left hip arthroplasty on 3/24/2025  Pt/ot    3-history of depression, bipolar, HTN, HL    4-DVT prophylaxis  Heparin subcu              Raghu Cruz MD  3/29/2025         [1]   Allergies  Allergen Reactions    Sulfa Antibiotics HIVES

## 2025-03-29 NOTE — DISCHARGE INSTRUCTIONS
Resume services with [facility]  [phone]    [facility] is providing your oxygen. Please call as soon as you get home.  P:[phone]  F:[phone]

## 2025-03-29 NOTE — PROGRESS NOTES
03/29/25 1400   Mobility   O2 walk? Yes   SPO2% on Room Air at Rest 92   SPO2% Ambulation on Room Air 86   Ambulation oxygen flow (liters per minute) 2

## 2025-03-29 NOTE — PLAN OF CARE
Problem: Patient Centered Care  Goal: Patient preferences are identified and integrated in the patient's plan of care  Description: Interventions:- What would you like us to know as we care for you? From home with spouse   - Provide timely, complete, and accurate information to patient/family- Incorporate patient and family knowledge, values, beliefs, and cultural backgrounds into the planning and delivery of care- Encourage patient/family to participate in care and decision-making at the level they choose- Honor patient and family perspectives and choices  Outcome: Progressing       Problem: SKIN/TISSUE INTEGRITY - ADULT  Goal: Incision(s), wounds(s) or drain site(s) healing without S/S of infection  Description: INTERVENTIONS:- Assess and document risk factors for pressure ulcer development- Assess and document skin integrity- Assess and document dressing/incision, wound bed, drain sites and surrounding tissue- Implement wound care per orders- Initiate isolation precautions as appropriate- Initiate Pressure Ulcer prevention bundle as indicated  Outcome: Progressing        Problem: PAIN - ADULT  Goal: Verbalizes/displays adequate comfort level or patient's stated pain goal  Description: INTERVENTIONS:- Encourage pt to monitor pain and request assistance- Assess pain using appropriate pain scale- Administer analgesics based on type and severity of pain and evaluate response- Implement non-pharmacological measures as appropriate and evaluate response- Consider cultural and social influences on pain and pain management- Manage/alleviate anxiety- Utilize distraction and/or relaxation techniques- Monitor for opioid side effects- Notify MD/LIP if interventions unsuccessful or patient reports new pain- Anticipate increased pain with activity and pre-medicate as appropriate  Outcome: Progressing     Problem: SAFETY ADULT - FALL  Goal: Free from fall injury  Description: INTERVENTIONS:- Assess pt frequently for physical  needs- Identify cognitive and physical deficits and behaviors that affect risk of falls.- [place] fall precautions as indicated by assessment.- Educate pt/family on patient safety including physical limitations- Instruct pt to call for assistance with activity based on assessment- Modify environment to reduce risk of injury- Provide assistive devices as appropriate- Consider OT/PT consult to assist with strengthening/mobility- Encourage toileting schedule  Outcome: Progressing     Problem: DISCHARGE PLANNING  Goal: Discharge to home or other facility with appropriate resources  Description: INTERVENTIONS:- Identify barriers to discharge w/pt and caregiver- Include patient/family/discharge partner in discharge planning- Arrange for needed discharge resources and transportation as appropriate- Identify discharge learning needs (meds, wound care, etc)- Arrange for interpreters to assist at discharge as needed- Consider post-discharge preferences of patient/family/discharge partner- Complete POLST form as appropriate- Assess patient's ability to be responsible for managing their own health- Refer to Case Management Department for coordinating discharge planning if the patient needs post-hospital services based on physician/LIP order or complex needs related to functional status, cognitive ability or social support system  Outcome: Progressing

## 2025-03-29 NOTE — CM/SW NOTE
SW followed up with [facility] to see if patient is current with them for HH services. [facility] liaison confirmed that patient is current with PT/RN for HH services. SW entered RAUL. [facility] liaison aware of RAUL entered.     SW/CM to remain available for support and/or discharge planning.     Marie Zhao, MSW, LSW   x [phone]

## 2025-03-29 NOTE — PLAN OF CARE
Problem: Patient Centered Care  Goal: Patient preferences are identified and integrated in the patient's plan of care  Description: Interventions:- What would you like us to know as we care for you? From home with spouse- Provide timely, complete, and accurate information to patient/family- Incorporate patient and family knowledge, values, beliefs, and cultural backgrounds into the planning and delivery of care- Encourage patient/family to participate in care and decision-making at the level they choose- Honor patient and family perspectives and choices  Outcome: Progressing     Problem: Patient/Family Goals  Goal: Patient/Family Long Term Goal  Description: Patient's Long Term Goal: Interventions:- - See additional Care Plan goals for specific interventions  Outcome: Progressing  Goal: Patient/Family Short Term Goal  Description: Patient's Short Term Goal: Interventions: - - See additional Care Plan goals for specific interventions  Outcome: Progressing     Problem: SKIN/TISSUE INTEGRITY - ADULT  Goal: Incision(s), wounds(s) or drain site(s) healing without S/S of infection  Description: INTERVENTIONS:- Assess and document risk factors for pressure ulcer development- Assess and document skin integrity- Assess and document dressing/incision, wound bed, drain sites and surrounding tissue- Implement wound care per orders- Initiate isolation precautions as appropriate- Initiate Pressure Ulcer prevention bundle as indicated  Outcome: Progressing     Problem: PAIN - ADULT  Goal: Verbalizes/displays adequate comfort level or patient's stated pain goal  Description: INTERVENTIONS:- Encourage pt to monitor pain and request assistance- Assess pain using appropriate pain scale- Administer analgesics based on type and severity of pain and evaluate response- Implement non-pharmacological measures as appropriate and evaluate response- Consider cultural and social influences on pain and pain management- Manage/alleviate  anxiety- Utilize distraction and/or relaxation techniques- Monitor for opioid side effects- Notify MD/LIP if interventions unsuccessful or patient reports new pain- Anticipate increased pain with activity and pre-medicate as appropriate  Outcome: Progressing     Problem: SAFETY ADULT - FALL  Goal: Free from fall injury  Description: INTERVENTIONS:- Assess pt frequently for physical needs- Identify cognitive and physical deficits and behaviors that affect risk of falls.- [place] fall precautions as indicated by assessment.- Educate pt/family on patient safety including physical limitations- Instruct pt to call for assistance with activity based on assessment- Modify environment to reduce risk of injury- Provide assistive devices as appropriate- Consider OT/PT consult to assist with strengthening/mobility- Encourage toileting schedule  Outcome: Progressing     Problem: DISCHARGE PLANNING  Goal: Discharge to home or other facility with appropriate resources  Description: INTERVENTIONS:- Identify barriers to discharge w/pt and caregiver- Include patient/family/discharge partner in discharge planning- Arrange for needed discharge resources and transportation as appropriate- Identify discharge learning needs (meds, wound care, etc)- Arrange for interpreters to assist at discharge as needed- Consider post-discharge preferences of patient/family/discharge partner- Complete POLST form as appropriate- Assess patient's ability to be responsible for managing their own health- Refer to Case Management Department for coordinating discharge planning if the patient needs post-hospital services based on physician/LIP order or complex needs related to functional status, cognitive ability or social support system  Outcome: Progressing

## 2025-03-29 NOTE — PHYSICAL THERAPY NOTE
PHYSICAL THERAPY EVALUATION - INPATIENT     Room Number: 547B/547-B  Evaluation Date: 3/29/2025  Type of Evaluation: Initial   Physician Order: PT Eval and Treat    Presenting Problem: Patient admitted w/ dyspnea (Trop (-))  Co-Morbidities : CPD on room air, bipolar, chronic LBP and LT anterir hip replacement 3.24.25, discharged home  Reason for Therapy: Mobility Dysfunction and Discharge Planning    PHYSICAL THERAPY ASSESSMENT   Patient is a 69 year old female admitted 3/28/2025 for dyspnea (Trop (-), Patient w/ h/o COPD on room air, bipolar, chronic LBP and LT anterir hip replacement 3.24.25, discharged home. Prior to admission, patient's baseline is light hands on assist w/ all mobilities using rolling walker post recent surgery.  Patient is currently functioning below baseline with bed mobility, transfers, gait, stair negotiation, standing prolonged periods, and desaturation w/ min mobility on room air .  Patient is requiring contact guard assist and minimal assist as a result of the following impairments: decreased functional strength, decreased endurance/aerobic capacity, impaired dynamic balance, decreased muscular endurance, limited LT hip ROM, and increased O2 needs from baseline.  Physical Therapy will continue to follow for duration of hospitalization.    Patient will benefit from continued skilled PT Services at discharge to promote prior level of function and safety with additional support and return home with home health PT. Patient was active w/ home PT prior to admission.    PLAN DURING HOSPITALIZATION  Nursing Mobility Recommendation : 1 Assist  PT Device Recommendation: Rolling walker  PT Treatment Plan: Bed mobility, Body mechanics, Endurance, Energy conservation, Patient education, Family education, Gait training, Strengthening, Transfer training, Balance training  Rehab Potential : Good  Frequency (Obs): Daily     PHYSICAL THERAPY MEDICAL/SOCIAL HISTORY   History related to current admission:       Problem List  Principal Problem:    COPD exacerbation (HCC)  Active Problems:    COPD (chronic obstructive pulmonary disease) (HCC)      HOME SITUATION  Type of Home: House  Home Layout: One level  Stairs to Enter : 3   Railing: Yes    Stairs to Bedroom: 0         Lives With: Spouse (spouse worked 2 days/wk, daughter available to assist when spoue at work)    Drives: No   Patient Regularly Uses: Reading glasses, Other (Comment) (rolling walker)     Prior Level of [place]: Patient originally independent without assistive device, living w/ spouse who worked two days/week and their daughter, family living in one level house w/ 3 stairs to enter, family had been providing light hands on assist for all mobilities post recent surgery using rolling walker at home, no h/o fall, patient was active w/ home PT, no h/o home O2 supplement.    SUBJECTIVE      \" Can I go home now? I miss that little gordy.\" Pointing to his grandson present in the room.      PHYSICAL THERAPY EXAMINATION   OBJECTIVE  Precautions: Bed/chair alarm  Fall Risk: High fall risk    WEIGHT BEARING RESTRICTION       PAIN ASSESSMENT  Ratin          COGNITION  Overall Cognitive Status:  WFL - within functional limits  Memory:  decreased recall of biographical information, decreased recall of precautions, decreased recall of recent events, and decreased short term memory  Following Commands:  follows one step commands without difficulty  Safety Judgement:  decreased awareness of need for assistance  Problem Solving:  assistance required to generate solutions    RANGE OF MOTION AND STRENGTH ASSESSMENT  BLE gross muscle testing 3/5, ROM WFL, light touch sensation intact.    BALANCE  Static Sitting: Good  Dynamic Sitting: Good  Static Standing: Fair  Dynamic Standing: Fair    ADDITIONAL TESTS                                    NEUROLOGICAL FINDINGS                      ACTIVITY TOLERANCE  Pulse: 114 (with ambulation)  Heart Rate Source: Monitor               Patient Position: Sitting    O2 WALK       AM-PAC '6-Clicks' INPATIENT SHORT FORM - BASIC MOBILITY  How much difficulty does the patient currently have...  Patient Difficulty: Turning over in bed (including adjusting bedclothes, sheets and blankets)?: None   Patient Difficulty: Sitting down on and standing up from a chair with arms (e.g., wheelchair, bedside commode, etc.): A Little   Patient Difficulty: Moving from lying on back to sitting on the side of the bed?: A Little   How much help from another person does the patient currently need...   Help from Another: Moving to and from a bed to a chair (including a wheelchair)?: A Little   Help from Another: Need to walk in hospital room?: A Little   Help from Another: Climbing 3-5 steps with a railing?: A Lot     AM-PAC Score:  Raw Score: 18   Approx Degree of Impairment: 46.58%   Standardized Score (AM-PAC Scale): 43.63   CMS Modifier (G-Code): CK    FUNCTIONAL ABILITY STATUS  Functional Mobility/Gait Assessment  Gait Assistance: Contact guard assist  Distance (ft): 50-18-5  Assistive Device: Rolling walker  Pattern:  (slow gait but faily steady w/ decreased weight shift on LLE despite f denying pain)  Stairs: Other (comment) (NT)  Rolling: stand-by assist  Supine to Sit: minimal assist  Sit to Supine:  NT  Sit to Stand: contact guard assist and minimal assist    Exercise/Education Provided:  Bed mobility  Body mechanics  Energy conservation  Gait training  Posture  Strengthening  Lower therapeutic exercise:  Ankle pumps  Knee extension  LAQ  1 set of 5 reps.  Transfer training    Skilled Therapy Provided: bed mobility, transfer techniques, gait training, energy conservation strategies, fall prevention.    The patient's Approx Degree of Impairment: 46.58% has been calculated based on documentation in the [facility] '6 clicks' Inpatient Basic Mobility Short Form.  Research supports that patients with this level of impairment may benefit from Home PT.  Final  disposition will be made by interdisciplinary medical team.    Patient End of Session: Up in chair, Needs met, Call light within reach, RN aware of session/findings, Hospital anti-slip socks, All patient questions and concerns addressed, Alarm set, Family present    CURRENT GOALS  Goals to be met by: 4.3.25  Patient Goal Patient's self-stated goal is: Home w/ spouse   Goal #1 Patient is able to demonstrate supine - sit EOB @ level: independent     Goal #1   Current Status    Goal #2 Patient is able to demonstrate transfers Sit to/from Stand at assistance level: supervision with walker - rolling     Goal #2  Current Status    Goal #3 Patient is able to ambulate 150 feet with assist device: walker - rolling at assistance level: supervision   Goal #3   Current Status    Goal #4 Patient will negotiate 3 stairs/one curb w/ assistive device and supervision   Goal #4   Current Status    Goal #5 Patient to demonstrate independence with home activity/exercise instructions provided to patient in preparation for discharge.   Goal #5   Current Status    Goal #6    Goal #6  Current Status      Patient Evaluation Complexity Level:  History Low - no personal factors and/or co-morbidities   Examination of body systems Low -  addressing 1-2 elements   Clinical Presentation Low- Stable   Clinical Decision Making  Low Complexity     Gait Trainin minutes  Therapeutic Activity:  0 minutes  Neuromuscular Re-education: 0 minutes  Therapeutic Exercise: 0 minutes  Canalith Repositionin minutes  Manual Therapy: 0 minutes  Can add/delete any of these

## 2025-03-29 NOTE — PROGRESS NOTES
[facility]  part of [facility]    Progress Note    Eric Aviles Patient Status:  Inpatient    1955 MRN [id number]   Location [facility] 5SW/SE Attending Shaista Washburn MD   Hosp Day # 1 PCP Casa Van MD       Subjective:   Eric Aviles is on 2 liters of oxygen. Afebrile. Moderate sob.     Objective:   Blood pressure 133/68, pulse 96, temperature 98.1 °F (36.7 °C), temperature source Oral, resp. rate 16, weight 160 lb (72.6 kg), SpO2 91%, not currently breastfeeding.    Physical Exam:    General: No acute distress.   Respiratory: Clear to auscultation bilaterally. No wheezes. No rhonchi.  Cardiovascular: S1, S2. Regular rate and rhythm. No murmurs, rubs or gallops.   Abdomen: Soft, nontender, nondistended.  Positive bowel sounds. No rebound or guarding.  Neurologic: No focal neurological deficits.   Musculoskeletal: Moves all extremities.  Extremities: No edema.    Results:     Lab Results   Component Value Date    WBC 10.6 2025    HGB 11.7 (L) 2025    HCT 33.6 (L) 2025    .0 2025    CREATSERUM 0.56 2025    BUN 10 2025     (L) 2025    K 4.5 2025    CL 98 2025    CO2 28.0 2025     (H) 2025    CA 9.4 2025    ALB 5.0 (H) 01/15/2025    ALKPHO 94 01/15/2025    BILT 0.4 01/15/2025    TP 7.5 01/15/2025    AST 19 01/15/2025    ALT 11 01/15/2025    PTT 30.0 2023    INR 0.97 2023    TSH 0.953 2023    LIP 32 2024    DDIMER 1.02 (H) 2019    ESRML 11 2021    CRP <0.29 2020    BNP 31 2012    MG 2.0 2019    PHOS 2.5 2019    TROP <0.045 2020    B12 896 2023       CT CHEST PE AORTA (IV ONLY) (CPT=71260)    Result Date: 3/28/2025  CONCLUSION:  1. No evidence of acute pulmonary embolism to the level of the first order subsegmental pulmonary artery branches.  2. Small pleural effusions and associated basilar atelectasis, with or without  superimposed pneumonia.  3. Moderate centrilobular emphysema.  4. Small hiatal hernia.   5. Lesser incidental findings as above.    Dictated by (CST): Fazal Hamilton MD on 3/28/2025 at 4:51 PM     Finalized by (CST): Fazal Hamilton MD on 3/28/2025 at 4:56 PM          XR CHEST AP PORTABLE  (CPT=71045)    Result Date: 3/28/2025  CONCLUSION: Linear bibasilar pulmonary opacities most compatible with atelectasis/scar.   Dictated by (CST): Tommie Sam MD on 3/28/2025 at 3:18 PM     Finalized by (CST): Tommie Sam MD on 3/28/2025 at 3:18 PM         EKG 12 Lead    Result Date: 3/28/2025  Normal sinus rhythm Minimal voltage criteria for LVH, may be normal variant ( R in aVL ) Nonspecific T wave abnormality Abnormal ECG When compared with ECG of 15-EMILE-2025 10:32, Nonspecific T wave abnormality has replaced inverted T waves in Anterior leads Confirmed by JAYNE RAMIREZ, LEDY ([id number]) on 3/28/2025 3:33:08 PM      ARIPiprazole  5 mg Oral Nightly    clonazePAM  2 mg Oral BID    DULoxetine  60 mg Oral Nightly    donepezil  5 mg Oral Nightly    atorvastatin  20 mg Oral Nightly    calcium carbonate  500 mg Oral Daily    celecoxib  200 mg Oral BID    ferrous sulfate  325 mg Oral Daily with breakfast    methylPREDNISolone  40 mg Intravenous Q12H       oxyCODONE    haloperidol lactate    ipratropium-albuterol    acetaminophen    temazepam    acetaminophen **OR** HYDROcodone-acetaminophen **OR** HYDROcodone-acetaminophen    guaiFENesin    benzonatate      Assessment and Plan:      1.       Acute  chronic obstructive pulmonary disease exacerbation.Continue IV Solumedrol 40 mg BID. Duonebs prn Monitor O2 sats.   2.       Acute hypoxia. Secondary to above. Monitor O2 sats.   3.       Recent left total hip arthroplasty, anterior approach. Pain control.  4.       Obesity. BMI 31  5         Dyslipidemia. Continue statin   5         Major depression. Anxiety. Continue Aripriprazole and clonazapam.       Quality:  DVT Prophylaxis: add  heparin  CODE status: Full    MDM. High     RYDER CASIANO MD  03/29/25

## 2025-03-30 ENCOUNTER — TELEPHONE (OUTPATIENT)
Dept: INTERNAL MEDICINE CLINIC | Facility: CLINIC | Age: 70
End: 2025-03-30

## 2025-03-30 VITALS
DIASTOLIC BLOOD PRESSURE: 65 MMHG | BODY MASS INDEX: 31 KG/M2 | OXYGEN SATURATION: 95 % | HEART RATE: 77 BPM | WEIGHT: 160 LBS | SYSTOLIC BLOOD PRESSURE: 134 MMHG | RESPIRATION RATE: 20 BRPM | TEMPERATURE: 98 F

## 2025-03-30 LAB
ANION GAP SERPL CALC-SCNC: 8 MMOL/L (ref 0–18)
BUN BLD-MCNC: 15 MG/DL (ref 9–23)
BUN/CREAT SERPL: 24.2 (ref 10–20)
CALCIUM BLD-MCNC: 9.2 MG/DL (ref 8.7–10.4)
CHLORIDE SERPL-SCNC: 98 MMOL/L (ref 98–112)
CO2 SERPL-SCNC: 29 MMOL/L (ref 21–32)
CREAT BLD-MCNC: 0.62 MG/DL
DEPRECATED RDW RBC AUTO: 42.4 FL (ref 35.1–46.3)
EGFRCR SERPLBLD CKD-EPI 2021: 95 ML/MIN/1.73M2 (ref 60–?)
ERYTHROCYTE [DISTWIDTH] IN BLOOD BY AUTOMATED COUNT: 13.3 % (ref 11–15)
GLUCOSE BLD-MCNC: 117 MG/DL (ref 70–99)
HCT VFR BLD AUTO: 31.2 %
HGB BLD-MCNC: 10.8 G/DL
MCH RBC QN AUTO: 30.3 PG (ref 26–34)
MCHC RBC AUTO-ENTMCNC: 34.6 G/DL (ref 31–37)
MCV RBC AUTO: 87.4 FL
OSMOLALITY SERPL CALC.SUM OF ELEC: 282 MOSM/KG (ref 275–295)
PLATELET # BLD AUTO: 328 10(3)UL (ref 150–450)
POTASSIUM SERPL-SCNC: 4.2 MMOL/L (ref 3.5–5.1)
RBC # BLD AUTO: 3.57 X10(6)UL
SODIUM SERPL-SCNC: 135 MMOL/L (ref 136–145)
WBC # BLD AUTO: 13.4 X10(3) UL (ref 4–11)

## 2025-03-30 PROCEDURE — 99239 HOSP IP/OBS DSCHRG MGMT >30: CPT | Performed by: HOSPITALIST

## 2025-03-30 PROCEDURE — 99232 SBSQ HOSP IP/OBS MODERATE 35: CPT | Performed by: INTERNAL MEDICINE

## 2025-03-30 RX ORDER — BUDESONIDE 0.5 MG/2ML
0.5 INHALANT ORAL 2 TIMES DAILY
Qty: 12 EACH | Refills: 1 | Status: SHIPPED | OUTPATIENT
Start: 2025-03-30

## 2025-03-30 RX ORDER — BUDESONIDE 0.5 MG/2ML
0.5 INHALANT ORAL 2 TIMES DAILY
Qty: 12 UNDEFINED | Refills: 1 | Status: SHIPPED | OUTPATIENT
Start: 2025-03-30 | End: 2025-03-30

## 2025-03-30 NOTE — CM/SW NOTE
SW received MDO for Home oxygen    Documentation for O2 sats: (RN to add a progress note with either o2 walk written out or flow sheet added to progress note )  SPO2% on Room Air at Rest:   SPO2% on Oxygen at Rest:   At rest oxygen flow (liters per minute):   SPO2% Ambulation on Oxygen:   Ambulation oxygen flow (liters per minute):     OR      (Found in the RN flowsheets called daily cares)   Mobility   O2 walk? Yes   SPO2% on Room Air at Rest    SPO2% on Oxygen at Rest    At rest oxygen flow (liters per minute)    SPO2% Ambulation on Oxygen    Ambulation oxygen flow (liters per minute)         (Reminder: The \"at rest\" and \"while ambulating\" are required statements. If patient is non ambulatory you can use \"with exertion\" such as during a transfer to assess their O2 level)        MD to document AFTER O2 sats  I had a face to face visit with this patient and I evaluated the patient's O2 saturations.  The patient is mobile in their home and is in need of a portable oxygen tank.  The home oxygen will improve the patient's condition.      Once O2 sats and MD verbiage are completed and IF home O2 is indicated, SW to place MDO in [facility] and request MD to cosign as appropriate. (Dx: CHF)      CECILIO uploaded walk test from 3/29/2025 into aidin referral for HME . SW still needs verbiage, order, and signed order.     1000am- SW received verbiage, SW uploaded verbiage via aidin. SW placed oxygen order. MD aware order is placed.     11am- CECILIO received signed order for ome oxygen. CECILIO uploaded order into aidin    1211pm- Cecilio followed up with the status of patient's oxygen. CECILIO called HME, spoke to Jazmin, and asked if everything is all set. Jazmin informed CECILIO that patient need require walk test, verbiage, and signed order. CECILIO informed that everything is uploaded into referral. CECILIO was informed that patient has everything into aidin referral. CECILIO asked to speak to supervisor, Jazmin hung up on SW. CECILIO reached out Mary Ann, who wanted to look  into it. SW received a call back from Mary Ann to dispense tank     1245pm- Tank dispense to RN to give to patient       03/30/25 1200   Discharge disposition   Expected discharge disposition Home or Self   Additional Home Care/Hospice Provider Residential   DME/Infusion Providers [facility]   Discharge transportation Private car       Plan: DC to Home with [facility] and [facility]ME for home oxygen,    SW/CM to remain available for support and/or discharge planning.     Marie Zhao, MSW, LSW   x [phone]

## 2025-03-30 NOTE — PLAN OF CARE
Problem: Patient Centered Care  Goal: Patient preferences are identified and integrated in the patient's plan of care  Description: Interventions:- What would you like us to know as we care for you? - Provide timely, complete, and accurate information to patient/family- Incorporate patient and family knowledge, values, beliefs, and cultural backgrounds into the planning and delivery of care- Encourage patient/family to participate in care and decision-making at the level they choose- Honor patient and family perspectives and choices  Outcome: Progressing     Problem: Patient/Family Goals  Goal: Patient/Family Long Term Goal  Description: Patient's Long Term Goal: Interventions:- - See additional Care Plan goals for specific interventions  Outcome: Progressing  Goal: Patient/Family Short Term Goal  Description: Patient's Short Term Goal: Interventions: - - See additional Care Plan goals for specific interventions  Outcome: Progressing     Problem: SKIN/TISSUE INTEGRITY - ADULT  Goal: Incision(s), wounds(s) or drain site(s) healing without S/S of infection  Description: INTERVENTIONS:- Assess and document risk factors for pressure ulcer development- Assess and document skin integrity- Assess and document dressing/incision, wound bed, drain sites and surrounding tissue- Implement wound care per orders- Initiate isolation precautions as appropriate- Initiate Pressure Ulcer prevention bundle as indicated  Outcome: Progressing     Problem: PAIN - ADULT  Goal: Verbalizes/displays adequate comfort level or patient's stated pain goal  Description: INTERVENTIONS:- Encourage pt to monitor pain and request assistance- Assess pain using appropriate pain scale- Administer analgesics based on type and severity of pain and evaluate response- Implement non-pharmacological measures as appropriate and evaluate response- Consider cultural and social influences on pain and pain management- Manage/alleviate anxiety- Utilize distraction  and/or relaxation techniques- Monitor for opioid side effects- Notify MD/LIP if interventions unsuccessful or patient reports new pain- Anticipate increased pain with activity and pre-medicate as appropriate  Outcome: Progressing     Problem: SAFETY ADULT - FALL  Goal: Free from fall injury  Description: INTERVENTIONS:- Assess pt frequently for physical needs- Identify cognitive and physical deficits and behaviors that affect risk of falls.- Elaine fall precautions as indicated by assessment.- Educate pt/family on patient safety including physical limitations- Instruct pt to call for assistance with activity based on assessment- Modify environment to reduce risk of injury- Provide assistive devices as appropriate- Consider OT/PT consult to assist with strengthening/mobility- Encourage toileting schedule  Outcome: Progressing     Problem: DISCHARGE PLANNING  Goal: Discharge to home or other facility with appropriate resources  Description: INTERVENTIONS:- Identify barriers to discharge w/pt and caregiver- Include patient/family/discharge partner in discharge planning- Arrange for needed discharge resources and transportation as appropriate- Identify discharge learning needs (meds, wound care, etc)- Arrange for interpreters to assist at discharge as needed- Consider post-discharge preferences of patient/family/discharge partner- Complete POLST form as appropriate- Assess patient's ability to be responsible for managing their own health- Refer to Case Management Department for coordinating discharge planning if the patient needs post-hospital services based on physician/LIP order or complex needs related to functional status, cognitive ability or social support system  Outcome: Progressing

## 2025-03-30 NOTE — TELEPHONE ENCOUNTER
Pharmacy called to clarify prescription of guaifenesin, advised to provide patient 5 days worth of medication

## 2025-03-30 NOTE — PROGRESS NOTES
[facility]  part of [facility]    Progress Note      Assessment and Plan:   1.  Acute exacerbation of COPD-much better clinically.  Quit smoking 2010.    Recommendations:  1.  Discharge planning  2.  Home with supplemental oxygen  3.  MDI  4.  Patient should see me in the office at the 2 to 3-week interval or sooner if needed.    2.  History of bipolar disease    3.  DVT prophylaxis-subcutaneous heparin    Subjective:   Eric Aviles is a(n) 69 year old female who is breathing comfortably    Objective:   Blood pressure 134/65, pulse 77, temperature 98 °F (36.7 °C), temperature source Oral, resp. rate 20, weight 160 lb (72.6 kg), SpO2 95%, not currently breastfeeding.    Physical Exam alert white female  HEENT examination is unremarkable with pupils equal round and reactive to light and accommodation.   Neck without adenopathy, thyromegaly, JVD nor bruit.   Lungs slightly diminished to auscultation and percussion.  Cardiac regular rate and rhythm no murmur.   Abdomen nontender, without hepatosplenomegaly and no mass appreciable.   Extremities without clubbing cyanosis nor edema.   Neurologic grossly intact with symmetric tone and strength and reflex.  Skin without gross abnormality     Results:     Lab Results   Component Value Date    WBC 13.4 03/30/2025    HGB 10.8 03/30/2025    HCT 31.2 03/30/2025    .0 03/30/2025    CREATSERUM 0.62 03/30/2025    BUN 15 03/30/2025     03/30/2025    K 4.2 03/30/2025    CL 98 03/30/2025    CO2 29.0 03/30/2025     03/30/2025    CA 9.2 03/30/2025       Santos Howe MD  Medical Director, Critical Care, [facility]  Medical Director, [facility]  Pager: [phone]

## 2025-03-30 NOTE — DISCHARGE SUMMARY
[facility]  part of [facility]    Discharge Summary    Eric Aviles Patient Status:  Inpatient    1955 MRN [id number]   Location [facility] 5SW/SE Attending Shaista Washburn MD   Hosp Day # 2 PCP Casa Van MD     Date of Admission: 3/28/2025   Date of Discharge: 3/30/3035    Hospital Discharge Diagnoses: Acute COPD exacerbation    Lace+ Score: 64  59-90 High Risk  29-58 Medium Risk  0-28   Low Risk.    TCM Follow-Up Recommendation:  LACE > 58: High Risk of readmission after discharge from the hospital.        Admitting Diagnosis: COPD exacerbation (HCC) [J44.1]    Disposition: Home    Discharge Diagnosis: .Principal Problem:    COPD exacerbation (HCC)  Active Problems:    COPD (chronic obstructive pulmonary disease) (HCC)      Hospital Course:   Reason for Admission:   Per Dr. Osorio  Patient is a 69-year-old  female who had left hip anterior approach arthroplasty on , discharged home same day. Today, she was seen by visiting home nurse and was noted to be dyspneic. Sent to the emergency department for evaluation. CBC and chemistry were unremarkable. ProBNP 200. Troponin was negative. Chest x-ray showed no acute findings. CT scan of the chest, rule out pulmonary embolism, was negative. She had small pleural effusion and associated basilar atelectasis, moderate centrilobular emphysema. Patient also noted to have wheezing and cough. She was started on Solu-Medrol and nebulizer treatments, and she will be admitted to the hospital for further management. Upon presentation to the emergency room, noted to have a pulse ox of 87% on room air, corrected to 95% on nasal cannula oxygen.     Discharge Physical Exam:   Physical Exam:    General: No acute distress.   Respiratory: Clear to auscultation bilaterally. No wheezes. No rhonchi.  Cardiovascular: S1, S2. Regular rate and rhythm. No murmurs, rubs or gallops.   Abdomen: Soft, nontender, nondistended.  Positive bowel  sounds. No rebound or guarding.  Neurologic: No focal neurological deficits.   Musculoskeletal: Moves all extremities.    Hospital Course:   1.       Acute  chronic obstructive pulmonary disease exacerbation.Steroids discontinued. Duonebs prn Monitor O2 sats. On advair. Will most likely need home O2.    2.       Acute hypoxia. Secondary to above. Monitor O2 sats.   3.       Recent left total hip arthroplasty, anterior approach. Pain control.  4.       Obesity. BMI 31  5         Dyslipidemia. Continue statin   5         Major depression. Anxiety. Continue Aripriprazole and clonazapam.     I had a face to face visit with this patient and I evaluated the patient's O2 saturations. The patient is mobile in their home and is in need of a portable oxygen tank. The home oxygen will improve the patient's condition.    Quality:  DVT Prophylaxis: add heparin  CODE status: Full      Complications: none    Consultants         Provider   Role Specialty     Raghu Cruz MD      Consulting Physician PULMONARY DISEASES                Discharge Plan:   Discharge Condition: Stable    Current Discharge Medication List        New Orders    Details   budesonide 0.5 MG/2ML Inhalation Suspension Take 2 mL (0.5 mg total) by nebulization 2 (two) times daily.      guaiFENesin 100 MG/5 ML Oral Take 10 mL (200 mg total) by mouth every 4 (four) hours as needed.           Home Meds - Unchanged    Details   clonazePAM 2 MG Oral Tab Take 1 tablet (2 mg total) by mouth in the morning and 1 tablet (2 mg total) before bedtime.      ketoconazole 2 % External Cream Apply 1 Application topically 2 (two) times daily.      QUEtiapine 50 MG Oral Tab Take 2.5 tablets (125 mg total) by mouth nightly.      Verapamil HCl  MG Oral Capsule SR 24 Hr Take 1 capsule (180 mg total) by mouth nightly.      albuterol 108 (90 Base) MCG/ACT Inhalation Aero Soln Inhale 2 puffs into the lungs every 6 (six) hours as needed for Wheezing or Shortness of Breath.       oxyCODONE 5 MG Oral Tab Take 1 tablet (5 mg total) by mouth every 6 (six) hours as needed for Pain.      celecoxib (CELEBREX) 200 MG Oral Cap Take 1 capsule (200 mg total) by mouth 2 (two) times daily.      Multiple Vitamins-Minerals (MULTIVITAMIN WOMENS 50+ ADV OR) Take 1 tablet by mouth daily.      cyanocobalamin 250 MCG Oral Tab Take 1 tablet (250 mcg total) by mouth daily.      Calcium Citrate 950 (200 Ca) MG Oral Tab Take 1 tablet (950 mg total) by mouth daily.      Ferrous Sulfate (FEROSUL) 325 (65 Fe) MG Oral Tab Take 1 tablet (325 mg total) by mouth daily with breakfast.      MELOXICAM 15 MG Oral Tab Take 1 tablet (15 mg total) by mouth daily.      nystatin 354907 UNIT/GM External Powder Apply 1 Application topically 3 (three) times daily.      donepezil 5 MG Oral Tab Take 1 tablet (5 mg total) by mouth nightly.      atorvastatin 20 MG Oral Tab Take 1 tablet (20 mg total) by mouth nightly.      ARIPiprazole 5 MG Oral Tab Take 1 tablet (5 mg total) by mouth nightly.      DULoxetine 60 MG Oral Cap DR Particles Take 1 capsule (60 mg total) by mouth nightly. Take 60mg daily.      Vitamin C 500 MG Oral Tab Take 1 tablet (500 mg total) by mouth daily.      Glucosamine-Chondroit-Vit C-Mn (GLUCOSAMINE CHONDR 500 COMPLEX) Oral Cap Take 1 capsule by mouth daily.                 Discharge Diet: As tolerated    Discharge Activity: As tolerated       Discharge Medications        START taking these medications        Instructions Prescription details   budesonide 0.5 MG/2ML Susp  Commonly known as: Pulmicort      Take 2 mL (0.5 mg total) by nebulization 2 (two) times daily.   Quantity: 12 Undefined  Refills: 1     guaiFENesin 100 MG/5 ML  Commonly known as: Robitussin      Take 10 mL (200 mg total) by mouth every 4 (four) hours as needed.   Quantity: 1 each  Refills: 0            CHANGE how you take these medications        Instructions Prescription details   nystatin 247120 UNIT/GM Powd  What changed:   when to take  this  reasons to take this      Apply 1 Application topically 3 (three) times daily.   Refills: 0            CONTINUE taking these medications        Instructions Prescription details   albuterol 108 (90 Base) MCG/ACT Aers  Commonly known as: Ventolin HFA      Inhale 2 puffs into the lungs every 6 (six) hours as needed for Wheezing or Shortness of Breath.   Quantity: 18 g  Refills: 0     ARIPiprazole 5 MG Tabs  Commonly known as: Abilify      Take 1 tablet (5 mg total) by mouth nightly.   Quantity: 90 tablet  Refills: 0     atorvastatin 20 MG Tabs  Commonly known as: Lipitor      Take 1 tablet (20 mg total) by mouth nightly.   Quantity: 90 tablet  Refills: 3     Calcium Citrate 950 (200 Ca) MG Tabs  Commonly known as: CITRACAL      Take 1 tablet (950 mg total) by mouth daily.   Refills: 0     celecoxib 200 MG Caps  Commonly known as: CeleBREX      Take 1 capsule (200 mg total) by mouth 2 (two) times daily.   Quantity: 60 capsule  Refills: 0     clonazePAM 2 MG Tabs  Commonly known as: KlonoPIN      Take 1 tablet (2 mg total) by mouth in the morning and 1 tablet (2 mg total) before bedtime.   Refills: 0     cyanocobalamin 250 MCG Tabs  Commonly known as: Vitamin B12      Take 1 tablet (250 mcg total) by mouth daily.   Refills: 0     donepezil 5 MG Tabs  Commonly known as: Aricept      Take 1 tablet (5 mg total) by mouth nightly.   Quantity: 90 tablet  Refills: 3     DULoxetine 60 MG Cpep  Commonly known as: Cymbalta      Take 1 capsule (60 mg total) by mouth nightly. Take 60mg daily.   Quantity: 90 capsule  Refills: 0     FeroSul 325 (65 Fe) MG Tabs  Generic drug: Ferrous Sulfate      Take 1 tablet (325 mg total) by mouth daily with breakfast.   Refills: 0     Glucosamine Chondr 500 Complex Caps      Take 1 capsule by mouth daily.   Refills: 0     ketoconazole 2 % Crea  Commonly known as: Nizoral      Apply 1 Application topically 2 (two) times daily.   Refills: 0     Meloxicam 15 MG Tabs      Take 1 tablet (15 mg  total) by mouth daily.   Quantity: 30 tablet  Refills: 0     MULTIVITAMIN WOMENS 50+ ADV OR      Take 1 tablet by mouth daily.   Refills: 0     oxyCODONE 5 MG Tabs      Take 1 tablet (5 mg total) by mouth every 6 (six) hours as needed for Pain.   Quantity: 28 tablet  Refills: 0     QUEtiapine 50 MG Tabs  Commonly known as: SEROquel      Take 2.5 tablets (125 mg total) by mouth nightly.   Refills: 0     Verapamil HCl  MG Cp24  Commonly known as: VERELAN      Take 1 capsule (180 mg total) by mouth nightly.   Refills: 0     Vitamin C 500 MG Tabs  Commonly known as: VITAMIN C      Take 1 tablet (500 mg total) by mouth daily.   Refills: 0            STOP taking these medications      cefadroxil 500 MG Caps  Commonly known as: DURICEF                  Where to Get Your Medications        These medications were sent to [facility] - [facility] [address] [facility] [phone], [phone] [address], [facility] [address]      Phone: [phone]   budesonide 0.5 MG/2ML Susp       These medications were sent to [id number] - [place], [place] - [address] [place] [address] [phone], [phone]  [address] [facility] [address]      Phone: [phone]   guaiFENesin 100 MG/5 ML         Follow up:      Follow-up Information       Casa Van MD Follow up in 1 week(s).    Specialty: Internal Medicine  Contact information:  [address] [place] [id number] [phone]                             Follow up Labs and imaging:         Other Discharge Instructions:         Resume services with [facility]  [phone]        Time spent:  > 30 minutes    RYDER CASIANO MD  3/30/2025

## 2025-03-30 NOTE — PLAN OF CARE
Problem: Patient Centered Care  Goal: Patient preferences are identified and integrated in the patient's plan of care  Description: Interventions:- What would you like us to know as we care for you? From home with spouse. - Provide timely, complete, and accurate information to patient/family- Incorporate patient and family knowledge, values, beliefs, and cultural backgrounds into the planning and delivery of care- Encourage patient/family to participate in care and decision-making at the level they choose- Honor patient and family perspectives and choices  Outcome: Progressing    Problem: SKIN/TISSUE INTEGRITY - ADULT  Goal: Incision(s), wounds(s) or drain site(s) healing without S/S of infection  Description: INTERVENTIONS:- Assess and document risk factors for pressure ulcer development- Assess and document skin integrity- Assess and document dressing/incision, wound bed, drain sites and surrounding tissue- Implement wound care per orders- Initiate isolation precautions as appropriate- Initiate Pressure Ulcer prevention bundle as indicated  Outcome: Progressing     Problem: PAIN - ADULT  Goal: Verbalizes/displays adequate comfort level or patient's stated pain goal  Description: INTERVENTIONS:- Encourage pt to monitor pain and request assistance- Assess pain using appropriate pain scale- Administer analgesics based on type and severity of pain and evaluate response- Implement non-pharmacological measures as appropriate and evaluate response- Consider cultural and social influences on pain and pain management- Manage/alleviate anxiety- Utilize distraction and/or relaxation techniques- Monitor for opioid side effects- Notify MD/LIP if interventions unsuccessful or patient reports new pain- Anticipate increased pain with activity and pre-medicate as appropriate  Outcome: Progressing     Problem: SAFETY ADULT - FALL  Goal: Free from fall injury  Description: INTERVENTIONS:- Assess pt frequently for physical needs-  Identify cognitive and physical deficits and behaviors that affect risk of falls.- [place] fall precautions as indicated by assessment.- Educate pt/family on patient safety including physical limitations- Instruct pt to call for assistance with activity based on assessment- Modify environment to reduce risk of injury- Provide assistive devices as appropriate- Consider OT/PT consult to assist with strengthening/mobility- Encourage toileting schedule  Outcome: Progressing     Problem: DISCHARGE PLANNING  Goal: Discharge to home or other facility with appropriate resources  Description: INTERVENTIONS:- Identify barriers to discharge w/pt and caregiver- Include patient/family/discharge partner in discharge planning- Arrange for needed discharge resources and transportation as appropriate- Identify discharge learning needs (meds, wound care, etc)- Arrange for interpreters to assist at discharge as needed- Consider post-discharge preferences of patient/family/discharge partner- Complete POLST form as appropriate- Assess patient's ability to be responsible for managing their own health- Refer to Case Management Department for coordinating discharge planning if the patient needs post-hospital services based on physician/LIP order or complex needs related to functional status, cognitive ability or social support system  Outcome: Progressing

## 2025-03-30 NOTE — PLAN OF CARE
D: Remains awake, alert, not in distress, breathing spontaneous and non-labored.  O2 maintained as ordered.Left hip with dressing intact, + CMS. No redness nor swelling noted. Denies pain.   A: Discharge instructions given to patient and daghter.   R: Verbalized understanding.       Problem: Patient Centered Care  Goal: Patient preferences are identified and integrated in the patient's plan of care  Description: Interventions:- What would you like us to know as we care for you? - Provide timely, complete, and accurate information to patient/family- Incorporate patient and family knowledge, values, beliefs, and cultural backgrounds into the planning and delivery of care- Encourage patient/family to participate in care and decision-making at the level they choose- Honor patient and family perspectives and choices  3/30/2025 1514 by Aydee Velasquez RN  Outcome: Adequate for Discharge  3/30/2025 1024 by Aydee Velasquez RN  Outcome: Progressing     Problem: Patient/Family Goals  Goal: Patient/Family Long Term Goal  Description: Patient's Long Term Goal: Interventions:- - See additional Care Plan goals for specific interventions  3/30/2025 1514 by Aydee Velasquez RN  Outcome: Adequate for Discharge  3/30/2025 1024 by Aydee Velasquez RN  Outcome: Progressing  Goal: Patient/Family Short Term Goal  Description: Patient's Short Term Goal: Interventions: - - See additional Care Plan goals for specific interventions  3/30/2025 1514 by Aydee Velasquez RN  Outcome: Adequate for Discharge  3/30/2025 1024 by Aydee Velasquez RN  Outcome: Progressing     Problem: SKIN/TISSUE INTEGRITY - ADULT  Goal: Incision(s), wounds(s) or drain site(s) healing without S/S of infection  Description: INTERVENTIONS:- Assess and document risk factors for pressure ulcer development- Assess and document skin integrity- Assess and document dressing/incision, wound bed, drain sites and surrounding tissue- Implement wound care per orders- Initiate  isolation precautions as appropriate- Initiate Pressure Ulcer prevention bundle as indicated  3/30/2025 1514 by Aydee Velasquez RN  Outcome: Adequate for Discharge  3/30/2025 1024 by Aydee Velasquez RN  Outcome: Progressing     Problem: PAIN - ADULT  Goal: Verbalizes/displays adequate comfort level or patient's stated pain goal  Description: INTERVENTIONS:- Encourage pt to monitor pain and request assistance- Assess pain using appropriate pain scale- Administer analgesics based on type and severity of pain and evaluate response- Implement non-pharmacological measures as appropriate and evaluate response- Consider cultural and social influences on pain and pain management- Manage/alleviate anxiety- Utilize distraction and/or relaxation techniques- Monitor for opioid side effects- Notify MD/LIP if interventions unsuccessful or patient reports new pain- Anticipate increased pain with activity and pre-medicate as appropriate  3/30/2025 1514 by Aydee Velasquez RN  Outcome: Adequate for Discharge  3/30/2025 1024 by Aydee Velasquez RN  Outcome: Progressing     Problem: SAFETY ADULT - FALL  Goal: Free from fall injury  Description: INTERVENTIONS:- Assess pt frequently for physical needs- Identify cognitive and physical deficits and behaviors that affect risk of falls.- [place] fall precautions as indicated by assessment.- Educate pt/family on patient safety including physical limitations- Instruct pt to call for assistance with activity based on assessment- Modify environment to reduce risk of injury- Provide assistive devices as appropriate- Consider OT/PT consult to assist with strengthening/mobility- Encourage toileting schedule  3/30/2025 1514 by Aydee Velasquez RN  Outcome: Adequate for Discharge  3/30/2025 1024 by Aydee Velasquez RN  Outcome: Progressing     Problem: DISCHARGE PLANNING  Goal: Discharge to home or other facility with appropriate resources  Description: INTERVENTIONS:- Identify barriers to discharge  w/pt and caregiver- Include patient/family/discharge partner in discharge planning- Arrange for needed discharge resources and transportation as appropriate- Identify discharge learning needs (meds, wound care, etc)- Arrange for interpreters to assist at discharge as needed- Consider post-discharge preferences of patient/family/discharge partner- Complete POLST form as appropriate- Assess patient's ability to be responsible for managing their own health- Refer to Case Management Department for coordinating discharge planning if the patient needs post-hospital services based on physician/LIP order or complex needs related to functional status, cognitive ability or social support system  3/30/2025 1514 by Aydee Velasquez, RN  Outcome: Adequate for Discharge  3/30/2025 1024 by Aydee Velasquez, RN  Outcome: Progressing

## 2025-03-31 ENCOUNTER — TELEPHONE (OUTPATIENT)
Dept: PULMONOLOGY | Facility: CLINIC | Age: 70
End: 2025-03-31

## 2025-03-31 ENCOUNTER — TELEPHONE (OUTPATIENT)
Facility: CLINIC | Age: 70
End: 2025-03-31

## 2025-03-31 NOTE — TELEPHONE ENCOUNTER
Patient spouse is call to scheduled 1 week hospital follow up.  He states that she will need a morning appointment.   Please call

## 2025-03-31 NOTE — TELEPHONE ENCOUNTER
Honey called from [facility] stating that pt was in the ER for a COPD exacerbation.   Honey stating she is going to resume the home health nursing and physical therapy.

## 2025-03-31 NOTE — TELEPHONE ENCOUNTER
Per Dr Rainey ok to resume HH .   Called Honey and left message on confidential voicemail per Dr Rainey orders. Call back if any concerns.

## 2025-04-01 ENCOUNTER — PATIENT OUTREACH (OUTPATIENT)
Dept: CASE MANAGEMENT | Age: 70
End: 2025-04-01

## 2025-04-01 NOTE — PROGRESS NOTES
NCM called and spoke with patient briefly. Patient states she is not able to talk at this time as the physical therapist just arrived. I offered to call back at another time and patient verbalized agreement with this. NCM to follow up at another time.

## 2025-04-01 NOTE — TELEPHONE ENCOUNTER
Left message to call back.    Dr. Howe- could we add on patient for a Thursday morning on 04/3 or 4/10?

## 2025-04-01 NOTE — PROGRESS NOTES
NCM attempted to reach the patient to complete a transitions of care call. Phone rings and then a busy tone. Unable to leave a message to call back. NCM to follow up at another time.

## 2025-04-04 NOTE — PAYOR COMM NOTE
--------------  DISCHARGE REVIEW    Payor: ROSA VALENTINO [facility]  Subscriber #:  [id number]  Authorization Number: [id number]    Admit date: 3/28/25  Admit time:   6:14 PM  Discharge Date: 3/30/2025  4:49 PM     Admitting Physician: Dang Osorio MD  Attending Physician:  No att. providers found  Primary Care Physician: Casa Van MD          Discharge Summary Notes        Discharge Summary signed by Shaista Washburn MD at 3/30/2025  9:33 AM       Author: Shaista Washburn MD Specialty: HOSPITALIST Author Type: Physician    Filed: 3/30/2025  9:33 AM Date of Service: 3/30/2025  9:29 AM Status: Signed    : Shaista Washburn MD (Physician)         [facility]  [facility] of [facility]    Discharge Summary    Eric Aviles Patient Status:  Inpatient    1955 MRN [id number]   Location Health system 5SW/SE Attending Shaista Washburn MD   Hosp Day # 2 PCP Casa Van MD     Date of Admission: 3/28/2025   Date of Discharge: 3/30/3035    Hospital Discharge Diagnoses: Acute COPD exacerbation    Lace+ Score: 64  59-90 High Risk  29-58 Medium Risk  0-28   Low Risk.    TCM Follow-Up Recommendation:  LACE > 58: High Risk of readmission after discharge from the hospital.        Admitting Diagnosis: COPD exacerbation (HCC) [J44.1]    Disposition: Home    Discharge Diagnosis: .Principal Problem:    COPD exacerbation (HCC)  Active Problems:    COPD (chronic obstructive pulmonary disease) (HCC)      Hospital Course:   Reason for Admission:   Per Dr. Osorio  Patient is a 69-year-old  female who had left hip anterior approach arthroplasty on , discharged home same day. Today, she was seen by visiting home nurse and was noted to be dyspneic. Sent to the emergency department for evaluation. CBC and chemistry were unremarkable. ProBNP 200. Troponin was negative. Chest x-ray showed no acute findings. CT scan of the chest, rule out pulmonary embolism, was negative. She had small pleural effusion and  associated basilar atelectasis, moderate centrilobular emphysema. Patient also noted to have wheezing and cough. She was started on Solu-Medrol and nebulizer treatments, and she will be admitted to the hospital for further management. Upon presentation to the emergency room, noted to have a pulse ox of 87% on room air, corrected to 95% on nasal cannula oxygen.     Discharge Physical Exam:   Physical Exam:    General: No acute distress.   Respiratory: Clear to auscultation bilaterally. No wheezes. No rhonchi.  Cardiovascular: S1, S2. Regular rate and rhythm. No murmurs, rubs or gallops.   Abdomen: Soft, nontender, nondistended.  Positive bowel sounds. No rebound or guarding.  Neurologic: No focal neurological deficits.   Musculoskeletal: Moves all extremities.    Hospital Course:   1.       Acute  chronic obstructive pulmonary disease exacerbation.Steroids discontinued. Duonebs prn Monitor O2 sats. On advair. Will most likely need home O2.    2.       Acute hypoxia. Secondary to above. Monitor O2 sats.   3.       Recent left total hip arthroplasty, anterior approach. Pain control.  4.       Obesity. BMI 31  5         Dyslipidemia. Continue statin   5         Major depression. Anxiety. Continue Aripriprazole and clonazapam.     I had a face to face visit with this patient and I evaluated the patient's O2 saturations. The patient is mobile in their home and is in need of a portable oxygen tank. The home oxygen will improve the patient's condition.    Quality:  DVT Prophylaxis: add heparin  CODE status: Full      Complications: none    Consultants         Provider   Role Specialty     Raghu Cruz MD      Consulting Physician PULMONARY DISEASES                Discharge Plan:   Discharge Condition: Stable    Current Discharge Medication List        New Orders    Details   budesonide 0.5 MG/2ML Inhalation Suspension Take 2 mL (0.5 mg total) by nebulization 2 (two) times daily.      guaiFENesin 100 MG/5 ML Oral Take 10  mL (200 mg total) by mouth every 4 (four) hours as needed.           Home Meds - Unchanged    Details   clonazePAM 2 MG Oral Tab Take 1 tablet (2 mg total) by mouth in the morning and 1 tablet (2 mg total) before bedtime.      ketoconazole 2 % External Cream Apply 1 Application topically 2 (two) times daily.      QUEtiapine 50 MG Oral Tab Take 2.5 tablets (125 mg total) by mouth nightly.      Verapamil HCl  MG Oral Capsule SR 24 Hr Take 1 capsule (180 mg total) by mouth nightly.      albuterol 108 (90 Base) MCG/ACT Inhalation Aero Soln Inhale 2 puffs into the lungs every 6 (six) hours as needed for Wheezing or Shortness of Breath.      oxyCODONE 5 MG Oral Tab Take 1 tablet (5 mg total) by mouth every 6 (six) hours as needed for Pain.      celecoxib (CELEBREX) 200 MG Oral Cap Take 1 capsule (200 mg total) by mouth 2 (two) times daily.      Multiple Vitamins-Minerals (MULTIVITAMIN WOMENS 50+ ADV OR) Take 1 tablet by mouth daily.      cyanocobalamin 250 MCG Oral Tab Take 1 tablet (250 mcg total) by mouth daily.      Calcium Citrate 950 (200 Ca) MG Oral Tab Take 1 tablet (950 mg total) by mouth daily.      Ferrous Sulfate (FEROSUL) 325 (65 Fe) MG Oral Tab Take 1 tablet (325 mg total) by mouth daily with breakfast.      MELOXICAM 15 MG Oral Tab Take 1 tablet (15 mg total) by mouth daily.      nystatin [id number] UNIT/GM External Powder Apply 1 Application topically 3 (three) times daily.      donepezil 5 MG Oral Tab Take 1 tablet (5 mg total) by mouth nightly.      atorvastatin 20 MG Oral Tab Take 1 tablet (20 mg total) by mouth nightly.      ARIPiprazole 5 MG Oral Tab Take 1 tablet (5 mg total) by mouth nightly.      DULoxetine 60 MG Oral Cap DR Particles Take 1 capsule (60 mg total) by mouth nightly. Take 60mg daily.      Vitamin C 500 MG Oral Tab Take 1 tablet (500 mg total) by mouth daily.      Glucosamine-Chondroit-Vit C-Mn (GLUCOSAMINE CHONDR 500 COMPLEX) Oral Cap Take 1 capsule by mouth daily.                  Discharge Diet: As tolerated    Discharge Activity: As tolerated       Discharge Medications        START taking these medications        Instructions Prescription details   budesonide 0.5 MG/2ML Susp  Commonly known as: Pulmicort      Take 2 mL (0.5 mg total) by nebulization 2 (two) times daily.   Quantity: 12 Undefined  Refills: 1     guaiFENesin 100 MG/5 ML  Commonly known as: Robitussin      Take 10 mL (200 mg total) by mouth every 4 (four) hours as needed.   Quantity: 1 each  Refills: 0            CHANGE how you take these medications        Instructions Prescription details   nystatin [id number] UNIT/GM Powd  What changed:   when to take this  reasons to take this      Apply 1 Application topically 3 (three) times daily.   Refills: 0            CONTINUE taking these medications        Instructions Prescription details   albuterol 108 (90 Base) MCG/ACT Aers  Commonly known as: Ventolin HFA      Inhale 2 puffs into the lungs every 6 (six) hours as needed for Wheezing or Shortness of Breath.   Quantity: 18 g  Refills: 0     ARIPiprazole 5 MG Tabs  Commonly known as: Abilify      Take 1 tablet (5 mg total) by mouth nightly.   Quantity: 90 tablet  Refills: 0     atorvastatin 20 MG Tabs  Commonly known as: Lipitor      Take 1 tablet (20 mg total) by mouth nightly.   Quantity: 90 tablet  Refills: 3     Calcium Citrate 950 (200 Ca) MG Tabs  Commonly known as: CITRACAL      Take 1 tablet (950 mg total) by mouth daily.   Refills: 0     celecoxib 200 MG Caps  Commonly known as: CeleBREX      Take 1 capsule (200 mg total) by mouth 2 (two) times daily.   Quantity: 60 capsule  Refills: 0     clonazePAM 2 MG Tabs  Commonly known as: KlonoPIN      Take 1 tablet (2 mg total) by mouth in the morning and 1 tablet (2 mg total) before bedtime.   Refills: 0     cyanocobalamin 250 MCG Tabs  Commonly known as: Vitamin B12      Take 1 tablet (250 mcg total) by mouth daily.   Refills: 0     donepezil 5 MG Tabs  Commonly known as:  Aricept      Take 1 tablet (5 mg total) by mouth nightly.   Quantity: 90 tablet  Refills: 3     DULoxetine 60 MG Cpep  Commonly known as: Cymbalta      Take 1 capsule (60 mg total) by mouth nightly. Take 60mg daily.   Quantity: 90 capsule  Refills: 0     FeroSul 325 (65 Fe) MG Tabs  Generic drug: Ferrous Sulfate      Take 1 tablet (325 mg total) by mouth daily with breakfast.   Refills: 0     Glucosamine Chondr 500 Complex Caps      Take 1 capsule by mouth daily.   Refills: 0     ketoconazole 2 % Crea  Commonly known as: Nizoral      Apply 1 Application topically 2 (two) times daily.   Refills: 0     Meloxicam 15 MG Tabs      Take 1 tablet (15 mg total) by mouth daily.   Quantity: 30 tablet  Refills: 0     MULTIVITAMIN WOMENS 50+ ADV OR      Take 1 tablet by mouth daily.   Refills: 0     oxyCODONE 5 MG Tabs      Take 1 tablet (5 mg total) by mouth every 6 (six) hours as needed for Pain.   Quantity: 28 tablet  Refills: 0     QUEtiapine 50 MG Tabs  Commonly known as: SEROquel      Take 2.5 tablets (125 mg total) by mouth nightly.   Refills: 0     Verapamil HCl  MG Cp24  Commonly known as: VERELAN      Take 1 capsule (180 mg total) by mouth nightly.   Refills: 0     Vitamin C 500 MG Tabs  Commonly known as: VITAMIN C      Take 1 tablet (500 mg total) by mouth daily.   Refills: 0            STOP taking these medications      cefadroxil 500 MG Caps  Commonly known as: DURICEF                  Where to Get Your Medications        These medications were sent to [facility] - [place], [place] - [address] [phone], [phone] [address] [address]      Phone: [phone]   budesonide 0.5 MG/2ML Susp       These medications were sent to [id number] - [place], [place] - [address] [phone], [phone]  [address] [address]      Phone: [phone]   guaiFENesin 100 MG/5 ML         Follow up:      Follow-up Information       Casa Van,  MD Follow up in 1 week(s).    Specialty: Internal Medicine  Contact information:  [address] [place] [address] [id number] [phone]                             Follow up Labs and imaging:         Other Discharge Instructions:         Resume services with [facility]  [phone]        Time spent:  > 30 minutes    SHAISTA CASIANO MD  3/30/2025      Electronically signed by Shaista Casiano MD on 3/30/2025  9:33 AM         REVIEWER COMMENTS

## 2025-04-04 NOTE — PROGRESS NOTES
NCM attempted to reach the patient to complete a transitions of care call. Left message to call back. [facility] provided direct contact info at [phone]. Unable to reach the patient after several attempts. [facility] closing encounter.

## 2025-04-04 NOTE — PAYOR COMM NOTE
--------------RECONSIDERATION REQUEST FOR INPATIENT SERVICES    ADMISSION REVIEW     Payor: [facility] [place]  Subscriber #:  [id number]  Authorization Number: [id number]    Admit date: 3/28/25  Admit time:  6:14 PM       REVIEW DOCUMENTATION:     ED Provider Notes        ED Provider Notes signed by Padmini Mcgrath MD at 3/28/2025  5:40 PM      Patient Seen in: [facility] Emergency Department      History     Chief Complaint   Patient presents with    Difficulty Breathing     Stated Complaint: SOB    Subjective:   HPI      69-year-old female presents for evaluation of hypoxemia.  Patient arrives via EMS, had hip replacement Monday, while doing PT physical therapist note room air oxygen slowly decreasing, today was in the mid 80s.  Patient has history of COPD but does not wear oxygen.  She does report feeling short of breath this morning, no chest pain.  She is not on anticoagulation.    Objective:     Past Medical History:    Arrhythmia    Bipolar disorder (HCC)    Cataract    Depression    Essential hypertension    High blood pressure    High cholesterol    Osteoarthritis    R knee surgery    Psychogenic polydipsia    Pulmonary emphysema (HCC)    Raynaud disease    Rib fracture    left side    Sciatica    Seborrheic keratoses    Sx.6/1/15, CPT.57651, L Total Knee, w/, Global Exp.8/30/15    Sx.6/8/16, CPT.15083, R Total Knee Replacement, w/, Global Exp.9/6/16    Visual impairment    readers         Physical Exam     ED Triage Vitals   BP 03/28/25 1429 125/57   Pulse 03/28/25 1426 73   Resp 03/28/25 1426 17   Temp 03/28/25 1426 97.5 °F (36.4 °C)   Temp src 03/28/25 1426 Temporal   SpO2 03/28/25 1426 (!) 87 %   O2 Device 03/28/25 1426 None (Room air)       Current Vitals:   Vital Signs  BP: 105/57  Pulse: 71  Resp: 15  Temp: 97.5 °F (36.4 °C)  Temp src: Temporal  MAP (mmHg): 72    Oxygen Therapy  SpO2: 92 %  O2 Device: Nasal cannula  O2 Flow Rate (L/min): 1 L/min        Physical Exam  Vitals  and nursing note reviewed.   Constitutional:       General: She is not in acute distress.     Appearance: She is well-developed.   HENT:      Head: Normocephalic and atraumatic.   Eyes:      Conjunctiva/sclera: Conjunctivae normal.   Cardiovascular:      Rate and Rhythm: Normal rate and regular rhythm.      Heart sounds: Normal heart sounds.   Pulmonary:      Effort: Pulmonary effort is normal. No respiratory distress.      Breath sounds: Normal breath sounds.      Comments: Occasional scattered wheeze to the right lung, good air movement bilaterally  Abdominal:      General: Bowel sounds are normal. There is no distension.      Palpations: Abdomen is soft.      Tenderness: There is no abdominal tenderness. There is no guarding or rebound.   Musculoskeletal:         General: Normal range of motion.      Cervical back: Normal range of motion and neck supple.   Skin:     General: Skin is warm and dry.      Findings: No rash.   Neurological:      General: No focal deficit present.      Mental Status: She is alert and oriented to person, place, and time.             ED Course     Labs Reviewed   CBC WITH DIFFERENTIAL WITH PLATELET - Abnormal; Notable for the following components:       Result Value    RBC 3.57 (*)     HGB 10.7 (*)     HCT 31.6 (*)     All other components within normal limits   BASIC METABOLIC PANEL (8) - Abnormal; Notable for the following components:    Glucose 133 (*)     Sodium 132 (*)     All other components within normal limits   PRO BETA NATRIURETIC PEPTIDE - Abnormal; Notable for the following components:    Pro-Beta Natriuretic Peptide 214 (*)     All other components within normal limits   TROPONIN I HIGH SENSITIVITY - Normal     EKG    Rate, intervals and axes as noted on EKG Report.  Rate: 71  Rhythm: Sinus Rhythm  Reading: Sinus rhythm, no STEMI                Imaging Results Available and Reviewed while in ED:   CT CHEST PE AORTA (IV ONLY) (CPT=71260)   Final Result   PROCEDURE: CT CHEST  PE AORTA (IV ONLY) (CPT=71260)       COMPARISON: [facility], CT CHEST PAIN PE W CONTRAST,    3/19/2016, 3:29 AM.  [facility], CT CHEST    W CONTRAST, 5/27/2016, 8:59 AM.  CT CHEST W CONTRAST, 10/22/2015, 12:47    PM.  [facility], CT    CHEST W CONTRAST, 5/03/2010, 3:23 PM.  [facility], CT    CHEST(CONTRAST ONLY) (CPT=71260), 1/21/2021, 9:26 AM.  [facility], CT CHEST(CONTRAST ONLY) (CPT=71260), 1/09/2020,    2:03 PM.  [facility], CT CHEST PAIN/PE (IV ONLY) (CPT=71260), 9/30/2019, 3:42 PM.     [facility], CT CHEST(CONTRAST ONLY)    (CPT=71260), 11/01/2016, 1:23 PM.       INDICATIONS: Shortness of breath.       TECHNIQUE: Multidetector CT images of the chest were obtained with    non-ionic intravenous contrast material. Automated exposure control for    dose reduction was used. Adjustment of the mA and/or kV was done based on    the patient's size. Iterative    reconstruction technique for dose reduction was employed. Dose information    was transmitted to the ACR (American College of Radiology) NRDR (National    Radiology Data Registry), which includes the Dose Index Registry.    Multiplanar reformats and maximum    intensity projection images were created.         FINDINGS:   VASCULATURE: There is adequate opacification of the pulmonary arterial    tree. No suspicious filling defects are identified in the main, lobar,    segmental, or proximal subsegmental pulmonary artery branches to suggest    acute pulmonary embolism. The distal    subsegmental branches are less well assessed. The main pulmonary artery    trunk is normal in caliber, measuring 2.8 cm.   CARDIAC: The heart is not enlarged. There is no bowing of the    interventricular septum to suggest right ventricular strain.   THORACIC AORTA: Unremarkable configuration without aneurysm or  dissection.        LUNGS/PLEURA: Background parenchymal findings of mild upper lobe    predominant centrilobular emphysema are noted. Small bilateral pleural    effusions are seen with associated compressive atelectasis, with or    without superimposed airspace consolidation.    Additional scattered ground-glass and reticular opacities are present and    may be atelectatic in origin. No pneumothorax is detected.     AIRWAYS: The tracheobronchial tree is without central mass or obstructing    lesion.   MEDIASTINUM/SAVANAH: No mass or lymphadenopathy. A borderline-sized right    perihilar lymph node has decreased in conspicuity from more remote    studies.     CHEST WALL: No axillary mass or lymphadenopathy.     LIMITED ABDOMEN: Within the parameters of the arterial phase of    contrast-enhancement, the included upper abdomen is unremarkable.     BONES: Mild left convex curvature of the midthoracic spine is present.    Multilevel degenerative changes are seen throughout the thoracic spine.    Bilateral reverse shoulder arthroplasties are demonstrated with resultant    artifactual degradation.   OTHER: A small hiatal hernia is evident.                       =====   CONCLUSION:    1. No evidence of acute pulmonary embolism to the level of the first order    subsegmental pulmonary artery branches.       2. Small pleural effusions and associated basilar atelectasis, with or    without superimposed pneumonia.       3. Moderate centrilobular emphysema.       4. Small hiatal hernia.         5. Lesser incidental findings as above.               Dictated by (CST): Fazal Hamilton MD on 3/28/2025 at 4:51 PM        Finalized by (CST): Fazal Hamilton MD on 3/28/2025 at 4:56 PM               XR CHEST AP PORTABLE  (CPT=71045)   Final Result   PROCEDURE: XR CHEST AP PORTABLE  (CPT=71045)   TIME: 3:03 p.m.         COMPARISON: [facility], XR CHEST AP PORTABLE (CPT=71045),    5/06/2024, 8:34 PM.        INDICATIONS: Shortness of breath today.       TECHNIQUE:   Single view.         FINDINGS:    CARDIAC/VASC: No cardiac silhouette abnormality or cardiomegaly.     Unremarkable pulmonary vasculature.    MEDIAST/SAVANAH:   Atherosclerotic aorta with no visible aneurysm.     LUNGS/PLEURA: Linear bibasilar pulmonary opacities.  Small left pleural    effusion.   BONES: Scattered mild degenerative endplate changes in the visualized    thoracolumbar spine.  Bilateral shoulder arthroplasties are present.   OTHER: Negative.                     =====   CONCLUSION: Linear bibasilar pulmonary opacities most compatible with    atelectasis/scar.           Dictated by (CST): Tommie Sam MD on 3/28/2025 at 3:18 PM        Finalized by (CST): Tommie Sam MD on 3/28/2025 at 3:18 PM                   ED Medications Administered:   Medications   methylPREDNISolone sodium succinate (Solu-MEDROL) injection 40 mg (has no administration in time range)   iopamidol 76% (ISOVUE-370) injection for power injector (80 mL Intravenous Given 3/28/25 1604)           Vitals:    03/28/25 1630 03/28/25 1645 03/28/25 1701 03/28/25 1715   BP: 120/56 116/59 105/55 105/57   Pulse: 68 71 70 71   Resp: 15 14 14 15   Temp:       TempSrc:       SpO2:    92%     *I personally reviewed and interpreted all ED vitals.        MDM          Admission disposition: 3/28/2025  5:21 PM           Medical Decision Making  Differential diagnosis includes but is not limited to COPD exacerbation, pneumonia, PE, ACS    Well-appearing patient,PE on CT, suspect COPD exacerbation.  Given 40 mg Solu-Medrol, discussed with and admitted to hospitalist.  Discussed with pulmonology, note no further recommendations for the emergency department.      Problems Addressed:  COPD exacerbation (HCC): acute illness or injury    Amount and/or Complexity of Data Reviewed  Independent Historian: EMS  External Data Reviewed: labs.     Details: Drop in hemoglobin compared to CBC from 1/15/2025, BMP  stable compared to 1/15/2025  Labs: ordered.  Radiology: ordered.  ECG/medicine tests: ordered and independent interpretation performed. Decision-making details documented in ED Course.  Discussion of management or test interpretation with external provider(s): Discussed with hospitalist and pulmonology          Disposition and Plan     Clinical Impression:  1. COPD exacerbation (HCC)         Disposition:  Admit  3/28/2025  5:21 pm    Supplementary Documentation:         Hospital Problems       Present on Admission  Date Reviewed: 3/21/2025            ICD-10-CM Noted POA    * (Principal) COPD exacerbation (HCC) J44.1 3/28/2025 Unknown                Signed by Padmini Mcgrath MD on 3/28/2025  5:40 PM             CONSULT  Result Date: 3/28/2025  CONCLUSION:        Linear bibasilar pulmonary opacities most compatible with atelectasis/scar.   Dictated by (CST): Tommie Sam MD on 3/28/2025 at 3:18 PM     Finalized by (CST): Tommie Sam MD on 3/28/2025 at 3:18 PM         EKG 12 Lead     Result Date: 3/28/2025  Normal sinus rhythm Minimal voltage criteria for LVH, may be normal variant ( R in aVL ) Nonspecific T wave abnormality Abnormal ECG When compared with ECG of 15-EMILE-2025 10:32, Nonspecific T wave abnormality has replaced inverted T waves in Anterior leads Confirmed by JAYNE RAMIREZ, LEDY ([id number]) on 3/28/2025 3:33:08 PM      Impression:      1-COPD  Quit smoking 2010  Possible mild exacerbation  No leukocytosis with no large infiltrate or effusion  Chest CT no PE with mild basilar atelectasis     Plan ;  Will discontinue steroid  Advair and nebulizers  Home O2 eval  Incentive spirometry     2-recent left hip arthroplasty on 3/24/2025  Pt/ot     3-history of depression, bipolar, HTN, HL     4-DVT prophylaxis  Heparin subcu      Raghu Cruz MD  3/29/2025         3/29       Date of Service: 3/29/2025 10:13 AM     Signed         [facility]  [facility]     Progress Note            Eric Aviles Patient Status:  Inpatient    1955 MRN [id number]   Location [facility] 5SW/SE Attending Shaista Washburn MD   Hosp Day # 1 PCP Casa Van MD         Subjective:   Eric Aviles is on 2 liters of oxygen. Afebrile. Moderate sob.      Objective:   Blood pressure 133/68, pulse 96, temperature 98.1 °F (36.7 °C), temperature source Oral, resp. rate 16, weight 160 lb (72.6 kg), SpO2 91%, not currently breastfeeding.     Physical Exam:    General: No acute distress.   Respiratory: Clear to auscultation bilaterally. No wheezes. No rhonchi.  Cardiovascular: S1, S2. Regular rate and rhythm. No murmurs, rubs or gallops.   Abdomen: Soft, nontender, nondistended.  Positive bowel sounds. No rebound or guarding.  Neurologic: No focal neurological deficits.   Musculoskeletal: Moves all extremities.  Extremities: No edema.     Results:            Lab Results   Component Value Date     WBC 10.6 2025     HGB 11.7 (L) 2025     HCT 33.6 (L) 2025     .0 2025     CREATSERUM 0.56 2025     BUN 10 2025      (L) 2025     K 4.5 2025     CL 98 2025     CO2 28.0 2025      (H) 2025     CA 9.4 2025     ALB 5.0 (H) 01/15/2025     ALKPHO 94 01/15/2025     BILT 0.4 01/15/2025     TP 7.5 01/15/2025     AST 19 01/15/2025     ALT 11 01/15/2025     PTT 30.0 2023     INR 0.97 2023     TSH 0.953 2023     LIP 32 2024     DDIMER 1.02 (H) 2019     ESRML 11 2021     CRP <0.29 2020     BNP 31 2012     MG 2.0 2019     PHOS 2.5 2019     TROP <0.045 2020     B12 896 2023         CT CHEST PE AORTA (IV ONLY) (CPT=71260)     Result Date: 3/28/2025  CONCLUSION:         1. No evidence of acute pulmonary embolism to the level of the first order subsegmental pulmonary artery branches.  2. Small pleural effusions and associated basilar atelectasis, with or without superimposed  pneumonia.  3. Moderate centrilobular emphysema.  4. Small hiatal hernia.   5. Lesser incidental findings as above.    Dictated by (CST): Fazal Hamilton MD on 3/28/2025 at 4:51 PM     Finalized by (CST): Fazal Hamilton MD on 3/28/2025 at 4:56 PM           XR CHEST AP PORTABLE  (CPT=71045)     Result Date: 3/28/2025  CONCLUSION:        Linear bibasilar pulmonary opacities most compatible with atelectasis/scar.   Dictated by (CST): Tommie Sam MD on 3/28/2025 at 3:18 PM     Finalized by (CST): Tommie Sam MD on 3/28/2025 at 3:18 PM         EKG 12 Lead     Result Date: 3/28/2025  Normal sinus rhythm Minimal voltage criteria for LVH, may be normal variant ( R in aVL ) Nonspecific T wave abnormality Abnormal ECG When compared with ECG of 15-EMILE-2025 10:32, Nonspecific T wave abnormality has replaced inverted T waves in Anterior leads Confirmed by JAYNE RAMIREZ, LEDY ([id number]) on 3/28/2025 3:33:08 PM       ARIPiprazole  5 mg Oral Nightly    clonazePAM  2 mg Oral BID    DULoxetine  60 mg Oral Nightly    donepezil  5 mg Oral Nightly    atorvastatin  20 mg Oral Nightly    calcium carbonate  500 mg Oral Daily    celecoxib  200 mg Oral BID    ferrous sulfate  325 mg Oral Daily with breakfast    methylPREDNISolone  40 mg Intravenous Q12H       Assessment and Plan:      1.       Acute  chronic obstructive pulmonary disease exacerbation.Continue IV Solumedrol 40 mg BID. Duonebs prn Monitor O2 sats.   2.       Acute hypoxia. Secondary to above. Monitor O2 sats.   3.       Recent left total hip arthroplasty, anterior approach. Pain control.  4.       Obesity. BMI 31  5         Dyslipidemia. Continue statin   5         Major depression. Anxiety. Continue Aripriprazole and clonazapam.         Quality:  DVT Prophylaxis: add heparin  CODE status: Full     MDM. High RYDER CASIANO MD  03/29/25                           Vitals (last day) before discharge       Date/Time Temp Pulse Resp BP SpO2 Weight O2 Device O2 Flow Rate  (L/min) Who    03/30/25 0854 98 °F (36.7 °C) 77 20 134/65 95 % -- Nasal cannula 1 L/min GA    03/30/25 0607 98.5 °F (36.9 °C) 79 20 149/72 91 % -- Nasal cannula 1 L/min IB    03/29/25 2123 98.7 °F (37.1 °C) 92 20 131/65 91 % -- Nasal cannula 1 L/min IB    03/29/25 1942 -- -- -- -- 91 % -- Nasal cannula 1 L/min JOSEY    03/29/25 1921 -- -- -- -- 86 %  -- None (Room air) -- JOSEY    03/29/25 1500 98.2 °F (36.8 °C) 105 20 150/83 91 % -- None (Room air) --     03/29/25 1400 -- 114 -- -- -- -- -- -- SM    03/29/25 0916 98.1 °F (36.7 °C) 96 16 133/68 91 % -- Nasal cannula 0.5 L/min                 Medication Administration Report  for Eric Aviles as of 03/21/25 through 03/30/25  Legend:       Medications 03/21 03/22 03/23 03/24 03/25 03/26 03/27 03/28 03/29 03/30   Completed Medications   iopamidol 76% (ISOVUE-370) injection for power injector  Dose: 80 mL  Freq: IMG once as needed Route: IV  PRN Reason: contrast  Start: 03/28/25 1604 End: 03/28/25 1604           [id number]          methylPREDNISolone sodium succinate (Solu-MEDROL) injection 40 mg  Dose: 40 mg  Freq: Once Route: IV  Start: 03/28/25 1721 End: 03/28/25 1751   Admin Instructions:   Reconstitute with 1ml sterile water or saline           [id number]          Discontinued Medications   Medications 03/21 03/22 03/23 03/24 03/25 03/26 03/27 03/28 03/29 03/30                                                          arformoterol (Brovana) 15 MCG/2ML nebulizer solution 15 mcg  Dose: 15 mcg  Freq: 2 times daily (RT) Route: Nebulization  Start: 03/29/25 1115 End: 03/30/25 1849   Order specific questions:               [id number]     [id number]      [id number]        ARIPiprazole (Abilify) tab 5 mg  Dose: 5 mg  Freq: Nightly Route: OR  Start: 03/29/25 0215 End: 03/30/25 1849            [id number]     91037         atorvastatin (Lipitor) tab 20 mg  Dose: 20 mg  Freq: Nightly Route: OR  Start: 03/29/25 0215 End: 03/30/25 1849            (9181)[id number]                      budesonide (Pulmicort)  0.5 MG/2ML nebulizer suspension 0.5 mg  Dose: 0.5 mg  Freq: 2 times daily Route: Nebulization  Start: 03/29/25 1115 End: 03/30/25 1849   Order specific questions:               [id number]     [id number]      [id number]        calcium carbonate (Tums) chewable tab 500 mg  Dose: 500 mg  Freq: Daily Route: OR  Start: 03/29/25 0930 End: 03/30/25 1849            [id number]      [id number]        celecoxib (CeleBREX) cap 200 mg  Dose: 200 mg  Freq: 2 times daily Route: OR  Start: 03/29/25 0215 End: 03/30/25 1849            [id number]     [id number]     [id number]      [id number]        clonazePAM (KlonoPIN) tab 2 mg  Dose: 2 mg  Freq: 2 times daily Route: OR  Start: 03/29/25 0215 End: 03/30/25 1849   Admin Instructions:   CAUTION: REPRODUCTIVE RISK            [id number]     [id number]     218439      [id number]        donepezil (Aricept) tab 5 mg  Dose: 5 mg  Freq: Nightly Route: OR  Start: 03/29/25 0215 End: 03/30/25 1849            [id number]     [id number]         DULoxetine (Cymbalta) DR cap 60 mg  Dose: 60 mg  Freq: Nightly Route: OR  Start: 03/29/25 0215 End: 03/30/25 1849   Admin Instructions:   Do not chew, break or crush.            [id number]     [id number]         ferrous sulfate DR tab 325 mg  Dose: 325 mg  Freq: Daily with breakfast Route: OR  Start: 03/29/25 0800 End: 03/30/25 1849   Admin Instructions:   Do not crush            [id number]      [id number]                    guaiFENesin (Robitussin) 100 MG/5 ML oral liquid 200 mg  Dose: 200 mg  Freq: Every 4 hours PRN Route: OR  PRN Reason: cough  Start: 03/28/25 1815 End: 03/30/25 1849           [id number]                       heparin (Porcine) 5000 UNIT/ML injection 5,000 Units  Dose: 5,000 Units  Freq: Every 8 hours scheduled Route: SC  Start: 03/29/25 1400 End: 03/30/25 1849            281452     [id number]      044229     (1400)34        ipratropium-albuterol (Duoneb) 0.5-2.5 (3) MG/3ML inhalation solution 3 mL  Dose: 3 mL  Freq: 2 times daily Route: Nebulization  Start: 03/29/25 1030 End: 03/30/25 3628   Order specific questions:                [id number]     [id number]      [id number]                     methylPREDNISolone sodium succinate (Solu-MEDROL) injection 40 mg  Dose: 40 mg  Freq: Every 12 hours Route: IV  Start: 03/28/25 1830 End: 03/29/25 1020   Admin Instructions:   Reconstitute with 1ml sterile water or saline           [id number]      [id number]                                                             temazepam (Restoril) cap 15 mg  Dose: 15 mg  Freq: Nightly PRN Route: OR  PRN Reason: Sleep  Start: 03/28/25 1815 End: 03/30/25 1849   Admin Instructions:   CAUTION: REPRODUCTIVE RISK            [id number]         Medications 03/21 03/22 03/23 03/24 03/25 03/26 03/27 03/28 03/29 03/30

## 2025-04-08 ENCOUNTER — OFFICE VISIT (OUTPATIENT)
Dept: INTERNAL MEDICINE CLINIC | Facility: CLINIC | Age: 70
End: 2025-04-08

## 2025-04-08 VITALS
TEMPERATURE: 98 F | RESPIRATION RATE: 18 BRPM | WEIGHT: 167 LBS | DIASTOLIC BLOOD PRESSURE: 70 MMHG | BODY MASS INDEX: 32.79 KG/M2 | HEIGHT: 60 IN | SYSTOLIC BLOOD PRESSURE: 130 MMHG | HEART RATE: 66 BPM | OXYGEN SATURATION: 95 %

## 2025-04-08 DIAGNOSIS — M16.12 PRIMARY OSTEOARTHRITIS OF LEFT HIP: ICD-10-CM

## 2025-04-08 DIAGNOSIS — J43.9 PULMONARY EMPHYSEMA, UNSPECIFIED EMPHYSEMA TYPE (HCC): Primary | ICD-10-CM

## 2025-04-08 PROCEDURE — 3078F DIAST BP <80 MM HG: CPT | Performed by: INTERNAL MEDICINE

## 2025-04-08 PROCEDURE — 1159F MED LIST DOCD IN RCRD: CPT | Performed by: INTERNAL MEDICINE

## 2025-04-08 PROCEDURE — 1111F DSCHRG MED/CURRENT MED MERGE: CPT | Performed by: INTERNAL MEDICINE

## 2025-04-08 PROCEDURE — 1160F RVW MEDS BY RX/DR IN RCRD: CPT | Performed by: INTERNAL MEDICINE

## 2025-04-08 PROCEDURE — 3008F BODY MASS INDEX DOCD: CPT | Performed by: INTERNAL MEDICINE

## 2025-04-08 PROCEDURE — 3075F SYST BP GE 130 - 139MM HG: CPT | Performed by: INTERNAL MEDICINE

## 2025-04-08 PROCEDURE — 99495 TRANSJ CARE MGMT MOD F2F 14D: CPT | Performed by: INTERNAL MEDICINE

## 2025-04-08 PROCEDURE — 1125F AMNT PAIN NOTED PAIN PRSNT: CPT | Performed by: INTERNAL MEDICINE

## 2025-04-08 RX ORDER — MELOXICAM 15 MG/1
15 TABLET ORAL DAILY
Qty: 30 TABLET | Refills: 0 | OUTPATIENT
Start: 2025-04-08

## 2025-04-08 NOTE — PROGRESS NOTES
Subjective:   Eric Aviles is a 70 year old female who presents for hospital follow up.   She was discharged from [facility] to Home or Self Care  Admission Date: 3/28/25   Discharge Date: 3/30/25  Hospital Discharge Diagnosis: COPD exacerbation    Interactive contact within 2 business days post discharge first initiated on Date: 4/1/2025    During the visit, the following was completed:  Obtained and reviewed discharge summary, continuity of care documents, Hospitalist notes, and all records  Reviewed Labs (CBC, CMP) and all records    HPI:   Patient presents for above.  Here for follow-up from hospitalization.  In late March 2025 underwent left hip arthroplasty.  Has been recovering at home when home health nurse noted that she had hypoxemia.  She was dyspneic as well.  She was sent to emergency room for an evaluation.  Her BNP was slightly elevated at 200 but troponin was negative.  Chest x-ray and CT scan did not show any new findings or pulmonary embolism.  She does have known emphysema and was wheezing with a cough when she presented to the emergency room.  She was given Solu-Medrol along with nebulizers.  Her pulse oxygenation was 87% on room air on admission.  Symptoms ultimately improved.  She was discharged home with home oxygenation.  She is not aware of how much oxygen she has been using.  She did not bring her machine to the office visit today.  It is in her car.  She has an appointment with her pulmonologist tomorrow.    History/Other:   Current Medications:  Medication Reconciliation:  I am aware of an inpatient discharge within the last 30 days.  The discharge medication list has been reconciled with the patient's current medication list and reviewed by me. See medication list for additions of new medication, and changes to current doses of medications and discontinued medications.  Outpatient Medications Marked as Taking for the 4/8/25 encounter (Office Visit) with Casa Van MD   Medication Sig     budesonide 0.5 MG/2ML Inhalation Suspension Take 2 mL (0.5 mg total) by nebulization 2 (two) times daily.    clonazePAM 2 MG Oral Tab Take 1 tablet (2 mg total) by mouth in the morning and 1 tablet (2 mg total) before bedtime.    QUEtiapine 50 MG Oral Tab Take 2.5 tablets (125 mg total) by mouth nightly. (Patient taking differently: Take 2 tablets (100 mg total) by mouth nightly.)    Verapamil HCl  MG Oral Capsule SR 24 Hr Take 1 capsule (180 mg total) by mouth nightly.    albuterol 108 (90 Base) MCG/ACT Inhalation Aero Soln Inhale 2 puffs into the lungs every 6 (six) hours as needed for Wheezing or Shortness of Breath.    oxyCODONE 5 MG Oral Tab Take 1 tablet (5 mg total) by mouth every 6 (six) hours as needed for Pain.    celecoxib (CELEBREX) 200 MG Oral Cap Take 1 capsule (200 mg total) by mouth 2 (two) times daily.    Multiple Vitamins-Minerals (MULTIVITAMIN WOMENS 50+ ADV OR) Take 1 tablet by mouth daily.    Vitamin C 500 MG Oral Tab Take 1 tablet (500 mg total) by mouth daily.    Glucosamine-Chondroit-Vit C-Mn (GLUCOSAMINE CHONDR 500 COMPLEX) Oral Cap Take 1 capsule by mouth daily.    cyanocobalamin 250 MCG Oral Tab Take 1 tablet (250 mcg total) by mouth daily.    Calcium Citrate 950 (200 Ca) MG Oral Tab Take 1 tablet (950 mg total) by mouth daily.    MELOXICAM 15 MG Oral Tab Take 1 tablet (15 mg total) by mouth daily.    donepezil 5 MG Oral Tab Take 1 tablet (5 mg total) by mouth nightly.    atorvastatin 20 MG Oral Tab Take 1 tablet (20 mg total) by mouth nightly.    ARIPiprazole 5 MG Oral Tab Take 1 tablet (5 mg total) by mouth nightly.    DULoxetine 60 MG Oral Cap DR Particles Take 1 capsule (60 mg total) by mouth nightly. Take 60mg daily.       Review of Systems   Constitutional:  Positive for fatigue.   HENT: Negative.     Eyes: Negative.    Respiratory: Negative.     Cardiovascular: Negative.    Endocrine: Negative.    Genitourinary: Negative.    Musculoskeletal: Negative.     Allergic/Immunologic: Negative.    Neurological:  Positive for weakness.   Hematological: Negative.      Objective:   Physical Exam  Eyes:      General: No scleral icterus.  Cardiovascular:      Rate and Rhythm: Normal rate and regular rhythm.      Pulses: Normal pulses.      Heart sounds: Normal heart sounds. No murmur heard.  Pulmonary:      Breath sounds: Examination of the right-lower field reveals decreased breath sounds. Examination of the left-lower field reveals decreased breath sounds. Decreased breath sounds present. No wheezing, rhonchi or rales.   Neurological:      Mental Status: She is alert.   Psychiatric:         Mood and Affect: Mood normal.         Behavior: Behavior normal.       /70 (BP Location: Right arm, Patient Position: Sitting, Cuff Size: large)   Pulse 66   Temp 97.5 °F (36.4 °C) (Temporal)   Resp 18   Ht 5' (1.524 m)   Wt 167 lb (75.8 kg)   SpO2 95%   BMI 32.61 kg/m²  Estimated body mass index is 32.61 kg/m² as calculated from the following:    Height as of this encounter: 5' (1.524 m).    Weight as of this encounter: 167 lb (75.8 kg).    Assessment & Plan:   1. Pulmonary emphysema, unspecified emphysema type (HCC) (Primary)  Currently taking DuoNebs twice a day.  Does not appear to be on any long-term medication such as Advair.  She will discuss this with her pulmonologist tomorrow.  Continue oxygen to keep SPO2 greater than 90%.    2. Primary osteoarthritis of left hip  Continue home physical therapy.  Follow-up with orthopedic surgery per their request.      Return in about 6 months (around 10/8/2025) for Complete physical.

## 2025-04-08 NOTE — PROGRESS NOTES
The following individual(s), Eric Aviles, verbally consents to be recorded using ambient AI listening technology and understand that they can each withdraw their consent to this listening technology at any point by asking the clinician to turn off or pause the recording

## 2025-04-08 NOTE — TELEPHONE ENCOUNTER
Meloxicam 15mg    DOS: 3/24/25  Left anterior total hip arthroplasty   Last OV: 3/21/25  Last refill date: 3/12/25 #/refills: 30/0  Upcoming appt:   Future Appointments   Date Time Provider Department Center   4/8/2025 11:15 AM Casa Van MD [facility]   4/14/2025 12:00 PM Santos Howe MD [facility]   4/18/2025 10:00 AM Chase Duke MD [facility]     Refill not appropriate.  Started on celebrex 200mg bid 3/21/25  Quantity 60/0

## 2025-04-14 ENCOUNTER — OFFICE VISIT (OUTPATIENT)
Dept: PULMONOLOGY | Facility: CLINIC | Age: 70
End: 2025-04-14

## 2025-04-14 ENCOUNTER — TELEPHONE (OUTPATIENT)
Dept: PULMONOLOGY | Facility: CLINIC | Age: 70
End: 2025-04-14

## 2025-04-14 VITALS
BODY MASS INDEX: 32.79 KG/M2 | DIASTOLIC BLOOD PRESSURE: 71 MMHG | WEIGHT: 167 LBS | HEIGHT: 60 IN | SYSTOLIC BLOOD PRESSURE: 120 MMHG | OXYGEN SATURATION: 96 % | RESPIRATION RATE: 14 BRPM | HEART RATE: 81 BPM

## 2025-04-14 DIAGNOSIS — J44.1 COPD EXACERBATION (HCC): Primary | ICD-10-CM

## 2025-04-14 DIAGNOSIS — Z87.891 PERSONAL HISTORY OF TOBACCO USE, PRESENTING HAZARDS TO HEALTH: ICD-10-CM

## 2025-04-14 DIAGNOSIS — J43.9 PULMONARY EMPHYSEMA, UNSPECIFIED EMPHYSEMA TYPE (HCC): ICD-10-CM

## 2025-04-14 PROCEDURE — 3078F DIAST BP <80 MM HG: CPT | Performed by: INTERNAL MEDICINE

## 2025-04-14 PROCEDURE — 3074F SYST BP LT 130 MM HG: CPT | Performed by: INTERNAL MEDICINE

## 2025-04-14 PROCEDURE — 1126F AMNT PAIN NOTED NONE PRSNT: CPT | Performed by: INTERNAL MEDICINE

## 2025-04-14 PROCEDURE — 1111F DSCHRG MED/CURRENT MED MERGE: CPT | Performed by: INTERNAL MEDICINE

## 2025-04-14 PROCEDURE — 99213 OFFICE O/P EST LOW 20 MIN: CPT | Performed by: INTERNAL MEDICINE

## 2025-04-14 PROCEDURE — 3008F BODY MASS INDEX DOCD: CPT | Performed by: INTERNAL MEDICINE

## 2025-04-14 RX ORDER — FLUTICASONE FUROATE AND VILANTEROL 100; 25 UG/1; UG/1
1 POWDER RESPIRATORY (INHALATION) DAILY
Qty: 1 EACH | Refills: 5 | Status: SHIPPED | OUTPATIENT
Start: 2025-04-14

## 2025-04-14 NOTE — PROGRESS NOTES
The patient is a 70-year-old female who I know well from prior evaluation who comes in now for follow-up after she was hospitalized with COPD exacerbation.  She was sent home with oxygen.  Remains on albuterol nebulizer and albuterol MDI.  Her breathing is good and she does not like having to use the oxygen.  Will reassess today.  She had a CT scan of the chest demonstrating moderate emphysematous changes and small effusions.  She had this in March when she was in the hospital.  She has never done pulmonary rehab.    Review of Systems:  Vision normal. Ear nose and throat normal. Bowel normal. Bladder function normal. No depression. No thyroid disease. No lymphatic system concerns.  No rash. Muscles and joints unremarkable. No weight loss no weight gain.    Physical Examination:  Vital signs normal. HEENT examination is unremarkable with pupils equal round and reactive to light and accommodation. Neck without adenopathy, thyromegaly, JVD nor bruit. Lungs slightly diminished to auscultation and percussion. Cardiac regular rate and rhythm no murmur. Abdomen nontender, without hepatosplenomegaly and no mass appreciable. Extremities and Musculoskeletal without clubbing cyanosis nor edema, and mobility acceptable. Neurologic grossly intact with symmetric tone and strength and reflex.    Assessment and plan:  1.  Dyspnea-COPD exacerbation-doing better clinically.    Recommendations: Will recheck oxygen saturations without oxygen to see if we can discontinue the supplemental oxygen.  PFTs, pulmonary rehab, add Breo 1 puff daily to follow with gargle rinse and spit, see me again in 4 to 6 months, weight loss, annual flu shot, COVID boosters, RSV vaccine if she has not gotten a baseline.

## 2025-04-15 ENCOUNTER — TELEPHONE (OUTPATIENT)
Age: 70
End: 2025-04-15

## 2025-04-15 NOTE — TELEPHONE ENCOUNTER
Socorro from [facility] called on the patients behalf.  She recommends that patient starts physical therapy out of the home next week.  She would like an order added to the system so patient can make her PT appts.  Any questions she can be reached at [phone].  Prescription for PT can be faxed to [phone].  Please advise.

## 2025-04-16 ENCOUNTER — TELEPHONE (OUTPATIENT)
Dept: ORTHOPEDICS CLINIC | Facility: CLINIC | Age: 70
End: 2025-04-16

## 2025-04-16 DIAGNOSIS — Z47.89 ORTHOPEDIC AFTERCARE: Primary | ICD-10-CM

## 2025-04-17 RX ORDER — MELOXICAM 15 MG/1
15 TABLET ORAL DAILY
Qty: 30 TABLET | Refills: 0 | Status: ON HOLD | OUTPATIENT
Start: 2025-04-17

## 2025-04-18 ENCOUNTER — TELEPHONE (OUTPATIENT)
Age: 70
End: 2025-04-18

## 2025-04-18 ENCOUNTER — OFFICE VISIT (OUTPATIENT)
Dept: ORTHOPEDICS CLINIC | Facility: CLINIC | Age: 70
End: 2025-04-18
Payer: MEDICARE

## 2025-04-18 ENCOUNTER — HOSPITAL ENCOUNTER (OUTPATIENT)
Dept: GENERAL RADIOLOGY | Age: 70
Discharge: HOME | End: 2025-04-18
Attending: ORTHOPAEDIC SURGERY
Payer: MEDICARE

## 2025-04-18 DIAGNOSIS — T81.31XA WOUND DEHISCENCE, SURGICAL, INITIAL ENCOUNTER: Primary | ICD-10-CM

## 2025-04-18 DIAGNOSIS — Z47.89 ORTHOPEDIC AFTERCARE: ICD-10-CM

## 2025-04-18 DIAGNOSIS — Z96.642 S/P TOTAL LEFT HIP ARTHROPLASTY: ICD-10-CM

## 2025-04-18 DIAGNOSIS — Z96.642 S/P HIP REPLACEMENT, LEFT: ICD-10-CM

## 2025-04-18 PROCEDURE — 1111F DSCHRG MED/CURRENT MED MERGE: CPT | Performed by: ORTHOPAEDIC SURGERY

## 2025-04-18 PROCEDURE — 73502 X-RAY EXAM HIP UNI 2-3 VIEWS: CPT | Performed by: ORTHOPAEDIC SURGERY

## 2025-04-18 PROCEDURE — 99024 POSTOP FOLLOW-UP VISIT: CPT | Performed by: ORTHOPAEDIC SURGERY

## 2025-04-18 NOTE — PROGRESS NOTES
DATE OF SURGERY:  3/24/2025  PROCEDURE:  Left Total Hip Arthroplasty    HISTORY: Eric Aviles who presents today for post op evaluation of the left hip.  She is 3 weeks status post VANE.  She is doing well.  She has no pain and has been using no ambulatory aids.  She does note that about 2 days ago she and her  started to notice some opening of her wound.  Denies any fevers or chills.  They did have a home nurse and home PT come by but the wound was not mentioned to them    Past medical history, medications, allergies are unchanged.    PHYSICAL EXAM:  Vitals: There were no vitals taken for this visit. There is no height or weight on file to calculate BMI.  General: Patient appears well developed, well nourished and in no acute distress.  Neurological: Patient is alert and oriented to time, place and person. There is intact sensation bilaterally in upper extremities.  Circulation: Patient has intact distal pulses and capillary refill bilaterally.  Lymphatics: Patient has no lymphadenopathy.  Skin: Intact with no obvious rashes.  Musculoskeletal:  Left hip Exam:  Gait: Nonantalgic  Skin: Center portion of the incision has dehisced.  This is approximately 3 cm in length, 1 cm wide, and 2 cm deep.  Deeper fascia appears to be intact.  There does appear to be some fibrinous debris.  Serous drainage is noted, no arnie purulence is noted.  Mild erythema is noted around this dehiscence.  Remainder of the incision appears to be intact.  Swelling/Effusion: Minimal/Mild  Tenderness: Evelin-incisional  ROM: Intact without pain  NV: Motor intact to TA, EHL, GSC. Sensation intact to light touch in L4, L5, S1 distribution. 2+ DP pulse.           No calf tenderness    IMAGING  X-rays from 4/18/2025 are personally visualized and interpreted by me. They show a left total hip arthroplasty in satisfactory position without hardware failure or migration. There are no periprosthetic fractures. There are no obvious signs of stem  subsidence. There are no obvious signs of loosening, wear or osteolysis.     ASSESSMENT & PLAN: Eric Aviles:    Status post left Total hip Replacement  Wound dehiscence left hip    I had a lengthy discussion with the patient today about their recovery for VANE.  I discussed her wound dehiscence.  Unfortunately given the size of this and the depth, I recommend left VANE I&D, and if the fascia is not intact, she would need a arthrotomy with head/liner exchange and will get ID involved. I will get her on the schedule for Monday.  She saw her PCP 10 days ago. We will have him provide clearance.    Her wound was packed with Puracol and a dressing was applied.  She is to leave this on until surgery on Monday.  She is to keep this dry.  She is to hold all blood thinners including aspirin.  She can be weightbearing as tolerated.  All of her questions and concerns were addressed today.    Chase Duke MD

## 2025-04-18 NOTE — TELEPHONE ENCOUNTER
Spoke with patient (and her ) to inform her that her surgery with Dr. Duke has been scheduled for Monday April 21, 2025. Patient verbalized understanding and had no further questions.

## 2025-04-18 NOTE — TELEPHONE ENCOUNTER
Ortho Surgery Request  Surgery: Left VANE I&D, possible head liner change  Surgical Assistant: Yes  Surgery Day Request:   Estimated Procedure Length: 90 mins  Anesthesia type:  Spinal   Position: Supine   Special Needs:[]Mini C-Arm  [x]Large C-arm - Large GE 3D  []Nurse Assist [x]SCD's [x]Surgical Assistant []Ultrasound []Ultrasound Krish  [x] Scipio Table  Equipment: VMO Systems    Location: Main OR  Post Op Visit Date: 2 weeks    CPT Code: 25317    ICD Code: T81.31XA   Medical Clearance Needed: No  Preadmission Testing Orders:  Use surgeon's preferences card  Additional PAT orders:  Pre OP Orders:  Use [facility] surgeon's personalized order set  Additional Pre Op Orders:  PCN Allergy:  No  If Yes:   __X__  Admit:  Observation  Home Health  Yes

## 2025-04-19 ENCOUNTER — LAB ENCOUNTER (OUTPATIENT)
Dept: LAB | Facility: HOSPITAL | Age: 70
End: 2025-04-19
Attending: ORTHOPAEDIC SURGERY
Payer: MEDICARE

## 2025-04-19 DIAGNOSIS — Z01.818 PRE-OP TESTING: ICD-10-CM

## 2025-04-19 LAB
ANION GAP SERPL CALC-SCNC: 7 MMOL/L (ref 0–18)
ANTIBODY SCREEN: NEGATIVE
BASOPHILS # BLD AUTO: 0.05 X10(3) UL (ref 0–0.2)
BASOPHILS NFR BLD AUTO: 0.8 %
BUN BLD-MCNC: 13 MG/DL (ref 9–23)
BUN/CREAT SERPL: 17.6 (ref 10–20)
CALCIUM BLD-MCNC: 9.9 MG/DL (ref 8.7–10.4)
CHLORIDE SERPL-SCNC: 107 MMOL/L (ref 98–112)
CO2 SERPL-SCNC: 27 MMOL/L (ref 21–32)
CREAT BLD-MCNC: 0.74 MG/DL (ref 0.55–1.02)
DEPRECATED RDW RBC AUTO: 45.8 FL (ref 35.1–46.3)
EGFRCR SERPLBLD CKD-EPI 2021: 86 ML/MIN/1.73M2 (ref 60–?)
EOSINOPHIL # BLD AUTO: 0.81 X10(3) UL (ref 0–0.7)
EOSINOPHIL NFR BLD AUTO: 13.2 %
ERYTHROCYTE [DISTWIDTH] IN BLOOD BY AUTOMATED COUNT: 14.1 % (ref 11–15)
GLUCOSE BLD-MCNC: 97 MG/DL (ref 70–99)
HCT VFR BLD AUTO: 34.6 % (ref 35–48)
HGB BLD-MCNC: 11.7 G/DL (ref 12–16)
IMM GRANULOCYTES # BLD AUTO: 0.01 X10(3) UL (ref 0–1)
IMM GRANULOCYTES NFR BLD: 0.2 %
LYMPHOCYTES # BLD AUTO: 2.24 X10(3) UL (ref 1–4)
LYMPHOCYTES NFR BLD AUTO: 36.5 %
MCH RBC QN AUTO: 30.5 PG (ref 26–34)
MCHC RBC AUTO-ENTMCNC: 33.8 G/DL (ref 31–37)
MCV RBC AUTO: 90.3 FL (ref 80–100)
MONOCYTES # BLD AUTO: 0.44 X10(3) UL (ref 0.1–1)
MONOCYTES NFR BLD AUTO: 7.2 %
MRSA DNA SPEC QL NAA+PROBE: NEGATIVE
NEUTROPHILS # BLD AUTO: 2.58 X10 (3) UL (ref 1.5–7.7)
NEUTROPHILS # BLD AUTO: 2.58 X10(3) UL (ref 1.5–7.7)
NEUTROPHILS NFR BLD AUTO: 42.1 %
OSMOLALITY SERPL CALC.SUM OF ELEC: 292 MOSM/KG (ref 275–295)
PLATELET # BLD AUTO: 296 10(3)UL (ref 150–450)
POTASSIUM SERPL-SCNC: 4.7 MMOL/L (ref 3.5–5.1)
RBC # BLD AUTO: 3.83 X10(6)UL (ref 3.8–5.3)
RH BLOOD TYPE: POSITIVE
SODIUM SERPL-SCNC: 141 MMOL/L (ref 136–145)
WBC # BLD AUTO: 6.1 X10(3) UL (ref 4–11)

## 2025-04-19 PROCEDURE — 36415 COLL VENOUS BLD VENIPUNCTURE: CPT

## 2025-04-19 PROCEDURE — 85025 COMPLETE CBC W/AUTO DIFF WBC: CPT

## 2025-04-19 PROCEDURE — 86850 RBC ANTIBODY SCREEN: CPT

## 2025-04-19 PROCEDURE — 86900 BLOOD TYPING SEROLOGIC ABO: CPT

## 2025-04-19 PROCEDURE — 80048 BASIC METABOLIC PNL TOTAL CA: CPT

## 2025-04-19 PROCEDURE — 86901 BLOOD TYPING SEROLOGIC RH(D): CPT

## 2025-04-19 PROCEDURE — 87641 MR-STAPH DNA AMP PROBE: CPT

## 2025-04-21 ENCOUNTER — ANESTHESIA (OUTPATIENT)
Dept: SURGERY | Facility: HOSPITAL | Age: 70
End: 2025-04-21
Payer: MEDICARE

## 2025-04-21 ENCOUNTER — ANESTHESIA EVENT (OUTPATIENT)
Dept: SURGERY | Facility: HOSPITAL | Age: 70
End: 2025-04-21
Payer: MEDICARE

## 2025-04-21 ENCOUNTER — HOSPITAL ENCOUNTER (OUTPATIENT)
Facility: HOSPITAL | Age: 70
Discharge: HOME | End: 2025-04-22
Attending: ORTHOPAEDIC SURGERY | Admitting: ORTHOPAEDIC SURGERY
Payer: MEDICARE

## 2025-04-21 ENCOUNTER — APPOINTMENT (OUTPATIENT)
Dept: GENERAL RADIOLOGY | Facility: HOSPITAL | Age: 70
End: 2025-04-21
Attending: ORTHOPAEDIC SURGERY
Payer: MEDICARE

## 2025-04-21 DIAGNOSIS — T81.31XA WOUND DEHISCENCE, SURGICAL, INITIAL ENCOUNTER: ICD-10-CM

## 2025-04-21 DIAGNOSIS — Z01.818 PRE-OP TESTING: Primary | ICD-10-CM

## 2025-04-21 DIAGNOSIS — Z96.642 S/P HIP REPLACEMENT, LEFT: ICD-10-CM

## 2025-04-21 LAB
CRP SERPL-MCNC: <0.4 MG/DL (ref ?–1)
DEPRECATED RDW RBC AUTO: 47.5 FL (ref 35.1–46.3)
ERYTHROCYTE [DISTWIDTH] IN BLOOD BY AUTOMATED COUNT: 14.2 % (ref 11–15)
ERYTHROCYTE [SEDIMENTATION RATE] IN BLOOD: 37 MM/HR (ref 0–30)
HCT VFR BLD AUTO: 36.3 % (ref 35–48)
HGB BLD-MCNC: 11.6 G/DL (ref 12–16)
MCH RBC QN AUTO: 29.4 PG (ref 26–34)
MCHC RBC AUTO-ENTMCNC: 32 G/DL (ref 31–37)
MCV RBC AUTO: 92.1 FL (ref 80–100)
PLATELET # BLD AUTO: 288 10(3)UL (ref 150–450)
RBC # BLD AUTO: 3.94 X10(6)UL (ref 3.8–5.3)
WBC # BLD AUTO: 7.7 X10(3) UL (ref 4–11)

## 2025-04-21 PROCEDURE — 72170 X-RAY EXAM OF PELVIS: CPT | Performed by: ORTHOPAEDIC SURGERY

## 2025-04-21 PROCEDURE — 99214 OFFICE O/P EST MOD 30 MIN: CPT | Performed by: HOSPITALIST

## 2025-04-21 RX ORDER — ONDANSETRON 2 MG/ML
4 INJECTION INTRAMUSCULAR; INTRAVENOUS EVERY 6 HOURS PRN
Status: DISCONTINUED | OUTPATIENT
Start: 2025-04-21 | End: 2025-04-22

## 2025-04-21 RX ORDER — ACETAMINOPHEN 500 MG
1000 TABLET ORAL EVERY 8 HOURS SCHEDULED
Status: DISCONTINUED | OUTPATIENT
Start: 2025-04-21 | End: 2025-04-22

## 2025-04-21 RX ORDER — METOCLOPRAMIDE HYDROCHLORIDE 5 MG/ML
5 INJECTION INTRAMUSCULAR; INTRAVENOUS EVERY 8 HOURS PRN
Status: DISCONTINUED | OUTPATIENT
Start: 2025-04-21 | End: 2025-04-21

## 2025-04-21 RX ORDER — HYDROMORPHONE HYDROCHLORIDE 1 MG/ML
0.2 INJECTION, SOLUTION INTRAMUSCULAR; INTRAVENOUS; SUBCUTANEOUS EVERY 5 MIN PRN
Status: DISCONTINUED | OUTPATIENT
Start: 2025-04-21 | End: 2025-04-21 | Stop reason: HOSPADM

## 2025-04-21 RX ORDER — CLONAZEPAM 0.5 MG/1
2 TABLET ORAL 2 TIMES DAILY PRN
Status: DISCONTINUED | OUTPATIENT
Start: 2025-04-21 | End: 2025-04-22

## 2025-04-21 RX ORDER — MORPHINE SULFATE 10 MG/ML
6 INJECTION, SOLUTION INTRAMUSCULAR; INTRAVENOUS EVERY 10 MIN PRN
Status: DISCONTINUED | OUTPATIENT
Start: 2025-04-21 | End: 2025-04-21 | Stop reason: HOSPADM

## 2025-04-21 RX ORDER — ARIPIPRAZOLE 5 MG/1
5 TABLET ORAL NIGHTLY
Status: DISCONTINUED | OUTPATIENT
Start: 2025-04-21 | End: 2025-04-22

## 2025-04-21 RX ORDER — METOCLOPRAMIDE 10 MG/1
10 TABLET ORAL ONCE
Status: COMPLETED | OUTPATIENT
Start: 2025-04-21 | End: 2025-04-21

## 2025-04-21 RX ORDER — TRANEXAMIC ACID 10 MG/ML
INJECTION, SOLUTION INTRAVENOUS AS NEEDED
Status: DISCONTINUED | OUTPATIENT
Start: 2025-04-21 | End: 2025-04-21 | Stop reason: SURG

## 2025-04-21 RX ORDER — METOCLOPRAMIDE HYDROCHLORIDE 5 MG/ML
5 INJECTION INTRAMUSCULAR; INTRAVENOUS EVERY 8 HOURS PRN
Status: DISCONTINUED | OUTPATIENT
Start: 2025-04-21 | End: 2025-04-21 | Stop reason: HOSPADM

## 2025-04-21 RX ORDER — ONDANSETRON 2 MG/ML
INJECTION INTRAMUSCULAR; INTRAVENOUS AS NEEDED
Status: DISCONTINUED | OUTPATIENT
Start: 2025-04-21 | End: 2025-04-21 | Stop reason: SURG

## 2025-04-21 RX ORDER — DIPHENHYDRAMINE HCL 25 MG
25 CAPSULE ORAL EVERY 4 HOURS PRN
Status: DISCONTINUED | OUTPATIENT
Start: 2025-04-21 | End: 2025-04-22

## 2025-04-21 RX ORDER — SODIUM CHLORIDE, SODIUM LACTATE, POTASSIUM CHLORIDE, CALCIUM CHLORIDE 600; 310; 30; 20 MG/100ML; MG/100ML; MG/100ML; MG/100ML
INJECTION, SOLUTION INTRAVENOUS CONTINUOUS
Status: DISCONTINUED | OUTPATIENT
Start: 2025-04-21 | End: 2025-04-21 | Stop reason: HOSPADM

## 2025-04-21 RX ORDER — SENNOSIDES 8.6 MG
17.2 TABLET ORAL NIGHTLY
Status: DISCONTINUED | OUTPATIENT
Start: 2025-04-21 | End: 2025-04-22

## 2025-04-21 RX ORDER — ACETAMINOPHEN 500 MG
1000 TABLET ORAL ONCE
Status: COMPLETED | OUTPATIENT
Start: 2025-04-21 | End: 2025-04-21

## 2025-04-21 RX ORDER — QUETIAPINE FUMARATE 100 MG/1
100 TABLET, FILM COATED ORAL NIGHTLY
Status: DISCONTINUED | OUTPATIENT
Start: 2025-04-21 | End: 2025-04-22

## 2025-04-21 RX ORDER — MORPHINE SULFATE 4 MG/ML
2 INJECTION, SOLUTION INTRAMUSCULAR; INTRAVENOUS EVERY 10 MIN PRN
Status: DISCONTINUED | OUTPATIENT
Start: 2025-04-21 | End: 2025-04-21 | Stop reason: HOSPADM

## 2025-04-21 RX ORDER — POLYETHYLENE GLYCOL 3350 17 G/17G
17 POWDER, FOR SOLUTION ORAL DAILY PRN
Status: DISCONTINUED | OUTPATIENT
Start: 2025-04-21 | End: 2025-04-22

## 2025-04-21 RX ORDER — ONDANSETRON 2 MG/ML
4 INJECTION INTRAMUSCULAR; INTRAVENOUS EVERY 6 HOURS PRN
Status: DISCONTINUED | OUTPATIENT
Start: 2025-04-21 | End: 2025-04-21 | Stop reason: HOSPADM

## 2025-04-21 RX ORDER — MAGNESIUM HYDROXIDE 1200 MG/15ML
LIQUID ORAL CONTINUOUS PRN
Status: DISCONTINUED | OUTPATIENT
Start: 2025-04-21 | End: 2025-04-21 | Stop reason: HOSPADM

## 2025-04-21 RX ORDER — HYDROMORPHONE HYDROCHLORIDE 1 MG/ML
0.6 INJECTION, SOLUTION INTRAMUSCULAR; INTRAVENOUS; SUBCUTANEOUS EVERY 5 MIN PRN
Status: DISCONTINUED | OUTPATIENT
Start: 2025-04-21 | End: 2025-04-21 | Stop reason: HOSPADM

## 2025-04-21 RX ORDER — BISACODYL 10 MG
10 SUPPOSITORY, RECTAL RECTAL
Status: DISCONTINUED | OUTPATIENT
Start: 2025-04-21 | End: 2025-04-22

## 2025-04-21 RX ORDER — SODIUM CHLORIDE, SODIUM LACTATE, POTASSIUM CHLORIDE, CALCIUM CHLORIDE 600; 310; 30; 20 MG/100ML; MG/100ML; MG/100ML; MG/100ML
INJECTION, SOLUTION INTRAVENOUS CONTINUOUS
Status: DISCONTINUED | OUTPATIENT
Start: 2025-04-21 | End: 2025-04-22

## 2025-04-21 RX ORDER — ATORVASTATIN CALCIUM 20 MG/1
20 TABLET, FILM COATED ORAL NIGHTLY
Status: DISCONTINUED | OUTPATIENT
Start: 2025-04-21 | End: 2025-04-22

## 2025-04-21 RX ORDER — DEXAMETHASONE SODIUM PHOSPHATE 4 MG/ML
VIAL (ML) INJECTION AS NEEDED
Status: DISCONTINUED | OUTPATIENT
Start: 2025-04-21 | End: 2025-04-21 | Stop reason: SURG

## 2025-04-21 RX ORDER — DULOXETIN HYDROCHLORIDE 60 MG/1
60 CAPSULE, DELAYED RELEASE ORAL NIGHTLY
Status: DISCONTINUED | OUTPATIENT
Start: 2025-04-21 | End: 2025-04-22

## 2025-04-21 RX ORDER — METOCLOPRAMIDE HYDROCHLORIDE 5 MG/ML
10 INJECTION INTRAMUSCULAR; INTRAVENOUS ONCE
Status: COMPLETED | OUTPATIENT
Start: 2025-04-21 | End: 2025-04-21

## 2025-04-21 RX ORDER — MORPHINE SULFATE 4 MG/ML
4 INJECTION, SOLUTION INTRAMUSCULAR; INTRAVENOUS EVERY 10 MIN PRN
Status: DISCONTINUED | OUTPATIENT
Start: 2025-04-21 | End: 2025-04-21 | Stop reason: HOSPADM

## 2025-04-21 RX ORDER — DIPHENHYDRAMINE HYDROCHLORIDE 50 MG/ML
25 INJECTION, SOLUTION INTRAMUSCULAR; INTRAVENOUS ONCE AS NEEDED
Status: ACTIVE | OUTPATIENT
Start: 2025-04-21 | End: 2025-04-21

## 2025-04-21 RX ORDER — ALBUTEROL SULFATE 90 UG/1
2 INHALANT RESPIRATORY (INHALATION) EVERY 6 HOURS PRN
Status: DISCONTINUED | OUTPATIENT
Start: 2025-04-21 | End: 2025-04-22

## 2025-04-21 RX ORDER — CELECOXIB 200 MG/1
200 CAPSULE ORAL 2 TIMES DAILY
Status: DISCONTINUED | OUTPATIENT
Start: 2025-04-22 | End: 2025-04-22

## 2025-04-21 RX ORDER — HYDROMORPHONE HYDROCHLORIDE 1 MG/ML
0.4 INJECTION, SOLUTION INTRAMUSCULAR; INTRAVENOUS; SUBCUTANEOUS EVERY 5 MIN PRN
Status: DISCONTINUED | OUTPATIENT
Start: 2025-04-21 | End: 2025-04-21 | Stop reason: HOSPADM

## 2025-04-21 RX ORDER — FAMOTIDINE 20 MG/1
20 TABLET, FILM COATED ORAL ONCE
Status: COMPLETED | OUTPATIENT
Start: 2025-04-21 | End: 2025-04-21

## 2025-04-21 RX ORDER — DIPHENHYDRAMINE HYDROCHLORIDE 50 MG/ML
12.5 INJECTION, SOLUTION INTRAMUSCULAR; INTRAVENOUS EVERY 4 HOURS PRN
Status: DISCONTINUED | OUTPATIENT
Start: 2025-04-21 | End: 2025-04-22

## 2025-04-21 RX ORDER — FAMOTIDINE 10 MG/ML
20 INJECTION, SOLUTION INTRAVENOUS ONCE
Status: COMPLETED | OUTPATIENT
Start: 2025-04-21 | End: 2025-04-21

## 2025-04-21 RX ORDER — ASPIRIN 81 MG/1
81 TABLET ORAL 2 TIMES DAILY
Status: DISCONTINUED | OUTPATIENT
Start: 2025-04-21 | End: 2025-04-22

## 2025-04-21 RX ORDER — SODIUM PHOSPHATE, DIBASIC AND SODIUM PHOSPHATE, MONOBASIC 7; 19 G/230ML; G/230ML
1 ENEMA RECTAL ONCE AS NEEDED
Status: DISCONTINUED | OUTPATIENT
Start: 2025-04-21 | End: 2025-04-22

## 2025-04-21 RX ORDER — VERAPAMIL HYDROCHLORIDE 180 MG/1
180 TABLET, FILM COATED, EXTENDED RELEASE ORAL NIGHTLY
Status: DISCONTINUED | OUTPATIENT
Start: 2025-04-21 | End: 2025-04-22

## 2025-04-21 RX ORDER — NALOXONE HYDROCHLORIDE 0.4 MG/ML
0.08 INJECTION, SOLUTION INTRAMUSCULAR; INTRAVENOUS; SUBCUTANEOUS AS NEEDED
Status: DISCONTINUED | OUTPATIENT
Start: 2025-04-21 | End: 2025-04-21 | Stop reason: HOSPADM

## 2025-04-21 RX ORDER — BUPIVACAINE HYDROCHLORIDE 7.5 MG/ML
INJECTION, SOLUTION INTRASPINAL AS NEEDED
Status: DISCONTINUED | OUTPATIENT
Start: 2025-04-21 | End: 2025-04-21 | Stop reason: SURG

## 2025-04-21 RX ORDER — DOCUSATE SODIUM 100 MG/1
100 CAPSULE, LIQUID FILLED ORAL 2 TIMES DAILY
Status: DISCONTINUED | OUTPATIENT
Start: 2025-04-21 | End: 2025-04-22

## 2025-04-21 RX ORDER — DONEPEZIL HYDROCHLORIDE 5 MG/1
5 TABLET, FILM COATED ORAL NIGHTLY
Status: DISCONTINUED | OUTPATIENT
Start: 2025-04-21 | End: 2025-04-22

## 2025-04-21 RX ADMIN — ONDANSETRON 4 MG: 2 INJECTION INTRAMUSCULAR; INTRAVENOUS at 14:01:00

## 2025-04-21 RX ADMIN — TRANEXAMIC ACID 1000 MG: 10 INJECTION, SOLUTION INTRAVENOUS at 14:05:00

## 2025-04-21 RX ADMIN — SODIUM CHLORIDE, SODIUM LACTATE, POTASSIUM CHLORIDE, CALCIUM CHLORIDE: 600; 310; 30; 20 INJECTION, SOLUTION INTRAVENOUS at 13:51:00

## 2025-04-21 RX ADMIN — BUPIVACAINE HYDROCHLORIDE 1.4 ML: 7.5 INJECTION, SOLUTION INTRASPINAL at 14:00:00

## 2025-04-21 RX ADMIN — DEXAMETHASONE SODIUM PHOSPHATE 4 MG: 4 MG/ML VIAL (ML) INJECTION at 14:01:00

## 2025-04-21 NOTE — ANESTHESIA PROCEDURE NOTES
Spinal Block    Date/Time: 4/21/2025 1:56 PM    Performed by: Anamika Horta CRNA  Authorized by: Pj Sherwood MD      General Information and Staff    Start Time:  4/21/2025 1:56 PM  End Time:  4/21/2025 2:00 PM  Anesthesiologist:  Pj Sherwood MD  CRNA:  Anamika Horta CRNA  Performed by:  CRNA  Patient Location:  OR  Site identification: surface landmarks    Reason for Block: at surgeon's request and surgical anesthesia    Preanesthetic Checklist: patient identified, IV checked, risks and benefits discussed, monitors and equipment checked, pre-op evaluation, timeout performed, anesthesia consent and sterile technique used      Procedure Details    Patient Position:  Sitting  Prep: ChloraPrep    Monitoring:  Cardiac monitor  Approach:  Midline  Location:  L3-4  Injection Technique:  Single-shot    Needle    Needle Type:  Pencil-tip  Needle Gauge:  24 G  Needle Length:  3.5 in    Assessment    Sensory Level:   Events: clear CSF, CSF aspirated, well tolerated and blood negative      Additional Comments     EASY, ATRAUMATIC SPINAL X1 ATTEMPT. TOLERATED VERY WELL.

## 2025-04-21 NOTE — CONSULTS
[facility]    PATIENT'S NAME: EMILE COELHO   ATTENDING PHYSICIAN: Chase Duke MD   CONSULTING PHYSICIAN: Dang Osorio MD   PATIENT ACCOUNT#:   [id number]    LOCATION:  [facility] PACU [address] [address]  MEDICAL RECORD #:   [id number]       YOB: 1955  ADMISSION DATE:       04/21/2025      CONSULT DATE:  04/21/2025    REPORT OF CONSULTATION      REASON FOR ADMISSION:  Post left hip I and D procedure.    HISTORY OF PRESENT ILLNESS:  Patient is a 70-year-old  female who had a left total hip arthroplasty on March 24.  Recently developed wound dehiscence and drainage.  Scheduled today for above-mentioned procedure by her orthopedic surgeon, Dr. Duke.  Preoperatively, had spinal block.  Postoperatively, transferred to PACU for further monitoring.     PAST MEDICAL HISTORY:  Generalized osteoarthritis, essential hypertension, bipolar affective disorder, chronic obstructive pulmonary disease, degenerative joint disease of lumbar spine, hyperlipidemia.    PAST SURGICAL HISTORY:  Left total hip arthroplasty, left breast biopsy, appendectomy, cataract procedure, left inguinal hernia repair, right distal humerus open reduction and internal fixation, bilateral total knee arthroplasty, and tubal ligation.    MEDICATIONS:  Please see medication reconciliation list.     ALLERGIES:  Sulfa.    SOCIAL HISTORY:  Ex-tobacco user.  No current tobacco, alcohol, or drug use.      FAMILY HISTORY:  Mother had diabetes mellitus type 2, coronary artery disease, and cerebrovascular accident.  Father had dementia.    REVIEW OF SYSTEMS:  Currently resting in bed.  No left hip pain.  No chest pain.  No shortness of breath.  Other 12-point review of systems is negative.       PHYSICAL EXAMINATION:    GENERAL:  Alert and oriented to time, place and person.  No acute distress.   VITAL SIGNS:  Temperature 98.0, pulse 74, respiratory rate 18, blood pressure 110/84, pulse ox 99% on room air.   HEENT:  Atraumatic.   Oropharynx clear.  Moist mucous membranes.  Ears and nose normal.  Eyes:  Anicteric sclerae.   NECK:  Supple.  No lymphadenopathy.  Trachea midline.  Full range of motion.   LUNGS:  Clear to auscultation bilaterally.  Normal respiratory effort.   HEART:  Regular rate and rhythm.  S1 and S2 auscultated.  No murmur.    ABDOMEN:  Soft, nondistended.  No tenderness.  Positive bowel sounds.   EXTREMITIES:  Left hip Prevena wound VAC dressing.  NEUROLOGIC:  Decreased sensation and muscle movement in both lower extremities, post spinal block.  No other focal findings.    ASSESSMENT AND PLAN:    1.   Left hip total arthroplasty wound dehiscence and drainage, status post left hip I and D procedure and wound closure.  IV cefazolin.  Pain control.  Spinal block.  Neurovascular checks.  Aspirin for DVT prophylaxis.  2.   Chronic obstructive pulmonary disease.  Continue home medications and monitor.  3.   Bipolar disorder.  Continue home medications.   4.   Essential hypertension.  Continue home medications and monitor.    Dictated By Dang Osorio MD  d: 04/21/2025 15:51:18  t: 04/21/2025 16:04:31  Job [id number]  FB/    cc: Chase Duke MD

## 2025-04-21 NOTE — ANESTHESIA PREPROCEDURE EVALUATION
Anesthesia PreOp Note    HPI:     Eric Aviles is a 70 year old female who presents for preoperative consultation requested by: Chase Duke MD    Date of Surgery: 4/21/2025    Procedure(s):  Left total hip incision and drainage, possible head liner change  Indication: Wound dehiscence, surgical, initial encounter [T81.31XA]  S/P hip replacement, left [Z96.642]    Relevant Problems   No relevant active problems       NPO:                         History Review:  Patient Active Problem List    Diagnosis Date Noted    Wound dehiscence, surgical, initial encounter 04/18/2025    S/P hip replacement, left 04/18/2025    COPD exacerbation (HCC) 03/28/2025    COPD (chronic obstructive pulmonary disease) (HCC) 03/28/2025    Preop examination 03/21/2025    Non-occlusive coronary artery disease requiring drug therapy 01/29/2025    Pain of left lower extremity 03/02/2023    Open supracondylar fracture of right elbow 04/15/2022    Supracondylar fracture of right humerus 04/15/2022    Depression with anxiety 10/26/2021    Other hyperlipidemia 02/16/2021    Breast lump on left side at 3 o'clock position 06/16/2020    Centrilobular emphysema (HCC) 10/11/2019    S/P reverse total shoulder arthroplasty, left 05/03/2019    Essential hypertension 03/22/2019    Hx of total knee arthroplasty, bilateral 07/12/2018    Bipolar disorder (HCC) 02/27/2018    Seborrheic keratoses 03/07/2017    Sx.6/8/16, CPT.38769, R Total Knee Replacement, w/, Global Exp.9/6/16 01/14/2016    Raynaud's disease 08/29/2014    Osteoarthritis 08/28/2014    Noninflammatory disorder of vagina 03/01/2014    Altered mental status 12/19/2013    Symptomatic menopausal or female climacteric states 09/23/2013    Chondrocalcinosis 09/09/2013    Rash and nonspecific skin eruption 01/04/2013    Anemia 11/20/2012    Abnormal blood chemistry 09/20/2012    Urinary frequency 09/20/2012    Malaise and fatigue 07/10/2012    Dermatographic urticaria 06/24/2012     Pruritic disorder 05/03/2012    Generalized osteoarthrosis, involving multiple sites 04/30/2012    Strain of rotator cuff 04/10/2012    Benign neoplasm of connective and soft tissue 01/05/2012    Solitary cyst of breast 08/18/2011    Dermatitis 10/19/2010    Neuralgia and neuritis 05/06/2010    Hearing loss 11/20/2009    After-cataract with vision obscured 11/03/2009    Constipation 07/22/2009    Dermatomycosis 06/22/2009    Inguinal hernia 05/04/2009    Diarrhea 04/13/2006       Past Medical History[1]    Past Surgical History[2]    Prescriptions Prior to Admission[3]  Current Medications and Prescriptions Ordered in Epic[4]    Allergies[5]    Family History[6]  Social Hx on file[7]    Available pre-op labs reviewed.  Lab Results   Component Value Date    WBC 6.1 04/19/2025    RBC 3.83 04/19/2025    HGB 11.7 (L) 04/19/2025    HCT 34.6 (L) 04/19/2025    MCV 90.3 04/19/2025    MCH 30.5 04/19/2025    MCHC 33.8 04/19/2025    RDW 14.1 04/19/2025    .0 04/19/2025     Lab Results   Component Value Date     04/19/2025    K 4.7 04/19/2025     04/19/2025    CO2 27.0 04/19/2025    BUN 13 04/19/2025    CREATSERUM 0.74 04/19/2025    GLU 97 04/19/2025    CA 9.9 04/19/2025          Vital Signs:  Body mass index is 31.25 kg/m².   height is 1.524 m (5') and weight is 72.6 kg (160 lb).   Vitals:    04/18/25 1317   Weight: 72.6 kg (160 lb)   Height: 1.524 m (5')        Anesthesia Evaluation     Patient summary reviewed and Nursing notes reviewed    Airway   Mallampati: II  TM distance: >3 FB  Neck ROM: full  Dental      Pulmonary - normal exam   (+) COPD  Cardiovascular - normal exam  Exercise tolerance: good  (+) hypertension, CAD    Neuro/Psych    (+)  neuromuscular disease,  bipolar disorder, depression      GI/Hepatic/Renal - negative ROS     Endo/Other - negative ROS   Abdominal  - normal exam                 Anesthesia Plan:   ASA:  3  Plan:   Spinal and MAC  Post-op Pain Management: IV analgesics  Informed  Consent Plan and Risks Discussed With:  Patient  Use of Blood Products Discussed With:  Patient  Blood Product Use Consented    Discussed plan with:  CRNA      I have informed Eric Aviles and/or legal guardian or family member of the nature of the anesthetic plan, benefits, risks including possible dental damage if relevant, major complications, and any alternative forms of anesthetic management.   All of the patient's questions were answered to the best of my ability. The patient desires the anesthetic management as planned.  Pj Sherwood MD  4/21/2025 10:51 AM  Present on Admission:  **None**           [1]   Past Medical History:   Arrhythmia    Bipolar disorder (HCC)    Cataract    COPD (chronic obstructive pulmonary disease) (HCC)    Depression    Essential hypertension    High blood pressure    High cholesterol    Osteoarthritis    R knee surgery    Psychogenic polydipsia    Pulmonary emphysema (HCC)    Raynaud disease    Rib fracture    left side    Sciatica    Seborrheic keratoses    Sx.6/1/15, CPT.68093, L Total Knee, w/, Global Exp.8/30/15    Sx.6/8/16, CPT.84560, R Total Knee Replacement, w/, Global Exp.9/6/16    Visual impairment    readers   [2]   Past Surgical History:  Procedure Laterality Date    Appendectomy      appendicitis    Appendectomy      Breast biopsy Left 09/07/2011    fibrocystic changes    Cataract Bilateral     Colonoscopy  07/2009    incomplete (chronic constipation)    Colonoscopy N/A 11/08/2018    Procedure: COLONOSCOPY;  Surgeon: Micheal Curtis MD;  Location: [facility] ENDOSCOPY    Colonoscopy N/A 12/19/2019    Procedure: COLONOSCOPY;  Surgeon: Micheal Curtis MD;  Location: [facility] ENDOSCOPY    Hernia surgery Left 12/21/2011    neuroma excision, partial mesh removal, primary re-repair    Inguinal hernia repair Left 05/20/2009    Knee surgery Right     osteoarthritis    Angel localization wire 1 site left (cpt=19281)      2011    Orif humerus fracture Right  04/15/2022    right distal humerus open reduction internal fixation    Other Left 03/22/2019    LEFT REVERSE TOTAL SHOULDER ARTHROPLASTY WITH BICEPS TENODESIS    Other surgical history      Laparotomy (adhesions)    Other surgical history      Arthrocentesis of the right knee joint    Other surgical history      Arthrocentesis of the left knee joint    Other surgical history Right 08/2019    rotator cuff    Total hip replacement Left     Total knee replacement Bilateral 2015 and 2016    Tubal ligation     [3]   Medications Prior to Admission   Medication Sig Dispense Refill Last Dose/Taking    MELOXICAM 15 MG Oral Tab TAKE ONE TABLET BY MOUTH ONE TIME DAILY 30 tablet 0 Taking    fluticasone furoate-vilanterol (BREO ELLIPTA) 100-25 MCG/ACT Inhalation Aerosol Powder, Breath Activated Inhale 1 puff into the lungs daily. Rinse your mouth with water without swallowing after using BREO to help reduce your chance of getting thrush. 1 each 5 Taking    guaiFENesin 100 MG/5 ML Oral Take 10 mL (200 mg total) by mouth every 4 (four) hours as needed. 1 each 0 Taking As Needed    budesonide 0.5 MG/2ML Inhalation Suspension Take 2 mL (0.5 mg total) by nebulization 2 (two) times daily. 12 each 1 Taking    clonazePAM 2 MG Oral Tab Take 1 tablet (2 mg total) by mouth in the morning and 1 tablet (2 mg total) before bedtime.   Taking    ketoconazole 2 % External Cream Apply 1 Application topically in the morning and 1 Application before bedtime.   Taking    QUEtiapine 50 MG Oral Tab Take 2.5 tablets (125 mg total) by mouth nightly. (Patient taking differently: Take 2 tablets (100 mg total) by mouth nightly.)   Taking Differently    Verapamil HCl  MG Oral Capsule SR 24 Hr Take 1 capsule (180 mg total) by mouth nightly.   Taking    albuterol 108 (90 Base) MCG/ACT Inhalation Aero Soln Inhale 2 puffs into the lungs every 6 (six) hours as needed for Wheezing or Shortness of Breath. 18 g 0 Taking As Needed    oxyCODONE 5 MG Oral Tab  Take 1 tablet (5 mg total) by mouth every 6 (six) hours as needed for Pain. 28 tablet 0 Taking As Needed    celecoxib (CELEBREX) 200 MG Oral Cap Take 1 capsule (200 mg total) by mouth 2 (two) times daily. 60 capsule 0 Taking    Multiple Vitamins-Minerals (MULTIVITAMIN WOMENS 50+ ADV OR) Take 1 tablet by mouth in the morning.   Taking    Vitamin C 500 MG Oral Tab Take 1 tablet (500 mg total) by mouth in the morning.   Taking    Glucosamine-Chondroit-Vit C-Mn (GLUCOSAMINE CHONDR 500 COMPLEX) Oral Cap Take 1 capsule by mouth in the morning.   Taking    cyanocobalamin 250 MCG Oral Tab Take 1 tablet (250 mcg total) by mouth in the morning.   Taking    Calcium Citrate 950 (200 Ca) MG Oral Tab Take 1 tablet (950 mg total) by mouth in the morning.   Taking    Ferrous Sulfate (FEROSUL) 325 (65 Fe) MG Oral Tab Take 1 tablet (325 mg total) by mouth daily with breakfast.   Taking    nystatin 200040 UNIT/GM External Powder Apply 1 Application topically in the morning and 1 Application in the evening and 1 Application before bedtime.   Taking    donepezil 5 MG Oral Tab Take 1 tablet (5 mg total) by mouth nightly. 90 tablet 3 Taking    atorvastatin 20 MG Oral Tab Take 1 tablet (20 mg total) by mouth nightly. 90 tablet 3 Taking    ARIPiprazole 5 MG Oral Tab Take 1 tablet (5 mg total) by mouth nightly. 90 tablet 0 Taking    DULoxetine 60 MG Oral Cap DR Particles Take 1 capsule (60 mg total) by mouth nightly. Take 60mg daily. 90 capsule 0 Taking   [4]   Current Facility-Administered Medications Ordered in Epic   Medication Dose Route Frequency Provider Last Rate Last Admin    clonidine-EPINEPHrine-ropivacaine-ketorolac (CERTS) (Duraclon-Adrenalin-Naropin-Toradol) pain cocktail irrigation   Intra-articular Once (Intra-Op) Chase Duke MD         No current [facility]-ordered outpatient medications on file.   [5]   Allergies  Allergen Reactions    Sulfa Antibiotics HIVES   [6]   Family History  Problem Relation Age of Onset    Dementia  Father         Alzheimer's    Lipids Father         hyperlipidemia    Hypertension Father     Musculo-skelatal Disorder Father         B/L knees replaced    Diabetes Mother     Heart Disease Mother         CAD    Stroke Mother         CVA    Heart Attack Mother         quadruple bypass surgery/MI    Arthritis Mother         possible Rheumatoid Arthritis    Asthma Daughter     Colon Cancer Maternal Grandmother     Breast Cancer Sister 40    Other (Other) Sister         flu H1N1    Cancer Brother         liver    Breast Cancer Paternal Aunt 52    Glaucoma Neg         multiple    Macular degeneration Neg         multiple   [7]   Social History  Socioeconomic History    Marital status:    Tobacco Use    Smoking status: Former     Current packs/day: 0.00     Average packs/day: 1 pack/day for 30.0 years (30.0 ttl pk-yrs)     Types: Cigarettes     Start date: 1972     Quit date: 2002     Years since quittin.2     Passive exposure: Never    Smokeless tobacco: Never    Tobacco comments:     1 pack per day   Vaping Use    Vaping status: Never Used   Substance and Sexual Activity    Alcohol use: Not Currently    Drug use: No    Sexual activity: Yes     Partners: Male     Birth control/protection: Tubal Ligation   Other Topics Concern    Caffeine Concern Yes     Comment: tea, soda, 44 oz daily    Reaction to local anesthetic No    Right Handed Yes

## 2025-04-21 NOTE — OR PREOP
Patient and daughter unsure of last date meds given, spouse not answering phone-he is incharge of administering meds

## 2025-04-21 NOTE — OPERATIVE REPORT
Left total hip incision and drainage, possible head liner change Procedure Note    Eric Aviles  4/21/2025    Pre-op Diagnosis:  Wound dehiscence, surgical, initial encounter [T81.31XA]  S/P hip replacement, left [Z96.642]    Post-op Diagnosis:  Wound dehiscence, surgical, initial encounter [T81.31XA]  S/P hip replacement, left [Z96.642]    Procedure(s):  Left total hip superficial irrigation and debridement    Circulating Nurse.: Selvin Lopez RN  Scrub Person.: Krystina Gomez  Surgical Assistant.: Deon Maier    Please note that the use of a certified surgical assistant was necessary for this case.  Marixa and Deon assisted in patient positioning, retracting soft tissue and other vital structures for bony preparation and implantation, and closure.  Without their assistance, the case would have taken significantly longer and stability for more morbidity to the patient.  There were no qualified residents available for assistance.    Anesthesia:  Spinal    Estimated Blood Loss:  50 cc    Specimens:  * No specimens in log *      Drains:  External Urinary Catheter (Active)       Implants:  * No implants in log *    Operative Indications:      The patient is a 70 year old female who previously underwent left total hip arthroplasty 3 weeks ago.  She was evaluated and noted to have wound dehiscence from the center of the wound. This appeared to be superficial but given the how large it was, my recommendation was for a left hip irrigation and debridement, exploration of the fascia and possible modular component exchange. Benefits were discussed with the patient.  They include but are not limited to failure to eradicate the infection, blood loss, need for transfusion, damage to surrounding neurovascular structures, fractures, dislocation/instability, risk for anesthesia, need for future procedures, blood clots, heart attack, and stroke. After the risks, benefits, and alternatives were discussed, the patient opted  for irrigation and debridement with modular component exchange in an attempt to salvage the joint. An explicit discussion was conducted with regards to the odds of successful infection eradication with this treatment course, and possible need for subsequent 2-stage revision.  We discussed the postoperative course which would require likely a course of IV antibiotics with a PICC line.  Understanding all options, the patient, her , and the daughter elected to proceed.        OPERATIVE PROCEDURE: The operative procedure was confirmed with the patient and the operative site was marked.  The patient was brought to the operating room and transferred to the operating table. Anesthesia was administered by the anesthesiology team.  The patient was placed into the supine position on the [place] table.  Care was taken to ensure that all bony prominences and nerves were well-padded and free of compression.  Preoperative ancef was given prior to incision, as there is good data to suggest that this does not alter intra-op cultures.  The left lower extremity was prepped and draped in the usual sterile fashion.  A time-out was undertaken by the entire team and the procedure was confirmed.      The old incision was marked and incised.  The bikini incision direct anterior approach to the hip was reopened. The wound was dehisced laterally and so the skin in this area was excised. We assessed the TFL fascia, and noted the fascia appeared intact and healed over. In face her skin dehiscence was much lateral over the IT band region. This was superficial and there was healthy subcutaneous tissue underneath. There did appear to be a component of fat necrosis, but no arnie purulence was visualized. Based on this, I decided for a superficial I&D and decided to not explore the joint.     Using sharp dissection the soft tissue was completely debrided down to healthy bleeding tissue. Next, the hip was irrigated with over 3 liters of  pulsatile saline.  Dilute betadine solution, Irrisept and finally [employer] solution. was used to irrigate the hip gentle scrubbing of the wound was performed.     Following this the wound appeared quite clean. Satisfied with this, I began my closure. I placed vanco powder in this layer. The subcutaneous fatty layer was closed with interrupted  0 PDS. The deep dermal was closed with 2-0 Monocryl and 2-0 Nylon sutures were used for the skin layer.  A sterile pre vena incisional wound vac was applied.     COMPLICATIONS: None apparent    POSTOPERATIVE PLAN:    Prophylactic antibiotics will be given for 24 hours postoperatively.    VTE Prophylaxis: After risk assessment, ASA \ and Intermittent pneumatic compression device (IPCD) will be used for VTE prophylaxis.  Weightbearing Status: WBAT.  Hip precautions None      Chase Duke MD

## 2025-04-21 NOTE — ANESTHESIA POSTPROCEDURE EVALUATION
Patient: Eric Aviles    Procedure Summary       Date: 04/21/25 Room / Location: [facility] MAIN OR  / [facility] MAIN OR    Anesthesia Start: 1351 Anesthesia Stop: 1514    Procedure: Left total hip incision and drainage (Left: Hip) Diagnosis:       Wound dehiscence, surgical, initial encounter      S/P hip replacement, left      (Wound dehiscence, surgical, initial encounter [T81.31XA]S/P hip replacement, left [Z96.642])    Surgeons: Chase Duke MD Anesthesiologist: Pj Sherwood MD    Anesthesia Type: general ASA Status: 3            Anesthesia Type: general    Vitals Value Taken Time   /84 04/21/25 15:14   Temp 98 °F (36.7 °C) 04/21/25 15:14   Pulse 76 04/21/25 15:14   Resp 16 04/21/25 15:14   SpO2 95 % 04/21/25 15:14   Vitals shown include unfiled device data.    [facility] AN Post Evaluation:   Patient Evaluated in PACU  Patient Participation: complete - patient participated  Level of Consciousness: awake  Pain Score: 0  Pain Management: adequate  Airway Patency:patent  Dental exam unchanged from preop  Yes    Nausea/Vomiting: none  Cardiovascular Status: acceptable  Respiratory Status: acceptable and room air  Postoperative Hydration acceptable      Jo Ann Maldonado CRNA  4/21/2025 3:15 PM

## 2025-04-22 ENCOUNTER — HOSPITAL ENCOUNTER (OUTPATIENT)
Dept: RESPIRATORY THERAPY | Facility: HOSPITAL | Age: 70
End: 2025-04-22
Attending: INTERNAL MEDICINE
Payer: MEDICARE

## 2025-04-22 VITALS
OXYGEN SATURATION: 92 % | SYSTOLIC BLOOD PRESSURE: 147 MMHG | HEART RATE: 66 BPM | BODY MASS INDEX: 32 KG/M2 | TEMPERATURE: 98 F | HEIGHT: 60 IN | WEIGHT: 163 LBS | DIASTOLIC BLOOD PRESSURE: 57 MMHG | RESPIRATION RATE: 18 BRPM

## 2025-04-22 RX ORDER — DOXYCYCLINE 100 MG/1
100 CAPSULE ORAL 2 TIMES DAILY
Qty: 28 CAPSULE | Refills: 0 | Status: SHIPPED | OUTPATIENT
Start: 2025-04-22

## 2025-04-22 RX ORDER — ASPIRIN 81 MG/1
81 TABLET ORAL 2 TIMES DAILY
Qty: 60 TABLET | Refills: 0 | Status: SHIPPED | OUTPATIENT
Start: 2025-04-22

## 2025-04-22 NOTE — CM/SW NOTE
04/22/25 1300   CM/SW Referral Data   Referral Source Physician;Social Work (self-referral)   Reason for Referral Discharge planning   Informant EMR;Clinical Staff Member   Medical Hx   Does patient have an established PCP? Yes   Patient Info   Patient's Current Mental Status at Time of Assessment Alert;Oriented   Patient's Home Environment House   Patient lives with Spouse/Significant other   Patient Status Prior to Admission   Independent with ADLs and Mobility Yes   Services in place prior to admission Home Health Care   Home Health Provider Info [facility]   Discharge Needs   Anticipated D/C needs Home health care   Choice of Post-Acute Provider   Informed patient of right to choose their preferred provider Yes   List of appropriate post-acute services provided to patient/family with quality data Yes     Per chart review, pt was arranged with [facility] services through [facility] from the last admission    Minnie from [facility] confirmed they are current with the pt    RAUL entered for RN and PT    Pt discharged on O2 last admission with [facility] - but currently reports no home O2     If pt still needs O2 at discharge will need     Documentation for O2 sats: (RN to add a progress note with either o2 walk written out or flow sheet added to progress note )  SPO2% on Room Air at Rest:   SPO2% on Oxygen at Rest:   At rest oxygen flow (liters per minute):   SPO2% Ambulation on Oxygen:   Ambulation oxygen flow (liters per minute):     (Reminder: The \"at rest\" and \"while ambulating\" are required statements. If patient is non ambulatory you can use \"with exertion\" such as during a transfer to assess their O2 level)    Will need MD to copy and paste below AFTER o2 sats have been entered by RN    Oxygen:  I had a face to face visit with Eric Aviles and I reviewed the patient's walking O2 study completed by nursing on [unfilled]. The patient is mobile in their home and is in need of a portable oxygen tank. The home oxygen will improve the  Eric Aviles's condition.    Pt has recent hx with HME and has COPD    SW/CM to remain available for support and/or discharge planning.     PLAN: home with [facility] - and possible Home O2 - pending above    Cheyanne TRAVISW, MSW ext. [phone]

## 2025-04-22 NOTE — PROGRESS NOTES
[facility] Orthopedic Surgery     Procedure: Procedure(s) and Anesthesia Type:     * Left total hip incision and drainage - Spinal (Date: 4/21/2025)    Subjective: Pain controlled, no nausea/vomiting, no chest pain, no shortness of breath. Cultures negative.     Vitals:  /61 (BP Location: Right arm)   Pulse 77   Temp 97.9 °F (36.6 °C) (Oral)   Resp 18   Ht 5' (1.524 m)   Wt 163 lb (73.9 kg)   SpO2 96%   BMI 31.83 kg/m²       Intake/Output Summary (Last 24 hours) at 4/22/2025 1234  Last data filed at 4/22/2025 0605  Gross per 24 hour   Intake 1000 ml   Output 50 ml   Net 950 ml       Physical Examination:  General: alert/oriented x3, in no apparent distress  Left Lower Ext:    Dressing CDI    5/5 ankle plantar/dorsiflex, toes extension/flexion, intact knee extension    SILT L4-S1    2+ DP, PT pulses, feet/toes warm bilat; <2sec CR    No calf Tenderness    Labs:  Lab Results   Component Value Date    WBC 7.7 04/21/2025    HGB 11.6 (L) 04/21/2025    HCT 36.3 04/21/2025    .0 04/21/2025     Lab Results   Component Value Date     04/19/2025    K 4.7 04/19/2025     04/19/2025    CO2 27.0 04/19/2025    BUN 13 04/19/2025    CREAT 0.5 05/27/2016         Impression/Plan: POD 1 s/p left hip I&D    Recommend oral antibiotics for 2 weeks, given superficial only. Will initiate Doxycycline   Weight Bearing: WBAT  DVT prophylaxis: SCD and ASA 81 mg BID  Disposition: Ok to discharge to home from ortho standpoint.   Leave Prevena for 1 week, will remove in my office next Tuesday.      Chase Duke MD

## 2025-04-22 NOTE — DISCHARGE SUMMARY
[facility]  part of [facility]    Discharge Summary    Eric Aviles Patient Status:  Outpatient in a Bed    1955 MRN [id number]   Location [facility] 4W/SW/SE Attending Remi Hernández MD   Hosp Day # 0 PCP Casa Van MD     Date of Admission: 2025      Date of Discharge: No discharge date for patient encounter.  ***    Admitting Diagnosis: Wound dehiscence, surgical, initial encounter [T81.31XA]  S/P hip replacement, left [Z96.642]  Wound dehiscence, surgical, initial encounter    Hospital Discharge Diagnoses: {Enter hospital discharge diagnoses here (please select the wildcards and then replace with free text; this allows us to save this data discretely for reporting purposes:5961::\"***\"}    Lace+ Score: 60  59-90 High Risk  29-58 Medium Risk  0-28   Low Risk.    TCM Follow-Up Recommendation:  {Care Managers will evaluate the need for follow-up for all patients ages 50+, and high/moderate risk patients ages 25-49. Low risk patients (LACE < 29) will only be evaluated if the \"Still recommend for TCM follow-up\" option is selected from this list.:[id number]}          Problem List: Problem List[1]      Physical Exam: ***    History of Present Illness: ***    Hospital Course: ***    Discharge Condition: {DISCHARGE CONDITION:}    Discharge Medications:      Discharge Medications        START taking these medications        Instructions Prescription details   doxycycline 100 MG Caps  Commonly known as: Vibramycin      Take 1 capsule (100 mg total) by mouth 2 (two) times daily.   Quantity: 28 capsule  Refills: 0            CONTINUE taking these medications        Instructions Prescription details   albuterol 108 (90 Base) MCG/ACT Aers  Commonly known as: Ventolin HFA      Inhale 2 puffs into the lungs every 6 (six) hours as needed for Wheezing or Shortness of Breath.   Quantity: 18 g  Refills: 0     ARIPiprazole 5 MG Tabs  Commonly known as: Abilify      Take 1 tablet (5 mg total)  by mouth nightly.   Quantity: 90 tablet  Refills: 0     atorvastatin 20 MG Tabs  Commonly known as: Lipitor      Take 1 tablet (20 mg total) by mouth nightly.   Quantity: 90 tablet  Refills: 3     budesonide 0.5 MG/2ML Susp  Commonly known as: Pulmicort      Take 2 mL (0.5 mg total) by nebulization 2 (two) times daily.   Quantity: 12 each  Refills: 1     Calcium Citrate 950 (200 Ca) MG Tabs  Commonly known as: CITRACAL      Take 1 tablet (950 mg total) by mouth in the morning.   Refills: 0     celecoxib 200 MG Caps  Commonly known as: CeleBREX      Take 1 capsule (200 mg total) by mouth 2 (two) times daily.   Quantity: 60 capsule  Refills: 0     clonazePAM 2 MG Tabs  Commonly known as: KlonoPIN      Take 1 tablet (2 mg total) by mouth in the morning and 1 tablet (2 mg total) before bedtime.   Refills: 0     cyanocobalamin 250 MCG Tabs  Commonly known as: Vitamin B12      Take 1 tablet (250 mcg total) by mouth in the morning.   Refills: 0     donepezil 5 MG Tabs  Commonly known as: Aricept      Take 1 tablet (5 mg total) by mouth nightly.   Quantity: 90 tablet  Refills: 3     DULoxetine 60 MG Cpep  Commonly known as: Cymbalta      Take 1 capsule (60 mg total) by mouth nightly. Take 60mg daily.   Quantity: 90 capsule  Refills: 0     FeroSul 325 (65 Fe) MG Tabs  Generic drug: Ferrous Sulfate      Take 1 tablet (325 mg total) by mouth daily with breakfast.   Refills: 0     fluticasone furoate-vilanterol 100-25 MCG/ACT Aepb  Commonly known as: Breo Ellipta      Inhale 1 puff into the lungs daily. Rinse your mouth with water without swallowing after using BREO to help reduce your chance of getting thrush.   Quantity: 1 each  Refills: 5     Glucosamine Chondr 500 Complex Caps      Take 1 capsule by mouth in the morning.   Refills: 0     guaiFENesin 100 MG/5 ML  Commonly known as: Robitussin      Take 10 mL (200 mg total) by mouth every 4 (four) hours as needed.   Quantity: 1 each  Refills: 0     ketoconazole 2 %  Crea  Commonly known as: Nizoral      Apply 1 Application topically in the morning and 1 Application before bedtime.   Refills: 0     Meloxicam 15 MG Tabs      TAKE ONE TABLET BY MOUTH ONE TIME DAILY   Quantity: 30 tablet  Refills: 0     MULTIVITAMIN WOMENS 50+ ADV OR      Take 1 tablet by mouth in the morning.   Refills: 0     nystatin 810688 UNIT/GM Powd      Apply 1 Application topically in the morning and 1 Application in the evening and 1 Application before bedtime.   Refills: 0     oxyCODONE 5 MG Tabs      Take 1 tablet (5 mg total) by mouth every 6 (six) hours as needed for Pain.   Quantity: 28 tablet  Refills: 0     QUEtiapine 50 MG Tabs  Commonly known as: SEROquel      Take 2.5 tablets (125 mg total) by mouth nightly.   Refills: 0     Verapamil HCl  MG Cp24  Commonly known as: VERELAN      Take 1 capsule (180 mg total) by mouth nightly.   Refills: 0     Vitamin C 500 MG Tabs  Commonly known as: VITAMIN C      Take 1 tablet (500 mg total) by mouth in the morning.   Refills: 0               Where to Get Your Medications        These medications were sent to [id number] - [place], [place] - [address] [place] [address] [phone], [phone]  [address] [place] [id number]      Phone: [phone]   doxycycline 100 MG Caps             Remi Hernández MD  4/22/2025  4:16 PM    Greater than 30 minutes spent on preparation and coordination of this discharge       [1]   Patient Active Problem List  Diagnosis    Osteoarthritis    Raynaud's disease    Sx.6/8/16, CPT.79985, R Total Knee Replacement, w/, Global Exp.9/6/16    Seborrheic keratoses    Hx of total knee arthroplasty, bilateral    Essential hypertension    S/P reverse total shoulder arthroplasty, left    Centrilobular emphysema (HCC)    Breast lump on left side at 3 o'clock position    Other hyperlipidemia    Depression with anxiety    Pre-op testing    Open supracondylar fracture of right elbow    Supracondylar fracture of right humerus     Pain of left lower extremity    Abnormal blood chemistry    After-cataract with vision obscured    Altered mental status    Anemia    Benign neoplasm of connective and soft tissue    Bipolar disorder (HCC)    Chondrocalcinosis    Constipation    Dermatitis    Dermatographic urticaria    Dermatomycosis    Diarrhea    Generalized osteoarthrosis, involving multiple sites    Hearing loss    Inguinal hernia    Malaise and fatigue    Neuralgia and neuritis    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

## 2025-04-22 NOTE — HOME CARE LIAISON
This is a current pt with [facility] . Pt will need a RAUL order entered prior to DC for the below services. Please add a F2F if more services need to be added. Notified MAURICE Hunter.   ONELIA/PT.

## 2025-04-22 NOTE — PHYSICAL THERAPY NOTE
PHYSICAL THERAPY HIP EVALUATION - INPATIENT     Room Number: 418/418-A  Evaluation Date: 4/22/2025  Type of Evaluation: Initial  Physician Order: PT Eval and Treat    Presenting Problem: I&D hip wound, NO joint involvement   PMH: B TKA, ORIF R humerus, L VANE 3/24/25; COPD, bipolar disorder,   Reason for Therapy: Mobility Dysfunction and Discharge Planning    PHYSICAL THERAPY ASSESSMENT   Patient is a 70 year old female admitted 4/21/2025 for wound dehiscence left s/p superficial I&D with Prevena wound vac in place. Initial surgery 3/24/25 and was re-admitted for COPD exacerbation 3/28.  Prior to admission, patient's baseline is home with spouse, reports she is not using an devices in the home for walking, no home O2. Reports her  does IADL and drives, pt is lethargic and slow to respond this morning, RN aware.  Patient is currently functioning near baseline with bed mobility, transfers, gait, stair negotiation, maintaining seated position, and standing prolonged periods.  Patient is requiring supervision and minimal assist as a result of the following impairments: decreased endurance/aerobic capacity, impaired standing balance, impaired motor planning, decreased muscular endurance, cognitive deficits (impaired alertness, impaired sequencing and direction following), and increased O2 needs from baseline.  Physical Therapy will continue to follow for duration of hospitalization.    Patient will benefit from continued skilled PT Services at discharge to promote prior level of function and safety with additional support and return home with home health PT. Recommend 24/7 supervision for now, may need supplemental O2 at home    PLAN DURING HOSPITALIZATION  Nursing Mobility Recommendation : 1 Assist  PT Device Recommendation: Rolling walker  PT Treatment Plan: Bed mobility, Patient education, Gait training, Range of motion, Strengthening, Transfer training, Stair training  Rehab Potential : Fair  Frequency (Obs):  Daily     PHYSICAL THERAPY MEDICAL/SOCIAL HISTORY   History related to current admission: Patient is a 70-year-old  female who had a left total hip arthroplasty on . Recently developed wound dehiscence and drainage. Scheduled today for above-mentioned procedure by her orthopedic surgeon, Dr. Duke. Preoperatively, had spinal block. Postoperatively, transferred to PACU for further monitoring.      Problem List  Principal Problem:    Wound dehiscence, surgical, initial encounter  Active Problems:    Essential hypertension    Pre-op testing    Bipolar disorder ([facility])    COPD (chronic obstructive pulmonary disease) ([facility])      HOME SITUATION  Type of Home: House  Home Layout: One level  Stairs to Enter : 3   Railing: Yes    Stairs to Bedroom: 0         Lives With: Spouse (who works on Sun/Mon,; daughter may be able to help on those days if needed)    Drives: No   Patient Regularly Uses: Reading glasses     Prior Level of [place]: Per pt report she lives in a home with her  and has been having HHPT after VANE and then COPD exacerbation and hospitalization. She reports not using any cane or RW.  is usually home.    SUBJECTIVE  \"I don't have any pain\"    PHYSICAL THERAPY EXAMINATION   OBJECTIVE  Precautions: Limb alert - left, Bed/chair alarm  Fall Risk: High fall risk    WEIGHT BEARING RESTRICTION  L Lower Extremity: Weight Bearing as Tolerated      PAIN ASSESSMENT  Ratin  Location: left hip  Management Techniques: Activity promotion    COGNITION  Overall Cognitive Status:  A&Ox2  Arousal/Alertness:  delayed responses to stimuli, inconsistent responses to stimuli, and lethargic  Following Commands:  follows multistep commands with increased time and follows multistep commands with repetition    RANGE OF MOTION AND STRENGTH ASSESSMENT  Upper extremity ROM and strength are within functional limits   Lower extremity ROM is within functional limits   Lower extremity strength is within  functional limits     BALANCE  Static Sitting: Good  Dynamic Sitting: Fair +  Static Standing: Fair -  Dynamic Standing: Poor +    ACTIVITY TOLERANCE  Pulse: 88  Heart Rate Source: Monitor     BP: 132/62  BP Location: Right arm  BP Method: Automatic  Patient Position: Sitting    O2 WALK  Oxygen Therapy  SPO2% on Oxygen at Rest: 90  At rest oxygen flow (liters per minute): 2  SPO2% Ambulation on Oxygen: 89  Ambulation oxygen flow (liters per minute): 2    AM-PAC '6-Clicks' INPATIENT SHORT FORM - BASIC MOBILITY  How much difficulty does the patient currently have...  Patient Difficulty: Turning over in bed (including adjusting bedclothes, sheets and blankets)?: A Little   Patient Difficulty: Sitting down on and standing up from a chair with arms (e.g., wheelchair, bedside commode, etc.): A Little   Patient Difficulty: Moving from lying on back to sitting on the side of the bed?: A Little   How much help from another person does the patient currently need...   Help from Another: Moving to and from a bed to a chair (including a wheelchair)?: A Little   Help from Another: Need to walk in hospital room?: A Little   Help from Another: Climbing 3-5 steps with a railing?: A Little     AM-PAC Score:  Raw Score: 18   Approx Degree of Impairment: 46.58%   Standardized Score (AM-PAC Scale): 43.63   CMS Modifier (G-Code): CK    FUNCTIONAL ABILITY STATUS  Functional Mobility/Gait Assessment  Gait Assistance: Contact guard assist  Distance (ft): 25'x3  Assistive Device: Rolling walker  Pattern: Within Functional Limits  Rolling: modified independent  Supine to Sit: modified independent  Sit to Supine: contact guard assist  Sit to Stand: supervision    Exercise/Education Provided:  Energy conservation  Functional activity tolerated  Gait training  Posture  ROM  Strengthening  Transfer training  Simulated stair climbing    Skilled Therapy Provided: ONELIA Freedman approves participation in therapy session. Patient presents in bed sound  asleep with room completely dark. She is given time to wake up/questions asked prior to mobility. She is slow to respond throughout session needing increased time for all processing and mobility. When asking to order menu placed in front of pt and she can partially read this but limited due to no reading glasses here. She is provided options and she is unable to recall what the option was and not sure what she wants to eat. RN/PCT aware and will assist pt. She benefits from use of RW for imbalance, needs cues for PLB to maintain SpO2 at 88% or better, needing 2L currently. She is able to perform HEP with assist/cues. She has difficulty with sequencing and needs hand over hand at times. She is moving physically with CGA-supervision and will benefit from assistance at home with all mobility and RW. RN is aware of session and needs.     The patient's Approx Degree of Impairment: 46.58% has been calculated based on documentation in the [facility] '6 clicks' Inpatient Basic Mobility Short Form.  Research supports that patients with this level of impairment may benefit from home with increased assistance and HHPT, may need supplemental O2. Final disposition will be made by interdisciplinary medical team.    Patient End of Session: Up in chair, Needs met, Call light within reach, RN aware of session/findings, All patient questions and concerns addressed, Hospital anti-slip socks    CURRENT GOALS  Goals to be met by: 4/30/25  Patient Goal Patient's self-stated goal is: to go home today   Goal #1 Patient is able to demonstrate supine - sit EOB @ level: modified independent   Goal #1   Current Status    Goal #2 Patient is able to demonstrate transfers Sit to/from Stand at assistance level: modified independent     Goal #2  Current Status    Goal #3 Patient is able to ambulate 300 feet with assistive device at assistance level: modified independent    Goal #3   Current Status    Goal #4 Patient will negotiate 4 stairs/one curb w/  assistive device and supervision   Goal #4   Current Status    Goal #5 Patient verbalizes and/or demonstrates all precautions and safety concerns independently   Goal #5   Current Status    Goal #6 Patient independently performs home exercise program for ROM/strengthening per the instructions provided in preparation for discharge.   Goal #6  Current Status      Patient Evaluation Complexity Level:  History High - 3 or more personal factors and/or co-morbidities   Examination of body systems Moderate - addressing a total of 3 or more elements   Clinical Presentation Low- Stable   Clinical Decision Making  Low Complexity     Therapeutic Activity:  21 minutes  Therapeutic Exercise: 13 minutes

## 2025-04-22 NOTE — PLAN OF CARE
POD#1. A&Ox3-4.  3 L [place]. SCD's and Cesar hose for DVT prophylaxis. Tolerating diet, denied nausea. Voiding to the bathroom. IVF. X1 walker. Pain managed with scheduled medications. Prevena wound vac intact. Plan for PT/OT eval today.    Problem: Patient Centered Care  Goal: Patient preferences are identified and integrated in the patient's plan of care  Description: Interventions:- What would you like us to know as we care for you? - Provide timely, complete, and accurate information to patient/family- Incorporate patient and family knowledge, values, beliefs, and cultural backgrounds into the planning and delivery of care- Encourage patient/family to participate in care and decision-making at the level they choose- Honor patient and family perspectives and choices  Outcome: Progressing     Problem: Patient/Family Goals  Goal: Patient/Family Long Term Goal  Description: Patient's Long Term Goal: Interventions:- - See additional Care Plan goals for specific interventions  Outcome: Progressing  Goal: Patient/Family Short Term Goal  Description: Patient's Short Term Goal: Interventions: - - See additional Care Plan goals for specific interventions  Outcome: Progressing     Problem: PAIN - ADULT  Goal: Verbalizes/displays adequate comfort level or patient's stated pain goal  Description: INTERVENTIONS:- Encourage pt to monitor pain and request assistance- Assess pain using appropriate pain scale- Administer analgesics based on type and severity of pain and evaluate response- Implement non-pharmacological measures as appropriate and evaluate response- Consider cultural and social influences on pain and pain management- Manage/alleviate anxiety- Utilize distraction and/or relaxation techniques- Monitor for opioid side effects- Notify MD/LIP if interventions unsuccessful or patient reports new pain- Anticipate increased pain with activity and pre-medicate as appropriate  Outcome: Progressing

## 2025-04-22 NOTE — OCCUPATIONAL THERAPY NOTE
OCCUPATIONAL THERAPY EVALUATION - INPATIENT     Room Number: 418/418-A  Evaluation Date: 4/22/2025  Type of Evaluation: Initial  Presenting Problem: L hip I & D    Physician Order: IP Consult to Occupational Therapy  Reason for Therapy: ADL/IADL Dysfunction and Discharge Planning    OCCUPATIONAL THERAPY ASSESSMENT   RN cleared pt for participation in OT session. Upon arrival, pt was supine in bed and agreeable to activity. Pt was left in bed and alarm was activated. Call light and all needs left in reach. Handoff given to RN.    Patient is a 70 year old female admitted 4/21/2025 for wound dehiscence left s/p superficial I&D with Prevena wound vac in place. Prior to admission, pt was I. Patient is currently requiring up to min A clothing management with toileting, LB dressing, and bathing. I for UB dressing, grooming, and eating ADLs. I for supine to sit, I for sitting at EOB, I for STS, and I for functional transfer at RW level. Pt tolerated 2 minutes in unsupported standing. Pt can DC home with assist PRN.    Education provided  Educated pt about role of OT and hospital therapy process as well as proper safety techniques including proper hand placement, body mechanics, safety techniques, adaptive dressing techniques, pain management (ice), and education on AE for dressing and toileting. Pt/family verbalized/demonstrated good carryover.    PLAN  DC OT services    OCCUPATIONAL THERAPY MEDICAL/SOCIAL HISTORY     Problem List  Principal Problem:    Wound dehiscence, surgical, initial encounter  Active Problems:    Essential hypertension    Pre-op testing    Bipolar disorder (HCC)    COPD (chronic obstructive pulmonary disease) (HCC)      Past Medical History  Past Medical History[1]    Past Surgical History  Past Surgical History[2]    HOME SITUATION  Type of Home: House  Home Layout: One level  Lives With: Daughter, Family (daughter and grandson live with her)     Toilet and Equipment: Standard height  toilet  Shower/Tub and Equipment: Walk-in shower, Shower chair  Other Equipment: None          Drives: No  Patient Regularly Uses: Reading glasses    SUBJECTIVE  \"I have no pain\"    OCCUPATIONAL THERAPY EXAMINATION      OBJECTIVE  Precautions: Limb alert - left, Bed/chair alarm  Fall Risk: High fall risk    PAIN ASSESSMENT  Rating: Other (Comment) (pt notes feeling no pain at the moment)  Location: L hip     COGNITION  Alert and oriented X4    RANGE OF MOTION   Upper extremity ROM is within functional limits     STRENGTH ASSESSMENT  Upper extremity strength is within functional limits     COORDINATION  Gross Motor: WFL   Fine Motor: WFL     ACTIVITIES OF DAILY LIVING ASSESSMENT  AM-PAC ‘6-Clicks’ Inpatient Daily Activity Short Form  How much help from another person does the patient currently need…  -   Putting on and taking off regular lower body clothing?: A Little  -   Bathing (including washing, rinsing, drying)?: A Little  -   Toileting, which includes using toilet, bedpan or urinal? : A Little  -   Putting on and taking off regular upper body clothing?: None  -   Taking care of personal grooming such as brushing teeth?: None  -   Eating meals?: None    AM-PAC Score:  Score: 21  Approx Degree of Impairment: 32.79%  Standardized Score (AM-PAC Scale): 44.27  CMS Modifier (G-Code): CJ       FUNCTIONAL TRANSFER ASSESSMENT  Supine to Sit : Supervision     Sit to supine: Sup  STS: Sup  Toilet Transfer: Sup    FUNCTIONAL ADL ASSESSMENT  UB ADLs: I  LB ADLs: min A  Toileting: min A    The patient's Approx Degree of Impairment: 32.79% has been calculated based on documentation in the [facility] '6 clicks' Inpatient Daily Activity Short Form.  Research supports that patients with this level of impairment may benefit from home. Final disposition will be made by interdisciplinary medical team.      Patient Evaluation Complexity Level:   Occupational Profile/Medical History LOW - Brief history including review of medical or  therapy records    Specific performance deficits impacting engagement in ADL/IADL LOW  1 - 3 performance deficits    Client Assessment/Performance Deficits LOW - No comorbidities nor modifications of tasks    Clinical Decision Making LOW - Analysis of occupational profile, problem-focused assessments, limited treatment options    Overall Complexity LOW     OT Session Time: 20 minutes  Self-Care Home Management: 10 minutes    JORDAN Radford OT  [facility]  Inpatient Rehabilitation  Occupational Therapy  [phone]         [1]   Past Medical History:   Arrhythmia    Bipolar disorder (HCC)    Cataract    COPD (chronic obstructive pulmonary disease) (HCC)    Depression    Essential hypertension    High blood pressure    High cholesterol    Osteoarthritis    R knee surgery    Psychogenic polydipsia    Pulmonary emphysema (HCC)    Raynaud disease    Rib fracture    left side    Sciatica    Seborrheic keratoses    Sx.6/1/15, CPT.87946, L Total Knee, w/, Global Exp.8/30/15    Sx.6/8/16, CPT.51857, R Total Knee Replacement, w/, Global Exp.9/6/16    Visual impairment    readers   [2]   Past Surgical History:  Procedure Laterality Date    Appendectomy      appendicitis    Appendectomy      Breast biopsy Left 09/07/2011    fibrocystic changes    Cataract Bilateral     Colonoscopy  07/2009    incomplete (chronic constipation)    Colonoscopy N/A 11/08/2018    Procedure: COLONOSCOPY;  Surgeon: Micheal Curtis MD;  Location: [facility] ENDOSCOPY    Colonoscopy N/A 12/19/2019    Procedure: COLONOSCOPY;  Surgeon: Micheal Curtis MD;  Location: [facility] ENDOSCOPY    Hernia surgery Left 12/21/2011    neuroma excision, partial mesh removal, primary re-repair    Inguinal hernia repair Left 05/20/2009    Knee surgery Right     osteoarthritis    Angel localization wire 1 site left (cpt=19281)      2011    Orif humerus fracture Right 04/15/2022    right distal humerus open reduction  internal fixation    Other Left 03/22/2019    LEFT REVERSE TOTAL SHOULDER ARTHROPLASTY WITH BICEPS TENODESIS    Other surgical history      Laparotomy (adhesions)    Other surgical history      Arthrocentesis of the right knee joint    Other surgical history      Arthrocentesis of the left knee joint    Other surgical history Right 08/2019    rotator cuff    Total hip replacement Left     Total knee replacement Bilateral 2015 and 2016    Tubal ligation

## 2025-04-22 NOTE — PLAN OF CARE
Patient is POD 1. A/Ox4, forgetful at times. On RA. Tolerating diet. Voiding freely. Up SBA w walker. Prevena wound vac in place. Scheduled tylenol given for pain. Call light and personal items within reach. Discharge paperwork discussed with patient and spouse. All questions answered, no further needs at this time.      Problem: Patient Centered Care  Goal: Patient preferences are identified and integrated in the patient's plan of care  Description: Interventions:- What would you like us to know as we care for you? - Provide timely, complete, and accurate information to patient/family- Incorporate patient and family knowledge, values, beliefs, and cultural backgrounds into the planning and delivery of care- Encourage patient/family to participate in care and decision-making at the level they choose- Honor patient and family perspectives and choices  Outcome: Adequate for Discharge     Problem: Patient/Family Goals  Goal: Patient/Family Long Term Goal  Description: Patient's Long Term Goal: Interventions:- - See additional Care Plan goals for specific interventions  Outcome: Adequate for Discharge  Goal: Patient/Family Short Term Goal  Description: Patient's Short Term Goal: Interventions: - - See additional Care Plan goals for specific interventions  Outcome: Adequate for Discharge     Problem: PAIN - ADULT  Goal: Verbalizes/displays adequate comfort level or patient's stated pain goal  Description: INTERVENTIONS:- Encourage pt to monitor pain and request assistance- Assess pain using appropriate pain scale- Administer analgesics based on type and severity of pain and evaluate response- Implement non-pharmacological measures as appropriate and evaluate response- Consider cultural and social influences on pain and pain management- Manage/alleviate anxiety- Utilize distraction and/or relaxation techniques- Monitor for opioid side effects- Notify MD/LIP if interventions unsuccessful or patient reports new pain-  Anticipate increased pain with activity and pre-medicate as appropriate  4/22/2025 1732 by Jennie Chirinos RN  Outcome: Adequate for Discharge  4/22/2025 1453 by Jennie Chirinos RN  Outcome: Progressing     Problem: METABOLIC/FLUID AND ELECTROLYTES - ADULT  Goal: Electrolytes maintained within normal limits  Description: INTERVENTIONS:- Monitor labs and rhythm and assess patient for signs and symptoms of electrolyte imbalances- Administer electrolyte replacement as ordered- Monitor response to electrolyte replacements, including rhythm and repeat lab results as appropriate- Fluid restriction as ordered- Instruct patient on fluid and nutrition restrictions as appropriate  4/22/2025 1732 by Jennie Chirinos RN  Outcome: Adequate for Discharge  4/22/2025 1453 by Jennie Chirinos RN  Outcome: Progressing     Problem: SKIN/TISSUE INTEGRITY - ADULT  Goal: Skin integrity remains intact  Description: INTERVENTIONS- Assess and document risk factors for pressure ulcer development- Assess and document skin integrity- Monitor for areas of redness and/or skin breakdown- Initiate interventions, skin care algorithm/standards of care as needed  4/22/2025 1732 by Jennie Chirinos RN  Outcome: Adequate for Discharge  4/22/2025 1453 by Jennie Chirinos RN  Outcome: Progressing  Goal: Incision(s), wounds(s) or drain site(s) healing without S/S of infection  Description: INTERVENTIONS:- Assess and document risk factors for pressure ulcer development- Assess and document skin integrity- Assess and document dressing/incision, wound bed, drain sites and surrounding tissue- Implement wound care per orders- Initiate isolation precautions as appropriate- Initiate Pressure Ulcer prevention bundle as indicated  4/22/2025 1732 by Jennie Chirinos RN  Outcome: Adequate for Discharge  4/22/2025 1453 by Jennie Chirinos RN  Outcome: Progressing     Problem: MUSCULOSKELETAL - ADULT  Goal: Maintain proper alignment of affected body part  Description: INTERVENTIONS:-  Support and protect limb and body alignment per provider's orders- Instruct and reinforce with patient and family use of appropriate assistive device and precautions (e.g. spinal or hip dislocation precautions)  4/22/2025 1732 by Jennie Chirinos RN  Outcome: Adequate for Discharge  4/22/2025 1453 by Jennie Chirinos RN  Outcome: Progressing     Problem: Impaired Functional Mobility  Goal: Achieve highest/safest level of mobility/gait  Description: Interventions:- Assess patient's functional ability and stability- Promote increasing activity/tolerance for mobility and gait- Educate and engage patient/family in tolerated activity level and precautions- Recommend use of  RW for transfers and ambulation  4/22/2025 1732 by Jennie Chirinos RN  Outcome: Adequate for Discharge  4/22/2025 1453 by Jennie Chirinos RN  Outcome: Progressing     Problem: Impaired Activities of Daily Living  Goal: Achieve highest/safest level of independence in self care  Description: Interventions:- Assess ability and encourage patient to participate in ADLs to maximize function- Promote sitting position while performing ADLs such as feeding, grooming, and bathing- Educate and encourage patient/family in tolerated functional activity level and precautions during self-care- Encourage patient to incorporate impaired side during daily activities to promote function  4/22/2025 1732 by Jennie Chirinos RN  Outcome: Adequate for Discharge  4/22/2025 1453 by Jennie Chirinos RN  Outcome: Progressing

## 2025-04-25 ENCOUNTER — TELEPHONE (OUTPATIENT)
Dept: ORTHOPEDICS CLINIC | Facility: CLINIC | Age: 70
End: 2025-04-25

## 2025-04-25 NOTE — TELEPHONE ENCOUNTER
Home Health RN states that the patient does not want to be seen this week. RN has questions regarding patients care and her wound vac.    Please advise, thanks.

## 2025-04-25 NOTE — TELEPHONE ENCOUNTER
Spoke with Honey, Home Health RN from [facility]. Clarified that wound vac should be set to stop working when done, usually 7 days, then shou ld be removed and replaced with a dry dressing. Did call back after finding a note that it will be removed in office on 4/29. No further questions or concerns at this time.

## 2025-04-25 NOTE — TELEPHONE ENCOUNTER
Called Honey. Left message to call back. On confidential voicemail.    You can please try ti transfer back

## 2025-04-29 ENCOUNTER — OFFICE VISIT (OUTPATIENT)
Age: 70
End: 2025-04-29
Payer: MEDICARE

## 2025-04-29 VITALS — HEIGHT: 60 IN | WEIGHT: 160 LBS | BODY MASS INDEX: 31.41 KG/M2

## 2025-04-29 DIAGNOSIS — T81.31XD POSTOPERATIVE WOUND DEHISCENCE, SUBSEQUENT ENCOUNTER: Primary | ICD-10-CM

## 2025-04-29 DIAGNOSIS — Z96.642 S/P HIP REPLACEMENT, LEFT: ICD-10-CM

## 2025-04-29 PROCEDURE — 3008F BODY MASS INDEX DOCD: CPT | Performed by: ORTHOPAEDIC SURGERY

## 2025-04-29 PROCEDURE — 1160F RVW MEDS BY RX/DR IN RCRD: CPT | Performed by: ORTHOPAEDIC SURGERY

## 2025-04-29 PROCEDURE — 1159F MED LIST DOCD IN RCRD: CPT | Performed by: ORTHOPAEDIC SURGERY

## 2025-04-29 PROCEDURE — 99024 POSTOP FOLLOW-UP VISIT: CPT | Performed by: ORTHOPAEDIC SURGERY

## 2025-04-29 NOTE — PROGRESS NOTES
Chief Complaint   Patient presents with    Post-Op     Left Hip I&D sx on 4/2- no drainage- no numbness or tingling- no present pain       HPI  Eric Aviles is a 70 year old female being seen in the office today for follow up of left hip.  She is 1 week up from I&D.  She notes no fevers chills.  Pain is improving.  She is in a Prevena.  Cultures from IntraOp are negative.         The following portions of the patient's history were reviewed [unfilled] and updated as appropriate: allergies, current medications, past family history, past medical history, past social history, past surgical history and problem list.      Physical Exam  Ht 5' (1.524 m)   Wt 160 lb (72.6 kg)   BMI 31.25 kg/m²   Body mass index is 31.25 kg/m².  Ortho Exam  Left hip exam demonstrates her incision to be well-approximated.  There is no surrounding erythema induration or active drainage today.  Swelling is appropriate for postop status.  She has full range of motion of the hip.  Distally neurovascularly intact.  She walks with a nonantalgic gait.    Imaging:  None      Assessment and Plan  Assessment & Plan      I had a lengthy discussion with the patient today about the patient's hip.  I believe the progress.    Martina was removed.  This was cleaned with Betadine.  She had an Aquacel dressing placed.  She is to leave this on for 7 days.  I will see her back in 1 week time, remove her Aquacel, and remove her sutures at that time as well.  She should finish out her oral antibiotics, which were a 2-week course.  All of the patient's questions were answered today.       No follow-ups on file.    [unfilled]    Chase Duke MD

## 2025-05-02 ENCOUNTER — TELEPHONE (OUTPATIENT)
Dept: ORTHOPEDICS CLINIC | Facility: CLINIC | Age: 70
End: 2025-05-02

## 2025-05-02 NOTE — TELEPHONE ENCOUNTER
Socorro from residential home health PT would like an RN to reach back out to update on the patient. Please advise

## 2025-05-02 NOTE — TELEPHONE ENCOUNTER
Spoke with Socorro, PT who stated patient is  walking without a devise, doing well, but hs some cognitive deficits, forgetful, needs reminders.  Patient has a program of simple exercises, but  she isn't sure she will remember how to do them.   is with her at all times.  oScorro with be discharging patient and understands that Dr Duke doesn't have his hip replacements go to Outpatient PT.      4/21/25  L Hip Replacement

## 2025-05-06 ENCOUNTER — OFFICE VISIT (OUTPATIENT)
Age: 70
End: 2025-05-06
Payer: MEDICARE

## 2025-05-06 DIAGNOSIS — Z96.642 S/P HIP REPLACEMENT, LEFT: ICD-10-CM

## 2025-05-06 DIAGNOSIS — T81.31XD POSTOPERATIVE WOUND DEHISCENCE, SUBSEQUENT ENCOUNTER: Primary | ICD-10-CM

## 2025-05-06 PROBLEM — T81.31XA WOUND DEHISCENCE, SURGICAL: Status: ACTIVE | Noted: 2025-04-18

## 2025-05-06 PROCEDURE — 1159F MED LIST DOCD IN RCRD: CPT | Performed by: ORTHOPAEDIC SURGERY

## 2025-05-06 PROCEDURE — 1160F RVW MEDS BY RX/DR IN RCRD: CPT | Performed by: ORTHOPAEDIC SURGERY

## 2025-05-06 PROCEDURE — 99024 POSTOP FOLLOW-UP VISIT: CPT | Performed by: ORTHOPAEDIC SURGERY

## 2025-05-06 RX ORDER — OXYCODONE HYDROCHLORIDE 5 MG/1
5 TABLET ORAL NIGHTLY PRN
Qty: 20 TABLET | Refills: 0 | Status: SHIPPED | OUTPATIENT
Start: 2025-05-06

## 2025-05-06 NOTE — ASSESSMENT & PLAN NOTE
Orders:    oxyCODONE 5 MG Oral Tab; Take 1 tablet (5 mg total) by mouth nightly as needed for Pain.

## 2025-05-06 NOTE — PROGRESS NOTES
Chief Complaint   Patient presents with    Wound Recheck     Left Hip I&D sx on 4/2 - Pt is healing well. Pain at times. Would like pain medication.        HPI  Eric Aviles is a 70 year old female being seen in the office today for follow up of left hip.  She is 2 weeks after superficial I&D.  She has 1 more dose of antibiotic remaining.  She denies any fevers chills.  She denies any wound drainage.         The following portions of the patient's history were reviewed [unfilled] and updated as appropriate: allergies, current medications, past family history, past medical history, past social history, past surgical history and problem list.      Physical Exam  There were no vitals taken for this visit.  There is no height or weight on file to calculate BMI.  Ortho Exam  Incision is well-approximated.  No evidence of erythema induration or drainage.  Medial aspect of the incision appears to be completely healed and so the sutures were removed.  Nylon sutures in the lateral aspect of the wound will be kept in for another week.    Imaging:  none    Assessment and Plan  Assessment & Plan  Postoperative wound dehiscence, subsequent encounter         S/P hip replacement, left    Orders:    oxyCODONE 5 MG Oral Tab; Take 1 tablet (5 mg total) by mouth nightly as needed for Pain.      I had a lengthy discussion with the patient today about the patient's hip.  Her incision looks good today.  Her lateral sutures are not ready to come out.  Will leave these in.  The medial sutures were removed.  She should finish out her antibiotic.  I will see her back in 1 week's time to reassess the potential removal of all sutures.  Avoid any belts or tight pants over the area as before.  Refill for oxycodone was provided.      No follow-ups on file.    [unfilled]    Chase Duke MD

## 2025-05-12 RX ORDER — LIDOCAINE HYDROCHLORIDE 20 MG/ML
1 SOLUTION ORAL; TOPICAL
Qty: 30 TABLET | Refills: 0 | OUTPATIENT
Start: 2025-05-12

## 2025-05-14 ENCOUNTER — OFFICE VISIT (OUTPATIENT)
Age: 70
End: 2025-05-14
Payer: MEDICARE

## 2025-05-14 DIAGNOSIS — T81.31XD POSTOPERATIVE WOUND DEHISCENCE, SUBSEQUENT ENCOUNTER: Primary | ICD-10-CM

## 2025-05-14 DIAGNOSIS — Z47.89 ORTHOPEDIC AFTERCARE: ICD-10-CM

## 2025-05-14 PROCEDURE — 99024 POSTOP FOLLOW-UP VISIT: CPT | Performed by: ORTHOPAEDIC SURGERY

## 2025-05-14 PROCEDURE — 1159F MED LIST DOCD IN RCRD: CPT | Performed by: ORTHOPAEDIC SURGERY

## 2025-05-14 PROCEDURE — 1160F RVW MEDS BY RX/DR IN RCRD: CPT | Performed by: ORTHOPAEDIC SURGERY

## 2025-05-14 PROCEDURE — 1111F DSCHRG MED/CURRENT MED MERGE: CPT | Performed by: ORTHOPAEDIC SURGERY

## 2025-05-14 NOTE — PROGRESS NOTES
No chief complaint on file.      HPI  Eric Aviles is a 70 year old female being seen in the office today for follow up of left hip.  She is doing well.  Denies any fevers chills or wound issues.  Denies any drainage.  She has finished her antibiotic 1 week ago.       The following portions of the patient's history were reviewed [unfilled] and updated as appropriate: allergies, current medications, past family history, past medical history, past social history, past surgical history and problem list.      Physical Exam  There were no vitals taken for this visit.  There is no height or weight on file to calculate BMI.  Ortho Exam  Left hip incision is closed.  No evidence of surrounding erythema induration or drainage.    Imaging:  N/A    Assessment and Plan  Assessment & Plan      I had a lengthy discussion with the patient today about the patient's hip.  I am pleased with her progress.  Continue VANE protocol.  Treatment options were discussed with the patient at length. Specifically, I recommend the following:    Remaining sutures were removed today.  Steri-Strips were applied.  She is okay to shower after 48 hours.  No bathing or pools.  Rest, ice, compression, elevation, anti-inflammatories/Tylenol for symptoms.  Continue home exercise program.  Activities: As tolerated  Follow up: 6 weeks with repeat xrays.  The patient understands and agrees with this plan. All of the patient's questions were answered today.       No follow-ups on file.    [unfilled]    Chase Duke MD

## 2025-05-19 ENCOUNTER — TELEPHONE (OUTPATIENT)
Dept: ORTHOPEDICS CLINIC | Facility: CLINIC | Age: 70
End: 2025-05-19

## 2025-06-11 ENCOUNTER — TELEPHONE (OUTPATIENT)
Dept: PULMONOLOGY | Facility: CLINIC | Age: 70
End: 2025-06-11

## 2025-06-11 NOTE — TELEPHONE ENCOUNTER
Medications - Current[1]  Medication Quantity Refills Start End   fluticasone furoate-vilanterol (BREO ELLIPTA) 100-25 MCG/ACT Inhalation Aerosol Powder, Breath Activated 1 each 5 4/14/2025 --   Sig:   Inhale 1 puff into the lungs daily. Rinse your mouth with water without swallowing after using BREO to help reduce your chance of getting thrush.     Route:   Inhalation     Order #:   [id number]            [1]   Current Outpatient Medications:     oxyCODONE 5 MG Oral Tab, Take 1 tablet (5 mg total) by mouth nightly as needed for Pain., Disp: 20 tablet, Rfl: 0    doxycycline 100 MG Oral Cap, Take 1 capsule (100 mg total) by mouth 2 (two) times daily., Disp: 28 capsule, Rfl: 0    aspirin 81 MG Oral Tab EC, Take 1 tablet (81 mg total) by mouth in the morning and 1 tablet (81 mg total) before bedtime., Disp: 60 tablet, Rfl: 0    MELOXICAM 15 MG Oral Tab, TAKE ONE TABLET BY MOUTH ONE TIME DAILY, Disp: 30 tablet, Rfl: 0    fluticasone furoate-vilanterol (BREO ELLIPTA) 100-25 MCG/ACT Inhalation Aerosol Powder, Breath Activated, Inhale 1 puff into the lungs daily. Rinse your mouth with water without swallowing after using BREO to help reduce your chance of getting thrush., Disp: 1 each, Rfl: 5    guaiFENesin 100 MG/5 ML Oral, Take 10 mL (200 mg total) by mouth every 4 (four) hours as needed., Disp: 1 each, Rfl: 0    budesonide 0.5 MG/2ML Inhalation Suspension, Take 2 mL (0.5 mg total) by nebulization 2 (two) times daily., Disp: 12 each, Rfl: 1    clonazePAM 2 MG Oral Tab, Take 1 tablet (2 mg total) by mouth in the morning and 1 tablet (2 mg total) before bedtime., Disp: , Rfl:     ketoconazole 2 % External Cream, Apply 1 Application topically in the morning and 1 Application before bedtime., Disp: , Rfl:     QUEtiapine 50 MG Oral Tab, Take 2.5 tablets (125 mg total) by mouth nightly. (Patient taking differently: Take 2 tablets (100 mg total) by mouth nightly.), Disp: , Rfl:     Verapamil HCl  MG Oral Capsule SR 24 Hr,  Take 1 capsule (180 mg total) by mouth nightly., Disp: , Rfl:     albuterol 108 (90 Base) MCG/ACT Inhalation Aero Soln, Inhale 2 puffs into the lungs every 6 (six) hours as needed for Wheezing or Shortness of Breath., Disp: 18 g, Rfl: 0    celecoxib (CELEBREX) 200 MG Oral Cap, Take 1 capsule (200 mg total) by mouth 2 (two) times daily., Disp: 60 capsule, Rfl: 0    Multiple Vitamins-Minerals (MULTIVITAMIN WOMENS 50+ ADV OR), Take 1 tablet by mouth in the morning., Disp: , Rfl:     Vitamin C 500 MG Oral Tab, Take 1 tablet (500 mg total) by mouth in the morning., Disp: , Rfl:     Glucosamine-Chondroit-Vit C-Mn (GLUCOSAMINE CHONDR 500 COMPLEX) Oral Cap, Take 1 capsule by mouth in the morning., Disp: , Rfl:     cyanocobalamin 250 MCG Oral Tab, Take 1 tablet (250 mcg total) by mouth in the morning., Disp: , Rfl:     Calcium Citrate 950 (200 Ca) MG Oral Tab, Take 1 tablet (950 mg total) by mouth in the morning., Disp: , Rfl:     Ferrous Sulfate (FEROSUL) 325 (65 Fe) MG Oral Tab, Take 1 tablet (325 mg total) by mouth daily with breakfast., Disp: , Rfl:     nystatin 616318 UNIT/GM External Powder, Apply 1 Application topically in the morning and 1 Application in the evening and 1 Application before bedtime., Disp: , Rfl:     donepezil 5 MG Oral Tab, Take 1 tablet (5 mg total) by mouth nightly., Disp: 90 tablet, Rfl: 3    atorvastatin 20 MG Oral Tab, Take 1 tablet (20 mg total) by mouth nightly., Disp: 90 tablet, Rfl: 3    ARIPiprazole 5 MG Oral Tab, Take 1 tablet (5 mg total) by mouth nightly., Disp: 90 tablet, Rfl: 0    DULoxetine 60 MG Oral Cap DR Particles, Take 1 capsule (60 mg total) by mouth nightly. Take 60mg daily., Disp: 90 capsule, Rfl: 0

## 2025-06-24 ENCOUNTER — TELEPHONE (OUTPATIENT)
Dept: CASE MANAGEMENT | Age: 70
End: 2025-06-24

## 2025-06-24 DIAGNOSIS — Z96.642 S/P HIP REPLACEMENT, LEFT: Primary | ICD-10-CM

## 2025-06-24 NOTE — TELEPHONE ENCOUNTER
Dr. Van,     Patient called requesting referral to Dr. Chase Duke. Appt 6/25/25    Pended referral please review diagnosis and sign off if you agree.    Thank you.  Carey Vides  [facility]

## 2025-06-25 ENCOUNTER — TELEPHONE (OUTPATIENT)
Age: 70
End: 2025-06-25

## 2025-06-25 ENCOUNTER — OFFICE VISIT (OUTPATIENT)
Age: 70
End: 2025-06-25
Payer: MEDICARE

## 2025-06-25 ENCOUNTER — HOSPITAL ENCOUNTER (OUTPATIENT)
Dept: GENERAL RADIOLOGY | Facility: HOSPITAL | Age: 70
Discharge: HOME | End: 2025-06-25
Attending: ORTHOPAEDIC SURGERY
Payer: MEDICARE

## 2025-06-25 DIAGNOSIS — Z47.89 ORTHOPEDIC AFTERCARE: Primary | ICD-10-CM

## 2025-06-25 DIAGNOSIS — Z47.89 ORTHOPEDIC AFTERCARE: ICD-10-CM

## 2025-06-25 DIAGNOSIS — Z96.642 S/P HIP REPLACEMENT, LEFT: ICD-10-CM

## 2025-06-25 DIAGNOSIS — M16.11 PRIMARY OSTEOARTHRITIS OF RIGHT HIP: Primary | ICD-10-CM

## 2025-06-25 DIAGNOSIS — M16.11 PRIMARY OSTEOARTHRITIS OF RIGHT HIP: ICD-10-CM

## 2025-06-25 PROCEDURE — 73502 X-RAY EXAM HIP UNI 2-3 VIEWS: CPT | Performed by: ORTHOPAEDIC SURGERY

## 2025-06-25 PROCEDURE — 99215 OFFICE O/P EST HI 40 MIN: CPT | Performed by: ORTHOPAEDIC SURGERY

## 2025-06-25 PROCEDURE — 1160F RVW MEDS BY RX/DR IN RCRD: CPT | Performed by: ORTHOPAEDIC SURGERY

## 2025-06-25 PROCEDURE — 1159F MED LIST DOCD IN RCRD: CPT | Performed by: ORTHOPAEDIC SURGERY

## 2025-06-25 NOTE — TELEPHONE ENCOUNTER
Ortho Surgery Request  Surgery: Right Anterior Total Hip Arthroplasty   Surgical Assistant: Yes  Surgery Day Request:   Estimated Procedure Length: 75 mins  Anesthesia type:  Spinal   Position: Supine   Special Needs:[]Mini C-Arm  [x]Large C-arm - Large rVue 3D  [x]Nurse Assist [x]SCD's [x]Surgical Assistant []Ultrasound []Ultrasound Krish  [x] [place] Table  Equipment: Feedzai    Location:Main OR  Post Op Visit Date: 3 weeks    CPT Code: 18998     ICD Code: M16.11  Medical Clearance Needed: Medical,   Preadmission Testing Orders:  Use surgeon's preferences card  Additional PAT orders:  Pre OP Orders:  Use [facility] surgeon's personalized order set  Additional Pre Op Orders:  PCN Allergy:  No  If Yes:   __X__  Admit:  Hospital outpatient surgery  [x]Same day discharge  Home Health  Yes

## 2025-06-25 NOTE — ASSESSMENT & PLAN NOTE
Orders:    XR HIP W OR WO PELVIS 2 OR 3 VIEWS, LEFT (CPT=73502) [[id number]] - Orthopedic providers only; Future

## 2025-06-25 NOTE — PROGRESS NOTES
Chief Complaint   Patient presents with    Post-Op     S/P  Left Hip I&D sx on 4/2 - Pt healing well. Pt denies any  pain.           HPI  Eric Aviles is a 70 year old female being seen in the office today for follow up of left hip.  She is 3 months from surgery.  Her VANE was on 3/24/2025.  Her I&D was 4/21/2025.  She notes no pain in the hip.  She denies any wound issues.      She is now complaining of right hip pain.  She does not think it is as bad as the left one was.  She would like to address this prior to it getting to be that painful.  Pain localized to the groin region.  She notes with weightbearing activities.  She feels that this is negatively impacting her quality of life.  She has been doing physical therapy exercises.  She has been doing anti-inflammatories and Tylenol.         The following portions of the patient's history were reviewed [unfilled] and updated as appropriate: allergies, current medications, past family history, past medical history, past social history, past surgical history and problem list.      Physical Exam  There were no vitals taken for this visit.  There is no height or weight on file to calculate BMI.  Ortho Exam  Left hip exam demonstrates well-healed surgical scar.  Laterally she does have a small scab.  No active drainage, no surrounding erythema.  She believes that a stitch may have popped through.  She has no tenderness to palpation.  Range of motion hip is full and painless.  She has negative Stinchfield.  Distally she is neurovascularly intact.    Left hip exam demonstrates no skin issues.  She has no tenderness about the hip.  She does have pain with range of motion.  She has a positive Stinchfield.  Distally neurovascularly intact.    She walks with a nonantalgic gait.    Imaging:  I independently reviewed imaging dated 6/25/2025.  AP pelvis and 2 views of the left hip were obtained today.  They demonstrate uncemented left total hip arthroplasty in good position  without obvious signs of loosening or periprosthetic complications.  Visualized portions of the right hip demonstrate complete obliteration of joint space, osteophyte formation, sclerosis consistent with osteoarthritis of the right hip.    Assessment and Plan  Assessment & Plan  Orthopedic aftercare    Orders:    XR HIP W OR WO PELVIS 2 OR 3 VIEWS, LEFT (CPT=73502) [[id number]] - Orthopedic providers only; Future    S/P hip replacement, left         Primary osteoarthritis of right hip           I had a lengthy discussion with the patient today about the patient's left hip.  I am pleased with her progress.  Recommend continued exercise program.  I recommended no picking at the scabs.  For the right hip, she continues to have severe pain.  It is activity limiting.  She feels that this is interfering with her quality of life.  We discussed nonoperative versus operative measures.  Understanding all options, given that this is negatively impacting her quality life, and that she has failed conservative measures, she elected to proceed with surgical intervention.    I had a lengthy discussion about joint replacement.  They were also given [facility] handouts on joint replacement.  We discussed the importance of preoperative clearances, labs and appropriate studies.  We discussed the preoperative, intraoperative and postoperative course related to the surgery.  We discussed the importance of postoperative rehabilitation after surgery.  Risks, benefits and alternatives of the surgery were discussed.  Risks include but are not limited to superficial and deep infection, DVT/PE, damage to surrounding nerves and vessels, bleeding, need for blood transfusion, injury to surrounding structures including nerves, vessels, ligaments, tendons or bone, iatrogenic fractures, dislocation/instability, leg length inequality, loosening, polyethylene wear, osteolysis, continued pain, need for future surgeries, risks for anesthesia including MI,  stroke, loss of limb, or loss of life.     As such, I recommend the following:    Surgery scheduling performed for right anterior total hip arthroplasty.  They will need medical clearance.   In the interim, they will continue activity modification, Tylenol, anti-inflammatory medication ( until 1 week prior to surgery date), and assistive device usage for symptomatic relief.  Activities: As tolerated  Follow-up: 1 week prior to surgery for preoperative H&P visit.        No follow-ups on file.    [unfilled]    Chasejluis Duke MD    Rationale for 57 Modifier Addendum    Decision for Major Surgery  The decision for a major surgery was made during this visit/consultation based on review and discussion of the history of presentation, examination, available labs/imaging, and the patient's functional status. The medical and surgical history were considered with regards to risk and potential modifications needed.

## 2025-06-26 NOTE — TELEPHONE ENCOUNTER
Type of surgery:  Right Anterior Total Hip Arthroplasty   Date: 7/28/25  Location: [facility]   Medical Clearance:      *Medical: Yes      *Dental:      *Other:  Prior Authorization Status: Pending   Workers Comp:  Medacta/David:  Gentry: Yes  POV: 8/19/25

## 2025-06-26 NOTE — TELEPHONE ENCOUNTER
Spoke with patient and her  to offer surgery dates. Patient chose 7/21. Patient was reminded that Medical and/or Dental clearance is needed within 30 days of surgical date. Failure to complete all testing in a timely manner will cause surgery to be delayed and/or rescheduled. Patient understood and had no further questions at this time.

## 2025-06-26 NOTE — TELEPHONE ENCOUNTER
Called patient and her  back to inform them that I have to change her surgery date from 7/21 to 7/28 because I overbooked. They verbalized understanding and had no questions at this time.

## 2025-06-30 ENCOUNTER — TELEPHONE (OUTPATIENT)
Dept: OBGYN CLINIC | Facility: CLINIC | Age: 70
End: 2025-06-30

## 2025-06-30 NOTE — TELEPHONE ENCOUNTER
Pt calling to report left breast pain that has been going for \"awhile\". Pt stated that there is some discoloration around the nipple area on left breast and states the breast is tender to touch. Pt denies any breast lumps/bumps. Pt informed that JLK is out of the office until next Monday but can check for other openings. Pt offered appt with [facility] on 7/2 in [place]. Pt stated she would prefer [place] location. Pt stated she would prefer to wait until JLK is back for her to review message and give recs. Message to JLK if pt can be added for breast exam? Pt stated she can do any day EXCEPT Mondays.

## 2025-06-30 NOTE — TELEPHONE ENCOUNTER
30 day supply sent, patient is overdue for appt last seen in 7/26/24. [employer] message sent.     The patient is requesting a refill on: Donepezil Hydrochloride 5 Mg Tab Risi     [place]): Donepez[place] HCl     Dispensed Written Strength Quantity Refills Days Supply Provider Pharmacy   DONEPEZIL 5 MG TAB RISI 04/03/2025 07/26/2024 5 90 each  90 Philip Arroyo MD [id number] [place]   DONEPEZIL 5 MG TAB RISI 01/07/2025 07/26/2024 5 90 each  90 Philip Arroyo MD [id number] [place]     Last OV: 7/26/24  Next OV: none schedule  Future Appointments   Date Time Provider Department Center   7/23/2025 11:00 AM Chase Duke MD [facility] OR[facility]   8/19/2025 11:15 AM Chase Duke MD [facility] OR[facility] E[facility] 10 [facility]   10/7/2025 12:30 PM [facility]   [facility]/2025 12:45 [facility]      Assessment  1. MCI (mild cognitive impairment) with memory loss  Possible progressive cognitive decline.  Possibility of Alzheimer disease could be entertained.  However there is no significant progression in the years time according to [place] cognitive assessment.  Therefore neuropsychological testing will be ordered again.  Imaging of the head and other blood work was looking for any other alternative explanation so far was unrevealing.     Instructions     Return in about 3 months (around 10/26/2024).

## 2025-07-01 RX ORDER — DONEPEZIL HYDROCHLORIDE 5 MG/1
5 TABLET, FILM COATED ORAL NIGHTLY
Qty: 30 TABLET | Refills: 0 | Status: SHIPPED | OUTPATIENT
Start: 2025-07-01

## 2025-07-07 ENCOUNTER — TELEPHONE (OUTPATIENT)
Dept: ORTHOPEDICS CLINIC | Facility: CLINIC | Age: 70
End: 2025-07-07

## 2025-07-07 NOTE — TELEPHONE ENCOUNTER
Carmina called and is in a lot of pain. Patient requesting pain medication     Confirmed pharmacy with patient   [id number] - [place], [place] - [address] [facility] [phone], [phone]   [address] [place] [id number]   Phone: [phone] Fax: [phone]   Hours: Not open 24 hours

## 2025-07-07 NOTE — TELEPHONE ENCOUNTER
Patient accepts appointment for breast exam on 8/12 with Dr. Ventura. Offered sooner appointment with another provider; patient desires to see Dr. Ventura. Provided ER pain precautions. Patient verbalized understanding.

## 2025-07-07 NOTE — TELEPHONE ENCOUNTER
Spoke with patient. She endorses that her pain is very high and getting worse. She endorses not taking anything for the pain. When I asked about any over the counter medications she stated she tried tylenol with no relief. She had only taken it twice in one day and nothing further. I did advise that she could take the tylenol up to around 3,000 mg in a 24 hour period, which equates to about 2 extra-strength tablets 3 times a day, and that if she can tolerate anti-inflammatories she could take that per bottle instructions in between the tylenol doses. She reports being unable to tolerate ice r/t Raynaud's. I advised to try this for a couple days to see if that brings her pain to a tolerable level. I also advised that any narcotics are not recommended prior to surgery as that makes pain management after more difficult and that anti-inflammatories would need to be stopped a week prior to surgery. Verbalized understanding and agreeable to plan.

## 2025-07-08 RX ORDER — CELECOXIB 200 MG/1
200 CAPSULE ORAL 2 TIMES DAILY
Qty: 28 CAPSULE | Refills: 0 | Status: SHIPPED | OUTPATIENT
Start: 2025-07-08

## 2025-07-08 NOTE — TELEPHONE ENCOUNTER
Talked to Dr Duke- He would like to supplement over-the-counter tylenol by sending Celebrex 200mg, #28 tabs, Take BID. She should stop the medication 1 week before surgery.    Order placed and sent to the pharmacy per Dr Duke    S/w patient and informed her of the order. Educated that this medication should be stopped 1 week prior to surgery. She had no other questions at this time. She will be present at pre-op visit on 7/23

## 2025-07-11 ENCOUNTER — LAB ENCOUNTER (OUTPATIENT)
Dept: LAB | Age: 70
End: 2025-07-11
Attending: INTERNAL MEDICINE
Payer: MEDICARE

## 2025-07-11 ENCOUNTER — OFFICE VISIT (OUTPATIENT)
Dept: INTERNAL MEDICINE CLINIC | Facility: CLINIC | Age: 70
End: 2025-07-11

## 2025-07-11 VITALS
TEMPERATURE: 98 F | HEIGHT: 60 IN | SYSTOLIC BLOOD PRESSURE: 100 MMHG | RESPIRATION RATE: 16 BRPM | OXYGEN SATURATION: 97 % | BODY MASS INDEX: 32.16 KG/M2 | WEIGHT: 163.81 LBS | HEART RATE: 68 BPM | DIASTOLIC BLOOD PRESSURE: 58 MMHG

## 2025-07-11 DIAGNOSIS — I10 ESSENTIAL HYPERTENSION: Chronic | ICD-10-CM

## 2025-07-11 DIAGNOSIS — M16.11 ARTHRITIS OF RIGHT HIP: ICD-10-CM

## 2025-07-11 DIAGNOSIS — Z01.818 PREOP EXAM FOR INTERNAL MEDICINE: Primary | ICD-10-CM

## 2025-07-11 DIAGNOSIS — J43.2 CENTRILOBULAR EMPHYSEMA (HCC): ICD-10-CM

## 2025-07-11 DIAGNOSIS — Z01.818 PREOP EXAM FOR INTERNAL MEDICINE: ICD-10-CM

## 2025-07-11 PROBLEM — J44.1 COPD EXACERBATION (HCC): Status: RESOLVED | Noted: 2025-03-28 | Resolved: 2025-07-11

## 2025-07-11 LAB
ALBUMIN SERPL-MCNC: 4.8 G/DL (ref 3.2–4.8)
ALBUMIN/GLOB SERPL: 2.2 {RATIO} (ref 1–2)
ALP LIVER SERPL-CCNC: 92 U/L (ref 55–142)
ALT SERPL-CCNC: 13 U/L (ref 10–49)
ANION GAP SERPL CALC-SCNC: 9 MMOL/L (ref 0–18)
AST SERPL-CCNC: 20 U/L (ref ?–34)
BASOPHILS # BLD AUTO: 0.05 X10(3) UL (ref 0–0.2)
BASOPHILS NFR BLD AUTO: 1 %
BILIRUB SERPL-MCNC: 0.5 MG/DL (ref 0.2–1.1)
BILIRUB UR QL: NEGATIVE
BUN BLD-MCNC: 7 MG/DL (ref 9–23)
BUN/CREAT SERPL: 9.3 (ref 10–20)
CALCIUM BLD-MCNC: 9.7 MG/DL (ref 8.7–10.4)
CHLORIDE SERPL-SCNC: 98 MMOL/L (ref 98–112)
CLARITY UR: CLEAR
CO2 SERPL-SCNC: 29 MMOL/L (ref 21–32)
CREAT BLD-MCNC: 0.75 MG/DL (ref 0.55–1.02)
DEPRECATED RDW RBC AUTO: 42 FL (ref 35.1–46.3)
EGFRCR SERPLBLD CKD-EPI 2021: 85 ML/MIN/1.73M2 (ref 60–?)
EOSINOPHIL # BLD AUTO: 0.17 X10(3) UL (ref 0–0.7)
EOSINOPHIL NFR BLD AUTO: 3.4 %
ERYTHROCYTE [DISTWIDTH] IN BLOOD BY AUTOMATED COUNT: 12.7 % (ref 11–15)
EST. AVERAGE GLUCOSE BLD GHB EST-MCNC: 120 MG/DL (ref 68–126)
FASTING STATUS PATIENT QL REPORTED: YES
GLOBULIN PLAS-MCNC: 2.2 G/DL (ref 2–3.5)
GLUCOSE BLD-MCNC: 66 MG/DL (ref 70–99)
GLUCOSE UR-MCNC: NORMAL MG/DL
HBA1C MFR BLD: 5.8 % (ref ?–5.7)
HCT VFR BLD AUTO: 39.8 % (ref 35–48)
HGB BLD-MCNC: 12.9 G/DL (ref 12–16)
HGB UR QL STRIP.AUTO: NEGATIVE
IMM GRANULOCYTES # BLD AUTO: 0.01 X10(3) UL (ref 0–1)
IMM GRANULOCYTES NFR BLD: 0.2 %
KETONES UR-MCNC: NEGATIVE MG/DL
LEUKOCYTE ESTERASE UR QL STRIP.AUTO: NEGATIVE
LYMPHOCYTES # BLD AUTO: 2.52 X10(3) UL (ref 1–4)
LYMPHOCYTES NFR BLD AUTO: 50.9 %
MCH RBC QN AUTO: 29.3 PG (ref 26–34)
MCHC RBC AUTO-ENTMCNC: 32.4 G/DL (ref 31–37)
MCV RBC AUTO: 90.2 FL (ref 80–100)
MONOCYTES # BLD AUTO: 0.34 X10(3) UL (ref 0.1–1)
MONOCYTES NFR BLD AUTO: 6.9 %
NEUTROPHILS # BLD AUTO: 1.86 X10 (3) UL (ref 1.5–7.7)
NEUTROPHILS # BLD AUTO: 1.86 X10(3) UL (ref 1.5–7.7)
NEUTROPHILS NFR BLD AUTO: 37.6 %
NITRITE UR QL STRIP.AUTO: NEGATIVE
OSMOLALITY SERPL CALC.SUM OF ELEC: 278 MOSM/KG (ref 275–295)
PH UR: 6 [PH] (ref 5–8)
PLATELET # BLD AUTO: 251 10(3)UL (ref 150–450)
POTASSIUM SERPL-SCNC: 3.9 MMOL/L (ref 3.5–5.1)
PROT SERPL-MCNC: 7 G/DL (ref 5.7–8.2)
PROT UR-MCNC: NEGATIVE MG/DL
RBC # BLD AUTO: 4.41 X10(6)UL (ref 3.8–5.3)
SODIUM SERPL-SCNC: 136 MMOL/L (ref 136–145)
SP GR UR STRIP: <1.005 (ref 1–1.03)
UROBILINOGEN UR STRIP-ACNC: NORMAL
WBC # BLD AUTO: 5 X10(3) UL (ref 4–11)

## 2025-07-11 PROCEDURE — 1126F AMNT PAIN NOTED NONE PRSNT: CPT | Performed by: INTERNAL MEDICINE

## 2025-07-11 PROCEDURE — 3074F SYST BP LT 130 MM HG: CPT | Performed by: INTERNAL MEDICINE

## 2025-07-11 PROCEDURE — 36415 COLL VENOUS BLD VENIPUNCTURE: CPT

## 2025-07-11 PROCEDURE — G2211 COMPLEX E/M VISIT ADD ON: HCPCS | Performed by: INTERNAL MEDICINE

## 2025-07-11 PROCEDURE — 3078F DIAST BP <80 MM HG: CPT | Performed by: INTERNAL MEDICINE

## 2025-07-11 PROCEDURE — 3008F BODY MASS INDEX DOCD: CPT | Performed by: INTERNAL MEDICINE

## 2025-07-11 PROCEDURE — 83036 HEMOGLOBIN GLYCOSYLATED A1C: CPT

## 2025-07-11 PROCEDURE — 85025 COMPLETE CBC W/AUTO DIFF WBC: CPT

## 2025-07-11 PROCEDURE — 99214 OFFICE O/P EST MOD 30 MIN: CPT | Performed by: INTERNAL MEDICINE

## 2025-07-11 PROCEDURE — 87081 CULTURE SCREEN ONLY: CPT

## 2025-07-11 PROCEDURE — 81003 URINALYSIS AUTO W/O SCOPE: CPT

## 2025-07-11 PROCEDURE — 80053 COMPREHEN METABOLIC PANEL: CPT

## 2025-07-11 PROCEDURE — 1160F RVW MEDS BY RX/DR IN RCRD: CPT | Performed by: INTERNAL MEDICINE

## 2025-07-11 PROCEDURE — 1159F MED LIST DOCD IN RCRD: CPT | Performed by: INTERNAL MEDICINE

## 2025-07-11 NOTE — PROGRESS NOTES
The following individual(s) verbally consented to be recorded using ambient AI listening technology and understand that they can each withdraw their consent to this listening technology at any point by asking the clinician to turn off or pause the recording:    Patient name: Eric Aviles  Additional names:  n/a

## 2025-07-11 NOTE — PROGRESS NOTES
Patient ID: Eric Aviles is a 70 year old female.  Chief Complaint   Patient presents with    Pre-Op Exam     Pre-op for surgery on  for Right Anterior Total Hip Arthroplasty.             HISTORY OF PRESENT ILLNESS:   HPI  Pt presents for preoperative clearance.  Planned surgery - Right anterior total hip arthroplasty.  Type of anesthesia - Unknown  Surgeon - Dr. Srikanth Stone  Date of surgery - 2025  Cardiac history - No  Myocardial infarction in past 6 months - No  Bleeding disorders - No  Taking blood thinners - No  Chest pain or shortness of breath with ADLs - No  Number of alcoholic beverages consumed weekly - 0  Tobacco consumption - No  Had stress test earlier this year for preoperative purposes for left hip arthroplasty.  No complications from that surgery.    Review of Systems  Ten point review of systems otherwise negative with the exception of HPI and assessment and plan.    MEDICAL HISTORY:   Past Medical History[1]    Past Surgical History[2]    Medications - Current[3]    Allergies:Allergies[4]    Social History     Socioeconomic History    Marital status:      Spouse name: Not on file    Number of children: Not on file    Years of education: Not on file    Highest education level: Not on file   Occupational History    Not on file   Tobacco Use    Smoking status: Former     Current packs/day: 0.00     Average packs/day: 1 pack/day for 30.0 years (30.0 ttl pk-yrs)     Types: Cigarettes     Start date: 1972     Quit date: 2002     Years since quittin.4     Passive exposure: Never    Smokeless tobacco: Never    Tobacco comments:     1 pack per day   Vaping Use    Vaping status: Never Used   Substance and Sexual Activity    Alcohol use: Not Currently    Drug use: No    Sexual activity: Yes     Partners: Male     Birth control/protection: Tubal Ligation   Other Topics Concern     Service Not Asked    Blood Transfusions Not Asked    Caffeine Concern Yes     Comment:  tea, soda, 44 oz daily    Occupational Exposure Not Asked    Hobby Hazards Not Asked    Sleep Concern Not Asked    Stress Concern Not Asked    Weight Concern Not Asked    Special Diet Not Asked    Back Care Not Asked    Exercise Not Asked    Bike Helmet Not Asked    Seat Belt Not Asked    Self-Exams Not Asked    Grew up on a farm Not Asked    History of tanning Not Asked    Outdoor occupation Not Asked    Pt has a pacemaker Not Asked    Pt has a defibrillator Not Asked    Breast feeding Not Asked    Reaction to local anesthetic No    Left Handed Not Asked    Right Handed Yes    Currently spends a great deal of time in the sun Not Asked    Past Sunlamp Treatments for Acne Not Asked    Hx of Spending Great Deal of Time in Sun Not Asked    Bad sunburns in the past Not Asked    Tanning Salons in the Past Not Asked    Hx of Radiation Treatments Not Asked    Regular use of sun block Not Asked   Social History Narrative    Not on file     Social Drivers of Health     Food Insecurity: No Food Insecurity (4/21/2025)    NCSS - Food Insecurity     Worried About Running Out of Food in the Last Year: No     Ran Out of Food in the Last Year: No   Transportation Needs: No Transportation Needs (4/21/2025)    NCSS - Transportation     Lack of Transportation: No   Stress: Not on file   Housing Stability: Not At Risk (4/21/2025)    NCSS - Housing/Utilities     Has Housing: Yes     Worried About Losing Housing: No     Unable to Get Utilities: No           PHYSICAL EXAM:     Vitals:    07/11/25 1139   BP: 100/58   Pulse: 68   Resp: 16   Temp: 97.5 °F (36.4 °C)   TempSrc: Temporal   SpO2: 97%   Weight: 163 lb 12.8 oz (74.3 kg)   Height: 5' (1.524 m)       Body mass index is 31.99 kg/m².    Physical Exam  Eyes:      General: No scleral icterus.  Cardiovascular:      Rate and Rhythm: Normal rate and regular rhythm.      Pulses: Normal pulses.      Heart sounds: Normal heart sounds. No murmur heard.  Pulmonary:      Effort: Pulmonary effort  is normal. No respiratory distress.      Breath sounds: Normal breath sounds. No stridor. No wheezing or rhonchi.   Abdominal:      General: Abdomen is flat. Bowel sounds are normal. There is no distension.      Palpations: Abdomen is soft. There is no mass.      Tenderness: There is no abdominal tenderness.      Hernia: No hernia is present.   Neurological:      Mental Status: She is alert.   Psychiatric:         Mood and Affect: Mood normal.         Behavior: Behavior normal.             ASSESSMENT/PLAN:   1. Preop exam for internal medicine  CBC With Differential With Platelet; Future  Comp Metabolic Panel (14); Future  Urinalysis with Culture Reflex; Future  MSSA and MRSA Culture Screen; Future  Hemoglobin A1C [E]; Future  Stress test done in earlier 2025 for her left hip arthroplasty does not need to be repeated.  Was unchanged from 2017.  No change in cardiac status.  Clearance dependent on above results.    2. Arthritis of right hip  CBC With Differential With Platelet; Future  Comp Metabolic Panel (14); Future  Urinalysis with Culture Reflex; Future  MSSA and MRSA Culture Screen; Future  Hemoglobin A1C [E]; Future  Stress test done in earlier 2025 for her left hip arthroplasty does not need to be repeated.  Was unchanged from 2017.  No change in cardiac status.  Clearance dependent on above results.    3. Essential hypertension  CBC With Differential With Platelet; Future  Comp Metabolic Panel (14); Future  Urinalysis with Culture Reflex; Future  MSSA and MRSA Culture Screen; Future  Hemoglobin A1C [E]; Future  Stress test done in earlier 2025 for her left hip arthroplasty does not need to be repeated.  Was unchanged from 2017.  No change in cardiac status.  Clearance dependent on above results.    4. Centrilobular emphysema (HCC)  CBC With Differential With Platelet; Future  Comp Metabolic Panel (14); Future  Urinalysis with Culture Reflex; Future  MSSA and MRSA Culture Screen; Future  Hemoglobin A1C [E];  Future  Stress test done in earlier 2025 for her left hip arthroplasty does not need to be repeated.  Was unchanged from 2017.  No change in cardiac status.  Clearance dependent on above results.    Return if symptoms worsen or fail to improve.    This note was prepared using Dragon Medical voice recognition dictation software. As a result errors may occur. When identified these errors have been corrected. While every attempt is made to correct errors during dictation discrepancies may still exist.    Casa Van MD  7/11/2025       [1]   Past Medical History:   Arrhythmia    Bipolar disorder (HCC)    Cataract    COPD (chronic obstructive pulmonary disease) (HCC)    Depression    Essential hypertension    High blood pressure    High cholesterol    Osteoarthritis    R knee surgery    Psychogenic polydipsia    Pulmonary emphysema (HCC)    Raynaud disease    Rib fracture    left side    Sciatica    Seborrheic keratoses    Sx.6/1/15, CPT.53320, L Total Knee, w/, Global Exp.8/30/15    Sx.6/8/16, CPT.29802, R Total Knee Replacement, w/, Global Exp.9/6/16    Visual impairment    readers   [2]   Past Surgical History:  Procedure Laterality Date    Appendectomy      appendicitis    Appendectomy      Breast biopsy Left 09/07/2011    fibrocystic changes    Cataract Bilateral     Colonoscopy  07/2009    incomplete (chronic constipation)    Colonoscopy N/A 11/08/2018    Procedure: COLONOSCOPY;  Surgeon: Micheal Curtis MD;  Location: [facility] ENDOSCOPY    Colonoscopy N/A 12/19/2019    Procedure: COLONOSCOPY;  Surgeon: Micheal Curtis MD;  Location: [facility] ENDOSCOPY    Hernia surgery Left 12/21/2011    neuroma excision, partial mesh removal, primary re-repair    Inguinal hernia repair Left 05/20/2009    Knee surgery Right     osteoarthritis    Angel localization wire 1 site left (cpt=19281)      2011    Orif humerus fracture Right 04/15/2022    right distal humerus open reduction internal fixation    Other Left  03/22/2019    LEFT REVERSE TOTAL SHOULDER ARTHROPLASTY WITH BICEPS TENODESIS    Other surgical history      Laparotomy (adhesions)    Other surgical history      Arthrocentesis of the right knee joint    Other surgical history      Arthrocentesis of the left knee joint    Other surgical history Right 08/2019    rotator cuff    Total hip replacement Left     Total knee replacement Bilateral 2015 and 2016    Tubal ligation     [3]   Current Outpatient Medications:     DONEPEZIL 5 MG Oral Tab, Take 1 tablet (5 mg total) by mouth nightly., Disp: 30 tablet, Rfl: 0    fluticasone furoate-vilanterol (BREO ELLIPTA) 100-25 MCG/ACT Inhalation Aerosol Powder, Breath Activated, Inhale 1 puff into the lungs daily. Rinse your mouth with water without swallowing after using BREO to help reduce your chance of getting thrush., Disp: 1 each, Rfl: 5    budesonide 0.5 MG/2ML Inhalation Suspension, Take 2 mL (0.5 mg total) by nebulization 2 (two) times daily., Disp: 12 each, Rfl: 1    clonazePAM 2 MG Oral Tab, Take 1 tablet (2 mg total) by mouth in the morning and 1 tablet (2 mg total) before bedtime., Disp: , Rfl:     QUEtiapine 50 MG Oral Tab, Take 2.5 tablets (125 mg total) by mouth nightly. (Patient taking differently: Take 2 tablets (100 mg total) by mouth nightly.), Disp: , Rfl:     albuterol 108 (90 Base) MCG/ACT Inhalation Aero Soln, Inhale 2 puffs into the lungs every 6 (six) hours as needed for Wheezing or Shortness of Breath., Disp: 18 g, Rfl: 0    Multiple Vitamins-Minerals (MULTIVITAMIN WOMENS 50+ ADV OR), Take 1 tablet by mouth in the morning., Disp: , Rfl:     nystatin 026767 UNIT/GM External Powder, Apply 1 Application topically in the morning and 1 Application in the evening and 1 Application before bedtime., Disp: , Rfl:     atorvastatin 20 MG Oral Tab, Take 1 tablet (20 mg total) by mouth nightly., Disp: 90 tablet, Rfl: 3    ARIPiprazole 5 MG Oral Tab, Take 1 tablet (5 mg total) by mouth nightly., Disp: 90 tablet,  Rfl: 0    DULoxetine 60 MG Oral Cap DR Particles, Take 1 capsule (60 mg total) by mouth nightly. Take 60mg daily., Disp: 90 capsule, Rfl: 0    celecoxib (CELEBREX) 200 MG Oral Cap, Take 1 capsule (200 mg total) by mouth 2 (two) times daily., Disp: 28 capsule, Rfl: 0    oxyCODONE 5 MG Oral Tab, Take 1 tablet (5 mg total) by mouth nightly as needed for Pain. (Patient not taking: Reported on 7/11/2025), Disp: 20 tablet, Rfl: 0    doxycycline 100 MG Oral Cap, Take 1 capsule (100 mg total) by mouth 2 (two) times daily. (Patient not taking: Reported on 7/11/2025), Disp: 28 capsule, Rfl: 0    aspirin 81 MG Oral Tab EC, Take 1 tablet (81 mg total) by mouth in the morning and 1 tablet (81 mg total) before bedtime., Disp: 60 tablet, Rfl: 0    MELOXICAM 15 MG Oral Tab, TAKE ONE TABLET BY MOUTH ONE TIME DAILY, Disp: 30 tablet, Rfl: 0    guaiFENesin 100 MG/5 ML Oral, Take 10 mL (200 mg total) by mouth every 4 (four) hours as needed. (Patient not taking: Reported on 7/11/2025), Disp: 1 each, Rfl: 0    ketoconazole 2 % External Cream, Apply 1 Application topically in the morning and 1 Application before bedtime. (Patient not taking: Reported on 7/11/2025), Disp: , Rfl:     Verapamil HCl  MG Oral Capsule SR 24 Hr, Take 1 capsule (180 mg total) by mouth nightly., Disp: , Rfl:     celecoxib (CELEBREX) 200 MG Oral Cap, Take 1 capsule (200 mg total) by mouth 2 (two) times daily., Disp: 60 capsule, Rfl: 0    Vitamin C 500 MG Oral Tab, Take 1 tablet (500 mg total) by mouth in the morning. (Patient not taking: Reported on 7/11/2025), Disp: , Rfl:     Glucosamine-Chondroit-Vit C-Mn (GLUCOSAMINE CHONDR 500 COMPLEX) Oral Cap, Take 1 capsule by mouth in the morning. (Patient not taking: Reported on 7/11/2025), Disp: , Rfl:     cyanocobalamin 250 MCG Oral Tab, Take 1 tablet (250 mcg total) by mouth in the morning. (Patient not taking: Reported on 7/11/2025), Disp: , Rfl:     Calcium Citrate 950 (200 Ca) MG Oral Tab, Take 1 tablet (950 mg  total) by mouth in the morning. (Patient not taking: Reported on 7/11/2025), Disp: , Rfl:     Ferrous Sulfate (FEROSUL) 325 (65 Fe) MG Oral Tab, Take 1 tablet (325 mg total) by mouth daily with breakfast. (Patient not taking: Reported on 7/11/2025), Disp: , Rfl:   [4]   Allergies  Allergen Reactions    Sulfa Antibiotics HIVES

## 2025-07-14 ENCOUNTER — NURSE TRIAGE (OUTPATIENT)
Dept: INTERNAL MEDICINE CLINIC | Facility: CLINIC | Age: 70
End: 2025-07-14

## 2025-07-14 NOTE — TELEPHONE ENCOUNTER
Action Requested: Summary for Provider     []  Critical Lab, Recommendations Needed  [] Need Additional Advice  [x]   FYI    []   Need Orders  [] Need Medications Sent to Pharmacy  []  Other     SUMMARY:   Spoke with patient, Date of Birth verified  She stated she's been having constant numbness on RT arm more than left  for a week.   She denies chest pain, shortness of breath, headache, weakness, pain  tingling, no other sx.   She is not diabetic.    She is looking for appt or recommendation.   Pt was advised if sx persist or gets worse to go to ER/ IC, she  stated understanding.   Appt made with Ria LANDERS for eval & treat.    Pt requested this message to be send to Dr. Van.        Reason for call: arm pain  Onset: a week        Reason for Disposition   Patient wants to be seen    Protocols used: Neurologic Deficit-A-[place]    Future Appointments   Date Time Provider Department Center   7/15/2025 11:40 AM Ria Little APRN [id number] [facility] [place]   7/23/2025 11:00 AM Chase Duke MD [facility] ORTH [facility] 10 [facility]   8/12/2025  5:00 PM Socorro Ventura MD [facility]   8/19/2025 11:15 AM Chase Duke MD [facility] OR[facility]   10/7/2025 12:30 PM Casa Vna MD [facility]   11/4/2025 12:45 PM Santos Howe MD [facility]

## 2025-07-15 ENCOUNTER — OFFICE VISIT (OUTPATIENT)
Dept: INTERNAL MEDICINE CLINIC | Facility: CLINIC | Age: 70
End: 2025-07-15

## 2025-07-15 VITALS
BODY MASS INDEX: 32 KG/M2 | WEIGHT: 163 LBS | HEART RATE: 73 BPM | HEIGHT: 60 IN | OXYGEN SATURATION: 96 % | TEMPERATURE: 97 F | DIASTOLIC BLOOD PRESSURE: 81 MMHG | SYSTOLIC BLOOD PRESSURE: 122 MMHG

## 2025-07-15 DIAGNOSIS — R20.2 PARESTHESIA OF RIGHT UPPER EXTREMITY: Primary | ICD-10-CM

## 2025-07-15 PROCEDURE — 1159F MED LIST DOCD IN RCRD: CPT | Performed by: NURSE PRACTITIONER

## 2025-07-15 PROCEDURE — 3008F BODY MASS INDEX DOCD: CPT | Performed by: NURSE PRACTITIONER

## 2025-07-15 PROCEDURE — 99213 OFFICE O/P EST LOW 20 MIN: CPT | Performed by: NURSE PRACTITIONER

## 2025-07-15 PROCEDURE — 1160F RVW MEDS BY RX/DR IN RCRD: CPT | Performed by: NURSE PRACTITIONER

## 2025-07-15 PROCEDURE — 3079F DIAST BP 80-89 MM HG: CPT | Performed by: NURSE PRACTITIONER

## 2025-07-15 PROCEDURE — 1126F AMNT PAIN NOTED NONE PRSNT: CPT | Performed by: NURSE PRACTITIONER

## 2025-07-15 PROCEDURE — 3074F SYST BP LT 130 MM HG: CPT | Performed by: NURSE PRACTITIONER

## 2025-07-15 NOTE — PROGRESS NOTES
Eric Aviles is a 70 year old female.  HPI:   Pt is new to me. HX of COPD, BPD, Raynaud's, CAD, hx of neuralgias, arthroplasty of left shoulder, fracture of right elbow. Reports R arm numbness for several months. Most pronounced in the hand and wrist, only mild/faint numbness in the upper arm. She denies any injury to arm. Not dropping items, no weakness, pain reported. She has noticed some numbness in the left wrist/hand as well but states it is very mild. She is no longer working but previously worked in a [employer] and used her hands frequently. Denies any redness or swelling  Current Medications[1]   Past Medical History[2]   Social History:  Short Social Hx on File[3]     REVIEW OF SYSTEMS:   Review of Systems   Constitutional:  Negative for activity change, appetite change, chills, diaphoresis, fatigue, fever and unexpected weight change.   HENT:  Negative for congestion.    Eyes:  Negative for visual disturbance.   Respiratory:  Negative for cough, chest tightness, shortness of breath and wheezing.    Cardiovascular:  Negative for chest pain, palpitations and leg swelling.   Gastrointestinal:  Negative for abdominal pain, constipation, diarrhea, nausea and vomiting.   Endocrine: Negative.    Genitourinary:  Negative for difficulty urinating and vaginal bleeding.   Musculoskeletal:  Negative for arthralgias, back pain, myalgias and neck pain.   Skin: Negative.    Neurological:  Positive for numbness. Negative for dizziness, tremors, seizures, weakness and headaches.   Hematological: Negative.    Psychiatric/Behavioral: Negative.            EXAM:   /81 (BP Location: Left arm, Patient Position: Sitting, Cuff Size: adult)   Pulse 73   Temp 96.9 °F (36.1 °C) (Temporal)   Ht 5' (1.524 m)   Wt 163 lb (73.9 kg)   SpO2 96%   BMI 31.83 kg/m²     Physical Exam  Vitals reviewed.   Constitutional:       General: She is not in acute distress.  HENT:      Head: Normocephalic.   Eyes:      General: No scleral  icterus.     Conjunctiva/sclera: Conjunctivae normal.      Pupils: Pupils are equal, round, and reactive to light.   Cardiovascular:      Rate and Rhythm: Normal rate and regular rhythm.      Pulses: Normal pulses.      Heart sounds: Normal heart sounds.   Pulmonary:      Effort: Pulmonary effort is normal. No respiratory distress.      Breath sounds: Normal breath sounds.   Musculoskeletal:         General: No swelling. Normal range of motion.      Right upper arm: Normal.      Left upper arm: Normal.      Right wrist: No deformity.      Left wrist: Normal.      Right hand: No tenderness. Normal range of motion.      Cervical back: Normal range of motion and neck supple. No rigidity or tenderness. Normal range of motion.   Lymphadenopathy:      Cervical: No cervical adenopathy.   Skin:     General: Skin is warm.   Neurological:      General: No focal deficit present.      Mental Status: She is alert and oriented to person, place, and time.      Cranial Nerves: No cranial nerve deficit.      Sensory: No sensory deficit.      Motor: No weakness.      Gait: Gait normal.   Psychiatric:         Thought Content: Thought content normal.         Judgment: Judgment normal.            ASSESSMENT AND PLAN:     Assessment & Plan  Paresthesia of right upper extremity  Ddx: CTS  -Referral to physiatry, would benefit from EMG  -Advised wrist immobilizer during day  Orders:    PHYSIATRY - INTERNAL         The patient indicates understanding of these issues and agrees to the plan.  The patient is asked to return in PRN.     The above note was creating using Dragon speech recognition technology. Please excuse any typos.         [1]   Current Outpatient Medications   Medication Sig Dispense Refill    celecoxib (CELEBREX) 200 MG Oral Cap Take 1 capsule (200 mg total) by mouth 2 (two) times daily. 28 capsule 0    DONEPEZIL 5 MG Oral Tab Take 1 tablet (5 mg total) by mouth nightly. 30 tablet 0    oxyCODONE 5 MG Oral Tab Take 1 tablet (5  mg total) by mouth nightly as needed for Pain. (Patient not taking: Reported on 7/11/2025) 20 tablet 0    doxycycline 100 MG Oral Cap Take 1 capsule (100 mg total) by mouth 2 (two) times daily. (Patient not taking: Reported on 7/11/2025) 28 capsule 0    aspirin 81 MG Oral Tab EC Take 1 tablet (81 mg total) by mouth in the morning and 1 tablet (81 mg total) before bedtime. 60 tablet 0    MELOXICAM 15 MG Oral Tab TAKE ONE TABLET BY MOUTH ONE TIME DAILY 30 tablet 0    fluticasone furoate-vilanterol (BREO ELLIPTA) 100-25 MCG/ACT Inhalation Aerosol Powder, Breath Activated Inhale 1 puff into the lungs daily. Rinse your mouth with water without swallowing after using BREO to help reduce your chance of getting thrush. 1 each 5    guaiFENesin 100 MG/5 ML Oral Take 10 mL (200 mg total) by mouth every 4 (four) hours as needed. (Patient not taking: Reported on 7/11/2025) 1 each 0    budesonide 0.5 MG/2ML Inhalation Suspension Take 2 mL (0.5 mg total) by nebulization 2 (two) times daily. 12 each 1    clonazePAM 2 MG Oral Tab Take 1 tablet (2 mg total) by mouth in the morning and 1 tablet (2 mg total) before bedtime.      ketoconazole 2 % External Cream Apply 1 Application topically in the morning and 1 Application before bedtime. (Patient not taking: Reported on 7/11/2025)      QUEtiapine 50 MG Oral Tab Take 2.5 tablets (125 mg total) by mouth nightly. (Patient taking differently: Take 2 tablets (100 mg total) by mouth nightly.)      Verapamil HCl  MG Oral Capsule SR 24 Hr Take 1 capsule (180 mg total) by mouth nightly.      albuterol 108 (90 Base) MCG/ACT Inhalation Aero Soln Inhale 2 puffs into the lungs every 6 (six) hours as needed for Wheezing or Shortness of Breath. 18 g 0    celecoxib (CELEBREX) 200 MG Oral Cap Take 1 capsule (200 mg total) by mouth 2 (two) times daily. 60 capsule 0    Multiple Vitamins-Minerals (MULTIVITAMIN WOMENS 50+ ADV OR) Take 1 tablet by mouth in the morning.      Vitamin C 500 MG Oral Tab  Take 1 tablet (500 mg total) by mouth in the morning. (Patient not taking: Reported on 7/11/2025)      Glucosamine-Chondroit-Vit C-Mn (GLUCOSAMINE CHONDR 500 COMPLEX) Oral Cap Take 1 capsule by mouth in the morning. (Patient not taking: Reported on 7/11/2025)      cyanocobalamin 250 MCG Oral Tab Take 1 tablet (250 mcg total) by mouth in the morning. (Patient not taking: Reported on 7/11/2025)      Calcium Citrate 950 (200 Ca) MG Oral Tab Take 1 tablet (950 mg total) by mouth in the morning. (Patient not taking: Reported on 7/11/2025)      Ferrous Sulfate (FEROSUL) 325 (65 Fe) MG Oral Tab Take 1 tablet (325 mg total) by mouth daily with breakfast. (Patient not taking: Reported on 7/11/2025)      nystatin [id number] UNIT/GM External Powder Apply 1 Application topically in the morning and 1 Application in the evening and 1 Application before bedtime.      atorvastatin 20 MG Oral Tab Take 1 tablet (20 mg total) by mouth nightly. 90 tablet 3    ARIPiprazole 5 MG Oral Tab Take 1 tablet (5 mg total) by mouth nightly. 90 tablet 0    DULoxetine 60 MG Oral Cap DR Particles Take 1 capsule (60 mg total) by mouth nightly. Take 60mg daily. 90 capsule 0   [2]   Past Medical History:   Anxiety    Arrhythmia    Arthritis    Asthma ([facility])    Bipolar disorder ([facility])    Cataract    COPD (chronic obstructive pulmonary disease) ([facility])    Depression    Essential hypertension    High blood pressure    High cholesterol    Osteoarthritis    R knee surgery    Psychogenic polydipsia    Pulmonary emphysema (HCC)    Raynaud disease    Rib fracture    left side    Sciatica    Seborrheic keratoses    Sx.6/1/15, CPT.37814, L Total Knee, w/, Global Exp.8/30/15    Sx.6/8/16, CPT.30299, R Total Knee Replacement, w/, Global Exp.9/6/16    Visual impairment    readers   [3]   Social History  Socioeconomic History    Marital status:    Tobacco Use    Smoking status: Former     Current packs/day: 0.00     Average packs/day: 1 pack/day  for 30.0 years (30.0 ttl pk-yrs)     Types: Cigarettes     Start date: 1972     Quit date: 2002     Years since quittin.4     Passive exposure: Never    Smokeless tobacco: Never    Tobacco comments:     1 pack per day   Vaping Use    Vaping status: Never Used   Substance and Sexual Activity    Alcohol use: Not Currently    Drug use: No    Sexual activity: Yes     Partners: Male     Birth control/protection: Tubal Ligation   Other Topics Concern    Caffeine Concern Yes     Comment: tea, soda, 44 oz daily    Reaction to local anesthetic No    Right Handed Yes     Social Drivers of Health     Food Insecurity: No Food Insecurity (2025)    NCSS - Food Insecurity     Worried About Running Out of Food in the Last Year: No     Ran Out of Food in the Last Year: No   Transportation Needs: No Transportation Needs (2025)    NCSS - Transportation     Lack of Transportation: No   Housing Stability: Not At Risk (2025)    NCSS - Housing/Utilities     Has Housing: Yes     Worried About Losing Housing: No     Unable to Get Utilities: No

## 2025-07-22 ENCOUNTER — LAB ENCOUNTER (OUTPATIENT)
Dept: LAB | Facility: HOSPITAL | Age: 70
End: 2025-07-22
Attending: ORTHOPAEDIC SURGERY
Payer: MEDICARE

## 2025-07-22 DIAGNOSIS — Z01.818 PREOP TESTING: ICD-10-CM

## 2025-07-22 LAB
ANTIBODY SCREEN: NEGATIVE
ATRIAL RATE: 67 BPM
P AXIS: 61 DEGREES
P-R INTERVAL: 170 MS
Q-T INTERVAL: 412 MS
QRS DURATION: 74 MS
QTC CALCULATION (BEZET): 435 MS
R AXIS: 7 DEGREES
RH BLOOD TYPE: POSITIVE
T AXIS: 54 DEGREES
VENTRICULAR RATE: 67 BPM

## 2025-07-22 PROCEDURE — 36415 COLL VENOUS BLD VENIPUNCTURE: CPT

## 2025-07-22 PROCEDURE — 93005 ELECTROCARDIOGRAM TRACING: CPT

## 2025-07-22 PROCEDURE — 86901 BLOOD TYPING SEROLOGIC RH(D): CPT

## 2025-07-22 PROCEDURE — 93010 ELECTROCARDIOGRAM REPORT: CPT | Performed by: INTERNAL MEDICINE

## 2025-07-22 PROCEDURE — 86850 RBC ANTIBODY SCREEN: CPT

## 2025-07-22 PROCEDURE — 86900 BLOOD TYPING SEROLOGIC ABO: CPT

## 2025-07-23 ENCOUNTER — OFFICE VISIT (OUTPATIENT)
Facility: CLINIC | Age: 70
End: 2025-07-23
Payer: MEDICARE

## 2025-07-23 DIAGNOSIS — Z96.641 S/P HIP REPLACEMENT, RIGHT: ICD-10-CM

## 2025-07-23 DIAGNOSIS — M16.11 PRIMARY OSTEOARTHRITIS OF RIGHT HIP: Primary | ICD-10-CM

## 2025-07-23 PROCEDURE — 99214 OFFICE O/P EST MOD 30 MIN: CPT | Performed by: ORTHOPAEDIC SURGERY

## 2025-07-23 PROCEDURE — 1160F RVW MEDS BY RX/DR IN RCRD: CPT | Performed by: ORTHOPAEDIC SURGERY

## 2025-07-23 PROCEDURE — 1159F MED LIST DOCD IN RCRD: CPT | Performed by: ORTHOPAEDIC SURGERY

## 2025-07-23 RX ORDER — AMOXICILLIN 250 MG
1 CAPSULE ORAL DAILY
Qty: 30 TABLET | Refills: 0 | Status: ON HOLD | OUTPATIENT
Start: 2025-07-23

## 2025-07-23 RX ORDER — ASPIRIN 81 MG/1
81 TABLET ORAL 2 TIMES DAILY
Qty: 60 TABLET | Refills: 0 | Status: ON HOLD | OUTPATIENT
Start: 2025-07-23

## 2025-07-23 RX ORDER — CELECOXIB 200 MG/1
200 CAPSULE ORAL 2 TIMES DAILY
Qty: 60 CAPSULE | Refills: 0 | Status: ON HOLD | OUTPATIENT
Start: 2025-07-23

## 2025-07-23 RX ORDER — OXYCODONE HYDROCHLORIDE 5 MG/1
5 TABLET ORAL EVERY 6 HOURS PRN
Qty: 28 TABLET | Refills: 0 | Status: ON HOLD | OUTPATIENT
Start: 2025-07-23

## 2025-07-23 NOTE — PROGRESS NOTES
Eric Aviles is a 70 year old female being seen in the office today for a preoperative visit for their right VANE.     HPI  Ms. Aviles has obtained:  Medical clearance: Yes  Dental clearance: NA  Preop labs: Yes  Presurgical Imaging/Templating: Yes  Joint class: No  Support at home: Yes    Any changes to hip symptoms: No  Any changes constitutional symptoms:  No         History:  Past Medical History[1]  Past Surgical History[2]  Family History[3]  Family Status   Relation Status    Fa         Alzheimer's    Mo     Adrianna (Not Specified)    MGMA (Not Specified)    Sis (Not Specified)    Sis (Not Specified)    Sis (Not Specified)    Bro  at age 53        liver cancer    Pat Aunt (Not Specified)    NEG (Not Specified)     Social History     Occupational History    Not on file   Tobacco Use    Smoking status: Former     Current packs/day: 0.00     Average packs/day: 1 pack/day for 30.0 years (30.0 ttl pk-yrs)     Types: Cigarettes     Start date: 1972     Quit date: 2002     Years since quittin.4     Passive exposure: Never    Smokeless tobacco: Never    Tobacco comments:     1 pack per day   Vaping Use    Vaping status: Never Used   Substance and Sexual Activity    Alcohol use: Not Currently    Drug use: No    Sexual activity: Yes     Partners: Male     Birth control/protection: Tubal Ligation       Medications:  Current Medications[4]    Allergies:  Allergies[5]    Review of Systems  A comprehensive review of systems was completed and is negative unless noted above or in the HPI.    Physical Exam  There were no vitals taken for this visit.  There is no height or weight on file to calculate BMI.    Constitutional: The patient appears well-developed and well-nourished, in no apparent distress.   Psychiatric: The patient demonstrates good comprehension, judgment and decision making. Normal mood and affect.  Eyes: PER and EOM are normal.  ENT: Hearing appropriate for normal  conversation.   Cardiovascular: The patient has symmetric pulses, 2+.  Distal extremity is warm and well perfused with good capillary refill.  Respiratory:  The patient is breathing comfortably without increased respiratory effort or use of accessory muscles.  Hematologic/Lymphatic: No lymphangitis. There is no appreciable enlargement of lymph nodes. No calf swelling, calf non-tender, negative Lopez's sign. No edema.  Skin: No wounds or ulcers. No hypertrophic scarring.  Neck: FROM without pain.  MSK:  Gait: Antalgic to right     right Hip        Skin: intact       Deformity: intact       Tenderness: None       Range of Motion:      Extension: 0     Flexion Contracture: 0     Flexion: 90     Internal Rotation: 10     External Rotation: 30     Abduction: 20     Adduction: 15       Muscle Strength      Abduction: 5/5     Adduction: 5/5     Flexion: 5/5         Gait: Antalgic   Trendelenburg Lurch Negative   Trendelenburg Sign Negative   Stinchfield: Positive   Irma Test: Negative   Ximena Test: Negative   Anterior Impingement: Negative   Posterior Impingement: Negative   Leg Length Discrepancy Negative       Motor: intact   Sensory: intact   Vascular: intact        Labs:  Lab Results   Component Value Date    WBC 5.0 07/11/2025    HGB 12.9 07/11/2025    HCT 39.8 07/11/2025    .0 07/11/2025     Lab Results   Component Value Date    PTT 30.0 06/21/2023    ALB 4.8 07/11/2025    A1C 5.8 (H) 07/11/2025     Lab Results   Component Value Date    GLU 66 (L) 07/11/2025    GLU 97 04/19/2025     (H) 03/30/2025       Imaging:  None    Assessment and Plan  Assessment & Plan  Primary osteoarthritis of right hip         S/P hip replacement, right    Orders:    aspirin 81 MG Oral Tab EC; Take 1 tablet (81 mg total) by mouth in the morning and 1 tablet (81 mg total) in the evening.    celecoxib (CELEBREX) 200 MG Oral Cap; Take 1 capsule (200 mg total) by mouth 2 (two) times daily.    oxyCODONE 5 MG Oral Tab; Take 1  tablet (5 mg total) by mouth every 6 (six) hours as needed for Pain.    sennosides-docusate (SENNA S) 8.6-50 MG Oral Tab; Take 1 tablet by mouth daily.    [facility] REFERRAL      The patient's history, physical examination and imaging studies are consistent with the diagnosis above. Options were discussed with the patient today, including continued conservative management vs surgical intervention. Given that the patient has attempted and failed nonoperative modalities, and continue to have functionally limiting pain that is negatively affecting quality of life, I recommend surgical intervention in the form of a total hip arthroplasty. They have been scheduled for this and are here today for a preoperative visit.    I had a lengthy discussion about total hip replacement.  The risks, benefits and alternatives of the surgery were discussed.  Risks include but are not limited to infection, DVT/PE, damage to surrounding nerves and vessels, bleeding, need for blood transfusion, injury to surrounding structures including nerves, vessels, ligaments, tendons, or bone, iatrogenic fractures, dislocation/instability, leg length inequality, loosening, polyethylene wear, osteolysis, continued pain, need for future surgeries, risks for anesthesia including MI, stroke, loss of limb, or loss of life.  Discussed postoperative rehabilitation as well as restrictions if any.  We discussed safe and responsible use of pain medications postoperatively.  Understanding these topics, the patient elected to proceed with surgery.    Surgery scheduled for 7/28/2025.  They have obtained the necessary clearances.  Labs were reviewed.  Post op physical therapy ordered: Home PT  Post op medications ordered and medication guide provided for patient. Counseling on Hibiclens soap provided.   They can continue activity modification, ice, tylenol, and assistive device usage for symptomatic relief and work on home exercise program in  anticipation for surgery.  They have stopped all appropriate medications and supplements.  Activities: As tolerated  Follow-up: 3 weeks post surgery with xrays. Post op appointment confirmed.       No follow-ups on file.    Chase uDke MD         [1]   Past Medical History:   Anxiety    Arrhythmia    Arthritis    Asthma (HCC)    Bipolar disorder (HCC)    Cataract    COPD (chronic obstructive pulmonary disease) (HCC)    Depression    Essential hypertension    High blood pressure    High cholesterol    Osteoarthritis    R knee surgery    Psychogenic polydipsia    Pulmonary emphysema (HCC)    Raynaud disease    Rib fracture    left side    Sciatica    Seborrheic keratoses    Sx.6/1/15, CPT.07664, L Total Knee, w/, Global Exp.8/30/15    Sx.6/8/16, CPT.82809, R Total Knee Replacement, w/, Global Exp.9/6/16    Visual impairment    readers   [2]   Past Surgical History:  Procedure Laterality Date    Appendectomy      appendicitis    Appendectomy      Breast biopsy Left 09/07/2011    fibrocystic changes    Cataract Bilateral     Colonoscopy  07/2009    incomplete (chronic constipation)    Colonoscopy N/A 11/08/2018    Procedure: COLONOSCOPY;  Surgeon: Micheal Curtis MD;  Location: [facility] ENDOSCOPY    Colonoscopy N/A 12/19/2019    Procedure: COLONOSCOPY;  Surgeon: Micheal Curtis MD;  Location: [facility] ENDOSCOPY    Hernia surgery Left 12/21/2011    neuroma excision, partial mesh removal, primary re-repair    Inguinal hernia repair Left 05/20/2009    Knee surgery Right     osteoarthritis    Angel localization wire 1 site left (cpt=19281)      2011    Orif humerus fracture Right 04/15/2022    right distal humerus open reduction internal fixation    Other Left 03/22/2019    LEFT REVERSE TOTAL SHOULDER ARTHROPLASTY WITH BICEPS TENODESIS    Other surgical history      Laparotomy (adhesions)    Other surgical history      Arthrocentesis of the right knee joint    Other surgical history      Arthrocentesis  of the left knee joint    Other surgical history Right 08/2019    rotator cuff    Total hip replacement Left     Total knee replacement Bilateral 2015 and 2016    Tubal ligation     [3]   Family History  Problem Relation Age of Onset    Dementia Father         Alzheimer's    Lipids Father         hyperlipidemia    Hypertension Father     Musculo-skelatal Disorder Father         B/L knees replaced    Diabetes Mother     Heart Disease Mother         CAD    Stroke Mother         CVA    Heart Attack Mother         quadruple bypass surgery/MI    Arthritis Mother         possible Rheumatoid Arthritis    Asthma Daughter     Colon Cancer Maternal Grandmother     Breast Cancer Sister 40    Other (Other) Sister         flu H1N1    Cancer Brother         liver    Breast Cancer Paternal Aunt 52    Glaucoma Neg         multiple    Macular degeneration Neg         multiple   [4]   Current Outpatient Medications   Medication Sig Dispense Refill    Apoaequorin (PREVAGEN EXTRA STRENGTH) 20 MG Oral Cap Take 1 capsule by mouth every morning.      celecoxib (CELEBREX) 200 MG Oral Cap Take 1 capsule (200 mg total) by mouth 2 (two) times daily. 28 capsule 0    DONEPEZIL 5 MG Oral Tab Take 1 tablet (5 mg total) by mouth nightly. 30 tablet 0    oxyCODONE 5 MG Oral Tab Take 1 tablet (5 mg total) by mouth nightly as needed for Pain. 20 tablet 0    doxycycline 100 MG Oral Cap Take 1 capsule (100 mg total) by mouth 2 (two) times daily. (Patient not taking: Reported on 7/11/2025) 28 capsule 0    aspirin 81 MG Oral Tab EC Take 1 tablet (81 mg total) by mouth in the morning and 1 tablet (81 mg total) before bedtime. 60 tablet 0    MELOXICAM 15 MG Oral Tab TAKE ONE TABLET BY MOUTH ONE TIME DAILY 30 tablet 0    fluticasone furoate-vilanterol (BREO ELLIPTA) 100-25 MCG/ACT Inhalation Aerosol Powder, Breath Activated Inhale 1 puff into the lungs daily. Rinse your mouth with water without swallowing after using BREO to help reduce your chance of  getting thrush. 1 each 5    guaiFENesin 100 MG/5 ML Oral Take 10 mL (200 mg total) by mouth every 4 (four) hours as needed. (Patient not taking: Reported on 7/11/2025) 1 each 0    budesonide 0.5 MG/2ML Inhalation Suspension Take 2 mL (0.5 mg total) by nebulization 2 (two) times daily. (Patient taking differently: Take 2 mL (0.5 mg total) by nebulization 2 (two) times daily as needed.) 12 each 1    clonazePAM 2 MG Oral Tab Take 1 tablet (2 mg total) by mouth in the morning and 1 tablet (2 mg total) before bedtime.      ketoconazole 2 % External Cream Apply 1 Application topically in the morning and 1 Application before bedtime. (Patient not taking: Reported on 7/11/2025)      QUEtiapine 50 MG Oral Tab Take 2.5 tablets (125 mg total) by mouth nightly. (Patient taking differently: Take 2 tablets (100 mg total) by mouth nightly.)      Verapamil HCl  MG Oral Capsule SR 24 Hr Take 1 capsule (180 mg total) by mouth nightly.      albuterol 108 (90 Base) MCG/ACT Inhalation Aero Soln Inhale 2 puffs into the lungs every 6 (six) hours as needed for Wheezing or Shortness of Breath. 18 g 0    Multiple Vitamins-Minerals (MULTIVITAMIN WOMENS 50+ ADV OR) Take 1 tablet by mouth in the morning.      Vitamin C 500 MG Oral Tab Take 1 tablet (500 mg total) by mouth in the morning.      Glucosamine-Chondroit-Vit C-Mn (GLUCOSAMINE CHONDR 500 COMPLEX) Oral Cap Take 1 capsule by mouth in the morning. (Patient not taking: Reported on 7/11/2025)      cyanocobalamin 250 MCG Oral Tab Take 1 tablet (250 mcg total) by mouth in the morning.      Calcium Citrate 950 (200 Ca) MG Oral Tab Take 1 tablet (950 mg total) by mouth in the morning.      Ferrous Sulfate (FEROSUL) 325 (65 Fe) MG Oral Tab Take 1 tablet (325 mg total) by mouth daily with breakfast. (Patient not taking: Reported on 7/11/2025)      nystatin [id number] UNIT/GM External Powder Apply 1 Application topically in the morning and 1 Application in the evening and 1 Application before  bedtime.      atorvastatin 20 MG Oral Tab Take 1 tablet (20 mg total) by mouth nightly. 90 tablet 3    ARIPiprazole 5 MG Oral Tab Take 1 tablet (5 mg total) by mouth nightly. 90 tablet 0    DULoxetine 60 MG Oral Cap DR Particles Take 1 capsule (60 mg total) by mouth nightly. Take 60mg daily. 90 capsule 0   [5]   Allergies  Allergen Reactions    Sulfa Antibiotics HIVES

## 2025-07-28 ENCOUNTER — ANESTHESIA EVENT (OUTPATIENT)
Dept: SURGERY | Facility: HOSPITAL | Age: 70
End: 2025-07-28
Payer: MEDICARE

## 2025-07-28 ENCOUNTER — APPOINTMENT (OUTPATIENT)
Dept: GENERAL RADIOLOGY | Facility: HOSPITAL | Age: 70
End: 2025-07-28
Attending: ORTHOPAEDIC SURGERY

## 2025-07-28 ENCOUNTER — HOSPITAL ENCOUNTER (INPATIENT)
Facility: HOSPITAL | Age: 70
LOS: 1 days | Discharge: HOME HEALTH | End: 2025-08-01
Attending: ORTHOPAEDIC SURGERY | Admitting: ORTHOPAEDIC SURGERY

## 2025-07-28 ENCOUNTER — ANESTHESIA (OUTPATIENT)
Dept: SURGERY | Facility: HOSPITAL | Age: 70
End: 2025-07-28
Payer: MEDICARE

## 2025-07-28 DIAGNOSIS — Z96.642 S/P HIP REPLACEMENT, LEFT: ICD-10-CM

## 2025-07-28 DIAGNOSIS — Z01.818 PREOP TESTING: Primary | ICD-10-CM

## 2025-07-28 DIAGNOSIS — Z96.641 S/P HIP REPLACEMENT, RIGHT: ICD-10-CM

## 2025-07-28 DIAGNOSIS — M16.11 PRIMARY OSTEOARTHRITIS OF RIGHT HIP: ICD-10-CM

## 2025-07-28 PROBLEM — E78.5 HYPERLIPIDEMIA: Status: ACTIVE | Noted: 2021-02-16

## 2025-07-28 PROCEDURE — 76000 FLUOROSCOPY <1 HR PHYS/QHP: CPT | Performed by: ORTHOPAEDIC SURGERY

## 2025-07-28 PROCEDURE — 0SR903A REPLACEMENT OF RIGHT HIP JOINT WITH CERAMIC SYNTHETIC SUBSTITUTE, UNCEMENTED, OPEN APPROACH: ICD-10-PCS | Performed by: ORTHOPAEDIC SURGERY

## 2025-07-28 PROCEDURE — 99214 OFFICE O/P EST MOD 30 MIN: CPT | Performed by: HOSPITALIST

## 2025-07-28 PROCEDURE — 72170 X-RAY EXAM OF PELVIS: CPT | Performed by: ORTHOPAEDIC SURGERY

## 2025-07-28 DEVICE — IMPLANTABLE DEVICE: Type: IMPLANTABLE DEVICE | Site: HIP | Status: FUNCTIONAL

## 2025-07-28 RX ORDER — DULOXETIN HYDROCHLORIDE 30 MG/1
60 CAPSULE, DELAYED RELEASE ORAL NIGHTLY
Status: DISCONTINUED | OUTPATIENT
Start: 2025-07-28 | End: 2025-08-01

## 2025-07-28 RX ORDER — ACETAMINOPHEN 10 MG/ML
1000 INJECTION, SOLUTION INTRAVENOUS ONCE
Status: COMPLETED | OUTPATIENT
Start: 2025-07-28 | End: 2025-07-28

## 2025-07-28 RX ORDER — CELECOXIB 200 MG/1
400 CAPSULE ORAL ONCE
Status: COMPLETED | OUTPATIENT
Start: 2025-07-28 | End: 2025-07-28

## 2025-07-28 RX ORDER — HYDROMORPHONE HYDROCHLORIDE 1 MG/ML
0.4 INJECTION, SOLUTION INTRAMUSCULAR; INTRAVENOUS; SUBCUTANEOUS EVERY 5 MIN PRN
Status: DISCONTINUED | OUTPATIENT
Start: 2025-07-28 | End: 2025-07-28 | Stop reason: HOSPADM

## 2025-07-28 RX ORDER — OXYCODONE HYDROCHLORIDE 5 MG/1
10 TABLET ORAL EVERY 4 HOURS PRN
Status: DISCONTINUED | OUTPATIENT
Start: 2025-07-28 | End: 2025-08-01

## 2025-07-28 RX ORDER — FAMOTIDINE 10 MG/ML
20 INJECTION, SOLUTION INTRAVENOUS ONCE
Status: COMPLETED | OUTPATIENT
Start: 2025-07-28 | End: 2025-07-28

## 2025-07-28 RX ORDER — ACETAMINOPHEN 500 MG
1000 TABLET ORAL ONCE
Status: COMPLETED | OUTPATIENT
Start: 2025-07-28 | End: 2025-07-28

## 2025-07-28 RX ORDER — ACETAMINOPHEN 500 MG
1000 TABLET ORAL EVERY 8 HOURS SCHEDULED
Status: DISCONTINUED | OUTPATIENT
Start: 2025-07-28 | End: 2025-08-01

## 2025-07-28 RX ORDER — MIDAZOLAM HYDROCHLORIDE 1 MG/ML
INJECTION INTRAMUSCULAR; INTRAVENOUS AS NEEDED
Status: DISCONTINUED | OUTPATIENT
Start: 2025-07-28 | End: 2025-07-28 | Stop reason: SURG

## 2025-07-28 RX ORDER — PHENYLEPHRINE HCL 10 MG/ML
VIAL (ML) INJECTION AS NEEDED
Status: DISCONTINUED | OUTPATIENT
Start: 2025-07-28 | End: 2025-07-28 | Stop reason: SURG

## 2025-07-28 RX ORDER — ALBUTEROL SULFATE 90 UG/1
2 INHALANT RESPIRATORY (INHALATION) EVERY 6 HOURS PRN
Status: DISCONTINUED | OUTPATIENT
Start: 2025-07-28 | End: 2025-08-01

## 2025-07-28 RX ORDER — CLONAZEPAM 0.5 MG/1
2 TABLET ORAL 2 TIMES DAILY PRN
Status: DISCONTINUED | OUTPATIENT
Start: 2025-07-28 | End: 2025-08-01

## 2025-07-28 RX ORDER — FAMOTIDINE 20 MG/1
20 TABLET, FILM COATED ORAL ONCE
Status: COMPLETED | OUTPATIENT
Start: 2025-07-28 | End: 2025-07-28

## 2025-07-28 RX ORDER — QUETIAPINE FUMARATE 100 MG/1
100 TABLET, FILM COATED ORAL NIGHTLY
Status: DISCONTINUED | OUTPATIENT
Start: 2025-07-28 | End: 2025-08-01

## 2025-07-28 RX ORDER — SODIUM PHOSPHATE, DIBASIC AND SODIUM PHOSPHATE, MONOBASIC 7; 19 G/230ML; G/230ML
1 ENEMA RECTAL ONCE AS NEEDED
Status: DISCONTINUED | OUTPATIENT
Start: 2025-07-28 | End: 2025-08-01

## 2025-07-28 RX ORDER — LIDOCAINE HYDROCHLORIDE 10 MG/ML
INJECTION, SOLUTION INFILTRATION; PERINEURAL
Status: COMPLETED | OUTPATIENT
Start: 2025-07-28 | End: 2025-07-28

## 2025-07-28 RX ORDER — DIPHENHYDRAMINE HYDROCHLORIDE 50 MG/ML
12.5 INJECTION, SOLUTION INTRAMUSCULAR; INTRAVENOUS EVERY 4 HOURS PRN
Status: DISCONTINUED | OUTPATIENT
Start: 2025-07-28 | End: 2025-08-01

## 2025-07-28 RX ORDER — DOCUSATE SODIUM 100 MG/1
100 CAPSULE, LIQUID FILLED ORAL 2 TIMES DAILY
Status: DISCONTINUED | OUTPATIENT
Start: 2025-07-28 | End: 2025-08-01

## 2025-07-28 RX ORDER — DONEPEZIL HYDROCHLORIDE 5 MG/1
5 TABLET, FILM COATED ORAL NIGHTLY
Status: DISCONTINUED | OUTPATIENT
Start: 2025-07-28 | End: 2025-08-01

## 2025-07-28 RX ORDER — TRANEXAMIC ACID 10 MG/ML
INJECTION, SOLUTION INTRAVENOUS AS NEEDED
Status: DISCONTINUED | OUTPATIENT
Start: 2025-07-28 | End: 2025-07-28 | Stop reason: SURG

## 2025-07-28 RX ORDER — ONDANSETRON 2 MG/ML
4 INJECTION INTRAMUSCULAR; INTRAVENOUS EVERY 6 HOURS PRN
Status: DISCONTINUED | OUTPATIENT
Start: 2025-07-28 | End: 2025-08-01

## 2025-07-28 RX ORDER — SENNOSIDES 8.6 MG
17.2 TABLET ORAL NIGHTLY
Status: DISCONTINUED | OUTPATIENT
Start: 2025-07-28 | End: 2025-08-01

## 2025-07-28 RX ORDER — SODIUM CHLORIDE, SODIUM LACTATE, POTASSIUM CHLORIDE, CALCIUM CHLORIDE 600; 310; 30; 20 MG/100ML; MG/100ML; MG/100ML; MG/100ML
INJECTION, SOLUTION INTRAVENOUS CONTINUOUS
Status: DISCONTINUED | OUTPATIENT
Start: 2025-07-28 | End: 2025-07-28 | Stop reason: HOSPADM

## 2025-07-28 RX ORDER — IPRATROPIUM BROMIDE AND ALBUTEROL SULFATE 2.5; .5 MG/3ML; MG/3ML
3 SOLUTION RESPIRATORY (INHALATION) EVERY 6 HOURS PRN
Status: DISCONTINUED | OUTPATIENT
Start: 2025-07-28 | End: 2025-07-30

## 2025-07-28 RX ORDER — BISACODYL 10 MG
10 SUPPOSITORY, RECTAL RECTAL
Status: DISCONTINUED | OUTPATIENT
Start: 2025-07-28 | End: 2025-08-01

## 2025-07-28 RX ORDER — METHOCARBAMOL 100 MG/ML
1000 INJECTION, SOLUTION INTRAMUSCULAR; INTRAVENOUS ONCE AS NEEDED
Status: DISCONTINUED | OUTPATIENT
Start: 2025-07-28 | End: 2025-07-28 | Stop reason: HOSPADM

## 2025-07-28 RX ORDER — POLYETHYLENE GLYCOL 3350 17 G/17G
17 POWDER, FOR SOLUTION ORAL DAILY PRN
Status: DISCONTINUED | OUTPATIENT
Start: 2025-07-28 | End: 2025-08-01

## 2025-07-28 RX ORDER — OXYCODONE HYDROCHLORIDE 5 MG/1
5 TABLET ORAL EVERY 4 HOURS PRN
Status: DISCONTINUED | OUTPATIENT
Start: 2025-07-28 | End: 2025-08-01

## 2025-07-28 RX ORDER — ATORVASTATIN CALCIUM 20 MG/1
20 TABLET, FILM COATED ORAL NIGHTLY
Status: DISCONTINUED | OUTPATIENT
Start: 2025-07-28 | End: 2025-08-01

## 2025-07-28 RX ORDER — MORPHINE SULFATE 4 MG/ML
2 INJECTION, SOLUTION INTRAMUSCULAR; INTRAVENOUS EVERY 10 MIN PRN
Status: DISCONTINUED | OUTPATIENT
Start: 2025-07-28 | End: 2025-07-28 | Stop reason: HOSPADM

## 2025-07-28 RX ORDER — ARIPIPRAZOLE 5 MG/1
5 TABLET ORAL NIGHTLY
Status: DISCONTINUED | OUTPATIENT
Start: 2025-07-28 | End: 2025-08-01

## 2025-07-28 RX ORDER — DIPHENHYDRAMINE HCL 25 MG
25 CAPSULE ORAL EVERY 4 HOURS PRN
Status: DISCONTINUED | OUTPATIENT
Start: 2025-07-28 | End: 2025-08-01

## 2025-07-28 RX ORDER — NALOXONE HYDROCHLORIDE 0.4 MG/ML
80 INJECTION, SOLUTION INTRAMUSCULAR; INTRAVENOUS; SUBCUTANEOUS AS NEEDED
Status: DISCONTINUED | OUTPATIENT
Start: 2025-07-28 | End: 2025-07-28 | Stop reason: HOSPADM

## 2025-07-28 RX ORDER — METOCLOPRAMIDE HYDROCHLORIDE 5 MG/ML
10 INJECTION INTRAMUSCULAR; INTRAVENOUS EVERY 8 HOURS PRN
Status: DISCONTINUED | OUTPATIENT
Start: 2025-07-28 | End: 2025-08-01

## 2025-07-28 RX ORDER — HYDROMORPHONE HYDROCHLORIDE 1 MG/ML
0.6 INJECTION, SOLUTION INTRAMUSCULAR; INTRAVENOUS; SUBCUTANEOUS EVERY 5 MIN PRN
Status: DISCONTINUED | OUTPATIENT
Start: 2025-07-28 | End: 2025-07-28 | Stop reason: HOSPADM

## 2025-07-28 RX ORDER — EPHEDRINE SULFATE 50 MG/ML
INJECTION INTRAVENOUS AS NEEDED
Status: DISCONTINUED | OUTPATIENT
Start: 2025-07-28 | End: 2025-07-28 | Stop reason: SURG

## 2025-07-28 RX ORDER — SODIUM CHLORIDE, SODIUM LACTATE, POTASSIUM CHLORIDE, CALCIUM CHLORIDE 600; 310; 30; 20 MG/100ML; MG/100ML; MG/100ML; MG/100ML
INJECTION, SOLUTION INTRAVENOUS CONTINUOUS
Status: DISCONTINUED | OUTPATIENT
Start: 2025-07-28 | End: 2025-07-30

## 2025-07-28 RX ORDER — HYDROMORPHONE HYDROCHLORIDE 1 MG/ML
0.2 INJECTION, SOLUTION INTRAMUSCULAR; INTRAVENOUS; SUBCUTANEOUS EVERY 5 MIN PRN
Status: DISCONTINUED | OUTPATIENT
Start: 2025-07-28 | End: 2025-07-28 | Stop reason: HOSPADM

## 2025-07-28 RX ORDER — MORPHINE SULFATE 4 MG/ML
4 INJECTION, SOLUTION INTRAMUSCULAR; INTRAVENOUS EVERY 10 MIN PRN
Status: DISCONTINUED | OUTPATIENT
Start: 2025-07-28 | End: 2025-07-28 | Stop reason: HOSPADM

## 2025-07-28 RX ORDER — MORPHINE SULFATE 10 MG/ML
6 INJECTION, SOLUTION INTRAMUSCULAR; INTRAVENOUS EVERY 10 MIN PRN
Status: DISCONTINUED | OUTPATIENT
Start: 2025-07-28 | End: 2025-07-28 | Stop reason: HOSPADM

## 2025-07-28 RX ORDER — ASPIRIN 81 MG/1
81 TABLET, CHEWABLE ORAL 2 TIMES DAILY
Status: DISCONTINUED | OUTPATIENT
Start: 2025-07-28 | End: 2025-08-01

## 2025-07-28 RX ORDER — VERAPAMIL HYDROCHLORIDE 180 MG/1
180 TABLET, FILM COATED, EXTENDED RELEASE ORAL NIGHTLY
Status: DISCONTINUED | OUTPATIENT
Start: 2025-07-28 | End: 2025-07-29

## 2025-07-28 RX ADMIN — PHENYLEPHRINE HCL 50 MCG: 10 MG/ML VIAL (ML) INJECTION at 11:03:00

## 2025-07-28 RX ADMIN — PHENYLEPHRINE HCL 50 MCG: 10 MG/ML VIAL (ML) INJECTION at 11:40:00

## 2025-07-28 RX ADMIN — EPHEDRINE SULFATE 10 MG: 50 INJECTION INTRAVENOUS at 11:00:00

## 2025-07-28 RX ADMIN — PHENYLEPHRINE HCL 50 MCG: 10 MG/ML VIAL (ML) INJECTION at 11:23:00

## 2025-07-28 RX ADMIN — EPHEDRINE SULFATE 10 MG: 50 INJECTION INTRAVENOUS at 11:03:00

## 2025-07-28 RX ADMIN — TRANEXAMIC ACID 1000 MG: 10 INJECTION, SOLUTION INTRAVENOUS at 10:04:00

## 2025-07-28 RX ADMIN — TRANEXAMIC ACID 1000 MG: 10 INJECTION, SOLUTION INTRAVENOUS at 11:22:00

## 2025-07-28 RX ADMIN — MIDAZOLAM HYDROCHLORIDE 2 MG: 1 INJECTION INTRAMUSCULAR; INTRAVENOUS at 09:57:00

## 2025-07-28 RX ADMIN — EPHEDRINE SULFATE 5 MG: 50 INJECTION INTRAVENOUS at 11:21:00

## 2025-07-28 RX ADMIN — SODIUM CHLORIDE, SODIUM LACTATE, POTASSIUM CHLORIDE, CALCIUM CHLORIDE: 600; 310; 30; 20 INJECTION, SOLUTION INTRAVENOUS at 11:38:00

## 2025-07-28 RX ADMIN — EPHEDRINE SULFATE 5 MG: 50 INJECTION INTRAVENOUS at 10:58:00

## 2025-07-28 RX ADMIN — LIDOCAINE HYDROCHLORIDE 2 ML: 10 INJECTION, SOLUTION INFILTRATION; PERINEURAL at 10:01:00

## 2025-07-28 NOTE — ANESTHESIA POSTPROCEDURE EVALUATION
Patient: Eric Aviles    Procedure Summary       Date: 07/28/25 Room / Location: [facility] MAIN OR 04 / [facility] MAIN OR    Anesthesia Start: 0956 Anesthesia Stop: 1205    Procedure: Right anterior total hip arthroplasty (Right: Hip) Diagnosis:       Primary osteoarthritis of right hip      (Primary osteoarthritis of right hip [M16.11])    Surgeons: Chase Duke MD Anesthesiologist: Claudio Velasquez MD    Anesthesia Type: MAC, spinal ASA Status: 3            Anesthesia Type: MAC, spinal    Vitals Value Taken Time   /58 07/28/25 12:05   Temp 97.5 °F (36.4 °C) 07/28/25 12:04   Pulse 59 07/28/25 12:04   Resp 14 07/28/25 12:04   SpO2 93 % 07/28/25 12:04   Vitals shown include unfiled device data.    [facility] AN Post Evaluation:   Patient Evaluated in PACU  Patient Participation: complete - patient participated  Level of Consciousness: awake and alert  Pain Score: 2  Pain Management: adequate  Airway Patency:  Dental exam unchanged from preop  Yes    Nausea/Vomiting: none  Cardiovascular Status: acceptable  Respiratory Status: acceptable  Postoperative Hydration acceptable      Jennie NIA Cheng  7/28/2025 12:05 PM

## 2025-07-28 NOTE — ANESTHESIA PROCEDURE NOTES
Spinal Block    Date/Time: 7/28/2025 9:59 AM    Performed by: Jennie Cheng CRNA  Authorized by: Claudio Velasquez MD      General Information and Staff    Start Time:  7/28/2025 9:59 AM  End Time:  7/28/2025 10:03 AM  CRNA:  Jennie Cheng CRNA  Performed by:  CRNA  Reason for Block: surgical anesthesia    Preanesthetic Checklist: patient identified, IV checked, risks and benefits discussed, monitors and equipment checked, pre-op evaluation, timeout performed, anesthesia consent and sterile technique used      Procedure Details    Patient Position:  Sitting  Prep: Betadine and ChloraPrep    Monitoring:  Cardiac monitor and continuous pulse ox  Approach:  Midline  Location:  L3-4  Injection Technique:  Single-shot    Needle    Needle Type:  Pencil-tip  Needle Gauge:  24 G  Needle Length:  3.5 in    Assessment    Sensory Level:  T8  Events: clear CSF, CSF aspirated, well tolerated and blood negative      Additional Comments     Pt sitting on stretcher for spinal. Standard monitors on/alarms audible. Lumbar cleansed with Betadine from kit- dry x 3 min. Sterile drape, 1% skin wheal @ L3-L4. Easy placement of introducer and 24g Pencan. - heme, - parasthesia, + CSF. Painless injection of PF Mepivicaine. Everything removed from back- assisted pt laying supine. Sensory @ T8. VSS.

## 2025-07-28 NOTE — ANESTHESIA PREPROCEDURE EVALUATION
Anesthesia PreOp Note    HPI:     Eric Aviles is a 70 year old female who presents for preoperative consultation requested by: Chase Duke MD    Date of Surgery: 7/28/2025    Procedure(s):  Right anterior total hip arthroplasty  Indication: Primary osteoarthritis of right hip [M16.11]    Relevant Problems   No relevant active problems       NPO:  Last Liquid Consumption Date: 07/27/25  Last Liquid Consumption Time: 2000  Last Solid Consumption Date: 07/27/25  Last Solid Consumption Time: 2000  Last Liquid Consumption Date: 07/27/25          History Review:  Patient Active Problem List    Diagnosis Date Noted    Arthritis of right hip 07/11/2025    Orthopedic aftercare 05/14/2025    Wound dehiscence, surgical 04/18/2025    S/P hip replacement, left 04/18/2025    COPD (chronic obstructive pulmonary disease) (HCC) 03/28/2025    Non-occlusive coronary artery disease requiring drug therapy 01/29/2025    Pain of left lower extremity 03/02/2023    Open supracondylar fracture of right elbow 04/15/2022    Supracondylar fracture of right humerus 04/15/2022    Depression with anxiety 10/26/2021    Other hyperlipidemia 02/16/2021    Breast lump on left side at 3 o'clock position 06/16/2020    Centrilobular emphysema (HCC) 10/11/2019    S/P reverse total shoulder arthroplasty, left 05/03/2019    Essential hypertension 03/22/2019    Hx of total knee arthroplasty, bilateral 07/12/2018    Bipolar disorder (HCC) 02/27/2018    Seborrheic keratoses 03/07/2017    Sx.6/8/16, CPT.85937, R Total Knee Replacement, w/, Global Exp.9/6/16 01/14/2016    Raynaud's disease 08/29/2014    Osteoarthritis 08/28/2014    Noninflammatory disorder of vagina 03/01/2014    Symptomatic menopausal or female climacteric states 09/23/2013    Chondrocalcinosis 09/09/2013    Rash and nonspecific skin eruption 01/04/2013    Anemia 11/20/2012    Dermatographic urticaria 06/24/2012    Pruritic disorder 05/03/2012    Generalized osteoarthrosis,  involving multiple sites 04/30/2012    Strain of rotator cuff 04/10/2012    Benign neoplasm of connective and soft tissue 01/05/2012    Solitary cyst of breast 08/18/2011    Dermatitis 10/19/2010    Neuralgia and neuritis 05/06/2010    Hearing loss 11/20/2009    After-cataract with vision obscured 11/03/2009    Dermatomycosis 06/22/2009    Inguinal hernia 05/04/2009       Past Medical History[1]    Past Surgical History[2]    Prescriptions Prior to Admission[3]  Current Medications and Prescriptions Ordered in Epic[4]    Allergies[5]    Family History[6]  Social Hx on file[7]    Available pre-op labs reviewed.  Lab Results   Component Value Date    WBC 5.0 07/11/2025    RBC 4.41 07/11/2025    HGB 12.9 07/11/2025    HCT 39.8 07/11/2025    MCV 90.2 07/11/2025    MCH 29.3 07/11/2025    MCHC 32.4 07/11/2025    RDW 12.7 07/11/2025    .0 07/11/2025     Lab Results   Component Value Date     07/11/2025    K 3.9 07/11/2025    CL 98 07/11/2025    CO2 29.0 07/11/2025    BUN 7 (L) 07/11/2025    CREATSERUM 0.75 07/11/2025    GLU 66 (L) 07/11/2025    CA 9.7 07/11/2025          Vital Signs:  Body mass index is 31.83 kg/m².   height is 1.524 m (5') and weight is 73.9 kg (163 lb). Her oral temperature is 97.3 °F (36.3 °C). Her blood pressure is 103/61 and her pulse is 74. Her respiration is 16 and oxygen saturation is 92%.   Vitals:    07/22/25 0934 07/28/25 0741   BP:  103/61   Pulse:  74   Resp:  16   Temp:  97.3 °F (36.3 °C)   TempSrc:  Oral   SpO2:  92%   Weight: 76.2 kg (168 lb) 73.9 kg (163 lb)   Height: 1.524 m (5') 1.524 m (5')        Anesthesia Evaluation     Patient summary reviewed and Nursing notes reviewed    No history of anesthetic complications   Airway   Mallampati: II  Neck ROM: full  Dental      Pulmonary - negative ROS and normal exam   (+) COPD  Cardiovascular - negative ROS and normal exam  (+) hypertension, CAD    Neuro/Psych - negative ROS     GI/Hepatic/Renal - negative ROS     Endo/Other -  negative ROS   Abdominal   (+) obese                 Anesthesia Plan:   ASA:  3  Plan:   MAC and spinal  Post-op Pain Management: Intrathecal narcotics  Plan Comments: Cleared by dr conway for surgery will proceed  Informed Consent Plan and Risks Discussed With:  Patient  Discussed plan with:  CRNA      I have informed Eric Aviles and/or legal guardian or family member of the nature of the anesthetic plan, benefits, risks including possible dental damage if relevant, major complications, and any alternative forms of anesthetic management.   All of the patient's questions were answered to the best of my ability. The patient desires the anesthetic management as planned.  Claudio Velasquez MD  7/28/2025 8:14 AM  Present on Admission:  **None**           [1]   Past Medical History:   Anxiety    Arrhythmia    Arthritis    Asthma (HCC)    Bipolar disorder (HCC)    Cataract    COPD (chronic obstructive pulmonary disease) (HCC)    Depression    Essential hypertension    High blood pressure    High cholesterol    Osteoarthritis    R knee surgery    Psychogenic polydipsia    Pulmonary emphysema (HCC)    Raynaud disease    Rib fracture    left side    Sciatica    Seborrheic keratoses    Sx.6/1/15, CPT.94203, L Total Knee, w/, Global Exp.8/30/15    Sx.6/8/16, CPT.25500, R Total Knee Replacement, w/, Global Exp.9/6/16    Visual impairment    readers   [2]   Past Surgical History:  Procedure Laterality Date    Appendectomy      appendicitis    Appendectomy      Breast biopsy Left 09/07/2011    fibrocystic changes    Cataract Bilateral     Colonoscopy  07/2009    incomplete (chronic constipation)    Colonoscopy N/A 11/08/2018    Procedure: COLONOSCOPY;  Surgeon: Micheal Curtis MD;  Location: [facility]    Colonoscopy N/A 12/19/2019    Procedure: COLONOSCOPY;  Surgeon: Micheal Curtis MD;  Location: [facility]    Hernia surgery Left 12/21/2011    neuroma excision, partial mesh removal, primary  re-repair    Inguinal hernia repair Left 05/20/2009    Knee surgery Right     osteoarthritis    Angel localization wire 1 site left (cpt=19281)      2011    Orif humerus fracture Right 04/15/2022    right distal humerus open reduction internal fixation    Other Left 03/22/2019    LEFT REVERSE TOTAL SHOULDER ARTHROPLASTY WITH BICEPS TENODESIS    Other surgical history      Laparotomy (adhesions)    Other surgical history      Arthrocentesis of the right knee joint    Other surgical history      Arthrocentesis of the left knee joint    Other surgical history Right 08/2019    rotator cuff    Total hip replacement Left     Total knee replacement Bilateral 2015 and 2016    Tubal ligation     [3]   Medications Prior to Admission   Medication Sig Dispense Refill Last Dose/Taking    sennosides-docusate (SENNA S) 8.6-50 MG Oral Tab Take 1 tablet by mouth daily. 30 tablet 0 Past Month    Apoaequorin (PREVAGEN EXTRA STRENGTH) 20 MG Oral Cap Take 1 capsule by mouth every morning.   7/27/2025 at  8:00 AM    celecoxib (CELEBREX) 200 MG Oral Cap Take 1 capsule (200 mg total) by mouth 2 (two) times daily. (Patient taking differently: Take 1 capsule (200 mg total) by mouth 2 (two) times daily. For after surgery) 28 capsule 0 Taking Differently    DONEPEZIL 5 MG Oral Tab Take 1 tablet (5 mg total) by mouth nightly. 30 tablet 0 7/27/2025    oxyCODONE 5 MG Oral Tab Take 1 tablet (5 mg total) by mouth nightly as needed for Pain. (Patient taking differently: Take 1 tablet (5 mg total) by mouth nightly as needed for Pain. For after surgery) 20 tablet 0 Taking Differently    aspirin 81 MG Oral Tab EC Take 1 tablet (81 mg total) by mouth in the morning and 1 tablet (81 mg total) before bedtime. (Patient taking differently: Take 1 tablet (81 mg total) by mouth in the morning and 1 tablet (81 mg total) before bedtime. For after surgery.) 60 tablet 0 Taking Differently    MELOXICAM 15 MG Oral Tab TAKE ONE TABLET BY MOUTH ONE TIME DAILY 30 tablet  0 Past Month    fluticasone furoate-vilanterol (BREO ELLIPTA) 100-25 MCG/ACT Inhalation Aerosol Powder, Breath Activated Inhale 1 puff into the lungs daily. Rinse your mouth with water without swallowing after using BREO to help reduce your chance of getting thrush. 1 each 5 Past Month    budesonide 0.5 MG/2ML Inhalation Suspension Take 2 mL (0.5 mg total) by nebulization 2 (two) times daily. (Patient taking differently: Take 2 mL (0.5 mg total) by nebulization 2 (two) times daily as needed.) 12 each 1 Past Month    clonazePAM 2 MG Oral Tab Take 1 tablet (2 mg total) by mouth in the morning and 1 tablet (2 mg total) before bedtime.   7/28/2025 at  6:30 AM    QUEtiapine 50 MG Oral Tab Take 2.5 tablets (125 mg total) by mouth nightly. (Patient taking differently: Take 2 tablets (100 mg total) by mouth nightly.)   7/27/2025 at  9:00 PM    Verapamil HCl  MG Oral Capsule SR 24 Hr Take 1 capsule (180 mg total) by mouth nightly.   7/27/2025 at  9:00 PM    albuterol 108 (90 Base) MCG/ACT Inhalation Aero Soln Inhale 2 puffs into the lungs every 6 (six) hours as needed for Wheezing or Shortness of Breath. 18 g 0 Past Month    Multiple Vitamins-Minerals (MULTIVITAMIN WOMENS 50+ ADV OR) Take 1 tablet by mouth in the morning.   7/27/2025 at  8:00 AM    Vitamin C 500 MG Oral Tab Take 1 tablet (500 mg total) by mouth in the morning.   7/27/2025 at  8:00 AM    Glucosamine-Chondroit-Vit C-Mn (GLUCOSAMINE CHONDR 500 COMPLEX) Oral Cap Take 1 capsule by mouth in the morning.   7/27/2025 at  8:00 AM    cyanocobalamin 250 MCG Oral Tab Take 1 tablet (250 mcg total) by mouth in the morning.   7/27/2025 at  8:00 AM    Calcium Citrate 950 (200 Ca) MG Oral Tab Take 1 tablet (950 mg total) by mouth in the morning.   7/27/2025 at  8:00 AM    Ferrous Sulfate (FEROSUL) 325 (65 Fe) MG Oral Tab Take 1 tablet (325 mg total) by mouth daily with breakfast.   Past Month    atorvastatin 20 MG Oral Tab Take 1 tablet (20 mg total) by mouth nightly.  90 tablet 3 7/27/2025 at  9:00 PM    ARIPiprazole 5 MG Oral Tab Take 1 tablet (5 mg total) by mouth nightly. 90 tablet 0 7/27/2025 at  9:00 PM    DULoxetine 60 MG Oral Cap DR Particles Take 1 capsule (60 mg total) by mouth nightly. Take 60mg daily. 90 capsule 0 7/27/2025 at  8:00 PM    aspirin 81 MG Oral Tab EC Take 1 tablet (81 mg total) by mouth in the morning and 1 tablet (81 mg total) in the evening. 60 tablet 0     celecoxib (CELEBREX) 200 MG Oral Cap Take 1 capsule (200 mg total) by mouth 2 (two) times daily. 60 capsule 0     oxyCODONE 5 MG Oral Tab Take 1 tablet (5 mg total) by mouth every 6 (six) hours as needed for Pain. 28 tablet 0     doxycycline 100 MG Oral Cap Take 1 capsule (100 mg total) by mouth 2 (two) times daily. 28 capsule 0 More than a month    guaiFENesin 100 MG/5 ML Oral Take 10 mL (200 mg total) by mouth every 4 (four) hours as needed. (Patient not taking: No sig reported) 1 each 0 More than a month    ketoconazole 2 % External Cream Apply 1 Application topically in the morning and 1 Application before bedtime. (Patient not taking: No sig reported)   More than a month    nystatin [id number] UNIT/GM External Powder Apply 1 Application topically in the morning and 1 Application in the evening and 1 Application before bedtime.   More than a month   [4]   Current Facility-Administered Medications Ordered in Epic   Medication Dose Route Frequency Provider Last Rate Last Admin    lactated ringers infusion   Intravenous Continuous Chase Duke MD 20 mL/hr at 07/28/25 0742 New Bag at 07/28/25 0742    ceFAZolin (Ancef) 2g in 10mL IV syringe premix  2 g Intravenous Once Chase Duke MD        clonidine-EPINEPHrine-ropivacaine-ketorolac (CERTS) (Duraclon-Adrenalin-Naropin-Toradol) pain cocktail irrigation   Intra-articular Once (Intra-Op) Chase Duke MD         No current [facility]-ordered outpatient medications on file.   [5]   Allergies  Allergen Reactions    Sulfa Antibiotics HIVES   [6]    Family History  Problem Relation Age of Onset    Dementia Father         Alzheimer's    Lipids Father         hyperlipidemia    Hypertension Father     Musculo-skelatal Disorder Father         B/L knees replaced    Diabetes Mother     Heart Disease Mother         CAD    Stroke Mother         CVA    Heart Attack Mother         quadruple bypass surgery/MI    Arthritis Mother         possible Rheumatoid Arthritis    Asthma Daughter     Colon Cancer Maternal Grandmother     Breast Cancer Sister 40    Other (Other) Sister         flu H1N1    Cancer Brother         liver    Breast Cancer Paternal Aunt 52    Glaucoma Neg         multiple    Macular degeneration Neg         multiple   [7]   Social History  Socioeconomic History    Marital status:    Tobacco Use    Smoking status: Former     Current packs/day: 0.00     Average packs/day: 1 pack/day for 30.0 years (30.0 ttl pk-yrs)     Types: Cigarettes     Start date: 1972     Quit date: 2002     Years since quittin.5     Passive exposure: Never    Smokeless tobacco: Never    Tobacco comments:     1 pack per day   Vaping Use    Vaping status: Never Used   Substance and Sexual Activity    Alcohol use: Not Currently    Drug use: No    Sexual activity: Yes     Partners: Male     Birth control/protection: Tubal Ligation   Other Topics Concern    Caffeine Concern Yes     Comment: tea, soda, 44 oz daily    Reaction to local anesthetic No    Right Handed Yes

## 2025-07-29 ENCOUNTER — APPOINTMENT (OUTPATIENT)
Dept: GENERAL RADIOLOGY | Facility: HOSPITAL | Age: 70
End: 2025-07-29
Attending: HOSPITALIST

## 2025-07-29 PROBLEM — Z01.818 PREOP TESTING: Status: ACTIVE | Noted: 2022-04-15

## 2025-07-29 LAB
ANION GAP SERPL CALC-SCNC: 3 MMOL/L (ref 0–18)
BASOPHILS # BLD AUTO: 0.03 X10(3) UL (ref 0–0.2)
BASOPHILS NFR BLD AUTO: 0.5 %
BUN BLD-MCNC: 8 MG/DL (ref 9–23)
BUN/CREAT SERPL: 11.8 (ref 10–20)
CALCIUM BLD-MCNC: 8.3 MG/DL (ref 8.7–10.4)
CHLORIDE SERPL-SCNC: 99 MMOL/L (ref 98–112)
CO2 SERPL-SCNC: 28 MMOL/L (ref 21–32)
CREAT BLD-MCNC: 0.68 MG/DL (ref 0.55–1.02)
DEPRECATED RDW RBC AUTO: 43.8 FL (ref 35.1–46.3)
EGFRCR SERPLBLD CKD-EPI 2021: 92 ML/MIN/1.73M2 (ref 60–?)
EOSINOPHIL # BLD AUTO: 0.09 X10(3) UL (ref 0–0.7)
EOSINOPHIL NFR BLD AUTO: 1.5 %
ERYTHROCYTE [DISTWIDTH] IN BLOOD BY AUTOMATED COUNT: 13.6 % (ref 11–15)
GLUCOSE BLD-MCNC: 105 MG/DL (ref 70–99)
HCT VFR BLD AUTO: 28.3 % (ref 35–48)
HCT VFR BLD AUTO: 28.3 % (ref 35–48)
HGB BLD-MCNC: 9.4 G/DL (ref 12–16)
HGB BLD-MCNC: 9.5 G/DL (ref 12–16)
IMM GRANULOCYTES # BLD AUTO: 0.01 X10(3) UL (ref 0–1)
IMM GRANULOCYTES NFR BLD: 0.2 %
LYMPHOCYTES # BLD AUTO: 0.94 X10(3) UL (ref 1–4)
LYMPHOCYTES NFR BLD AUTO: 15.4 %
MCH RBC QN AUTO: 29.3 PG (ref 26–34)
MCHC RBC AUTO-ENTMCNC: 33.2 G/DL (ref 31–37)
MCV RBC AUTO: 88.2 FL (ref 80–100)
MONOCYTES # BLD AUTO: 0.43 X10(3) UL (ref 0.1–1)
MONOCYTES NFR BLD AUTO: 7 %
NEUTROPHILS # BLD AUTO: 4.6 X10 (3) UL (ref 1.5–7.7)
NEUTROPHILS # BLD AUTO: 4.6 X10(3) UL (ref 1.5–7.7)
NEUTROPHILS NFR BLD AUTO: 75.4 %
OSMOLALITY SERPL CALC.SUM OF ELEC: 269 MOSM/KG (ref 275–295)
PLATELET # BLD AUTO: 186 10(3)UL (ref 150–450)
POTASSIUM SERPL-SCNC: 4.5 MMOL/L (ref 3.5–5.1)
RBC # BLD AUTO: 3.21 X10(6)UL (ref 3.8–5.3)
SODIUM SERPL-SCNC: 130 MMOL/L (ref 136–145)
WBC # BLD AUTO: 6.1 X10(3) UL (ref 4–11)

## 2025-07-29 PROCEDURE — 71045 X-RAY EXAM CHEST 1 VIEW: CPT | Performed by: HOSPITALIST

## 2025-07-29 PROCEDURE — 99215 OFFICE O/P EST HI 40 MIN: CPT | Performed by: HOSPITALIST

## 2025-07-30 ENCOUNTER — APPOINTMENT (OUTPATIENT)
Dept: GENERAL RADIOLOGY | Facility: HOSPITAL | Age: 70
End: 2025-07-30
Attending: STUDENT IN AN ORGANIZED HEALTH CARE EDUCATION/TRAINING PROGRAM

## 2025-07-30 LAB
ADENOVIRUS PCR:: NOT DETECTED
ALBUMIN SERPL-MCNC: 3.7 G/DL (ref 3.2–4.8)
ANION GAP SERPL CALC-SCNC: 4 MMOL/L (ref 0–18)
B PARAPERT DNA SPEC QL NAA+PROBE: NOT DETECTED
B PERT DNA SPEC QL NAA+PROBE: NOT DETECTED
BASOPHILS # BLD AUTO: 0.03 X10(3) UL (ref 0–0.2)
BASOPHILS NFR BLD AUTO: 0.6 %
BUN BLD-MCNC: 9 MG/DL (ref 9–23)
BUN/CREAT SERPL: 12.2 (ref 10–20)
C PNEUM DNA SPEC QL NAA+PROBE: NOT DETECTED
CALCIUM BLD-MCNC: 8.6 MG/DL (ref 8.7–10.4)
CHLORIDE SERPL-SCNC: 98 MMOL/L (ref 98–112)
CO2 SERPL-SCNC: 29 MMOL/L (ref 21–32)
CORONAVIRUS 229E PCR:: NOT DETECTED
CORONAVIRUS HKU1 PCR:: NOT DETECTED
CORONAVIRUS NL63 PCR:: NOT DETECTED
CORONAVIRUS OC43 PCR:: NOT DETECTED
CREAT BLD-MCNC: 0.74 MG/DL (ref 0.55–1.02)
DEPRECATED RDW RBC AUTO: 43.4 FL (ref 35.1–46.3)
EGFRCR SERPLBLD CKD-EPI 2021: 86 ML/MIN/1.73M2 (ref 60–?)
EOSINOPHIL # BLD AUTO: 0.29 X10(3) UL (ref 0–0.7)
EOSINOPHIL NFR BLD AUTO: 5.6 %
ERYTHROCYTE [DISTWIDTH] IN BLOOD BY AUTOMATED COUNT: 13.4 % (ref 11–15)
FLUAV RNA SPEC QL NAA+PROBE: NOT DETECTED
FLUBV RNA SPEC QL NAA+PROBE: NOT DETECTED
GLUCOSE BLD-MCNC: 105 MG/DL (ref 70–99)
HCT VFR BLD AUTO: 26.4 % (ref 35–48)
HGB BLD-MCNC: 8.7 G/DL (ref 12–16)
IMM GRANULOCYTES # BLD AUTO: 0.01 X10(3) UL (ref 0–1)
IMM GRANULOCYTES NFR BLD: 0.2 %
LYMPHOCYTES # BLD AUTO: 1.41 X10(3) UL (ref 1–4)
LYMPHOCYTES NFR BLD AUTO: 27.4 %
MAGNESIUM SERPL-MCNC: 1.8 MG/DL (ref 1.6–2.6)
MCH RBC QN AUTO: 29 PG (ref 26–34)
MCHC RBC AUTO-ENTMCNC: 33 G/DL (ref 31–37)
MCV RBC AUTO: 88 FL (ref 80–100)
METAPNEUMOVIRUS PCR:: NOT DETECTED
MONOCYTES # BLD AUTO: 0.45 X10(3) UL (ref 0.1–1)
MONOCYTES NFR BLD AUTO: 8.8 %
MYCOPLASMA PNEUMONIA PCR:: NOT DETECTED
NEUTROPHILS # BLD AUTO: 2.95 X10 (3) UL (ref 1.5–7.7)
NEUTROPHILS # BLD AUTO: 2.95 X10(3) UL (ref 1.5–7.7)
NEUTROPHILS NFR BLD AUTO: 57.4 %
OSMOLALITY SERPL CALC.SUM OF ELEC: 271 MOSM/KG (ref 275–295)
PARAINFLUENZA 1 PCR:: NOT DETECTED
PARAINFLUENZA 2 PCR:: NOT DETECTED
PARAINFLUENZA 3 PCR:: NOT DETECTED
PARAINFLUENZA 4 PCR:: NOT DETECTED
PHOSPHATE SERPL-MCNC: 3.7 MG/DL (ref 2.4–5.1)
PLATELET # BLD AUTO: 154 10(3)UL (ref 150–450)
POTASSIUM SERPL-SCNC: 4.2 MMOL/L (ref 3.5–5.1)
RBC # BLD AUTO: 3 X10(6)UL (ref 3.8–5.3)
RHINOVIRUS/ENTERO PCR:: NOT DETECTED
RSV RNA SPEC QL NAA+PROBE: NOT DETECTED
SARS-COV-2 RNA NPH QL NAA+NON-PROBE: NOT DETECTED
SODIUM SERPL-SCNC: 131 MMOL/L (ref 136–145)
WBC # BLD AUTO: 5.1 X10(3) UL (ref 4–11)

## 2025-07-30 PROCEDURE — 71045 X-RAY EXAM CHEST 1 VIEW: CPT | Performed by: STUDENT IN AN ORGANIZED HEALTH CARE EDUCATION/TRAINING PROGRAM

## 2025-07-30 PROCEDURE — 99215 OFFICE O/P EST HI 40 MIN: CPT | Performed by: STUDENT IN AN ORGANIZED HEALTH CARE EDUCATION/TRAINING PROGRAM

## 2025-07-30 RX ORDER — FERROUS SULFATE 325(65) MG
325 TABLET, DELAYED RELEASE (ENTERIC COATED) ORAL
Status: DISCONTINUED | OUTPATIENT
Start: 2025-07-30 | End: 2025-08-01

## 2025-07-30 RX ORDER — MAGNESIUM OXIDE 400 MG/1
400 TABLET ORAL ONCE
Status: COMPLETED | OUTPATIENT
Start: 2025-07-30 | End: 2025-07-30

## 2025-07-30 RX ORDER — FLUTICASONE FUROATE AND VILANTEROL 100; 25 UG/1; UG/1
1 POWDER RESPIRATORY (INHALATION) DAILY
Status: DISCONTINUED | OUTPATIENT
Start: 2025-07-30 | End: 2025-07-30 | Stop reason: ALTCHOICE

## 2025-07-30 RX ORDER — BUDESONIDE 0.5 MG/2ML
0.5 INHALANT ORAL 2 TIMES DAILY PRN
Status: DISCONTINUED | OUTPATIENT
Start: 2025-07-30 | End: 2025-08-01

## 2025-07-30 RX ORDER — FLUTICASONE PROPIONATE AND SALMETEROL 250; 50 UG/1; UG/1
1 POWDER RESPIRATORY (INHALATION) 2 TIMES DAILY
Status: DISCONTINUED | OUTPATIENT
Start: 2025-07-30 | End: 2025-08-01

## 2025-07-30 RX ORDER — IPRATROPIUM BROMIDE AND ALBUTEROL SULFATE 2.5; .5 MG/3ML; MG/3ML
3 SOLUTION RESPIRATORY (INHALATION) 2 TIMES DAILY
Status: DISCONTINUED | OUTPATIENT
Start: 2025-07-30 | End: 2025-08-01

## 2025-07-31 LAB
ANION GAP SERPL CALC-SCNC: 1 MMOL/L (ref 0–18)
BASOPHILS # BLD AUTO: 0.02 X10(3) UL (ref 0–0.2)
BASOPHILS NFR BLD AUTO: 0.4 %
BUN BLD-MCNC: 5 MG/DL (ref 9–23)
BUN/CREAT SERPL: 9.3 (ref 10–20)
CALCIUM BLD-MCNC: 9 MG/DL (ref 8.7–10.4)
CHLORIDE SERPL-SCNC: 103 MMOL/L (ref 98–112)
CO2 SERPL-SCNC: 30 MMOL/L (ref 21–32)
CREAT BLD-MCNC: 0.54 MG/DL (ref 0.55–1.02)
DEPRECATED RDW RBC AUTO: 43.8 FL (ref 35.1–46.3)
EGFRCR SERPLBLD CKD-EPI 2021: 98 ML/MIN/1.73M2 (ref 60–?)
EOSINOPHIL # BLD AUTO: 0.27 X10(3) UL (ref 0–0.7)
EOSINOPHIL NFR BLD AUTO: 4.9 %
ERYTHROCYTE [DISTWIDTH] IN BLOOD BY AUTOMATED COUNT: 14 % (ref 11–15)
GLUCOSE BLD-MCNC: 104 MG/DL (ref 70–99)
HCT VFR BLD AUTO: 25.6 % (ref 35–48)
HGB BLD-MCNC: 8.9 G/DL (ref 12–16)
IMM GRANULOCYTES # BLD AUTO: 0.03 X10(3) UL (ref 0–1)
IMM GRANULOCYTES NFR BLD: 0.5 %
LYMPHOCYTES # BLD AUTO: 1 X10(3) UL (ref 1–4)
LYMPHOCYTES NFR BLD AUTO: 18.1 %
MAGNESIUM SERPL-MCNC: 1.9 MG/DL (ref 1.6–2.6)
MCH RBC QN AUTO: 30.2 PG (ref 26–34)
MCHC RBC AUTO-ENTMCNC: 34.8 G/DL (ref 31–37)
MCV RBC AUTO: 86.8 FL (ref 80–100)
MONOCYTES # BLD AUTO: 0.4 X10(3) UL (ref 0.1–1)
MONOCYTES NFR BLD AUTO: 7.2 %
NEUTROPHILS # BLD AUTO: 3.8 X10 (3) UL (ref 1.5–7.7)
NEUTROPHILS # BLD AUTO: 3.8 X10(3) UL (ref 1.5–7.7)
NEUTROPHILS NFR BLD AUTO: 68.9 %
OSMOLALITY SERPL CALC.SUM OF ELEC: 276 MOSM/KG (ref 275–295)
PLATELET # BLD AUTO: 200 10(3)UL (ref 150–450)
POTASSIUM SERPL-SCNC: 4.8 MMOL/L (ref 3.5–5.1)
RBC # BLD AUTO: 2.95 X10(6)UL (ref 3.8–5.3)
SODIUM SERPL-SCNC: 134 MMOL/L (ref 136–145)
WBC # BLD AUTO: 5.5 X10(3) UL (ref 4–11)

## 2025-07-31 PROCEDURE — 99233 SBSQ HOSP IP/OBS HIGH 50: CPT | Performed by: STUDENT IN AN ORGANIZED HEALTH CARE EDUCATION/TRAINING PROGRAM

## 2025-07-31 RX ORDER — CELECOXIB 100 MG/1
100 CAPSULE ORAL 2 TIMES DAILY
Status: DISCONTINUED | OUTPATIENT
Start: 2025-07-31 | End: 2025-08-01

## 2025-07-31 RX ORDER — MIDODRINE HYDROCHLORIDE 5 MG/1
5 TABLET ORAL EVERY 4 HOURS PRN
Status: DISCONTINUED | OUTPATIENT
Start: 2025-07-31 | End: 2025-08-01

## 2025-08-01 VITALS
RESPIRATION RATE: 18 BRPM | HEIGHT: 60 IN | DIASTOLIC BLOOD PRESSURE: 53 MMHG | BODY MASS INDEX: 32 KG/M2 | SYSTOLIC BLOOD PRESSURE: 120 MMHG | TEMPERATURE: 99 F | WEIGHT: 163 LBS | HEART RATE: 70 BPM | OXYGEN SATURATION: 100 %

## 2025-08-01 PROCEDURE — 99239 HOSP IP/OBS DSCHRG MGMT >30: CPT | Performed by: STUDENT IN AN ORGANIZED HEALTH CARE EDUCATION/TRAINING PROGRAM

## 2025-08-01 RX ORDER — CELECOXIB 200 MG/1
200 CAPSULE ORAL 2 TIMES DAILY
Status: SHIPPED | COMMUNITY
Start: 2025-08-01

## 2025-08-01 RX ORDER — ASPIRIN 81 MG/1
81 TABLET ORAL 2 TIMES DAILY
Qty: 60 TABLET | Refills: 0 | Status: SHIPPED | OUTPATIENT
Start: 2025-08-01

## 2025-08-01 RX ORDER — OXYCODONE HYDROCHLORIDE 5 MG/1
5 TABLET ORAL NIGHTLY PRN
Status: CANCELLED | COMMUNITY
Start: 2025-08-01

## 2025-08-01 RX ORDER — ASPIRIN 81 MG/1
81 TABLET, CHEWABLE ORAL 2 TIMES DAILY
Qty: 60 TABLET | Refills: 0 | Status: CANCELLED
Start: 2025-08-01

## 2025-08-01 RX ORDER — ASPIRIN 81 MG/1
81 TABLET ORAL 2 TIMES DAILY
Qty: 60 TABLET | Refills: 0 | Status: SHIPPED | OUTPATIENT
Start: 2025-08-01 | End: 2025-08-31

## 2025-08-01 RX ORDER — BUDESONIDE 0.5 MG/2ML
0.5 INHALANT ORAL 2 TIMES DAILY PRN
Status: SHIPPED | COMMUNITY
Start: 2025-08-01

## 2025-08-01 RX ORDER — QUETIAPINE FUMARATE 50 MG/1
100 TABLET, FILM COATED ORAL NIGHTLY
Status: SHIPPED | COMMUNITY
Start: 2025-08-01

## 2025-08-04 ENCOUNTER — PATIENT OUTREACH (OUTPATIENT)
Age: 70
End: 2025-08-04

## 2025-08-04 ENCOUNTER — PATIENT OUTREACH (OUTPATIENT)
Dept: CASE MANAGEMENT | Age: 70
End: 2025-08-04

## 2025-08-04 ENCOUNTER — TELEPHONE (OUTPATIENT)
Dept: INTERNAL MEDICINE CLINIC | Facility: CLINIC | Age: 70
End: 2025-08-04

## 2025-08-04 RX ORDER — OXYCODONE HYDROCHLORIDE 5 MG/1
5 TABLET ORAL EVERY 6 HOURS PRN
COMMUNITY

## 2025-08-06 RX ORDER — DONEPEZIL HYDROCHLORIDE 5 MG/1
5 TABLET, FILM COATED ORAL NIGHTLY
Qty: 30 TABLET | Refills: 0 | Status: SHIPPED | OUTPATIENT
Start: 2025-08-06

## 2025-08-06 RX ORDER — DONEPEZIL HYDROCHLORIDE 5 MG/1
5 TABLET, FILM COATED ORAL NIGHTLY
Qty: 90 TABLET | Refills: 0 | Status: SHIPPED | OUTPATIENT
Start: 2025-08-06

## 2025-08-12 ENCOUNTER — PATIENT OUTREACH (OUTPATIENT)
Age: 70
End: 2025-08-12

## 2025-08-12 ENCOUNTER — TELEPHONE (OUTPATIENT)
Dept: ORTHOPEDICS CLINIC | Facility: CLINIC | Age: 70
End: 2025-08-12

## 2025-08-19 ENCOUNTER — PATIENT OUTREACH (OUTPATIENT)
Age: 70
End: 2025-08-19

## 2025-08-19 ENCOUNTER — HOSPITAL ENCOUNTER (OUTPATIENT)
Dept: GENERAL RADIOLOGY | Facility: HOSPITAL | Age: 70
Discharge: HOME | End: 2025-08-19
Attending: ORTHOPAEDIC SURGERY

## 2025-08-19 ENCOUNTER — OFFICE VISIT (OUTPATIENT)
Facility: CLINIC | Age: 70
End: 2025-08-19

## 2025-08-19 ENCOUNTER — HOSPITAL ENCOUNTER (INPATIENT)
Facility: HOSPITAL | Age: 70
LOS: 4 days | Discharge: HOME | End: 2025-08-23
Attending: HOSPITALIST | Admitting: HOSPITALIST

## 2025-08-19 DIAGNOSIS — Z98.890 STATUS POST-OPERATIVE REPAIR OF HIP FRACTURE: ICD-10-CM

## 2025-08-19 DIAGNOSIS — T81.30XA WOUND DEHISCENCE: Primary | ICD-10-CM

## 2025-08-19 DIAGNOSIS — Z87.81 STATUS POST-OPERATIVE REPAIR OF HIP FRACTURE: ICD-10-CM

## 2025-08-19 DIAGNOSIS — Z47.89 ORTHOPEDIC AFTERCARE: ICD-10-CM

## 2025-08-19 DIAGNOSIS — T81.31XA WOUND DEHISCENCE, SURGICAL, INITIAL ENCOUNTER: Primary | ICD-10-CM

## 2025-08-19 LAB
ANION GAP SERPL CALC-SCNC: 5 MMOL/L (ref 0–18)
BASOPHILS # BLD AUTO: 0.05 X10(3) UL (ref 0–0.2)
BASOPHILS NFR BLD AUTO: 1 %
BUN BLD-MCNC: 6 MG/DL (ref 9–23)
BUN/CREAT SERPL: 7.9 (ref 10–20)
CALCIUM BLD-MCNC: 10.1 MG/DL (ref 8.7–10.4)
CHLORIDE SERPL-SCNC: 101 MMOL/L (ref 98–112)
CO2 SERPL-SCNC: 29 MMOL/L (ref 21–32)
CREAT BLD-MCNC: 0.76 MG/DL (ref 0.55–1.02)
CRP SERPL-MCNC: <0.5 MG/DL (ref ?–0.5)
DEPRECATED RDW RBC AUTO: 49.6 FL (ref 35.1–46.3)
EGFRCR SERPLBLD CKD-EPI 2021: 83 ML/MIN/1.73M2 (ref 60–?)
EOSINOPHIL # BLD AUTO: 0.45 X10(3) UL (ref 0–0.7)
EOSINOPHIL NFR BLD AUTO: 8.9 %
ERYTHROCYTE [DISTWIDTH] IN BLOOD BY AUTOMATED COUNT: 15.4 % (ref 11–15)
ERYTHROCYTE [SEDIMENTATION RATE] IN BLOOD: 46 MM/HR (ref 0–30)
GLUCOSE BLD-MCNC: 91 MG/DL (ref 70–99)
HCT VFR BLD AUTO: 32.8 % (ref 35–48)
HGB BLD-MCNC: 10.7 G/DL (ref 12–16)
IMM GRANULOCYTES # BLD AUTO: 0.01 X10(3) UL (ref 0–1)
IMM GRANULOCYTES NFR BLD: 0.2 %
LYMPHOCYTES # BLD AUTO: 1.68 X10(3) UL (ref 1–4)
LYMPHOCYTES NFR BLD AUTO: 33.3 %
MCH RBC QN AUTO: 29.2 PG (ref 26–34)
MCHC RBC AUTO-ENTMCNC: 32.6 G/DL (ref 31–37)
MCV RBC AUTO: 89.4 FL (ref 80–100)
MONOCYTES # BLD AUTO: 0.43 X10(3) UL (ref 0.1–1)
MONOCYTES NFR BLD AUTO: 8.5 %
NEUTROPHILS # BLD AUTO: 2.42 X10 (3) UL (ref 1.5–7.7)
NEUTROPHILS # BLD AUTO: 2.42 X10(3) UL (ref 1.5–7.7)
NEUTROPHILS NFR BLD AUTO: 48.1 %
OSMOLALITY SERPL CALC.SUM OF ELEC: 277 MOSM/KG (ref 275–295)
PLATELET # BLD AUTO: 353 10(3)UL (ref 150–450)
POTASSIUM SERPL-SCNC: 4.5 MMOL/L (ref 3.5–5.1)
RBC # BLD AUTO: 3.67 X10(6)UL (ref 3.8–5.3)
SODIUM SERPL-SCNC: 135 MMOL/L (ref 136–145)
WBC # BLD AUTO: 5 X10(3) UL (ref 4–11)

## 2025-08-19 PROCEDURE — 99024 POSTOP FOLLOW-UP VISIT: CPT | Performed by: ORTHOPAEDIC SURGERY

## 2025-08-19 PROCEDURE — 73502 X-RAY EXAM HIP UNI 2-3 VIEWS: CPT | Performed by: ORTHOPAEDIC SURGERY

## 2025-08-19 PROCEDURE — 1111F DSCHRG MED/CURRENT MED MERGE: CPT | Performed by: ORTHOPAEDIC SURGERY

## 2025-08-19 PROCEDURE — 99223 1ST HOSP IP/OBS HIGH 75: CPT | Performed by: HOSPITALIST

## 2025-08-19 RX ORDER — TEMAZEPAM 15 MG/1
15 CAPSULE ORAL NIGHTLY PRN
Status: DISCONTINUED | OUTPATIENT
Start: 2025-08-19 | End: 2025-08-22

## 2025-08-19 RX ORDER — BUDESONIDE 0.5 MG/2ML
0.5 INHALANT ORAL 2 TIMES DAILY PRN
Status: DISCONTINUED | OUTPATIENT
Start: 2025-08-19 | End: 2025-08-23

## 2025-08-19 RX ORDER — MORPHINE SULFATE 4 MG/ML
4 INJECTION, SOLUTION INTRAMUSCULAR; INTRAVENOUS EVERY 2 HOUR PRN
Status: DISCONTINUED | OUTPATIENT
Start: 2025-08-19 | End: 2025-08-21

## 2025-08-19 RX ORDER — ARIPIPRAZOLE 5 MG/1
5 TABLET ORAL NIGHTLY
Status: DISCONTINUED | OUTPATIENT
Start: 2025-08-19 | End: 2025-08-23

## 2025-08-19 RX ORDER — HYDROCODONE BITARTRATE AND ACETAMINOPHEN 5; 325 MG/1; MG/1
2 TABLET ORAL EVERY 4 HOURS PRN
Refills: 0 | Status: DISCONTINUED | OUTPATIENT
Start: 2025-08-19 | End: 2025-08-21

## 2025-08-19 RX ORDER — CLONAZEPAM 0.5 MG/1
2 TABLET ORAL 2 TIMES DAILY
Status: DISCONTINUED | OUTPATIENT
Start: 2025-08-19 | End: 2025-08-21

## 2025-08-19 RX ORDER — DULOXETIN HYDROCHLORIDE 30 MG/1
60 CAPSULE, DELAYED RELEASE ORAL NIGHTLY
Status: DISCONTINUED | OUTPATIENT
Start: 2025-08-19 | End: 2025-08-23

## 2025-08-19 RX ORDER — QUETIAPINE FUMARATE 100 MG/1
100 TABLET, FILM COATED ORAL NIGHTLY
Status: DISCONTINUED | OUTPATIENT
Start: 2025-08-19 | End: 2025-08-21

## 2025-08-19 RX ORDER — HYDROCODONE BITARTRATE AND ACETAMINOPHEN 5; 325 MG/1; MG/1
1 TABLET ORAL EVERY 4 HOURS PRN
Refills: 0 | Status: DISCONTINUED | OUTPATIENT
Start: 2025-08-19 | End: 2025-08-21

## 2025-08-19 RX ORDER — VERAPAMIL HYDROCHLORIDE 180 MG/1
180 TABLET, FILM COATED, EXTENDED RELEASE ORAL NIGHTLY
Status: DISCONTINUED | OUTPATIENT
Start: 2025-08-19 | End: 2025-08-23

## 2025-08-19 RX ORDER — DONEPEZIL HYDROCHLORIDE 5 MG/1
5 TABLET, FILM COATED ORAL NIGHTLY
Status: DISCONTINUED | OUTPATIENT
Start: 2025-08-19 | End: 2025-08-23

## 2025-08-19 RX ORDER — ALBUTEROL SULFATE 90 UG/1
2 INHALANT RESPIRATORY (INHALATION) EVERY 6 HOURS PRN
Status: DISCONTINUED | OUTPATIENT
Start: 2025-08-19 | End: 2025-08-23

## 2025-08-19 RX ORDER — SODIUM CHLORIDE 9 MG/ML
INJECTION, SOLUTION INTRAVENOUS CONTINUOUS
Status: DISCONTINUED | OUTPATIENT
Start: 2025-08-19 | End: 2025-08-21

## 2025-08-19 RX ORDER — ACETAMINOPHEN 500 MG
500 TABLET ORAL EVERY 4 HOURS PRN
Status: DISCONTINUED | OUTPATIENT
Start: 2025-08-19 | End: 2025-08-21

## 2025-08-19 RX ORDER — MORPHINE SULFATE 2 MG/ML
2 INJECTION, SOLUTION INTRAMUSCULAR; INTRAVENOUS EVERY 2 HOUR PRN
Status: DISCONTINUED | OUTPATIENT
Start: 2025-08-19 | End: 2025-08-21

## 2025-08-19 RX ORDER — MORPHINE SULFATE 2 MG/ML
1 INJECTION, SOLUTION INTRAMUSCULAR; INTRAVENOUS EVERY 2 HOUR PRN
Status: DISCONTINUED | OUTPATIENT
Start: 2025-08-19 | End: 2025-08-21

## 2025-08-19 RX ORDER — ATORVASTATIN CALCIUM 20 MG/1
20 TABLET, FILM COATED ORAL NIGHTLY
Status: DISCONTINUED | OUTPATIENT
Start: 2025-08-19 | End: 2025-08-23

## 2025-08-19 RX ORDER — ONDANSETRON 2 MG/ML
4 INJECTION INTRAMUSCULAR; INTRAVENOUS EVERY 6 HOURS PRN
Status: DISCONTINUED | OUTPATIENT
Start: 2025-08-19 | End: 2025-08-21

## 2025-08-19 RX ORDER — ACETAMINOPHEN 325 MG/1
650 TABLET ORAL EVERY 4 HOURS PRN
Status: DISCONTINUED | OUTPATIENT
Start: 2025-08-19 | End: 2025-08-21

## 2025-08-19 RX ORDER — METOCLOPRAMIDE HYDROCHLORIDE 5 MG/ML
10 INJECTION INTRAMUSCULAR; INTRAVENOUS EVERY 8 HOURS PRN
Status: DISCONTINUED | OUTPATIENT
Start: 2025-08-19 | End: 2025-08-19

## 2025-08-20 ENCOUNTER — ANESTHESIA EVENT (OUTPATIENT)
Dept: SURGERY | Facility: HOSPITAL | Age: 70
End: 2025-08-20

## 2025-08-20 ENCOUNTER — APPOINTMENT (OUTPATIENT)
Dept: GENERAL RADIOLOGY | Facility: HOSPITAL | Age: 70
End: 2025-08-20
Attending: ORTHOPAEDIC SURGERY

## 2025-08-20 ENCOUNTER — ANESTHESIA (OUTPATIENT)
Dept: SURGERY | Facility: HOSPITAL | Age: 70
End: 2025-08-20

## 2025-08-20 PROBLEM — Z98.890 STATUS POST-OPERATIVE REPAIR OF HIP FRACTURE: Status: ACTIVE | Noted: 2025-04-18

## 2025-08-20 PROBLEM — Z87.81 STATUS POST-OPERATIVE REPAIR OF HIP FRACTURE: Status: ACTIVE | Noted: 2025-04-18

## 2025-08-20 LAB
ANION GAP SERPL CALC-SCNC: 8 MMOL/L (ref 0–18)
ANTIBODY SCREEN: NEGATIVE
BASOPHILS # BLD AUTO: 0.05 X10(3) UL (ref 0–0.2)
BASOPHILS NFR BLD AUTO: 1 %
BASOPHILS NFR SNV: 0 %
BUN BLD-MCNC: 8 MG/DL (ref 9–23)
BUN/CREAT SERPL: 11.3 (ref 10–20)
CALCIUM BLD-MCNC: 9.1 MG/DL (ref 8.7–10.4)
CHLORIDE SERPL-SCNC: 104 MMOL/L (ref 98–112)
CO2 SERPL-SCNC: 26 MMOL/L (ref 21–32)
CREAT BLD-MCNC: 0.71 MG/DL (ref 0.55–1.02)
DEPRECATED RDW RBC AUTO: 51 FL (ref 35.1–46.3)
EGFRCR SERPLBLD CKD-EPI 2021: 90 ML/MIN/1.73M2 (ref 60–?)
EOSINOPHIL # BLD AUTO: 0.57 X10(3) UL (ref 0–0.7)
EOSINOPHIL NFR BLD AUTO: 11.1 %
EOSINOPHIL NFR SNV: 8 %
ERYTHROCYTE [DISTWIDTH] IN BLOOD BY AUTOMATED COUNT: 15.6 % (ref 11–15)
GLUCOSE BLD-MCNC: 99 MG/DL (ref 70–99)
HCT VFR BLD AUTO: 31.8 % (ref 35–48)
HGB BLD-MCNC: 10.3 G/DL (ref 12–16)
IMM GRANULOCYTES # BLD AUTO: 0.01 X10(3) UL (ref 0–1)
IMM GRANULOCYTES NFR BLD: 0.2 %
LYMPHOCYTES # BLD AUTO: 2.29 X10(3) UL (ref 1–4)
LYMPHOCYTES NFR BLD AUTO: 44.6 %
LYMPHOCYTES NFR SNV: 60 %
MCH RBC QN AUTO: 29.5 PG (ref 26–34)
MCHC RBC AUTO-ENTMCNC: 32.4 G/DL (ref 31–37)
MCV RBC AUTO: 91.1 FL (ref 80–100)
MONOCYTES # BLD AUTO: 0.36 X10(3) UL (ref 0.1–1)
MONOCYTES NFR BLD AUTO: 7 %
MONOS+MACROS NFR SNV: 18 %
MRSA DNA SPEC QL NAA+PROBE: NEGATIVE
NEUTROPHILS # BLD AUTO: 1.86 X10 (3) UL (ref 1.5–7.7)
NEUTROPHILS # BLD AUTO: 1.86 X10(3) UL (ref 1.5–7.7)
NEUTROPHILS NFR BLD AUTO: 36.1 %
NEUTROPHILS NFR FLD: 14 %
OSMOLALITY SERPL CALC.SUM OF ELEC: 284 MOSM/KG (ref 275–295)
PLATELET # BLD AUTO: 314 10(3)UL (ref 150–450)
POTASSIUM SERPL-SCNC: 4.1 MMOL/L (ref 3.5–5.1)
RBC # BLD AUTO: 3.49 X10(6)UL (ref 3.8–5.3)
RBC # FLD AUTO: ABNORMAL /CUMM (ref ?–1)
RH BLOOD TYPE: POSITIVE
SODIUM SERPL-SCNC: 138 MMOL/L (ref 136–145)
TOTAL CELLS COUNTED FLD: 100
TOTAL CELLS COUNTED SNV: 4222 /CUMM (ref 0–200)
WBC # BLD AUTO: 5.1 X10(3) UL (ref 4–11)
WBC # SNV: 4222 /CUMM

## 2025-08-20 PROCEDURE — 99233 SBSQ HOSP IP/OBS HIGH 50: CPT | Performed by: HOSPITALIST

## 2025-08-20 RX ORDER — CELECOXIB 200 MG/1
200 CAPSULE ORAL 2 TIMES DAILY
Status: DISCONTINUED | OUTPATIENT
Start: 2025-08-20 | End: 2025-08-23

## 2025-08-20 RX ORDER — METOCLOPRAMIDE HYDROCHLORIDE 5 MG/ML
5 INJECTION INTRAMUSCULAR; INTRAVENOUS EVERY 8 HOURS PRN
Status: DISCONTINUED | OUTPATIENT
Start: 2025-08-20 | End: 2025-08-20 | Stop reason: HOSPADM

## 2025-08-20 RX ORDER — DIPHENHYDRAMINE HYDROCHLORIDE 50 MG/ML
12.5 INJECTION, SOLUTION INTRAMUSCULAR; INTRAVENOUS EVERY 4 HOURS PRN
Status: DISCONTINUED | OUTPATIENT
Start: 2025-08-20 | End: 2025-08-21

## 2025-08-20 RX ORDER — DIPHENHYDRAMINE HCL 25 MG
25 CAPSULE ORAL EVERY 4 HOURS PRN
Status: DISCONTINUED | OUTPATIENT
Start: 2025-08-20 | End: 2025-08-23

## 2025-08-20 RX ORDER — MIDAZOLAM HYDROCHLORIDE 1 MG/ML
INJECTION INTRAMUSCULAR; INTRAVENOUS AS NEEDED
Status: DISCONTINUED | OUTPATIENT
Start: 2025-08-20 | End: 2025-08-20 | Stop reason: SURG

## 2025-08-20 RX ORDER — SODIUM CHLORIDE, SODIUM LACTATE, POTASSIUM CHLORIDE, CALCIUM CHLORIDE 600; 310; 30; 20 MG/100ML; MG/100ML; MG/100ML; MG/100ML
INJECTION, SOLUTION INTRAVENOUS CONTINUOUS
Status: DISCONTINUED | OUTPATIENT
Start: 2025-08-20 | End: 2025-08-20 | Stop reason: HOSPADM

## 2025-08-20 RX ORDER — OXYCODONE HYDROCHLORIDE 5 MG/1
5 TABLET ORAL EVERY 4 HOURS PRN
Status: DISCONTINUED | OUTPATIENT
Start: 2025-08-20 | End: 2025-08-21

## 2025-08-20 RX ORDER — ACETAMINOPHEN 500 MG
1000 TABLET ORAL ONCE AS NEEDED
Status: DISCONTINUED | OUTPATIENT
Start: 2025-08-20 | End: 2025-08-20 | Stop reason: HOSPADM

## 2025-08-20 RX ORDER — ASPIRIN 81 MG/1
81 TABLET ORAL 2 TIMES DAILY
Status: DISCONTINUED | OUTPATIENT
Start: 2025-08-20 | End: 2025-08-23

## 2025-08-20 RX ORDER — ONDANSETRON 2 MG/ML
4 INJECTION INTRAMUSCULAR; INTRAVENOUS EVERY 6 HOURS PRN
Status: DISCONTINUED | OUTPATIENT
Start: 2025-08-20 | End: 2025-08-20 | Stop reason: HOSPADM

## 2025-08-20 RX ORDER — MORPHINE SULFATE 4 MG/ML
2 INJECTION, SOLUTION INTRAMUSCULAR; INTRAVENOUS EVERY 10 MIN PRN
Status: DISCONTINUED | OUTPATIENT
Start: 2025-08-20 | End: 2025-08-20 | Stop reason: HOSPADM

## 2025-08-20 RX ORDER — LIDOCAINE HYDROCHLORIDE 10 MG/ML
INJECTION, SOLUTION EPIDURAL; INFILTRATION; INTRACAUDAL; PERINEURAL AS NEEDED
Status: DISCONTINUED | OUTPATIENT
Start: 2025-08-20 | End: 2025-08-20 | Stop reason: SURG

## 2025-08-20 RX ORDER — BISACODYL 10 MG
10 SUPPOSITORY, RECTAL RECTAL
Status: DISCONTINUED | OUTPATIENT
Start: 2025-08-20 | End: 2025-08-23

## 2025-08-20 RX ORDER — ROCURONIUM BROMIDE 10 MG/ML
INJECTION, SOLUTION INTRAVENOUS AS NEEDED
Status: DISCONTINUED | OUTPATIENT
Start: 2025-08-20 | End: 2025-08-20 | Stop reason: SURG

## 2025-08-20 RX ORDER — VANCOMYCIN HYDROCHLORIDE 1 G/20ML
INJECTION, POWDER, LYOPHILIZED, FOR SOLUTION INTRAVENOUS AS NEEDED
Status: DISCONTINUED | OUTPATIENT
Start: 2025-08-20 | End: 2025-08-20 | Stop reason: HOSPADM

## 2025-08-20 RX ORDER — ACETAMINOPHEN 500 MG
1000 TABLET ORAL EVERY 8 HOURS SCHEDULED
Status: DISCONTINUED | OUTPATIENT
Start: 2025-08-20 | End: 2025-08-23

## 2025-08-20 RX ORDER — POLYETHYLENE GLYCOL 3350 17 G/17G
17 POWDER, FOR SOLUTION ORAL DAILY PRN
Status: DISCONTINUED | OUTPATIENT
Start: 2025-08-20 | End: 2025-08-23

## 2025-08-20 RX ORDER — HYDROMORPHONE HYDROCHLORIDE 1 MG/ML
0.4 INJECTION, SOLUTION INTRAMUSCULAR; INTRAVENOUS; SUBCUTANEOUS EVERY 5 MIN PRN
Status: DISCONTINUED | OUTPATIENT
Start: 2025-08-20 | End: 2025-08-20 | Stop reason: HOSPADM

## 2025-08-20 RX ORDER — OXYCODONE HYDROCHLORIDE 5 MG/1
2.5 TABLET ORAL EVERY 4 HOURS PRN
Status: DISCONTINUED | OUTPATIENT
Start: 2025-08-20 | End: 2025-08-23

## 2025-08-20 RX ORDER — HYDROMORPHONE HYDROCHLORIDE 1 MG/ML
0.6 INJECTION, SOLUTION INTRAMUSCULAR; INTRAVENOUS; SUBCUTANEOUS EVERY 5 MIN PRN
Status: DISCONTINUED | OUTPATIENT
Start: 2025-08-20 | End: 2025-08-20 | Stop reason: HOSPADM

## 2025-08-20 RX ORDER — MORPHINE SULFATE 10 MG/ML
6 INJECTION, SOLUTION INTRAMUSCULAR; INTRAVENOUS EVERY 10 MIN PRN
Status: DISCONTINUED | OUTPATIENT
Start: 2025-08-20 | End: 2025-08-20 | Stop reason: HOSPADM

## 2025-08-20 RX ORDER — DOCUSATE SODIUM 100 MG/1
100 CAPSULE, LIQUID FILLED ORAL 2 TIMES DAILY
Status: DISCONTINUED | OUTPATIENT
Start: 2025-08-20 | End: 2025-08-23

## 2025-08-20 RX ORDER — FERROUS SULFATE 325(65) MG
325 TABLET, DELAYED RELEASE (ENTERIC COATED) ORAL
Status: DISCONTINUED | OUTPATIENT
Start: 2025-08-21 | End: 2025-08-23

## 2025-08-20 RX ORDER — HYDROMORPHONE HYDROCHLORIDE 1 MG/ML
0.2 INJECTION, SOLUTION INTRAMUSCULAR; INTRAVENOUS; SUBCUTANEOUS EVERY 5 MIN PRN
Status: DISCONTINUED | OUTPATIENT
Start: 2025-08-20 | End: 2025-08-20 | Stop reason: HOSPADM

## 2025-08-20 RX ORDER — LIDOCAINE HYDROCHLORIDE 40 MG/ML
SOLUTION TOPICAL AS NEEDED
Status: DISCONTINUED | OUTPATIENT
Start: 2025-08-20 | End: 2025-08-20 | Stop reason: SURG

## 2025-08-20 RX ORDER — MORPHINE SULFATE 4 MG/ML
4 INJECTION, SOLUTION INTRAMUSCULAR; INTRAVENOUS EVERY 10 MIN PRN
Status: DISCONTINUED | OUTPATIENT
Start: 2025-08-20 | End: 2025-08-20 | Stop reason: HOSPADM

## 2025-08-20 RX ORDER — SODIUM PHOSPHATE, DIBASIC AND SODIUM PHOSPHATE, MONOBASIC 7; 19 G/230ML; G/230ML
1 ENEMA RECTAL ONCE AS NEEDED
Status: DISCONTINUED | OUTPATIENT
Start: 2025-08-20 | End: 2025-08-23

## 2025-08-20 RX ORDER — SENNOSIDES 8.6 MG
17.2 TABLET ORAL NIGHTLY
Status: DISCONTINUED | OUTPATIENT
Start: 2025-08-20 | End: 2025-08-23

## 2025-08-20 RX ORDER — DIPHENHYDRAMINE HYDROCHLORIDE 50 MG/ML
25 INJECTION, SOLUTION INTRAMUSCULAR; INTRAVENOUS ONCE AS NEEDED
Status: ACTIVE | OUTPATIENT
Start: 2025-08-20 | End: 2025-08-20

## 2025-08-20 RX ORDER — NALOXONE HYDROCHLORIDE 0.4 MG/ML
0.08 INJECTION, SOLUTION INTRAMUSCULAR; INTRAVENOUS; SUBCUTANEOUS AS NEEDED
Status: DISCONTINUED | OUTPATIENT
Start: 2025-08-20 | End: 2025-08-20 | Stop reason: HOSPADM

## 2025-08-20 RX ADMIN — MIDAZOLAM HYDROCHLORIDE 2 MG: 1 INJECTION INTRAMUSCULAR; INTRAVENOUS at 12:08:00

## 2025-08-20 RX ADMIN — SODIUM CHLORIDE: 9 INJECTION, SOLUTION INTRAVENOUS at 13:56:00

## 2025-08-20 RX ADMIN — ONDANSETRON 4 MG: 2 INJECTION INTRAMUSCULAR; INTRAVENOUS at 13:33:00

## 2025-08-20 RX ADMIN — LIDOCAINE HYDROCHLORIDE 50 MG: 10 INJECTION, SOLUTION EPIDURAL; INFILTRATION; INTRACAUDAL; PERINEURAL at 12:15:00

## 2025-08-20 RX ADMIN — LIDOCAINE HYDROCHLORIDE 4 ML: 40 SOLUTION TOPICAL at 12:18:00

## 2025-08-20 RX ADMIN — ROCURONIUM BROMIDE 50 MG: 10 INJECTION, SOLUTION INTRAVENOUS at 12:16:00

## 2025-08-21 PROCEDURE — 99233 SBSQ HOSP IP/OBS HIGH 50: CPT | Performed by: HOSPITALIST

## 2025-08-21 RX ORDER — CLONAZEPAM 0.5 MG/1
1 TABLET ORAL 2 TIMES DAILY
Status: DISCONTINUED | OUTPATIENT
Start: 2025-08-21 | End: 2025-08-22

## 2025-08-21 RX ORDER — QUETIAPINE FUMARATE 25 MG/1
50 TABLET, FILM COATED ORAL NIGHTLY
Status: DISCONTINUED | OUTPATIENT
Start: 2025-08-21 | End: 2025-08-22

## 2025-08-21 RX ORDER — ACETAMINOPHEN 325 MG/1
650 TABLET ORAL EVERY 4 HOURS PRN
Status: DISCONTINUED | OUTPATIENT
Start: 2025-08-21 | End: 2025-08-23

## 2025-08-22 LAB
ANION GAP SERPL CALC-SCNC: 5 MMOL/L (ref 0–18)
BASOPHILS # BLD AUTO: 0.03 X10(3) UL (ref 0–0.2)
BASOPHILS NFR BLD AUTO: 0.5 %
BUN BLD-MCNC: 9 MG/DL (ref 9–23)
BUN/CREAT SERPL: 13 (ref 10–20)
CALCIUM BLD-MCNC: 8.9 MG/DL (ref 8.7–10.4)
CHLORIDE SERPL-SCNC: 106 MMOL/L (ref 98–112)
CO2 SERPL-SCNC: 29 MMOL/L (ref 21–32)
CREAT BLD-MCNC: 0.69 MG/DL (ref 0.55–1.02)
DEPRECATED RDW RBC AUTO: 51.9 FL (ref 35.1–46.3)
EGFRCR SERPLBLD CKD-EPI 2021: 92 ML/MIN/1.73M2 (ref 60–?)
EOSINOPHIL # BLD AUTO: 0.8 X10(3) UL (ref 0–0.7)
EOSINOPHIL NFR BLD AUTO: 14.5 %
ERYTHROCYTE [DISTWIDTH] IN BLOOD BY AUTOMATED COUNT: 15 % (ref 11–15)
GLUCOSE BLD-MCNC: 92 MG/DL (ref 70–99)
HCT VFR BLD AUTO: 28 % (ref 35–48)
HGB BLD-MCNC: 8.8 G/DL (ref 12–16)
IMM GRANULOCYTES # BLD AUTO: 0.01 X10(3) UL (ref 0–1)
IMM GRANULOCYTES NFR BLD: 0.2 %
LYMPHOCYTES # BLD AUTO: 1.93 X10(3) UL (ref 1–4)
LYMPHOCYTES NFR BLD AUTO: 35 %
MCH RBC QN AUTO: 29.6 PG (ref 26–34)
MCHC RBC AUTO-ENTMCNC: 31.4 G/DL (ref 31–37)
MCV RBC AUTO: 94.3 FL (ref 80–100)
MONOCYTES # BLD AUTO: 0.4 X10(3) UL (ref 0.1–1)
MONOCYTES NFR BLD AUTO: 7.3 %
NEUTROPHILS # BLD AUTO: 2.34 X10 (3) UL (ref 1.5–7.7)
NEUTROPHILS # BLD AUTO: 2.34 X10(3) UL (ref 1.5–7.7)
NEUTROPHILS NFR BLD AUTO: 42.5 %
OSMOLALITY SERPL CALC.SUM OF ELEC: 288 MOSM/KG (ref 275–295)
PLATELET # BLD AUTO: 229 10(3)UL (ref 150–450)
POTASSIUM SERPL-SCNC: 4.3 MMOL/L (ref 3.5–5.1)
RBC # BLD AUTO: 2.97 X10(6)UL (ref 3.8–5.3)
SODIUM SERPL-SCNC: 140 MMOL/L (ref 136–145)
WBC # BLD AUTO: 5.5 X10(3) UL (ref 4–11)

## 2025-08-22 PROCEDURE — 99233 SBSQ HOSP IP/OBS HIGH 50: CPT | Performed by: HOSPITALIST

## 2025-08-22 RX ORDER — CLONAZEPAM 0.5 MG/1
0.5 TABLET ORAL 2 TIMES DAILY
Status: DISCONTINUED | OUTPATIENT
Start: 2025-08-22 | End: 2025-08-23

## 2025-08-22 RX ORDER — QUETIAPINE FUMARATE 25 MG/1
25 TABLET, FILM COATED ORAL NIGHTLY
Status: DISCONTINUED | OUTPATIENT
Start: 2025-08-22 | End: 2025-08-23

## 2025-08-22 RX ORDER — DOXYCYCLINE 100 MG/1
100 CAPSULE ORAL 2 TIMES DAILY
Qty: 20 CAPSULE | Refills: 0 | Status: SHIPPED | OUTPATIENT
Start: 2025-08-22 | End: 2025-09-01

## 2025-08-23 VITALS
WEIGHT: 163 LBS | TEMPERATURE: 98 F | SYSTOLIC BLOOD PRESSURE: 135 MMHG | HEART RATE: 66 BPM | RESPIRATION RATE: 18 BRPM | HEIGHT: 60 IN | DIASTOLIC BLOOD PRESSURE: 58 MMHG | OXYGEN SATURATION: 94 % | BODY MASS INDEX: 32 KG/M2

## 2025-08-23 PROCEDURE — 99233 SBSQ HOSP IP/OBS HIGH 50: CPT | Performed by: HOSPITALIST

## 2025-08-23 RX ORDER — CLONAZEPAM 2 MG/1
1 TABLET ORAL 2 TIMES DAILY
Status: SHIPPED | COMMUNITY
Start: 2025-08-23

## 2025-08-23 RX ORDER — QUETIAPINE FUMARATE 50 MG/1
50 TABLET, FILM COATED ORAL NIGHTLY
Status: SHIPPED | COMMUNITY
Start: 2025-08-23

## 2025-08-25 ENCOUNTER — PATIENT OUTREACH (OUTPATIENT)
Dept: CASE MANAGEMENT | Age: 70
End: 2025-08-25

## 2025-08-25 ENCOUNTER — PATIENT OUTREACH (OUTPATIENT)
Age: 70
End: 2025-08-25

## (undated) DIAGNOSIS — G56.01 RIGHT CARPAL TUNNEL SYNDROME: Primary | ICD-10-CM

## (undated) DEVICE — Device: Brand: STABLECUT®

## (undated) DEVICE — VIOLET BRAIDED (POLYGLACTIN 910), SYNTHETIC ABSORBABLE SUTURE: Brand: COATED VICRYL

## (undated) DEVICE — DRESSING HYDRCOLL 3.25X6IN TRNSLUC

## (undated) DEVICE — GAMMEX® PI HYBRID SIZE 6.5, STERILE POWDER-FREE SURGICAL GLOVE, POLYISOPRENE AND NEOPRENE BLEND: Brand: GAMMEX

## (undated) DEVICE — SYRINGE MED 60ML 5ML INCREMENT CLR BRL BLK

## (undated) DEVICE — 20 ML SYRINGE LUER-LOCK TIP: Brand: MONOJECT

## (undated) DEVICE — GLOVE SUR 8 SENSICARE PIP WHT PWD F

## (undated) DEVICE — SUT COAT VCRL 1 27IN CT-1 ABSRB VLT 36MM 1/2

## (undated) DEVICE — NEEDLE SPNL 18GA L3.5IN W/ QNCKE SHARPER BVL

## (undated) DEVICE — HANDPIECE SET WITH HIGH FLOW TIP AND SUCTION TUBE: Brand: INTERPULSE

## (undated) DEVICE — SUTURE FIBERWIRE S AR-7200

## (undated) DEVICE — FORCEP RADIAL JAW 4

## (undated) DEVICE — PSI-TEC TUBING: Brand: PSI-TEC TUBING

## (undated) DEVICE — SHOULDER: Brand: MEDLINE INDUSTRIES, INC.

## (undated) DEVICE — SUT ETHLN 2-0 18IN FS NABSRB BLK 26MM 3/8 CIR

## (undated) DEVICE — SOLUTION IRRIG 3000ML 0.9% NACL FLX CONT

## (undated) DEVICE — SUTURE ETHIBOND 1 CT-1

## (undated) DEVICE — GAUZE SPONGES,12 PLY: Brand: CURITY

## (undated) DEVICE — COVER STND 54X23IN MAYO REINF

## (undated) DEVICE — SUCTION CANISTER, 3000CC,SAFELINER: Brand: DEROYAL

## (undated) DEVICE — HOOD: Brand: FLYTE

## (undated) DEVICE — Device: Brand: CUSTOM PROCEDURE KIT

## (undated) DEVICE — WEBRIL COTTON UNDERCAST PADDING: Brand: WEBRIL

## (undated) DEVICE — 3M™ STERI-STRIP™ COMPOUND BENZOIN TINCTURE 40 BAGS/CARTON 4 CARTONS/CASE C1544: Brand: 3M™ STERI-STRIP™

## (undated) DEVICE — HOOD, PEEL-AWAY: Brand: FLYTE

## (undated) DEVICE — PRECISION (12.0 X 0.51 X 34.5MM)

## (undated) DEVICE — INTENDED FOR TISSUE SEPARATION, AND OTHER PROCEDURES THAT REQUIRE A SHARP SURGICAL BLADE TO PUNCTURE OR CUT.: Brand: BARD-PARKER ® STAINLESS STEEL BLADES

## (undated) DEVICE — KIT EVAC 400CC DIA1/8IN H PAT 12.5IN 3 SPR

## (undated) DEVICE — DRESSING SUR 9X25CM SIL SP CVR WTRPRF VIR

## (undated) DEVICE — CONTAINER SPEC COLL AND TRNSPRT W/ WHT CAP

## (undated) DEVICE — GLOVE SUR 7.5 SENSICARE PIP WHT PWD F

## (undated) DEVICE — 3M™ MEDITPORE™ SOFT CLOTH TAPE 6 IN X 10 YD 12 ROLLS/CASE 2966: Brand: 3M™ MEDIPORE™

## (undated) DEVICE — STANDARD HYPODERMIC NEEDLE,POLYPROPYLENE HUB: Brand: MONOJECT

## (undated) DEVICE — Device

## (undated) DEVICE — STAPLER SKIN ETHICON GUN PXW35

## (undated) DEVICE — CAST GRN 5YDX4IN GPSN S PLSTR

## (undated) DEVICE — SUTURE VICRYL 0 CP-1

## (undated) DEVICE — BIPOLAR SEALER 23-112-1 AQM 6.0: Brand: AQUAMANTYS ®

## (undated) DEVICE — ELECTRODE ESURG 2.75IN EZ CLN

## (undated) DEVICE — LINE MNTR ADLT SET O2 INTMD

## (undated) DEVICE — PERIPHERAL SCREW DRILL BIT 3.2MM

## (undated) DEVICE — SUT PDS II 2-0 27IN ABSRB VLT L26MM CT-2

## (undated) DEVICE — BATTERY

## (undated) DEVICE — SUT ETHIBOND 1 CT-1 X425H

## (undated) DEVICE — TOWEL SURG OR 17X30IN BLUE

## (undated) DEVICE — SUT VCRL 1-0 36IN CTX ABSRB UD L48MM 1/2 CIR

## (undated) DEVICE — Device: Brand: K-WIRE

## (undated) DEVICE — 3M™ COBAN™ NL STERILE NON-LATEX SELF-ADHERENT WRAP, 2084S, 4 IN X 5 YD (10 CM X 4,5 M), 18 ROLLS/CASE: Brand: 3M™ COBAN™

## (undated) DEVICE — SUT MCRYL 2-0 36IN CT-1 ABSRB UD L36MM TAPR P

## (undated) DEVICE — APPLICATOR PREP 26ML CHG 2% ISO ALC 70%

## (undated) DEVICE — HOOD STERI-SHIELD 408-800-100

## (undated) DEVICE — Device: Brand: DEFENDO AIR/WATER/SUCTION AND BIOPSY VALVE

## (undated) DEVICE — APPLICATOR 2% CHG 70% ISOPRPNL

## (undated) DEVICE — SYRINGE MNJCT 35ML LF STRL LL

## (undated) DEVICE — UPPER EXTREMITY: Brand: MEDLINE INDUSTRIES, INC.

## (undated) DEVICE — FLEXIBLE YANKAUER,MEDIUM TIP, NO VACUUM CONTROL: Brand: ARGYLE

## (undated) DEVICE — SUTURE MONOCRYL 3-0 Y936H

## (undated) DEVICE — PAD PERINL PST FOAM DISP FOR AMSCO SURG TBL

## (undated) DEVICE — DRILL BIT 2.7MM 2142-27-070

## (undated) DEVICE — DRAPE SRG 70X60IN SPLT U IMPRV

## (undated) DEVICE — DRESSING FOAM 1IN 4MM H DISK CHG IMPREG AEGIS

## (undated) DEVICE — SUTURE VICRYL 2-0 FS-1

## (undated) DEVICE — SOLUTION IRRIG 1000ML 0.9% NACL USP BTL

## (undated) DEVICE — BLADE SAW SAGITTAL 19.5

## (undated) DEVICE — SUT PDS II 0 36IN CT-1 ABSRB VLT L36MM 1/2

## (undated) DEVICE — DISPOSABLE TOURNIQUET CUFF SINGLE BLADDER, DUAL PORT AND QUICK CONNECT CONNECTOR: Brand: COLOR CUFF

## (undated) DEVICE — Device: Brand: XPERIENCE

## (undated) DEVICE — SHOULDER P.A.D II: Brand: DEROYAL

## (undated) DEVICE — DRAPE SRG 90X60IN BCK TBL CVR

## (undated) DEVICE — GAMMEX® NON-LATEX PI ORTHO SIZE 7, STERILE POLYISOPRENE POWDER-FREE SURGICAL GLOVE: Brand: GAMMEX

## (undated) DEVICE — 60 ML SYRINGE LUER-LOCK TIP: Brand: MONOJECT

## (undated) DEVICE — SOLUTION IRRIG 1000ML WND LAV BACTISURE

## (undated) DEVICE — 450 ML BOTTLE OF 0.05% CHLORHEXIDINE GLUCONATE IN 99.95% STERILE WATER FOR IRRIGATION, USP AND APPLICATOR.: Brand: IRRISEPT ANTIMICROBIAL WOUND LAVAGE

## (undated) DEVICE — KIT NEG PRSS PREVENA DRAIN 20CM INCIS MGMT PEEL PALACE

## (undated) DEVICE — SUT PROLENE 3-0 PS-2 8687H

## (undated) DEVICE — ABSORBABLE WOUND CLOSURE DEVICE: Brand: V-LOC 90

## (undated) DEVICE — GOWN SURG AERO BLUE PERF LG

## (undated) DEVICE — ADHESIVE SKIN CLSR 0.7ML TOP DERMBND ADV

## (undated) DEVICE — COVER BK TBL L72IN BLU PD HVY DTY BK TBL CVR

## (undated) DEVICE — DISPOSABLE ORTHOPAEDIC PROTECTOR: Brand: ALEXIS ® ORTHOPAEDIC PROTECTOR

## (undated) DEVICE — SEALER BPLR ELECTRD 3.48X5.74MM CNTR

## (undated) DEVICE — ZZ-CONVERTED-TO-522442- SPONGE 4X4 10PK

## (undated) DEVICE — 3M™ STERI-STRIP™ REINFORCED ADHESIVE SKIN CLOSURES, R1547, 1/2 IN X 4 IN (12 MM X 100 MM), 6 STRIPS/ENVELOPE: Brand: 3M™ STERI-STRIP™

## (undated) DEVICE — ANTIBACTERIAL UNDYED BRAIDED (POLYGLACTIN 910), SYNTHETIC ABSORBABLE SUTURE: Brand: COATED VICRYL

## (undated) DEVICE — DRAPE,UTILITY,TAPE,15X26,STERILE: Brand: MEDLINE

## (undated) DEVICE — 35 ML SYRINGE REGULAR TIP: Brand: MONOJECT

## (undated) DEVICE — 35 ML SYRINGE LUER-LOCK TIP: Brand: MONOJECT

## (undated) DEVICE — SOL  .9 1000ML BTL

## (undated) DEVICE — PACK CDS ANTERIOR HIP

## (undated) DEVICE — 2T11 #2 PDO 36 X 36: Brand: 2T11 #2 PDO 36 X 36

## (undated) DEVICE — TRAY PREP WET SKIN 4 COMPARTMENT 6 SPNG 2 TWL

## (undated) DEVICE — SPONGE LAP 18X18 XRAY STRL

## (undated) DEVICE — HANDLE SUR BLU PLAS LT FLX SLIP ON ST DISP

## (undated) DEVICE — TRAY CATH FOLEY 16FR INCLUDE BARDX IC COMPLT CARE

## (undated) DEVICE — SUT ETHBND XL 2 30IN V-37 NABSRB GRN 40MM 1/2

## (undated) DEVICE — SPONGE PREMIERPRO 7X18X18

## (undated) DEVICE — KIT DRN 1/8IN PVC 3 SPRG EVAC

## (undated) DEVICE — FAN SPRAY KIT: Brand: PULSAVAC®

## (undated) DEVICE — SUT VICRYL 0 CP-1 J267H

## (undated) DEVICE — INTENT TO BE USED WITH SUTURE MATERIAL FOR TISSUE CLOSURE: Brand: RICHARD-ALLAN® NEEDLE 3/8 CIRCLE TROCAR

## (undated) DEVICE — ENDOSCOPY PACK - LOWER: Brand: MEDLINE INDUSTRIES, INC.

## (undated) DEVICE — C-ARM: Brand: UNBRANDED

## (undated) DEVICE — SUT VCRL 2-0 36IN CT-1 ABSRB UD L36MM 1/2 CIR

## (undated) DEVICE — SKIN PREP TRAY 4 COMPARTM TRAY: Brand: MEDLINE INDUSTRIES, INC.

## (undated) DEVICE — PRECISION (7.0 X 0.51 X 29.5MM)

## (undated) DEVICE — PROXIMATE SKIN STAPLERS (35 WIDE) CONTAINS 35 STAINLESS STEEL STAPLES (FIXED HEAD): Brand: PROXIMATE

## (undated) DEVICE — SUT STRATAFIX SYMMTRC PDS+ 1 18IN CTX ABSRB V

## (undated) DEVICE — STERILE TETRA-FLEX CF, ELASTIC BANDAGE, 4" X 5.5YD: Brand: TETRA-FLEX™CF

## (undated) DEVICE — SOL  .9 3000ML

## (undated) DEVICE — POUCH IRRIG 19X23IN TRNSLUC MATTE FNSH

## (undated) DEVICE — 3M™ IOBAN™ 2 ANTIMICROBIAL INCISE DRAPE 6650EZ: Brand: IOBAN™ 2

## (undated) DEVICE — SUT MCRYL 3-0 27IN ABSRB UD L24MM PS-1

## (undated) DEVICE — GOWN SURG AERO BLUE PERF XLG

## (undated) DEVICE — CONMED ACCESSORY ELECTRODE, NEEDLE ELECTRODE

## (undated) DEVICE — HANDPIECE IRRIG BTTRY OPERATED TYP W/ SUCT HI

## (undated) DEVICE — SNAP KOVER: Brand: UNBRANDED

## (undated) DEVICE — SUT VICRYL 2-0 FS-1 J443H

## (undated) DEVICE — SUT ETHILON 3-0 FS-1 669H

## (undated) DEVICE — WOUND RETRACTOR AND PROTECTOR: Brand: ALEXIS O WOUND PROTECTOR-RETRACTOR

## (undated) DEVICE — HOOD STERI-SHIELD 408-800-000

## (undated) DEVICE — SUT COAT VCRL 2-0 27IN ABSRB UD 26MM CT-2

## (undated) DEVICE — SUT ETHBND XL 1 30IN NABSRB GRN 48MM CTX 1/2 CIR TAPR

## (undated) DEVICE — HANDLE SUCT REG CAP FLANGETIP SMOOTH YANK

## (undated) DEVICE — CONTAINER SPEC 4OZ POLYPR GRAD SCR LID LEAK

## (undated) DEVICE — HYDROGEN PEROXIDE 16OZ.

## (undated) DEVICE — DRAPE SUR W53XL77IN STD POLYPR SMS 3 QTR SHT

## (undated) DEVICE — TOWEL,OR,DSP,ST,BLUE,DLX,2/PK,40PK/CS: Brand: MEDLINE

## (undated) DEVICE — MEGADYNE 2.75IN NEEDLE MONO

## (undated) DEVICE — CHLORAPREP 26ML APPLICATOR

## (undated) DEVICE — SOL NACL IRRIG 0.9% 1000ML BTL

## (undated) DEVICE — CAPTIVATOR COLD

## (undated) DEVICE — ALIGNEMENT PIN-SMOOTH

## (undated) DEVICE — COTTON UNDERCAST PADDING,REGULAR FINISH: Brand: WEBRIL

## (undated) DEVICE — KIT INCIS MGMT PREVENA DRAIN 13CM PEEL AND PLC DISP

## (undated) DEVICE — 12 ML SYRINGE LUER-LOCK TIP: Brand: MONOJECT

## (undated) NOTE — Clinical Note
1/6/2017              [facility]        [address]        [place] [address]         Dear Indu Culp,      It was a pleasure to see you at our [address] office.   Your lab tests were normal.  .  I look forward

## (undated) NOTE — MR AVS SNAPSHOT
[facility] SPECIALTY \[facility] [place] [facility]\"" - [place] [address] [address] [address] [phone]               Thank you for choosing us for your health care visit with RICARDO Bowles.   We are glad to serve you and happy to provide you with this summary of may be held responsible for payment in full if proper authorization is not acquired. Please contact the Patient Business Office at [phone] if you have any questions related to insurance coverage. Thank you.          Reason for Today's Visit     Cough ibuprofen 600 MG Tabs   TAKE ONE TABLET BY MOUTH EVERY 6 HOURS AS NEEDED WITH FOOD   Commonly known as:  MOTRIN           methylPREDNISolone 4 MG Tbpk   As directed.    Commonly known as:  MEDROL           Pantoprazole Sodium 40 MG Tbec   TAKE ONE TABLET B Visit [facility] online at  [email or web address]

## (undated) NOTE — Clinical Note
1/11/2017              Marisa Aviles        [address] [facility] [address]         Dear Loreto Angel,      It was a pleasure to see you at our [address] [facility] office.   Your lab tests were normal.  You don't Document electronically generated by:  Maribeth Singh

## (undated) NOTE — LETTER
9/3/2019    [address] [place] [address]            Dear Marcial Courser,      Our records indicate that you are due for an appointment for a Colonoscopy in November 2019, or shortly there after, with Burtis Nyhan

## (undated) NOTE — LETTER
[address] [place] [address]  Authorization for Invasive Procedures  1.  I hereby authorize Dr. Yaa Prieto , my physician and whomever may be designated as the doctor's assistant, to perform the following operation and/or procedure: 5. I consent to the photographing of the operations or procedures to be performed for the purposes of advancing medicine, science, and/or education, provided my identity is not revealed.  If the procedure has been videotaped, the physician/surgeon will obta __________ Time: ___________    Statement of Physician  My signature below affirms that prior to the time of the procedure, I have explained to the patient and/or her legal representative, the risks and benefits involved in the proposed treatment and any r

## (undated) NOTE — MR AVS SNAPSHOT
[facility] SPECIALTY [facility] - [place] [address] [address] [id number] [phone]               Thank you for choosing us for your health care visit with Fatou Khalil MD.  We are glad to serve you and happy to provide you with this summary of y Commonly known as:  CYMBALTA           FLUVIRIN Susp   Generic drug:  Influenza Vac Typ A&B Surf Ant           FLUZONE QUADRIVALENT Susp   Generic drug:  Influenza Vac Split Quad           ibuprofen 600 MG Tabs   TAKE ONE TABLET BY MOUTH EVERY 6 HOURS AS N

## (undated) NOTE — LETTER
September 12, 2017     Lily Aviles  [address] [facility]      Dear Aarti Artist:    Below are the results from your recent visit:  Your PFT shows mild COPD.      Resulted Orders   PFT MISCELLANEOUS    Narrative    Sanjeev Dewitt MD     9/12/

## (undated) NOTE — LETTER
[address], [place]  Authorization for Invasive Procedures  1.  I hereby authorize Dr. Troy Kruse , my physician and whomever may be designated as the doctor's assistant, to perform the following operation and/or procedure:  Cardiac 5. I consent to the photographing of the operations or procedures to be performed for the purposes of advancing medicine, science, and/or education, provided my identity is not revealed.  If the procedure has been videotaped, the physician/surgeon will obta __________ Time: ___________    Statement of Physician  My signature below affirms that prior to the time of the procedure, I have explained to the patient and/or her legal representative, the risks and benefits involved in the proposed treatment and any r

## (undated) NOTE — ED AVS SNAPSHOT
Juan Mcdermott   MRN: [id number]    Department:  [facility]   Date of Visit:  1/30/2018           Disclosure     Insurance plans vary and the physician(s) referred by the ER may not be covered by your plan.  Please contact you CARE PHYSICIAN AT ONCE OR RETURN IMMEDIATELY TO THE EMERGENCY DEPARTMENT. If you have been prescribed any medication(s), please fill your prescription right away and begin taking the medication(s) as directed.   If you believe that any of the medications

## (undated) NOTE — LETTER
4/13/2023 Jan 26 [address] [address]         The Whom It May Concern,    Eric Almaraz is under my medical care. Please restrict her activities so that she does not lift or pull more than 10 pounds for 1 week beginning on 4/13/2023. If you have any questions feel free to contact my office at your convenience.     Sincerely,       Rajwinder May MD  [facility] [address] [address] [phone]        Document electronically generated by:  Rajwinder May MD

## (undated) NOTE — LETTER
Patient Name: Charis Britt  : 1955  MRN: [id number]  Patient Address: [address] [place] [id number]      Coronavirus Disease 2019 (COVID-19)     [facility] is committed to the safety and well-being of our patients, members carefully. If your symptoms get worse, call your healthcare provider immediately. 3. Get rest and stay hydrated.    4. If you have a medical appointment, call the healthcare provider ahead of time and tell them that you have or may have COVID-19.  5. For m of fever-reducing medications; and  · Improvement in respiratory symptoms (e.g., cough, shortness of breath); and  · At least 10 days have passed since symptoms first appeared OR if asymptomatic patient or date of symptom onset is unclear then use 10 days donors must:    · Have had a confirmed diagnosis of COVID-19  · Be symptom-free for at least 14 days*    *Some people will be required to have a repeat COVID-19 test in order to be eligible to donate.  If you’re instructed by Nancy that a repeat test is r random. Researchers are trying to identify similarities between people with a Post-COVID condition to better understand if there are risk factors. How do I prevent a Post-COVID condition?   The best way to prevent the long-term symptoms of COVID-19 is

## (undated) NOTE — ED AVS SNAPSHOT
Marcial Courser   MRN: [id number]    Department:  [facility]   Date of Visit:  6/27/2018           Disclosure     Insurance plans vary and the physician(s) referred by the ER may not be covered by your plan.  Please contact you within the next three months to obtain basic health screening including reassessment of your blood pressure.     IF THERE IS ANY CHANGE OR WORSENING OF YOUR CONDITION, CALL YOUR PRIMARY CARE PHYSICIAN AT ONCE OR RETURN IMMEDIATELY TO THE EMERGENCY DEPARTMEN

## (undated) NOTE — LETTER
[address], [place]  Authorization for Invasive Procedures  1.  I hereby authorize Dr. Concepcion Dave , my physician and whomever may be designated as the doctor's assistant, to perform the following operation and/or procedure:  Cardiac 5. I consent to the photographing of the operations or procedures to be performed for the purposes of advancing medicine, science, and/or education, provided my identity is not revealed.  If the procedure has been videotaped, the physician/surgeon will obta __________ Time: ___________    Statement of Physician  My signature below affirms that prior to the time of the procedure, I have explained to the patient and/or her legal representative, the risks and benefits involved in the proposed treatment and any r

## (undated) NOTE — IP AVS SNAPSHOT
[address]  [address] [facility] ~ [phone]                Discharge Summary   4/23/2017    Roselie Prader           Admission Information        Provider Department    4/23/2017 Ying Watson MD E NEXT DOSE DUE AT 04/25/2017 AT 0900 AM                   ClonazePAM 2 MG Tabs   Last time this was given:  2 mg on 4/23/2017  9:49 PM   Commonly known as:  KLONOPIN        Take 2 mg by mouth nightly.                  NEXT DOSE DUE AT 0900PM 04/24/2017 INFLUENZA 10/21/2014, 10/21/2014, 1/24/2014      Recent Hematology Lab Results  (Last 3 results in the past 90 days)    WBC RBC Hemoglobin Hematocrit MCV MCH MCHC RDW Platelet MPV    (17/85/10)  4.7 (04/23/17)  4.16 (04/23/17)  12.7 (04/23/17)  37.9 (04/2 coverage. Patient [address] is a Federal Navigator program that can help with your Affordable Care Act coverage, as well as all types of Medicaid plans.   To get signed up and covered, please call [phone] and ask to get set up for an insuran What to report to your healthcare team: Difficulty urinating, dizziness, no bowel movement in 2+ days, unresolved pain           GI Medications     PANTOPRAZOLE SODIUM 40 MG Oral Tab EC       Use:  Nausea/vomiting, acid reflux, low bowel motility, stomach

## (undated) NOTE — LETTER
10/28/21      Patient: Curry Montesinos  : 1955 Visit date: 10/28/2021    Dear Estefani Beckham,      I examined your patient in consultation today. We removed multiple superficial thorns from the right palm.   She tolerated the procedure well

## (undated) NOTE — LETTER
11/23/2018              [facility] [address]         Dear Caty Milligan records indicate that the tests ordered for you by Wilfred Rhodes MD  have not been done.   If you have, in fact, already completed

## (undated) NOTE — LETTER
3/4/2017              Gretta Aviles        [address]        [place]         Dear Edwin Arredondo,    It was a pleasure to see you. Your mammogram was normal.  There is no need for further testing at this time.   I look forward to seeing you at yo

## (undated) NOTE — LETTER
Re:      Eric Aviles                                                                              : 51                                                                           The above-named patient is scheduled for outpatient surgery on   25         at [facility] with Chase Mauricio MD. The procedure(s) being performed is/are: Left Anterior Total Hip Arthroplasty         The patient has been advised to contact your office for pre-operative clearance. The patient needs the following test/exams (as marked):    __X___ History & Physical (should be no older that 30 days)    ___X__ MRSA (Valid for 30 Days)  ___X__ Type & Screen (Only To be done at an [facility])   __X___ EKG (Valid for 1 year)  ___X__ CBC & BMP (Valid for 3 months)   ___X__ Urinalysis Reflex to culture (Valid for 30 days)  __X___ Hemoglobin A1c           Please include any other tests you deem necessary for medical clearance. Please fax these results to our office at [phone] Attention: Valerie.    If you have any questions regarding this case, please contact our office at [phone]. Thank you for your assistance with this matter.

## (undated) NOTE — MR AVS SNAPSHOT
[address] [address]  [phone]               Thank you for choosing us for your health care visit with Jessica Boone MD.  We are glad to serve you and happy to provide you with this agee exam. This allows you to be more comfortable during the exam.  There are no eating or activity restrictions for this exam.              Allergies as of Jan 06, 2017     Sulfanilamide Rash                Today's Vital Signs     BP Pulse Height Weight BMI Bring a paper prescription for each of these medications    - Acetaminophen-Codeine 300-30 MG Tabs            Today's Orders     XR SHOULDER, COMPLETE (MIN 2 VIEWS), RIGHT (CPT=73030)    Complete by:  Jan 06, 2017 (Approximate)        CBC, Platelet;  No Eve Mayo Referral Order Referred to  Rhonda Gomes Phone Visits Status Diagnosis           Generalized OA [956925]  Chronic right shoulder pain [884783]           Polyarthralgia [80439]           Polyarthralgia [72837]           Polyarthralgia [61691]           Poly

## (undated) NOTE — LETTER
No referring provider defined for this encounter. 10/06/17        Patient: Omayra Silveira   YOB: 1955   Date of Visit: 10/5/2017       Dear  Dr. Kristina Jiménez MD,      Thank you for referring Omayra Silveira to my practice.   Please find my a

## (undated) NOTE — LETTER
4/18/2025                                                                              Eric Aviles is scheduled for outpatient surgery on April 21, 2025 at [facility] with Chase Mauricio MD. The procedure(s) being performed is/are: Left Total Hip Arthroplasty I&D, Possible Head Liner Change.     The patient has been advised to contact your office for pre-operative clearance. The patient needs the following test/exams (as marked):    _____ History & Physical (should be no older that 30 days)    _____ MRSA (Valid for 30 Days)  _____ Type & Screen (Only to be done at an [facility])  _____ EKG (Valid for 1 year)  _____ CBC & BMP (Valid for 3 months)   _____ Urinalysis Reflex to culture (Valid for 30 days)  _____ Hemoglobin A1c             Please include any other tests you deem necessary for medical clearance. Please fax these results to our office at [phone], as well as the pre-op department at [phone].  If you have any questions regarding this case, please contact our office at [phone]. Thank you for your assistance with this matter.

## (undated) NOTE — MR AVS SNAPSHOT
[address]  [address] [place] [phone]               Thank you for choosing us for your health care visit with Pretty Khan PT.   We are glad to serve you and happy to provide you with this summary o [facility] in [address] [place] [address] 402 ([address] [place])    [address] [facility] [phone]           Please arrive at your scheduled appointment time. Wear comfortable, loose fitting clothing.             Feb 21, 2017  3:15 PM

## (undated) NOTE — LETTER
[facility] [place]  [address]  Dept: [phone]  Dept Fax: [phone]  Loc: [phone]      June 1, 2017    Patient: Patience Jacome   Date of Visit: 6/1/2017       To Whom It May Concern:    Kelechi heredia

## (undated) NOTE — ED AVS SNAPSHOT
Fannie Kamara   MRN: [id number]    Department:  [facility] Emergency Department   Date of Visit:  7/25/2019           Disclosure     Insurance plans vary and the physician(s) referred by the ER may not be covered by your plan.  Please contact you within the next three months to obtain basic health screening including reassessment of your blood pressure.     IF THERE IS ANY CHANGE OR WORSENING OF YOUR CONDITION, CALL YOUR PRIMARY CARE PHYSICIAN AT ONCE OR RETURN IMMEDIATELY TO THE EMERGENCY DEPARTMEN

## (undated) NOTE — ED AVS SNAPSHOT
Daniele Gruber   MRN: [id number]    Department:  [facility] Emergency Department   Date of Visit:  9/27/2018           Disclosure     Insurance plans vary and the physician(s) referred by the ER may not be covered by your plan.  Please contact you CARE PHYSICIAN AT ONCE OR RETURN IMMEDIATELY TO THE EMERGENCY DEPARTMENT. If you have been prescribed any medication(s), please fill your prescription right away and begin taking the medication(s) as directed.   If you believe that any of the medications